# Patient Record
Sex: FEMALE | Race: WHITE | NOT HISPANIC OR LATINO | Employment: FULL TIME | URBAN - METROPOLITAN AREA
[De-identification: names, ages, dates, MRNs, and addresses within clinical notes are randomized per-mention and may not be internally consistent; named-entity substitution may affect disease eponyms.]

---

## 2018-08-07 DIAGNOSIS — M25.569 KNEE PAIN, UNSPECIFIED CHRONICITY, UNSPECIFIED LATERALITY: Primary | ICD-10-CM

## 2018-08-09 ENCOUNTER — OFFICE VISIT (OUTPATIENT)
Dept: ORTHOPEDICS | Facility: CLINIC | Age: 58
End: 2018-08-09
Payer: COMMERCIAL

## 2018-08-09 ENCOUNTER — HOSPITAL ENCOUNTER (OUTPATIENT)
Dept: RADIOLOGY | Facility: HOSPITAL | Age: 58
Discharge: HOME | End: 2018-08-09
Attending: ORTHOPAEDIC SURGERY
Payer: COMMERCIAL

## 2018-08-09 VITALS
BODY MASS INDEX: 29.19 KG/M2 | SYSTOLIC BLOOD PRESSURE: 128 MMHG | DIASTOLIC BLOOD PRESSURE: 69 MMHG | WEIGHT: 186 LBS | HEIGHT: 67 IN | HEART RATE: 89 BPM

## 2018-08-09 DIAGNOSIS — M17.12 ARTHRITIS OF KNEE, LEFT: ICD-10-CM

## 2018-08-09 DIAGNOSIS — M25.569 KNEE PAIN, UNSPECIFIED CHRONICITY, UNSPECIFIED LATERALITY: ICD-10-CM

## 2018-08-09 DIAGNOSIS — M25.561 RIGHT KNEE PAIN, UNSPECIFIED CHRONICITY: Primary | ICD-10-CM

## 2018-08-09 DIAGNOSIS — S83.241A ACUTE MEDIAL MENISCAL TEAR, RIGHT, INITIAL ENCOUNTER: Primary | ICD-10-CM

## 2018-08-09 PROCEDURE — 73564 X-RAY EXAM KNEE 4 OR MORE: CPT | Mod: 26,LT,, | Performed by: RADIOLOGY

## 2018-08-09 PROCEDURE — 99203 OFFICE O/P NEW LOW 30 MIN: CPT | Mod: S$GLB,,, | Performed by: ORTHOPAEDIC SURGERY

## 2018-08-09 PROCEDURE — 99999 PR PBB SHADOW E&M-EST. PATIENT-LVL III: CPT | Mod: PBBFAC,,, | Performed by: ORTHOPAEDIC SURGERY

## 2018-08-09 PROCEDURE — 73564 X-RAY EXAM KNEE 4 OR MORE: CPT | Mod: 26,RT,, | Performed by: RADIOLOGY

## 2018-08-09 PROCEDURE — 73564 X-RAY EXAM KNEE 4 OR MORE: CPT | Mod: TC,50,PN

## 2018-08-11 ENCOUNTER — HOSPITAL ENCOUNTER (OUTPATIENT)
Dept: RADIOLOGY | Facility: HOSPITAL | Age: 58
Discharge: HOME | End: 2018-08-11
Attending: ORTHOPAEDIC SURGERY
Payer: COMMERCIAL

## 2018-08-11 DIAGNOSIS — M25.561 RIGHT KNEE PAIN, UNSPECIFIED CHRONICITY: ICD-10-CM

## 2018-08-11 PROCEDURE — 73721 MRI JNT OF LWR EXTRE W/O DYE: CPT | Mod: TC,RT

## 2018-08-11 PROCEDURE — 73721 MRI JNT OF LWR EXTRE W/O DYE: CPT | Mod: 26,RT,, | Performed by: RADIOLOGY

## 2018-08-12 PROBLEM — M17.12 ARTHRITIS OF KNEE, LEFT: Status: ACTIVE | Noted: 2018-08-12

## 2018-08-12 NOTE — PROGRESS NOTES
History reviewed. No pertinent past medical history.    Past Surgical History:   Procedure Laterality Date    KNEE SURGERY Left 1980s       No current outpatient medications on file.     No current facility-administered medications for this visit.        Review of patient's allergies indicates:   Allergen Reactions    Ciprofloxacin     Pcn [penicillins]        History reviewed. No pertinent family history.    Social History     Socioeconomic History    Marital status: Single     Spouse name: Not on file    Number of children: Not on file    Years of education: Not on file    Highest education level: Not on file   Social Needs    Financial resource strain: Not on file    Food insecurity - worry: Not on file    Food insecurity - inability: Not on file    Transportation needs - medical: Not on file    Transportation needs - non-medical: Not on file   Occupational History    Not on file   Tobacco Use    Smoking status: Never Smoker    Smokeless tobacco: Never Used   Substance and Sexual Activity    Alcohol use: Not on file    Drug use: Not on file    Sexual activity: Not on file   Other Topics Concern    Not on file   Social History Narrative    Not on file       Chief Complaint:   Chief Complaint   Patient presents with    Right Knee - Pain    Left Knee - Pain       History of present illness:  This is a 58-year-old female seen for bilateral knee pain.  Patient just recently moved here.  She has had a history of problems with her knees for several years.  She had a torn meniscus in her right knee previously.  She last had a cortisone injection in June in the left knee. She was told that she would need left knee replacement previously.  She had an open ligament reconstruction by Dr. Covarrubias back in 1981.  Pain is a 3/10.      Review of Systems:    Constitution: Negative for chills, fever, and sweats.  Negative for unexplained weight loss.    HENT:  Negative for headaches and blurry  vision.    Cardiovascular:Negative for chest pain or irregular heart beat. Negative for hypertension.    Respiratory:  Negative for cough and shortness of breath.    Gastrointestinal: Negative for abdominal pain, heartburn, melena, nausea, and vomitting.    Genitourinary:  Negative bladder incontinence and dysuria.    Musculoskeletal:  See HPI    Neurological: Negative for numbness.    Psychiatric/Behavioral: Negative for depression.  The patient is not nervous/anxious.      Endocrine: Negative for polyuria    Hematologic/Lymphatic: Negative for bleeding problem.  Does not bruise/bleed easily.    Skin: Negative for poor would healing and rash      Physical Examination:    Vital Signs:    Vitals:    08/09/18 1326   BP: 128/69   Pulse: 89       Body mass index is 29.13 kg/m².    This a well-developed, well nourished patient in no acute distress.  They are alert and oriented and cooperative to examination.  Pt. walks without an antalgic gait.      Examination of the left knee shows no rashes or erythema. There are no masses ecchymosis or effusion.  Prior open surgical scars on both the medial and lateral knee. Patient has full range of motion from 0-130°. Patient is nontender to palpation over lateral joint line and nontender to palpation over the medial joint line. Patient has a - Lachman exam, - anterior drawer exam, and - posterior drawer exam. - Will's exam. Knee is stable to varus and valgus stress. 5 out of 5 motor strength. Palpable distal pulses. Intact light touch sensation. Negative Patellofemoral crepitus    Examination of the right knee shows no rashes or erythema. There are no masses ecchymosis or effusion. Patient has full range of motion from 0-130°. Patient is nontender to palpation over lateral joint line and nontender to palpation over the medial joint line. Patient has a - Lachman exam, - anterior drawer exam, and - posterior drawer exam. - Will's exam. Knee is stable to varus and valgus  stress. 5 out of 5 motor strength. Palpable distal pulses. Intact light touch sensation. Negative Patellofemoral crepitus        X-rays:  X-rays of both knees are ordered and reviewed which show severe lateral narrowing of the left knee.  Mild medial narrowing of the right knee.     Assessment::  Severe left knee arthritis  Possible right medial meniscal tear    Plan:  Reviewed the findings with her today. Patient would benefit from a left knee replacement at some point. Her main concern is the right knee today.  I recommended an MRI to evaluate her right knee further.  Follow-up after the right knees completed.    This note was created using [employer] voice recognition software that occasionally misinterpreted phrases or words.    Consult note is delivered via Epic messaging service.

## 2018-08-14 ENCOUNTER — TELEPHONE (OUTPATIENT)
Dept: ORTHOPEDICS | Facility: CLINIC | Age: 58
End: 2018-08-14

## 2018-08-14 NOTE — TELEPHONE ENCOUNTER
----- Message from Ata Paula MD sent at 8/12/2018  8:16 AM CDT -----  Results noted. Pt needs appt to discuss results and treatment options.

## 2018-09-10 ENCOUNTER — OFFICE VISIT (OUTPATIENT)
Dept: ORTHOPEDICS | Facility: CLINIC | Age: 58
End: 2018-09-10
Payer: COMMERCIAL

## 2018-09-10 VITALS
WEIGHT: 186.06 LBS | HEART RATE: 68 BPM | BODY MASS INDEX: 29.2 KG/M2 | SYSTOLIC BLOOD PRESSURE: 141 MMHG | HEIGHT: 67 IN | DIASTOLIC BLOOD PRESSURE: 72 MMHG

## 2018-09-10 DIAGNOSIS — R20.2 NUMBNESS AND TINGLING OF LEFT LEG: Primary | ICD-10-CM

## 2018-09-10 DIAGNOSIS — M17.12 ARTHRITIS OF KNEE, LEFT: Primary | ICD-10-CM

## 2018-09-10 DIAGNOSIS — M25.562 LEFT KNEE PAIN, UNSPECIFIED CHRONICITY: Primary | ICD-10-CM

## 2018-09-10 DIAGNOSIS — S83.241D ACUTE MEDIAL MENISCAL TEAR, RIGHT, SUBSEQUENT ENCOUNTER: ICD-10-CM

## 2018-09-10 DIAGNOSIS — R20.0 NUMBNESS AND TINGLING OF LEFT LEG: Primary | ICD-10-CM

## 2018-09-10 PROCEDURE — 99999 PR PBB SHADOW E&M-EST. PATIENT-LVL III: CPT | Mod: PBBFAC,,, | Performed by: ORTHOPAEDIC SURGERY

## 2018-09-10 PROCEDURE — 99213 OFFICE O/P EST LOW 20 MIN: CPT | Mod: S$GLB,,, | Performed by: ORTHOPAEDIC SURGERY

## 2018-09-10 NOTE — PROGRESS NOTES
History reviewed. No pertinent past medical history.    Past Surgical History:   Procedure Laterality Date    KNEE SURGERY Left 1980s       No current outpatient medications on file.     No current facility-administered medications for this visit.        Review of patient's allergies indicates:   Allergen Reactions    Ciprofloxacin     Pcn [penicillins]        History reviewed. No pertinent family history.    Social History     Socioeconomic History    Marital status: Single     Spouse name: Not on file    Number of children: Not on file    Years of education: Not on file    Highest education level: Not on file   Social Needs    Financial resource strain: Not on file    Food insecurity - worry: Not on file    Food insecurity - inability: Not on file    Transportation needs - medical: Not on file    Transportation needs - non-medical: Not on file   Occupational History    Not on file   Tobacco Use    Smoking status: Never Smoker    Smokeless tobacco: Never Used   Substance and Sexual Activity    Alcohol use: Not on file    Drug use: Not on file    Sexual activity: Not on file   Other Topics Concern    Not on file   Social History Narrative    Not on file       Chief Complaint:   Chief Complaint   Patient presents with    Right Knee - Pain       Interval history:  This is a 58-year-old female seen for bilateral knee pain.  Patient just recently moved here.  She has had a history of problems with her knees for several years.  She had a torn meniscus in her right knee previously.  She last had a cortisone injection in June in the left knee. She was told that she would need left knee replacement previously.  She had an open ligament reconstruction by Dr. Covarrubias back in 1981.  Pain is a 3/10.    History of present illness:  The right knee MRI did show a medial meniscal tear as we suspected.  Unfortunately, the left knee is bothering her even more.  Starting to have some more numbness and tingling in  the left leg.  Like feels cold times. Pain is up to 6/10.  She feels like she is much closer to doing a knee replacement.      Review of Systems:    Constitution: Negative for chills, fever, and sweats.  Negative for unexplained weight loss.    HENT:  Negative for headaches and blurry vision.    Cardiovascular:Negative for chest pain or irregular heart beat. Negative for hypertension.    Respiratory:  Negative for cough and shortness of breath.    Gastrointestinal: Negative for abdominal pain, heartburn, melena, nausea, and vomitting.    Genitourinary:  Negative bladder incontinence and dysuria.    Musculoskeletal:  See HPI    Neurological:  Positive for numbness.    Psychiatric/Behavioral: Negative for depression.  The patient is not nervous/anxious.      Endocrine: Negative for polyuria    Hematologic/Lymphatic: Negative for bleeding problem.  Does not bruise/bleed easily.    Skin: Negative for poor would healing and rash      Physical Examination:    Vital Signs:    Vitals:    09/10/18 0817   BP: (!) 141/72   Pulse: 68       Body mass index is 29.14 kg/m².    This a well-developed, well nourished patient in no acute distress.  They are alert and oriented and cooperative to examination.  Pt. walks without an antalgic gait.      Examination of the left knee shows no rashes or erythema. There are no masses ecchymosis or effusion.  Prior open surgical scars on both the medial and lateral knee. Patient has full range of motion from 0-130°. Patient is nontender to palpation over lateral joint line and nontender to palpation over the medial joint line. Patient has a - Lachman exam, - anterior drawer exam, and - posterior drawer exam. - Will's exam. Knee is stable to varus and valgus stress. 5 out of 5 motor strength. Palpable distal pulses. Intact light touch sensation. Negative Patellofemoral crepitus    Examination of the right knee shows no rashes or erythema. There are no masses ecchymosis or effusion. Patient  has full range of motion from 0-130°. Patient is nontender to palpation over lateral joint line and nontender to palpation over the medial joint line. Patient has a - Lachman exam, - anterior drawer exam, and - posterior drawer exam. - Will's exam. Knee is stable to varus and valgus stress. 5 out of 5 motor strength. Palpable distal pulses. Intact light touch sensation. Negative Patellofemoral crepitus        X-rays:  X-rays of both knees are reviewed which show severe lateral narrowing of the left knee.  Mild medial narrowing of the right knee.    MRI of the right knee: Tear of the posterior horn of the medial meniscus.  Intrasubstance degeneration of the anterior horn of the lateral meniscus without tear.  Large multilocular complex Baker cyst in the medial popliteal fossa.  Mild osteoarthritic changes with thinning of the articular cartilages.     Assessment::  Severe left knee arthritis  right medial meniscal tear    Plan:  Reviewed the findings with her today. Patient would benefit from a left knee replacement at some point. We will go ahead and get the [employer] MRI signature scan done for her left total knee replacement.  We also get a nerve conduction test of her left lower extremity.  Follow-up after the tests are complete.    This note was created using [employer] voice recognition software that occasionally misinterpreted phrases or words.    Consult note is delivered via Epic messaging service.

## 2018-09-11 ENCOUNTER — HOSPITAL ENCOUNTER (OUTPATIENT)
Dept: RADIOLOGY | Facility: HOSPITAL | Age: 58
Discharge: HOME | End: 2018-09-11
Attending: ORTHOPAEDIC SURGERY
Payer: COMMERCIAL

## 2018-09-11 ENCOUNTER — TELEPHONE (OUTPATIENT)
Dept: FAMILY MEDICINE | Facility: CLINIC | Age: 58
End: 2018-09-11

## 2018-09-11 DIAGNOSIS — M25.562 LEFT KNEE PAIN, UNSPECIFIED CHRONICITY: ICD-10-CM

## 2018-09-11 PROCEDURE — 73721 MRI JNT OF LWR EXTRE W/O DYE: CPT | Mod: TC,LT

## 2018-09-11 PROCEDURE — 73721 MRI JNT OF LWR EXTRE W/O DYE: CPT | Mod: 26,LT,, | Performed by: RADIOLOGY

## 2018-09-11 NOTE — TELEPHONE ENCOUNTER
----- Message from Mone Escalona LPN sent at 9/11/2018  9:31 AM CDT -----  Pt is wanting to schedule total knee replacement with Dr. Paula in October. She will require medical clearance prior to sx but is not established with a primary care physician. She is requesting a call to make soonest available appointment to obtain medical clearance. Thanks!

## 2018-09-12 NOTE — TELEPHONE ENCOUNTER
Pt has pre op scheduled.     ----- Message from Mone Escalona LPN sent at 9/11/2018  9:31 AM CDT -----  Pt is wanting to schedule total knee replacement with Dr. Paula in October. She will require medical clearance prior to sx but is not established with a primary care physician. She is requesting a call to make soonest available appointment to obtain medical clearance. Thanks!

## 2018-10-02 ENCOUNTER — TELEPHONE (OUTPATIENT)
Dept: ORTHOPEDICS | Facility: CLINIC | Age: 58
End: 2018-10-02

## 2018-10-02 NOTE — TELEPHONE ENCOUNTER
----- Message from Wesley Valdivia sent at 10/2/2018  1:32 PM CDT -----  Type: Needs Medical Advice    Who Called:  Self   Symptoms (please be specific): NA How long has patient had these symptoms:  ALEXY   Pharmacy name and phone #:  [facility] Call Back Number: [phone]  Additional Information: Patient called asking to speak with the nurse regarding surgery and plans.

## 2018-10-04 ENCOUNTER — HOSPITAL ENCOUNTER (OUTPATIENT)
Dept: RADIOLOGY | Facility: CLINIC | Age: 58
Discharge: HOME | End: 2018-10-04
Attending: FAMILY MEDICINE
Payer: COMMERCIAL

## 2018-10-04 ENCOUNTER — OFFICE VISIT (OUTPATIENT)
Dept: FAMILY MEDICINE | Facility: CLINIC | Age: 58
End: 2018-10-04
Payer: COMMERCIAL

## 2018-10-04 VITALS
WEIGHT: 195.56 LBS | TEMPERATURE: 98 F | BODY MASS INDEX: 30.69 KG/M2 | HEIGHT: 67 IN | SYSTOLIC BLOOD PRESSURE: 105 MMHG | DIASTOLIC BLOOD PRESSURE: 72 MMHG | HEART RATE: 73 BPM

## 2018-10-04 DIAGNOSIS — Z00.00 HEALTHCARE MAINTENANCE: Primary | ICD-10-CM

## 2018-10-04 DIAGNOSIS — B35.3 RECURRENT TINEA PEDIS: ICD-10-CM

## 2018-10-04 DIAGNOSIS — Z01.818 PRE-OP EVALUATION: ICD-10-CM

## 2018-10-04 PROCEDURE — 93005 ELECTROCARDIOGRAM TRACING: CPT | Mod: S$GLB,,, | Performed by: FAMILY MEDICINE

## 2018-10-04 PROCEDURE — 71046 X-RAY EXAM CHEST 2 VIEWS: CPT | Mod: TC,FY,PO

## 2018-10-04 PROCEDURE — 71046 X-RAY EXAM CHEST 2 VIEWS: CPT | Mod: 26,,, | Performed by: RADIOLOGY

## 2018-10-04 PROCEDURE — 93010 ELECTROCARDIOGRAM REPORT: CPT | Mod: S$GLB,,, | Performed by: INTERNAL MEDICINE

## 2018-10-04 PROCEDURE — 99204 OFFICE O/P NEW MOD 45 MIN: CPT | Mod: S$GLB,,, | Performed by: FAMILY MEDICINE

## 2018-10-04 PROCEDURE — 99999 PR PBB SHADOW E&M-EST. PATIENT-LVL III: CPT | Mod: PBBFAC,,, | Performed by: FAMILY MEDICINE

## 2018-10-04 RX ORDER — FLUCONAZOLE 150 MG/1
150 TABLET ORAL WEEKLY
Qty: 3 TABLET | Refills: 0 | Status: SHIPPED | OUTPATIENT
Start: 2018-10-04 | End: 2018-10-22

## 2018-10-04 NOTE — PROGRESS NOTES
Subjective:   Patient ID: Caryn Toledo is a 58 y.o. female     Chief Complaint:Pre-op Exam      Pt here to establish care. Has history of right knee pain. Has been going on for years. Intermittent. Pain moderate to severe. Pain does not radiate. Pain last throughout day. Made better with rest. Pt also with fungal infectoin of foot. Has been going on for months. Started after pedicure. Not improved with topical medication.      Review of Systems   Constitutional: Negative for chills and fever.   HENT: Negative for sore throat.    Eyes: Negative for visual disturbance.   Respiratory: Negative for shortness of breath.    Cardiovascular: Negative for chest pain.   Gastrointestinal: Negative for abdominal pain.   Endocrine: Negative for polyuria.   Genitourinary: Negative for dysuria.   Musculoskeletal: Positive for arthralgias. Negative for back pain.   Skin: Negative for color change.   Neurological: Negative for headaches.   Psychiatric/Behavioral: Negative for agitation and confusion.     History reviewed. No pertinent past medical history.  Objective:     Vitals:    10/04/18 1028   BP: 105/72   Pulse: 73   Temp: 98.1 °F (36.7 °C)     Body mass index is 30.63 kg/m².  Physical Exam   Constitutional: She is oriented to person, place, and time. She appears well-developed and well-nourished.   HENT:   Head: Normocephalic and atraumatic.   Eyes: EOM are normal. Pupils are equal, round, and reactive to light.   Neck: Normal range of motion. Neck supple.   Cardiovascular: Normal rate, regular rhythm, normal heart sounds and intact distal pulses.   Pulmonary/Chest: Effort normal and breath sounds normal.   Abdominal: Soft. She exhibits no distension.   Musculoskeletal: Normal range of motion.   Neurological: She is alert and oriented to person, place, and time.   Skin: Skin is warm and dry.   Psychiatric: She has a normal mood and affect. Her behavior is normal. Judgment and thought content normal.   Nursing note  and vitals reviewed.    Assessment:     1. Healthcare maintenance    2. BMI 30.0-30.9,adult    3. Pre-op evaluation    4. Recurrent tinea pedis      Plan:   Healthcare maintenance  -     Hepatitis C antibody; Future; Expected date: 10/04/2018  -     Mammo Digital Screening Bilateral With CAD; Future; Expected date: 10/04/2018    BMI 30.0-30.9,adult  -     Lipid panel; Future; Expected date: 10/04/2018  -     Hemoglobin A1c; Future; Expected date: 10/04/2018  The patient's BMI has been recorded in the chart. The patient has been provided educational materials regarding the benefits of attaining and maintaining a normal weight. We will continue to address and follow this issue during follow up visits.    Pre-op evaluation  -     CBC auto differential; Future; Expected date: 10/04/2018  -     Comprehensive metabolic panel; Future; Expected date: 10/04/2018  -     X-Ray Chest PA And Lateral; Future; Expected date: 10/04/2018  - will review labwork and imaging and if normal patient low risk for surgery  - EKG NSR ordered and performed here in clinic and interpreted by myself    Recurrent Tinea pedis of both feet  -     fluconazole (DIFLUCAN) 150 MG Tab; Take 1 tablet (150 mg total) by mouth once a week. for 21 days  Dispense: 3 tablet; Refill: 0  - recurrent        Discharge:   Time spent with patient: 45 minutes and over half of that time was spent on counseling an coordination of care.    Barriers to medications present (no )    Adverse reactions to current medications (no)    Over the counter medications reviewed (Yes)      Terrance Nolen MD  10/04/2018

## 2018-10-05 ENCOUNTER — DOCUMENTATION ONLY (OUTPATIENT)
Dept: FAMILY MEDICINE | Facility: CLINIC | Age: 58
End: 2018-10-05

## 2018-10-05 ENCOUNTER — LAB VISIT (OUTPATIENT)
Dept: LAB | Facility: HOSPITAL | Age: 58
End: 2018-10-05
Attending: FAMILY MEDICINE
Payer: COMMERCIAL

## 2018-10-05 DIAGNOSIS — Z00.00 HEALTHCARE MAINTENANCE: ICD-10-CM

## 2018-10-05 DIAGNOSIS — Z01.818 PRE-OP EVALUATION: ICD-10-CM

## 2018-10-05 LAB
ALBUMIN SERPL BCP-MCNC: 3.9 G/DL
ALP SERPL-CCNC: 70 U/L
ALT SERPL W/O P-5'-P-CCNC: 24 U/L
ANION GAP SERPL CALC-SCNC: 5 MMOL/L
AST SERPL-CCNC: 18 U/L
BASOPHILS # BLD AUTO: 0.01 K/UL
BASOPHILS NFR BLD: 0.3 %
BILIRUB SERPL-MCNC: 0.6 MG/DL
BUN SERPL-MCNC: 14 MG/DL
CALCIUM SERPL-MCNC: 9.7 MG/DL
CHLORIDE SERPL-SCNC: 110 MMOL/L
CHOLEST SERPL-MCNC: 302 MG/DL
CHOLEST/HDLC SERPL: 5.6 {RATIO}
CO2 SERPL-SCNC: 28 MMOL/L
CREAT SERPL-MCNC: 0.8 MG/DL
DIFFERENTIAL METHOD: ABNORMAL
EOSINOPHIL # BLD AUTO: 0 K/UL
EOSINOPHIL NFR BLD: 1.1 %
ERYTHROCYTE [DISTWIDTH] IN BLOOD BY AUTOMATED COUNT: 13.3 %
EST. GFR  (AFRICAN AMERICAN): >60 ML/MIN/1.73 M^2
EST. GFR  (NON AFRICAN AMERICAN): >60 ML/MIN/1.73 M^2
ESTIMATED AVG GLUCOSE: 105 MG/DL
GLUCOSE SERPL-MCNC: 92 MG/DL
HBA1C MFR BLD HPLC: 5.3 %
HCT VFR BLD AUTO: 42.4 %
HDLC SERPL-MCNC: 54 MG/DL
HDLC SERPL: 17.9 %
HGB BLD-MCNC: 13.6 G/DL
IMM GRANULOCYTES # BLD AUTO: 0.01 K/UL
IMM GRANULOCYTES NFR BLD AUTO: 0.3 %
LDLC SERPL CALC-MCNC: 216.6 MG/DL
LYMPHOCYTES # BLD AUTO: 1.3 K/UL
LYMPHOCYTES NFR BLD: 36.4 %
MCH RBC QN AUTO: 30.3 PG
MCHC RBC AUTO-ENTMCNC: 32.1 G/DL
MCV RBC AUTO: 94 FL
MONOCYTES # BLD AUTO: 0.3 K/UL
MONOCYTES NFR BLD: 7.9 %
NEUTROPHILS # BLD AUTO: 2 K/UL
NEUTROPHILS NFR BLD: 54 %
NONHDLC SERPL-MCNC: 248 MG/DL
NRBC BLD-RTO: 0 /100 WBC
PLATELET # BLD AUTO: 212 K/UL
PMV BLD AUTO: 10.8 FL
POTASSIUM SERPL-SCNC: 4.5 MMOL/L
PROT SERPL-MCNC: 6.9 G/DL
RBC # BLD AUTO: 4.49 M/UL
SODIUM SERPL-SCNC: 143 MMOL/L
TRIGL SERPL-MCNC: 157 MG/DL
WBC # BLD AUTO: 3.68 K/UL

## 2018-10-05 PROCEDURE — 36415 COLL VENOUS BLD VENIPUNCTURE: CPT | Mod: PO

## 2018-10-05 PROCEDURE — 83036 HEMOGLOBIN GLYCOSYLATED A1C: CPT

## 2018-10-05 PROCEDURE — 80053 COMPREHEN METABOLIC PANEL: CPT

## 2018-10-05 PROCEDURE — 86803 HEPATITIS C AB TEST: CPT

## 2018-10-05 PROCEDURE — 85025 COMPLETE CBC W/AUTO DIFF WBC: CPT

## 2018-10-05 PROCEDURE — 80061 LIPID PANEL: CPT

## 2018-10-08 LAB — HCV AB SERPL QL IA: NEGATIVE

## 2018-10-11 DIAGNOSIS — Z12.11 COLON CANCER SCREENING: ICD-10-CM

## 2018-10-18 ENCOUNTER — TELEPHONE (OUTPATIENT)
Dept: FAMILY MEDICINE | Facility: CLINIC | Age: 58
End: 2018-10-18

## 2018-10-18 NOTE — TELEPHONE ENCOUNTER
----- Message from Luis Alberto Cruz sent at 10/18/2018 12:22 PM CDT -----  Contact: self   Type:  Patient Returning Call    Who Called: patient   Who Left Message for Patient:  Nika   Does the patient know what this is regarding?:results   Best Call Back Number: [phone] (home)

## 2018-10-22 ENCOUNTER — OFFICE VISIT (OUTPATIENT)
Dept: ORTHOPEDICS | Facility: CLINIC | Age: 58
End: 2018-10-22
Payer: COMMERCIAL

## 2018-10-22 ENCOUNTER — HOSPITAL ENCOUNTER (OUTPATIENT)
Dept: PREADMISSION TESTING | Facility: HOSPITAL | Age: 58
Discharge: HOME | End: 2018-10-22
Attending: ORTHOPAEDIC SURGERY
Payer: COMMERCIAL

## 2018-10-22 VITALS
WEIGHT: 195 LBS | HEART RATE: 78 BPM | SYSTOLIC BLOOD PRESSURE: 129 MMHG | HEIGHT: 67 IN | DIASTOLIC BLOOD PRESSURE: 81 MMHG | BODY MASS INDEX: 30.61 KG/M2

## 2018-10-22 VITALS — BODY MASS INDEX: 30.61 KG/M2 | WEIGHT: 195 LBS | HEIGHT: 67 IN

## 2018-10-22 DIAGNOSIS — M17.12 ARTHRITIS OF KNEE, LEFT: Primary | ICD-10-CM

## 2018-10-22 DIAGNOSIS — S83.241D ACUTE MEDIAL MENISCAL TEAR, RIGHT, SUBSEQUENT ENCOUNTER: ICD-10-CM

## 2018-10-22 DIAGNOSIS — S83.241D ACUTE MEDIAL MENISCAL TEAR, RIGHT, SUBSEQUENT ENCOUNTER: Primary | ICD-10-CM

## 2018-10-22 DIAGNOSIS — M17.12 ARTHRITIS OF KNEE, LEFT: ICD-10-CM

## 2018-10-22 LAB
ABO + RH BLD: NORMAL
ANION GAP SERPL CALC-SCNC: 11 MMOL/L
BASOPHILS # BLD AUTO: 0 K/UL
BASOPHILS NFR BLD: 0.5 %
BILIRUB UR QL STRIP: NEGATIVE
BILIRUB UR QL STRIP: NEGATIVE
BLD GP AB SCN CELLS X3 SERPL QL: NORMAL
BUN SERPL-MCNC: 12 MG/DL
CALCIUM SERPL-MCNC: 9.7 MG/DL
CHLORIDE SERPL-SCNC: 107 MMOL/L
CLARITY UR: CLEAR
CLARITY UR: CLEAR
CO2 SERPL-SCNC: 25 MMOL/L
COLOR UR: YELLOW
COLOR UR: YELLOW
CREAT SERPL-MCNC: 0.9 MG/DL
DIFFERENTIAL METHOD: ABNORMAL
EOSINOPHIL # BLD AUTO: 0 K/UL
EOSINOPHIL NFR BLD: 0.4 %
ERYTHROCYTE [DISTWIDTH] IN BLOOD BY AUTOMATED COUNT: 14.2 %
EST. GFR  (AFRICAN AMERICAN): >60 ML/MIN/1.73 M^2
EST. GFR  (NON AFRICAN AMERICAN): >60 ML/MIN/1.73 M^2
GLUCOSE SERPL-MCNC: 82 MG/DL
GLUCOSE UR QL STRIP: NEGATIVE
GLUCOSE UR QL STRIP: NEGATIVE
HCT VFR BLD AUTO: 43.4 %
HGB BLD-MCNC: 14.6 G/DL
HGB UR QL STRIP: NEGATIVE
HGB UR QL STRIP: NEGATIVE
KETONES UR QL STRIP: NEGATIVE
KETONES UR QL STRIP: NEGATIVE
LEUKOCYTE ESTERASE UR QL STRIP: ABNORMAL
LEUKOCYTE ESTERASE UR QL STRIP: ABNORMAL
LYMPHOCYTES # BLD AUTO: 1.5 K/UL
LYMPHOCYTES NFR BLD: 32.9 %
MCH RBC QN AUTO: 30 PG
MCHC RBC AUTO-ENTMCNC: 33.6 G/DL
MCV RBC AUTO: 90 FL
MICROSCOPIC COMMENT: NORMAL
MONOCYTES # BLD AUTO: 0.2 K/UL
MONOCYTES NFR BLD: 5.3 %
NEUTROPHILS # BLD AUTO: 2.7 K/UL
NEUTROPHILS NFR BLD: 60.9 %
NITRITE UR QL STRIP: NEGATIVE
NITRITE UR QL STRIP: NEGATIVE
PH UR STRIP: 6 [PH] (ref 5–8)
PH UR STRIP: 6 [PH] (ref 5–8)
PLATELET # BLD AUTO: 219 K/UL
PMV BLD AUTO: 8.3 FL
POTASSIUM SERPL-SCNC: 4.1 MMOL/L
PROT UR QL STRIP: NEGATIVE
PROT UR QL STRIP: NEGATIVE
RBC # BLD AUTO: 4.85 M/UL
SODIUM SERPL-SCNC: 143 MMOL/L
SP GR UR STRIP: 1.02 (ref 1–1.03)
SP GR UR STRIP: 1.02 (ref 1–1.03)
URN SPEC COLLECT METH UR: ABNORMAL
URN SPEC COLLECT METH UR: ABNORMAL
UROBILINOGEN UR STRIP-ACNC: NEGATIVE EU/DL
UROBILINOGEN UR STRIP-ACNC: NEGATIVE EU/DL
WBC # BLD AUTO: 4.4 K/UL
WBC #/AREA URNS HPF: 1 /HPF (ref 0–5)

## 2018-10-22 PROCEDURE — 86901 BLOOD TYPING SEROLOGIC RH(D): CPT

## 2018-10-22 PROCEDURE — 99214 OFFICE O/P EST MOD 30 MIN: CPT | Mod: 57,S$GLB,, | Performed by: ORTHOPAEDIC SURGERY

## 2018-10-22 PROCEDURE — 99999 PR PBB SHADOW E&M-EST. PATIENT-LVL III: CPT | Mod: PBBFAC,,, | Performed by: ORTHOPAEDIC SURGERY

## 2018-10-22 PROCEDURE — 85025 COMPLETE CBC W/AUTO DIFF WBC: CPT

## 2018-10-22 PROCEDURE — 81000 URINALYSIS NONAUTO W/SCOPE: CPT

## 2018-10-22 PROCEDURE — 99900104 DSU ONLY-NO CHARGE-EA ADD'L HR (STAT)

## 2018-10-22 PROCEDURE — 80048 BASIC METABOLIC PNL TOTAL CA: CPT

## 2018-10-22 PROCEDURE — 99900103 DSU ONLY-NO CHARGE-INITIAL HR (STAT)

## 2018-10-22 PROCEDURE — 87081 CULTURE SCREEN ONLY: CPT

## 2018-10-22 PROCEDURE — 36415 COLL VENOUS BLD VENIPUNCTURE: CPT

## 2018-10-22 RX ORDER — CLINDAMYCIN PHOSPHATE 900 MG/50ML
900 INJECTION, SOLUTION INTRAVENOUS
Status: CANCELLED | OUTPATIENT
Start: 2018-10-22

## 2018-10-22 RX ORDER — MUPIROCIN 20 MG/G
OINTMENT TOPICAL
Status: CANCELLED | OUTPATIENT
Start: 2018-10-22

## 2018-10-22 NOTE — PRE ADMISSION SCREENING
Patient Name: Caryn Toledo  YOB: 1960   MRN: [id number]     [facility]-PAC   Basic Mobility Inpatient Short Form 6 Clicks         How much difficulty does the patient currently have  Unable  A Lot  A Little  None      1. Turning over in bed (including adjusting bedclothes, sheets and blankets)?     1 []    2 []    3 []    4 [x]        2. Sitting down on and standing up from a chair with arms (e.g., wheelchair, bedside commode, etc.)     1 []  2 []  3 []     4 [x]      3. Moving from lying on back to sitting on the side of the bed?     1 []  2 []  3 []    4 [x]    How much help from another person does the patient currently need  Total  A Lot  A Little  None      4. Moving to and from a bed to a chair (including a wheelchair)?    1 []  2 []  3 []    4 [x]      5. Need to walk in hospital room?    1 []  2 []  3 []    4 [x]      6. Climbing 3-5 steps with a railing?    1 []  2 []  3 []    4 [x]       Raw Score:      24            CMS 0-100% Score:     0       %   Standardized Score:    61.14           CMS Modifier:      [facility] AM-PAC   Basic Mobility Inpatient Short Form 6 Clicks Score Conversion Table*         *Use this form to convert -PAC Basic Mobility Inpatient Raw Scores.   [facility] Inpatient Basic Mobility Short Form Scoring Example   1. Add the number values associated with the response to each item. For example, items totals yield a Raw Score of 21.   2. Match the raw score to the t-Scale scores (t-Scale score = 50.25, SE = 4.69).   3. Find the associated CMS % (CMS % = 28.97%).   4. Locate the correct CMS Functional Modifier Code, or G Code (G code = CJ)     NOTE: Each -PAC Short Form has a separate conversion table. Make sure that you use the correct conversion table.       Instruction Manual - page 45 contains conversion table

## 2018-10-22 NOTE — OR NURSING
Patient stated she has a history of reactions to a lot of different medications.  [place] anti [place] drug that caused a reaction.  She doesn't know the name of the medication.  Called and Spoke to Dr. Le.  She said the anesthesiologist will speak to her the morning of her procedure. Elke Spence

## 2018-10-22 NOTE — PRE ADMISSION SCREENING
JOINT CAMP ASSESSMENT    Name Caryn Toledo   MRN [id number]    Age/Sex 58 y.o. female    Surgeon Dr. Ata Paula   Joint Camp Date 10/22/2018   Surgery Date 10/30/2018   Procedure Left Knee Arthroplasty   Insurance Payor: AETNA / Plan: AETNA GENERIC / Product Type: Commercial /    Care Team Patient Care Team:  Terrance Nolen MD as PCP - General (Family Medicine)  Marva Hernandez LPN as Care Coordinator (Family Medicine)  Ata Paula MD as Consulting Physician (Sports Medicine)    Pharmacy   [facility] - [place], [place] - [address]  [address] [place] [address]  Phone: [phone] Fax: [phone]     AM-PAC Score   24   Risk Assessment Score 2     No past medical history on file.    Past Surgical History:   Procedure Laterality Date    KNEE SURGERY Left 1980s         Home Enviroment     Living Arrangement: Lives with spouse  Home Environment: 1-story house/ trailer, number of outside stairs: 1, walk-in shower  Home Safety Concerns: Pets in the home: dogs (2).    DISCHARGE CAREGIVER/SUPPORT SYSTEM     Identified post-op caregiver: Patient has spouse / significant other and children / family / friends to help, mother.  Patient's caregiver(s) will be able to provide physical assistance. Patient will have someone to assist overnight.      Caregiver present at pre-op interview:  No      PRE-OPERATIVE FUNCTIONAL STATUS     Employment: Employed full time    Pre-op Functional Status: Patient is independent with mobility/ambulation, transfers, ADL's, IADL's.    Use of assistive device for ambulation: none  ADL: self care  ADL Limitations: difficulty with walking  Medical Restrictions: Decreased range of motions in extremities    POTENTIAL BARRIERS TO DISCHARGE/POTENTIAL POST-OP COMPLICATIONS     Patient with Hx of post op nausea and vomiting. Patient also states that she has unusual and severe reactions (anaphylaxis) to medications when taking them for the  1st time.      DISCHARGE PLAN     Expected LOS of 2 days or less for joint replacement discussed with patient. We also discussed a discharge path of HH for approximately one week with a transition to outpatient PT on the second week given no post-op complications.      Patient in agreement with discharge plan: Yes    Discharge to: Discharge home with home darnell (PT/OT) x1 weeks with transition to outpatient PT     HH:  [facility]     OP PT: [facility]     Home DME: none     Needed DME at D/C: rolling walker and bedside commode     Rx: Per Dr. Paula at discharge.     Meds to Beds: N/A  Patient expected to discharge on Aspirin 325mg by mouth twice daily for DVT prophylaxis.

## 2018-10-22 NOTE — PROGRESS NOTES
History reviewed. No pertinent past medical history.    Past Surgical History:   Procedure Laterality Date    KNEE SURGERY Left 1980s       No current outpatient medications on file.     No current facility-administered medications for this visit.        Review of patient's allergies indicates:   Allergen Reactions    Ciprofloxacin     Pcn [penicillins]        History reviewed. No pertinent family history.    Social History     Socioeconomic History    Marital status: Single     Spouse name: Not on file    Number of children: Not on file    Years of education: Not on file    Highest education level: Not on file   Social Needs    Financial resource strain: Not on file    Food insecurity - worry: Not on file    Food insecurity - inability: Not on file    Transportation needs - medical: Not on file    Transportation needs - non-medical: Not on file   Occupational History    Not on file   Tobacco Use    Smoking status: Never Smoker    Smokeless tobacco: Never Used   Substance and Sexual Activity    Alcohol use: Not on file    Drug use: Not on file    Sexual activity: Not on file   Other Topics Concern    Not on file   Social History Narrative    Not on file       Chief Complaint:   Chief Complaint   Patient presents with    Knee Pain     left knee-SX consents       Interval history:  This is a 58-year-old female seen for bilateral knee pain.  Patient just recently moved here.  She has had a history of problems with her knees for several years.  She had a torn meniscus in her right knee previously.  She last had a cortisone injection in June in the left knee. She was told that she would need left knee replacement previously.  She had an open ligament reconstruction by Dr. Covarrubias back in 1981.  Pain is a 3/10.    History of present illness:  The right knee MRI did show a medial meniscal tear as we suspected.  Unfortunately, the left knee is bothering her even more.  Starting to have some more  numbness and tingling in the left leg.  Like feels cold times. Pain is up to 6/10.  She feels like she is much closer to doing a knee replacement.      Review of Systems:    Constitution: Negative for chills, fever, and sweats.  Negative for unexplained weight loss.    HENT:  Negative for headaches and blurry vision.    Cardiovascular:Negative for chest pain or irregular heart beat. Negative for hypertension.    Respiratory:  Negative for cough and shortness of breath.    Gastrointestinal: Negative for abdominal pain, heartburn, melena, nausea, and vomitting.    Genitourinary:  Negative bladder incontinence and dysuria.    Musculoskeletal:  See HPI    Neurological:  Positive for numbness.    Psychiatric/Behavioral: Negative for depression.  The patient is not nervous/anxious.      Endocrine: Negative for polyuria    Hematologic/Lymphatic: Negative for bleeding problem.  Does not bruise/bleed easily.    Skin: Negative for poor would healing and rash      Physical Examination:    Vital Signs:    Vitals:    10/22/18 0902   BP: 129/81   Pulse: 78       Body mass index is 30.54 kg/m².    This a well-developed, well nourished patient in no acute distress.  They are alert and oriented and cooperative to examination.  Pt. walks without an antalgic gait.      Examination of the left knee shows no rashes or erythema. There are no masses ecchymosis or effusion.  Prior open surgical scars on both the medial and lateral knee. Patient has full range of motion from 0-130°. Patient is nontender to palpation over lateral joint line and nontender to palpation over the medial joint line. Patient has a - Lachman exam, - anterior drawer exam, and - posterior drawer exam. - Will's exam. Knee is stable to varus and valgus stress. 5 out of 5 motor strength. Palpable distal pulses. Intact light touch sensation. Negative Patellofemoral crepitus    Examination of the right knee shows no rashes or erythema. There are no masses ecchymosis  or effusion. Patient has full range of motion from 0-130°. Patient is nontender to palpation over lateral joint line and nontender to palpation over the medial joint line. Patient has a - Lachman exam, - anterior drawer exam, and - posterior drawer exam. - Will's exam. Knee is stable to varus and valgus stress. 5 out of 5 motor strength. Palpable distal pulses. Intact light touch sensation. Negative Patellofemoral crepitus    Heart is regular rate without obvious murmurs   Normal respiratory effort without audible wheezing  Abdomen is soft and nontender     X-rays:  X-rays of both knees are reviewed which show severe lateral narrowing of the left knee.  Mild medial narrowing of the right knee.    MRI of the right knee: Tear of the posterior horn of the medial meniscus.  Intrasubstance degeneration of the anterior horn of the lateral meniscus without tear.  Large multilocular complex Baker cyst in the medial popliteal fossa.  Mild osteoarthritic changes with thinning of the articular cartilages.     Assessment::  Severe left knee arthritis  right medial meniscal tear    Plan:  Reviewed the findings with her today. Patient would benefit from a left knee replacement and we will get that set up.  Plan is also for a right knee arthroscopy with partial meniscectomy.  Risks, benefits, and alternatives to the procedure were explained to the patient including but not limited to damage to nerves, arteries, blood vessels, bones, tendons, ligaments, stiffness, instability, infection, DVT, PE, as well as general anesthetic complications including seizure, stroke, heart attack and even death. The patient understood these risks and wished to proceed and signed the informed consent.       This note was created using [employer] voice recognition software that occasionally misinterpreted phrases or words.    Consult note is delivered via Epic messaging service.

## 2018-10-22 NOTE — PRE ADMISSION SCREENING
"               [facility] Risk Assessment Scale    Patient Name: Caryn Toledo  YOB: 1960  MRN: [id number]            RIsk Factor Measure Recommendation Patient Data Scale/Score   BMI >40 Reconsider surgery, weight loss   Estimated body mass index is 30.54 kg/m² as calculated from the following:    Height as of this encounter: 5' 7" (1.702 m).    Weight as of this encounter: 88.5 kg (195 lb).   [] 0 = 1 - 24.9  [] 1 = 25-29.9  [x] 2 = 30-34.9  [] 3 = 35-39.9  [] 4 = 40-44.9  [] 5 = 45-99.9   Hemoglobin AIC (if applicable) >9 Delay surgery until DM under control  Refer for:  · Nutrition Therapy  · Exercise   · Medication    Lab Results   Component Value Date    HGBA1C 5.3 10/05/2018       Lab Results   Component Value Date    GLU 82 10/22/2018      [x] 0 = 4.0-5.6  [] 1 = 5.7-6.4  [] 2 = 6.5-6.9  [] 3 = 7.0-7.9  [] 4 = 8.0-8.9  [] 5 = 9.0-12   Hemoglobin (Anemia) <9 Delay surgery   Correct anemia Lab Results   Component Value Date    HGB 14.6 10/22/2018    [] 20 - <9.0                    Albumin <3 Delay surgery &Workup Lab Results   Component Value Date    ALBUMIN 3.9 10/05/2018    [] 20 - <3.0   Smoking Cessation >4 Weeks Delay Surgery  Refer to OP Cessation Class    Never Smoker [] 20 - current smoker                                _____ PPD                    Hx of MI, PE, Arrhythmia, CVA, DVT <30 Days Delay Surgery    N/A [] 20      Infection Variable Delay surgery and re-evaluate   N/A [] 20 - recent/current infection     Depression (PHQ) >10 out of 27 Delay Surgery and re-evaluate  Medication  Counseling              [x] 0     []1     []2     []3      []4      [] 5                    (1-4)      (5-9)  (10-14)  (15-19)   (20-27)     Memory Impairment & Memory loss (Mini-Cog Screening Tool) Advanced dementia and/or Parkinson's Reconsider surgery     [x] 0     []1     []2     []3     []4     [] 5     Physical Conditioning (Modified AM-PAC Per Physical Therapy at [facility]) Unable to ambulate on day " of surgery Delay surgery and re-evaluate  Pre-Rehabilitation   (PT evaluation)       [x]  0   []4       []8     []12        []16     []20       (<20%)   (<40%)   (<60%)   (<80% )    (>80%)     Home Environment/Caregiver support  (Per /Navigator Interview)    Availability of basic services and/or approprate assistance during post-operative period Delay surgery and re-evaluate  Safe home environment  Health   1 week post-surgery  Transportation  availability  Ability to obtain DME/Medications post-op    [x] 0     []1     []2     []3     []4     [] 5  [x] 0     []1     []2     []3     []4     [] 5  [x] 0     []1     []2     []3     []4     [] 5  [x] 0     []1     []2     []3     []4     [] 5         MD Contact: Dr. Paula Comments:  Total Score:  2

## 2018-10-22 NOTE — H&P (VIEW-ONLY)
History reviewed. No pertinent past medical history.    Past Surgical History:   Procedure Laterality Date    KNEE SURGERY Left 1980s       No current outpatient medications on file.     No current facility-administered medications for this visit.        Review of patient's allergies indicates:   Allergen Reactions    Ciprofloxacin     Pcn [penicillins]        History reviewed. No pertinent family history.    Social History     Socioeconomic History    Marital status: Single     Spouse name: Not on file    Number of children: Not on file    Years of education: Not on file    Highest education level: Not on file   Social Needs    Financial resource strain: Not on file    Food insecurity - worry: Not on file    Food insecurity - inability: Not on file    Transportation needs - medical: Not on file    Transportation needs - non-medical: Not on file   Occupational History    Not on file   Tobacco Use    Smoking status: Never Smoker    Smokeless tobacco: Never Used   Substance and Sexual Activity    Alcohol use: Not on file    Drug use: Not on file    Sexual activity: Not on file   Other Topics Concern    Not on file   Social History Narrative    Not on file       Chief Complaint:   Chief Complaint   Patient presents with    Knee Pain     left knee-SX consents       Interval history:  This is a 58-year-old female seen for bilateral knee pain.  Patient just recently moved here.  She has had a history of problems with her knees for several years.  She had a torn meniscus in her right knee previously.  She last had a cortisone injection in June in the left knee. She was told that she would need left knee replacement previously.  She had an open ligament reconstruction by Dr. Covarrubias back in 1981.  Pain is a 3/10.    History of present illness:  The right knee MRI did show a medial meniscal tear as we suspected.  Unfortunately, the left knee is bothering her even more.  Starting to have some more  numbness and tingling in the left leg.  Like feels cold times. Pain is up to 6/10.  She feels like she is much closer to doing a knee replacement.      Review of Systems:    Constitution: Negative for chills, fever, and sweats.  Negative for unexplained weight loss.    HENT:  Negative for headaches and blurry vision.    Cardiovascular:Negative for chest pain or irregular heart beat. Negative for hypertension.    Respiratory:  Negative for cough and shortness of breath.    Gastrointestinal: Negative for abdominal pain, heartburn, melena, nausea, and vomitting.    Genitourinary:  Negative bladder incontinence and dysuria.    Musculoskeletal:  See HPI    Neurological:  Positive for numbness.    Psychiatric/Behavioral: Negative for depression.  The patient is not nervous/anxious.      Endocrine: Negative for polyuria    Hematologic/Lymphatic: Negative for bleeding problem.  Does not bruise/bleed easily.    Skin: Negative for poor would healing and rash      Physical Examination:    Vital Signs:    Vitals:    10/22/18 0902   BP: 129/81   Pulse: 78       Body mass index is 30.54 kg/m².    This a well-developed, well nourished patient in no acute distress.  They are alert and oriented and cooperative to examination.  Pt. walks without an antalgic gait.      Examination of the left knee shows no rashes or erythema. There are no masses ecchymosis or effusion.  Prior open surgical scars on both the medial and lateral knee. Patient has full range of motion from 0-130°. Patient is nontender to palpation over lateral joint line and nontender to palpation over the medial joint line. Patient has a - Lachman exam, - anterior drawer exam, and - posterior drawer exam. - Will's exam. Knee is stable to varus and valgus stress. 5 out of 5 motor strength. Palpable distal pulses. Intact light touch sensation. Negative Patellofemoral crepitus    Examination of the right knee shows no rashes or erythema. There are no masses ecchymosis  or effusion. Patient has full range of motion from 0-130°. Patient is nontender to palpation over lateral joint line and nontender to palpation over the medial joint line. Patient has a - Lachman exam, - anterior drawer exam, and - posterior drawer exam. - Will's exam. Knee is stable to varus and valgus stress. 5 out of 5 motor strength. Palpable distal pulses. Intact light touch sensation. Negative Patellofemoral crepitus    Heart is regular rate without obvious murmurs   Normal respiratory effort without audible wheezing  Abdomen is soft and nontender     X-rays:  X-rays of both knees are reviewed which show severe lateral narrowing of the left knee.  Mild medial narrowing of the right knee.    MRI of the right knee: Tear of the posterior horn of the medial meniscus.  Intrasubstance degeneration of the anterior horn of the lateral meniscus without tear.  Large multilocular complex Baker cyst in the medial popliteal fossa.  Mild osteoarthritic changes with thinning of the articular cartilages.     Assessment::  Severe left knee arthritis  right medial meniscal tear    Plan:  Reviewed the findings with her today. Patient would benefit from a left knee replacement and we will get that set up.  Plan is also for a right knee arthroscopy with partial meniscectomy.  Risks, benefits, and alternatives to the procedure were explained to the patient including but not limited to damage to nerves, arteries, blood vessels, bones, tendons, ligaments, stiffness, instability, infection, DVT, PE, as well as general anesthetic complications including seizure, stroke, heart attack and even death. The patient understood these risks and wished to proceed and signed the informed consent.       This note was created using [employer] voice recognition software that occasionally misinterpreted phrases or words.    Consult note is delivered via Epic messaging service.

## 2018-10-23 ENCOUNTER — OFFICE VISIT (OUTPATIENT)
Dept: PHYSICAL MEDICINE AND REHAB | Facility: CLINIC | Age: 58
End: 2018-10-23
Payer: COMMERCIAL

## 2018-10-23 DIAGNOSIS — R20.2 NUMBNESS AND TINGLING OF LEFT LEG: ICD-10-CM

## 2018-10-23 DIAGNOSIS — R20.0 NUMBNESS AND TINGLING OF LEFT LEG: ICD-10-CM

## 2018-10-23 PROCEDURE — 95908 NRV CNDJ TST 3-4 STUDIES: CPT | Mod: S$GLB,,, | Performed by: PHYSICAL MEDICINE & REHABILITATION

## 2018-10-23 PROCEDURE — 99499 UNLISTED E&M SERVICE: CPT | Mod: S$GLB,,, | Performed by: PHYSICAL MEDICINE & REHABILITATION

## 2018-10-23 PROCEDURE — 95886 MUSC TEST DONE W/N TEST COMP: CPT | Mod: S$GLB,,, | Performed by: PHYSICAL MEDICINE & REHABILITATION

## 2018-10-23 NOTE — PROGRESS NOTES
[facility]  Parisa Bolivar D.O.  Physical Medicine and Rehab  Phone: [phone]  Fax: [phone]        Patient: Nessa Toledo   Patient ID: [id number]   Sex:     Date of Birth:     Age:     Notes:     Last visit date: 10/23/2018             Visit date and time: 10/23/2018 07:14   Patient Age on Visit Date:     Referring Physician:     Diagnoses:        Leg numbness       Sensory NCS      Nerve / Sites Rec. Site Onset Lat Peak Lat Ref. NP Amp Ref. PP Amp Ref. Segments Distance Velocity     ms ms ms µV µV µV µV  cm m/s   L Sural - Ankle (Calf)      Calf Ankle 3.13 4.01 ?4.20 5.3 ?5.0 5.0 ?5.0 Calf - Ankle 14 45   L Superficial peroneal - Ankle      Lat leg Ankle 2.19 2.76 ?4.40 5.0 ?5.0 7.5 ?5.0 Lat leg - Ankle 14 64           Motor NCS      Nerve / Sites Muscle Latency Ref. Amplitude Ref. Amp % Duration Segments Distance Lat Diff Velocity Ref.     ms ms mV mV % ms  cm ms m/s m/s   L Peroneal - EDB      Ankle EDB 3.59 ?6.20 8.1 ?2.0 100 5.83 Ankle - EDB 8         Fib head EDB 10.42  6.7  82.9 6.67 Fib head - Ankle 32 6.82 47 ?39      Pop fossa EDB 11.61  6.8  84.5 6.77 Pop fossa - Fib head 10 1.20 83 ?39   L Tibial - AH      Ankle AH 3.80 ?6.00 15.3 ?3.0 100 5.36 Ankle - AH 8         Pop fossa AH 9.69  0.8  5.47 8.91 Pop fossa - Ankle 31 5.89 53 ?39           F  Wave      Nerve Fmin Ref.    ms ms   L Tibial - AH 52.92 ?58.00           EMG          EMG Summary Table     Spontaneous MUAP Recruitment   Muscle IA Fib PSW Fasc H.F. Amp Dur. PPP Pattern   L. Vastus lateralis N None None None None N N N N   L. Rectus femoris N None None None None N N N N   L. Biceps femoris (short head) N None None None None N N N N   L. Tibialis anterior N None None None None N N N N   L. Gastrocnemius (Medial head) N None None None None N N N N           Summary    The motor conduction test was normal in all 2 of the tested nerves: L Peroneal - EDB, L Tibial - AH.    The sensory conduction test was normal in all 2 of  the tested nerves: L Sural - Ankle (Calf), L Superficial peroneal - Ankle.    The F wave study was normal in all 1 of the tested nerves: L Tibial - AH.    The needle EMG study was normal in all 5 tested muscles: L. Vastus lateralis, L. Rectus femoris, L. Biceps femoris (short head), L. Tibialis anterior, L. Gastrocnemius (Medial head).              Conclusion:     Normal NCS/EMG          ____________________________  Parisa Bolivar D.O.

## 2018-10-23 NOTE — LETTER
October 23, 2018      Ata Paula MD  [address] [place] [address] [address] [place] [id number]           [facility]  [address]  [place] [address]  Phone: [phone]  Fax: [phone]          Patient: Caryn Toledo   MR Number: [id number]   YOB: 1960   Date of Visit: 10/23/2018       Dear Dr. Ata Paula:    Thank you for referring Caryn Toledo to me for evaluation. Attached you will find relevant portions of my assessment and plan of care.    If you have questions, please do not hesitate to call me. I look forward to following Caryn Toledo along with you.    Sincerely,    Parisa Bolivar,     Enclosure  CC:  No Recipients    If you would like to receive this communication electronically, please contact [email or web address] or [phone] to request more information on [employer] access.    For providers and/or their staff who would like to refer a patient to [facility], please contact us through our one-stop-shop provider referral line, [facility], at [phone].    If you feel you have received this communication in error or would no longer like to receive these types of communications, please e-mail [email or web address]

## 2018-10-24 LAB — MRSA SPEC QL CULT: NORMAL

## 2018-10-29 ENCOUNTER — ANESTHESIA EVENT (OUTPATIENT)
Dept: SURGERY | Facility: HOSPITAL | Age: 58
DRG: 470 | End: 2018-10-29
Payer: COMMERCIAL

## 2018-10-30 ENCOUNTER — ANESTHESIA (OUTPATIENT)
Dept: SURGERY | Facility: HOSPITAL | Age: 58
DRG: 470 | End: 2018-10-30
Payer: COMMERCIAL

## 2018-10-30 ENCOUNTER — HOSPITAL ENCOUNTER (INPATIENT)
Facility: HOSPITAL | Age: 58
LOS: 1 days | Discharge: HOME HEALTH | DRG: 470 | End: 2018-10-31
Attending: ORTHOPAEDIC SURGERY | Admitting: ORTHOPAEDIC SURGERY
Payer: COMMERCIAL

## 2018-10-30 DIAGNOSIS — E78.00 HIGH CHOLESTEROL: ICD-10-CM

## 2018-10-30 DIAGNOSIS — M17.12 ARTHRITIS OF KNEE, LEFT: ICD-10-CM

## 2018-10-30 DIAGNOSIS — S83.241D ACUTE MEDIAL MENISCAL TEAR, RIGHT, SUBSEQUENT ENCOUNTER: ICD-10-CM

## 2018-10-30 PROCEDURE — 25000003 PHARM REV CODE 250: Performed by: ANESTHESIOLOGY

## 2018-10-30 PROCEDURE — 36000711: Performed by: ORTHOPAEDIC SURGERY

## 2018-10-30 PROCEDURE — 29881 ARTHRS KNE SRG MNISECTMY M/L: CPT | Mod: 59,RT,, | Performed by: ORTHOPAEDIC SURGERY

## 2018-10-30 PROCEDURE — 63600175 PHARM REV CODE 636 W HCPCS: Performed by: NURSE ANESTHETIST, CERTIFIED REGISTERED

## 2018-10-30 PROCEDURE — 97530 THERAPEUTIC ACTIVITIES: CPT

## 2018-10-30 PROCEDURE — 64447 NJX AA&/STRD FEMORAL NRV IMG: CPT | Mod: 59,LT,, | Performed by: ANESTHESIOLOGY

## 2018-10-30 PROCEDURE — 76942 ECHO GUIDE FOR BIOPSY: CPT | Mod: 26,,, | Performed by: ANESTHESIOLOGY

## 2018-10-30 PROCEDURE — 94799 UNLISTED PULMONARY SVC/PX: CPT

## 2018-10-30 PROCEDURE — 27447 TOTAL KNEE ARTHROPLASTY: CPT | Mod: LT,,, | Performed by: ORTHOPAEDIC SURGERY

## 2018-10-30 PROCEDURE — 37000009 HC ANESTHESIA EA ADD 15 MINS: Performed by: ORTHOPAEDIC SURGERY

## 2018-10-30 PROCEDURE — 99900103 DSU ONLY-NO CHARGE-INITIAL HR (STAT): Performed by: ORTHOPAEDIC SURGERY

## 2018-10-30 PROCEDURE — 25000003 PHARM REV CODE 250: Performed by: ORTHOPAEDIC SURGERY

## 2018-10-30 PROCEDURE — D9220A PRA ANESTHESIA: Mod: ANES,,, | Performed by: ANESTHESIOLOGY

## 2018-10-30 PROCEDURE — C1776 JOINT DEVICE (IMPLANTABLE): HCPCS | Performed by: ORTHOPAEDIC SURGERY

## 2018-10-30 PROCEDURE — 36000710: Performed by: ORTHOPAEDIC SURGERY

## 2018-10-30 PROCEDURE — G8979 MOBILITY GOAL STATUS: HCPCS | Mod: CJ

## 2018-10-30 PROCEDURE — 63600175 PHARM REV CODE 636 W HCPCS: Performed by: ORTHOPAEDIC SURGERY

## 2018-10-30 PROCEDURE — 0SBC4ZZ EXCISION OF RIGHT KNEE JOINT, PERCUTANEOUS ENDOSCOPIC APPROACH: ICD-10-PCS | Performed by: ORTHOPAEDIC SURGERY

## 2018-10-30 PROCEDURE — 37000008 HC ANESTHESIA 1ST 15 MINUTES: Performed by: ORTHOPAEDIC SURGERY

## 2018-10-30 PROCEDURE — 99254 IP/OBS CNSLTJ NEW/EST MOD 60: CPT | Mod: ,,, | Performed by: INTERNAL MEDICINE

## 2018-10-30 PROCEDURE — 97162 PT EVAL MOD COMPLEX 30 MIN: CPT

## 2018-10-30 PROCEDURE — 99900104 DSU ONLY-NO CHARGE-EA ADD'L HR (STAT): Performed by: ORTHOPAEDIC SURGERY

## 2018-10-30 PROCEDURE — 97116 GAIT TRAINING THERAPY: CPT

## 2018-10-30 PROCEDURE — 71000033 HC RECOVERY, INTIAL HOUR: Performed by: ORTHOPAEDIC SURGERY

## 2018-10-30 PROCEDURE — S0077 INJECTION, CLINDAMYCIN PHOSP: HCPCS | Performed by: ORTHOPAEDIC SURGERY

## 2018-10-30 PROCEDURE — 27201423 OPTIME MED/SURG SUP & DEVICES STERILE SUPPLY: Performed by: ORTHOPAEDIC SURGERY

## 2018-10-30 PROCEDURE — 0SRD0J9 REPLACEMENT OF LEFT KNEE JOINT WITH SYNTHETIC SUBSTITUTE, CEMENTED, OPEN APPROACH: ICD-10-PCS | Performed by: ORTHOPAEDIC SURGERY

## 2018-10-30 PROCEDURE — 63600175 PHARM REV CODE 636 W HCPCS: Performed by: ANESTHESIOLOGY

## 2018-10-30 PROCEDURE — S0020 INJECTION, BUPIVICAINE HYDRO: HCPCS | Performed by: ANESTHESIOLOGY

## 2018-10-30 PROCEDURE — G8978 MOBILITY CURRENT STATUS: HCPCS | Mod: CK

## 2018-10-30 PROCEDURE — 27200750 HC INSULATED NEEDLE/ STIMUPLEX: Performed by: ANESTHESIOLOGY

## 2018-10-30 PROCEDURE — 64447 NJX AA&/STRD FEMORAL NRV IMG: CPT | Performed by: ANESTHESIOLOGY

## 2018-10-30 PROCEDURE — 12000002 HC ACUTE/MED SURGE SEMI-PRIVATE ROOM

## 2018-10-30 PROCEDURE — D9220A PRA ANESTHESIA: Mod: CRNA,,, | Performed by: NURSE ANESTHETIST, CERTIFIED REGISTERED

## 2018-10-30 PROCEDURE — C1713 ANCHOR/SCREW BN/BN,TIS/BN: HCPCS | Performed by: ORTHOPAEDIC SURGERY

## 2018-10-30 PROCEDURE — 71000039 HC RECOVERY, EACH ADD'L HOUR: Performed by: ORTHOPAEDIC SURGERY

## 2018-10-30 PROCEDURE — 25000003 PHARM REV CODE 250: Performed by: NURSE ANESTHETIST, CERTIFIED REGISTERED

## 2018-10-30 PROCEDURE — 63600175 PHARM REV CODE 636 W HCPCS

## 2018-10-30 DEVICE — CEMENT BONE RDPQ 40G PDR 20ML: Type: IMPLANTABLE DEVICE | Site: KNEE | Status: FUNCTIONAL

## 2018-10-30 DEVICE — TIBIAL TRAY CRUCIATE 75MM: Type: IMPLANTABLE DEVICE | Site: KNEE | Status: FUNCTIONAL

## 2018-10-30 DEVICE — INSERT TIB POST 10X71-75MM: Type: IMPLANTABLE DEVICE | Site: KNEE | Status: FUNCTIONAL

## 2018-10-30 DEVICE — PATELLA COMPONENT 3 PEG 31X8MM: Type: IMPLANTABLE DEVICE | Site: KNEE | Status: FUNCTIONAL

## 2018-10-30 DEVICE — COMP FEM POST STBL 65MM L: Type: IMPLANTABLE DEVICE | Site: KNEE | Status: FUNCTIONAL

## 2018-10-30 RX ORDER — LOPERAMIDE HYDROCHLORIDE 2 MG/1
2 CAPSULE ORAL CONTINUOUS PRN
Status: DISCONTINUED | OUTPATIENT
Start: 2018-10-30 | End: 2018-10-31 | Stop reason: HOSPADM

## 2018-10-30 RX ORDER — MUPIROCIN 20 MG/G
OINTMENT TOPICAL
Status: DISCONTINUED | OUTPATIENT
Start: 2018-10-30 | End: 2018-10-30 | Stop reason: HOSPADM

## 2018-10-30 RX ORDER — CELECOXIB 100 MG/1
400 CAPSULE ORAL ONCE
Status: DISCONTINUED | OUTPATIENT
Start: 2018-10-30 | End: 2018-10-31 | Stop reason: HOSPADM

## 2018-10-30 RX ORDER — BUPIVACAINE HYDROCHLORIDE 5 MG/ML
INJECTION, SOLUTION EPIDURAL; INTRACAUDAL
Status: COMPLETED | OUTPATIENT
Start: 2018-10-30 | End: 2018-10-30

## 2018-10-30 RX ORDER — PHENYLEPHRINE HYDROCHLORIDE 10 MG/ML
INJECTION INTRAVENOUS
Status: DISCONTINUED | OUTPATIENT
Start: 2018-10-30 | End: 2018-10-30

## 2018-10-30 RX ORDER — SODIUM CHLORIDE 0.9 % (FLUSH) 0.9 %
5 SYRINGE (ML) INJECTION
Status: DISCONTINUED | OUTPATIENT
Start: 2018-10-30 | End: 2018-10-31 | Stop reason: HOSPADM

## 2018-10-30 RX ORDER — CELECOXIB 100 MG/1
200 CAPSULE ORAL DAILY
Status: DISCONTINUED | OUTPATIENT
Start: 2018-10-31 | End: 2018-10-31 | Stop reason: HOSPADM

## 2018-10-30 RX ORDER — OXYCODONE HYDROCHLORIDE 10 MG/1
10 TABLET ORAL
Status: DISCONTINUED | OUTPATIENT
Start: 2018-10-30 | End: 2018-10-31 | Stop reason: HOSPADM

## 2018-10-30 RX ORDER — OXYCODONE HYDROCHLORIDE 5 MG/1
5 TABLET ORAL
Status: DISCONTINUED | OUTPATIENT
Start: 2018-10-30 | End: 2018-10-31 | Stop reason: HOSPADM

## 2018-10-30 RX ORDER — ONDANSETRON 2 MG/ML
4 INJECTION INTRAMUSCULAR; INTRAVENOUS DAILY PRN
Status: DISCONTINUED | OUTPATIENT
Start: 2018-10-30 | End: 2018-10-30 | Stop reason: HOSPADM

## 2018-10-30 RX ORDER — DEXTROSE MONOHYDRATE AND SODIUM CHLORIDE 5; .9 G/100ML; G/100ML
INJECTION, SOLUTION INTRAVENOUS CONTINUOUS
Status: DISCONTINUED | OUTPATIENT
Start: 2018-10-30 | End: 2018-10-31 | Stop reason: HOSPADM

## 2018-10-30 RX ORDER — ONDANSETRON 2 MG/ML
INJECTION INTRAMUSCULAR; INTRAVENOUS
Status: DISCONTINUED | OUTPATIENT
Start: 2018-10-30 | End: 2018-10-30

## 2018-10-30 RX ORDER — VANCOMYCIN HCL IN 5 % DEXTROSE 1G/250ML
1000 PLASTIC BAG, INJECTION (ML) INTRAVENOUS
Status: COMPLETED | OUTPATIENT
Start: 2018-10-30 | End: 2018-10-30

## 2018-10-30 RX ORDER — OXYCODONE HCL 10 MG/1
10 TABLET, FILM COATED, EXTENDED RELEASE ORAL EVERY 12 HOURS
Status: DISCONTINUED | OUTPATIENT
Start: 2018-10-30 | End: 2018-10-31 | Stop reason: HOSPADM

## 2018-10-30 RX ORDER — ENOXAPARIN SODIUM 100 MG/ML
40 INJECTION SUBCUTANEOUS EVERY 24 HOURS
Status: DISCONTINUED | OUTPATIENT
Start: 2018-10-30 | End: 2018-10-30

## 2018-10-30 RX ORDER — MORPHINE SULFATE 4 MG/ML
2 INJECTION, SOLUTION INTRAMUSCULAR; INTRAVENOUS
Status: DISCONTINUED | OUTPATIENT
Start: 2018-10-30 | End: 2018-10-31 | Stop reason: HOSPADM

## 2018-10-30 RX ORDER — TRANEXAMIC ACID 100 MG/ML
INJECTION, SOLUTION INTRAVENOUS
Status: DISCONTINUED | OUTPATIENT
Start: 2018-10-30 | End: 2018-10-30

## 2018-10-30 RX ORDER — PREGABALIN 75 MG/1
150 CAPSULE ORAL NIGHTLY
Status: DISCONTINUED | OUTPATIENT
Start: 2018-10-30 | End: 2018-10-31 | Stop reason: HOSPADM

## 2018-10-30 RX ORDER — SODIUM CHLORIDE, SODIUM LACTATE, POTASSIUM CHLORIDE, CALCIUM CHLORIDE 600; 310; 30; 20 MG/100ML; MG/100ML; MG/100ML; MG/100ML
INJECTION, SOLUTION INTRAVENOUS CONTINUOUS
Status: DISCONTINUED | OUTPATIENT
Start: 2018-10-30 | End: 2018-10-30

## 2018-10-30 RX ORDER — PROPOFOL 10 MG/ML
VIAL (ML) INTRAVENOUS CONTINUOUS PRN
Status: DISCONTINUED | OUTPATIENT
Start: 2018-10-30 | End: 2018-10-30

## 2018-10-30 RX ORDER — BUPIVACAINE HCL/EPINEPHRINE 0.25-.0005
VIAL (ML) INJECTION
Status: DISCONTINUED | OUTPATIENT
Start: 2018-10-30 | End: 2018-10-30 | Stop reason: HOSPADM

## 2018-10-30 RX ORDER — CLINDAMYCIN PHOSPHATE 900 MG/50ML
900 INJECTION, SOLUTION INTRAVENOUS
Status: COMPLETED | OUTPATIENT
Start: 2018-10-30 | End: 2018-10-30

## 2018-10-30 RX ORDER — PROPOFOL 10 MG/ML
VIAL (ML) INTRAVENOUS
Status: DISCONTINUED | OUTPATIENT
Start: 2018-10-30 | End: 2018-10-30

## 2018-10-30 RX ORDER — ONDANSETRON 2 MG/ML
4 INJECTION INTRAMUSCULAR; INTRAVENOUS EVERY 12 HOURS PRN
Status: DISCONTINUED | OUTPATIENT
Start: 2018-10-30 | End: 2018-10-31 | Stop reason: HOSPADM

## 2018-10-30 RX ORDER — DOCUSATE SODIUM 100 MG/1
100 CAPSULE, LIQUID FILLED ORAL EVERY 12 HOURS
Status: DISCONTINUED | OUTPATIENT
Start: 2018-10-30 | End: 2018-10-31 | Stop reason: HOSPADM

## 2018-10-30 RX ORDER — MUPIROCIN 20 MG/G
1 OINTMENT TOPICAL 2 TIMES DAILY
Status: DISCONTINUED | OUTPATIENT
Start: 2018-10-30 | End: 2018-10-31 | Stop reason: HOSPADM

## 2018-10-30 RX ORDER — SCOLOPAMINE TRANSDERMAL SYSTEM 1 MG/1
1 PATCH, EXTENDED RELEASE TRANSDERMAL
Status: DISCONTINUED | OUTPATIENT
Start: 2018-10-30 | End: 2018-10-30 | Stop reason: HOSPADM

## 2018-10-30 RX ORDER — HYDROMORPHONE HYDROCHLORIDE 2 MG/ML
0.2 INJECTION, SOLUTION INTRAMUSCULAR; INTRAVENOUS; SUBCUTANEOUS EVERY 5 MIN PRN
Status: DISCONTINUED | OUTPATIENT
Start: 2018-10-30 | End: 2018-10-30 | Stop reason: HOSPADM

## 2018-10-30 RX ORDER — MIDAZOLAM HYDROCHLORIDE 1 MG/ML
INJECTION, SOLUTION INTRAMUSCULAR; INTRAVENOUS
Status: DISCONTINUED | OUTPATIENT
Start: 2018-10-30 | End: 2018-10-30

## 2018-10-30 RX ORDER — EPINEPHRINE 1 MG/ML
INJECTION, SOLUTION INTRACARDIAC; INTRAMUSCULAR; INTRAVENOUS; SUBCUTANEOUS
Status: DISCONTINUED | OUTPATIENT
Start: 2018-10-30 | End: 2018-10-30 | Stop reason: HOSPADM

## 2018-10-30 RX ORDER — FENTANYL CITRATE 50 UG/ML
INJECTION, SOLUTION INTRAMUSCULAR; INTRAVENOUS
Status: DISCONTINUED | OUTPATIENT
Start: 2018-10-30 | End: 2018-10-30

## 2018-10-30 RX ORDER — FAMOTIDINE 20 MG/1
20 TABLET, FILM COATED ORAL 2 TIMES DAILY
Status: DISCONTINUED | OUTPATIENT
Start: 2018-10-30 | End: 2018-10-31 | Stop reason: HOSPADM

## 2018-10-30 RX ORDER — CELECOXIB 100 MG/1
200 CAPSULE ORAL ONCE
Status: COMPLETED | OUTPATIENT
Start: 2018-10-30 | End: 2018-10-30

## 2018-10-30 RX ORDER — ACETAMINOPHEN 325 MG/1
650 TABLET ORAL EVERY 4 HOURS PRN
Status: DISCONTINUED | OUTPATIENT
Start: 2018-10-30 | End: 2018-10-31 | Stop reason: HOSPADM

## 2018-10-30 RX ORDER — SODIUM CHLORIDE 0.9 % (FLUSH) 0.9 %
3 SYRINGE (ML) INJECTION
Status: DISCONTINUED | OUTPATIENT
Start: 2018-10-30 | End: 2018-10-31 | Stop reason: HOSPADM

## 2018-10-30 RX ORDER — PREGABALIN 75 MG/1
75 CAPSULE ORAL ONCE
Status: COMPLETED | OUTPATIENT
Start: 2018-10-30 | End: 2018-10-30

## 2018-10-30 RX ORDER — OXYCODONE HCL 10 MG/1
10 TABLET, FILM COATED, EXTENDED RELEASE ORAL ONCE
Status: COMPLETED | OUTPATIENT
Start: 2018-10-30 | End: 2018-10-30

## 2018-10-30 RX ORDER — ACETAMINOPHEN 10 MG/ML
1000 INJECTION, SOLUTION INTRAVENOUS EVERY 8 HOURS
Status: DISCONTINUED | OUTPATIENT
Start: 2018-10-30 | End: 2018-10-30 | Stop reason: HOSPADM

## 2018-10-30 RX ORDER — ENOXAPARIN SODIUM 100 MG/ML
40 INJECTION SUBCUTANEOUS EVERY 24 HOURS
Status: DISCONTINUED | OUTPATIENT
Start: 2018-10-30 | End: 2018-10-31 | Stop reason: HOSPADM

## 2018-10-30 RX ORDER — LIDOCAINE HYDROCHLORIDE 10 MG/ML
1 INJECTION, SOLUTION EPIDURAL; INFILTRATION; INTRACAUDAL; PERINEURAL ONCE
Status: DISCONTINUED | OUTPATIENT
Start: 2018-10-30 | End: 2018-10-30 | Stop reason: HOSPADM

## 2018-10-30 RX ADMIN — PREGABALIN 150 MG: 75 CAPSULE ORAL at 08:10

## 2018-10-30 RX ADMIN — MUPIROCIN 1 G: 20 OINTMENT TOPICAL at 03:10

## 2018-10-30 RX ADMIN — VANCOMYCIN HYDROCHLORIDE 1000 MG: 1 INJECTION, POWDER, LYOPHILIZED, FOR SOLUTION INTRAVENOUS at 03:10

## 2018-10-30 RX ADMIN — PHENYLEPHRINE HYDROCHLORIDE 40 MCG: 10 INJECTION INTRAVENOUS at 09:10

## 2018-10-30 RX ADMIN — PROPOFOL 30 MG: 10 INJECTION, EMULSION INTRAVENOUS at 08:10

## 2018-10-30 RX ADMIN — SCOPOLAMINE 1 PATCH: 1 PATCH, EXTENDED RELEASE TRANSDERMAL at 07:10

## 2018-10-30 RX ADMIN — BUPIVACAINE HYDROCHLORIDE 2.5 ML: 5 INJECTION, SOLUTION EPIDURAL; INTRACAUDAL; PERINEURAL at 08:10

## 2018-10-30 RX ADMIN — PREGABALIN 75 MG: 75 CAPSULE ORAL at 07:10

## 2018-10-30 RX ADMIN — PROPOFOL 75 MCG/KG/MIN: 10 INJECTION, EMULSION INTRAVENOUS at 08:10

## 2018-10-30 RX ADMIN — OXYCODONE HYDROCHLORIDE 10 MG: 10 TABLET, FILM COATED, EXTENDED RELEASE ORAL at 07:10

## 2018-10-30 RX ADMIN — DEXTROSE AND SODIUM CHLORIDE: 5; .9 INJECTION, SOLUTION INTRAVENOUS at 11:10

## 2018-10-30 RX ADMIN — MUPIROCIN: 20 OINTMENT TOPICAL at 07:10

## 2018-10-30 RX ADMIN — DOCUSATE SODIUM 100 MG: 100 CAPSULE, LIQUID FILLED ORAL at 08:10

## 2018-10-30 RX ADMIN — CLINDAMYCIN PHOSPHATE 900 MG: 18 INJECTION, SOLUTION INTRAVENOUS at 08:10

## 2018-10-30 RX ADMIN — MIDAZOLAM 2 MG: 1 INJECTION INTRAMUSCULAR; INTRAVENOUS at 07:10

## 2018-10-30 RX ADMIN — BUPIVACAINE HYDROCHLORIDE 20 ML: 5 INJECTION, SOLUTION EPIDURAL; INTRACAUDAL; PERINEURAL at 07:10

## 2018-10-30 RX ADMIN — FAMOTIDINE 20 MG: 20 TABLET ORAL at 08:10

## 2018-10-30 RX ADMIN — OXYCODONE HYDROCHLORIDE 5 MG: 5 TABLET ORAL at 06:10

## 2018-10-30 RX ADMIN — CELECOXIB 200 MG: 100 CAPSULE ORAL at 07:10

## 2018-10-30 RX ADMIN — FENTANYL CITRATE 50 MCG: 50 INJECTION, SOLUTION INTRAMUSCULAR; INTRAVENOUS at 07:10

## 2018-10-30 RX ADMIN — ONDANSETRON 4 MG: 2 INJECTION, SOLUTION INTRAMUSCULAR; INTRAVENOUS at 09:10

## 2018-10-30 RX ADMIN — FENTANYL CITRATE 50 MCG: 50 INJECTION, SOLUTION INTRAMUSCULAR; INTRAVENOUS at 08:10

## 2018-10-30 RX ADMIN — MUPIROCIN 1 G: 20 OINTMENT TOPICAL at 08:10

## 2018-10-30 RX ADMIN — TRANEXAMIC ACID 1000 MG: 100 INJECTION, SOLUTION INTRAVENOUS at 08:10

## 2018-10-30 RX ADMIN — PROMETHAZINE HYDROCHLORIDE 6.25 MG: 25 INJECTION INTRAMUSCULAR; INTRAVENOUS at 11:10

## 2018-10-30 RX ADMIN — SODIUM CHLORIDE, SODIUM LACTATE, POTASSIUM CHLORIDE, AND CALCIUM CHLORIDE: .6; .31; .03; .02 INJECTION, SOLUTION INTRAVENOUS at 07:10

## 2018-10-30 RX ADMIN — OXYCODONE HYDROCHLORIDE 10 MG: 10 TABLET, FILM COATED, EXTENDED RELEASE ORAL at 08:10

## 2018-10-30 RX ADMIN — ENOXAPARIN SODIUM 40 MG: 100 INJECTION SUBCUTANEOUS at 05:10

## 2018-10-30 NOTE — TRANSFER OF CARE
"Anesthesia Transfer of Care Note    Patient: Caryn Toledo    Procedure(s) Performed: Procedure(s) (LRB):  ARTHROPLASTY, KNEE (Left)  ARTHROSCOPY, KNEE, WITH MENISCECTOMY (Right)    Patient location: PACU    Anesthesia Type: spinal and MAC    Transport from OR: Transported from OR on 2-3 L/min O2 by NC with adequate spontaneous ventilation    Post pain: adequate analgesia    Post assessment: no apparent anesthetic complications    Post vital signs: stable    Level of consciousness: awake, alert and oriented    Nausea/Vomiting: no nausea/vomiting    Complications: none    Transfer of care protocol was followed      Last vitals:   Visit Vitals  BP (!) 108/51 (BP Location: Left arm, Patient Position: Lying)   Pulse 65   Temp 36.6 °C (97.9 °F) (Temporal)   Resp 18   Ht 5' 7" (1.702 m)   Wt 88.5 kg (195 lb)   SpO2 (!) 92%   Breastfeeding? No   BMI 30.54 kg/m²     "

## 2018-10-30 NOTE — PT/OT/SLP EVAL
Physical Therapy Evaluation    Patient Name:  Caryn Toledo   MRN:  [id number]    Recommendations:     Discharge Recommendations:  home health PT   Discharge Equipment Recommendations: walker, rolling   Barriers to discharge: None    Assessment:     Caryn Toledo is a 58 y.o. female admitted with a medical diagnosis of Acute medial meniscal tear, right, subsequent encounter.  She presents with the following impairments/functional limitations:  weakness, impaired endurance, impaired functional mobilty, decreased lower extremity function, gait instability, impaired self care skills, orthopedic precautions . Pt seen po day0, and did well with gait training at hallways. Pt should progress well with PT.    Rehab Prognosis:  good; patient would benefit from acute skilled PT services to address these deficits and reach maximum level of function.      Recent Surgery: Procedure(s) (LRB):  ARTHROPLASTY, KNEE (Left)  ARTHROSCOPY, KNEE, WITH MENISCECTOMY (Right) Day of Surgery    Plan:     During this hospitalization, patient to be seen BID to address the above listed problems via gait training, therapeutic activities, therapeutic exercises  · Plan of Care Expires:  11/09/18   Plan of Care Reviewed with: patient, mother    Subjective     Communicated with nurse Leung prior to session.  Patient found supine upon PT entry to room, agreeable to evaluation.    PT attempted 2x this pm- sleepy at first attempt  Second attempt, awake interactive and wanting to use bathroom  Mom from [place] at bedside and assisting with care    Chief Complaint: a little pain behind knee  Patient comments/goals: get home soon  Pain/Comfort:  · Pain Rating 1: 4/10  · Location - Side 1: Left  · Location 1: knee  · Pain Addressed 1: Reposition, Distraction    Patients cultural, spiritual, [employer]ology conflicts given the current situation:      Living Environment:  Home with SO  Prior to admission, patients level of function was independent.   Patient has the following equipment: none.  DME owned (not currently used): .  Upon discharge, patient will have assistance from SO.    Objective:     Patient found with: cryotherapy, SCD, peripheral IV     General Precautions: Standard, fall   Orthopedic Precautions:LLE weight bearing as tolerated, RLE weight bearing as tolerated   Braces: N/A     Exams:  · RLE ROM: WFL  · RLE Strength: Deficits: 90 degrees flexion- post scope  · LLE ROM: LK flexion 90 degrees  · LLE Strength: 3+/5    Functional Mobility:  · Bed Mobility:     · Rolling Right: minimum assistance  · Supine to Sit: minimum assistance  · Transfers:     · Sit to Stand:  minimum assistance with rolling walker  · Toilet Transfer: minimum assistance with  rolling walker  using  Stand Pivot  · Gait: 150ft with RW min assist    AM-PAC 6 CLICK MOBILITY  Total Score:16       Therapeutic Activities and Exercises:   bathroom use to void, to sink to wash hands. Pt ambulated at hallways with RW  OOB to chair with all needs within reach    Patient left up in chair with all lines intact, call button in reach and nurse Xochitl notified.    GOALS:   Multidisciplinary Problems     Physical Therapy Goals        Problem: Physical Therapy Goal    Goal Priority Disciplines Outcome Goal Variances Interventions   Physical Therapy Goal     PT, PT/OT Ongoing (interventions implemented as appropriate)     Description:  Goals to be met by: 2018     Patient will increase functional independence with mobility by performin. Supine to sit with Stand-by Assistance  2. Sit to stand transfer with Contact Guard Assistance  3. Bed to chair transfer with Contact Guard Assistance using Rolling Walker  4. Gait  x 250x2 feet with Contact Guard Assistance using Rolling Walker.   5. Lower extremity exercise program x20 reps per handout, with assistance as needed                      History:     Past Medical History:   Diagnosis Date    High cholesterol     PONV (postoperative  nausea and vomiting)        Past Surgical History:   Procedure Laterality Date    KNEE SURGERY Left 1980s       Clinical Decision Making:     History  Co-morbidities and personal factors that may impact the plan of care Examination  Body Structures and Functions, activity limitations and participation restrictions that may impact the plan of care Clinical Presentation   Decision Making/ Complexity Score   Co-morbidities:   [] Time since onset of injury / illness / exacerbation  [] Status of current condition  []Patient's cognitive status and safety concerns    [] Multiple Medical Problems (see med hx)  Personal Factors:   [] Patient's age  [] Prior Level of function   [] Patient's home situation (environment and family support)  [] Patient's level of motivation  [] Expected progression of patient      HISTORY:(criteria)    [] 36078 - no personal factors/history    [] 33347 - has 1-2 personal factor/comorbidity     [] 33335 - has >3 personal factor/comorbidity     Body Regions:  [] Objective examination findings  [] Head     []  Neck  [] Trunk   [] Upper Extremity  [] Lower Extremity    Body Systems:  [] For communication ability, affect, cognition, language, and learning style: the assessment of the ability to make needs known, consciousness, orientation (person, place, and time), expected emotional /behavioral responses, and learning preferences (eg, learning barriers, education  needs)  [] For the neuromuscular system: a general assessment of gross coordinated movement (eg, balance, gait, locomotion, transfers, and transitions) and motor function  (motor control and motor learning)  [] For the musculoskeletal system: the assessment of gross symmetry, gross range of motion, gross strength, height, and weight  [] For the integumentary system: the assessment of pliability(texture), presence of scar formation, skin color, and skin integrity  [] For cardiovascular/pulmonary system: the assessment of heart rate,  respiratory rate, blood pressure, and edema     Activity limitations:    [] Patient's cognitive status and saf ety concerns          [] Status of current condition      [] Weight bearing restriction  [] Cardiopulmunary Restriction    Participation Restrictions:   [] Goals and goal agreement with the patient     [] Rehab potential (prognosis) and probable outcome      Examination of Body System: (criteria)    [] 62112 - addressing 1-2 elements    [] 41191 - addressing a total of 3 or more elements     [] 19109 -  Addressing a total of 4 or more elements         Clinical Presentation: (criteria)  Choose one     On examination of body system using standardized tests and measures patient presents with (CHOOSE ONE) elements from any of the following: body structures and functions, activity limitations, and/or participation restrictions.  Leading to a clinical presentation that is considered (CHOOSE ONE)                              Clinical Decision Making  (Eval Complexity):  Choose One     Time Tracking:     PT Received On: 10/30/18  PT Start Time: 1523     PT Stop Time: 1547  PT Total Time (min): 24 min     Billable Minutes: Evaluation 8, Gait Training 8 and Therapeutic Activity 8      Candice Vila, PT  10/30/2018

## 2018-10-30 NOTE — ANESTHESIA PROCEDURE NOTES
Peripheral    Patient location during procedure: pre-op   Block not for primary anesthetic.  Reason for block: at surgeon's request and post-op pain management   Post-op Pain Location: knee left  Start time: 10/30/2018 7:26 AM  Timeout: 10/30/2018 7:25 AM   End time: 10/30/2018 7:30 AM  Surgery related to: TKA left  Staffing  Anesthesiologist: Frankie Zhao MD  Performed: anesthesiologist   Preanesthetic Checklist  Completed: patient identified, site marked, surgical consent, pre-op evaluation, timeout performed, IV checked, risks and benefits discussed and monitors and equipment checked  Peripheral Block  Patient position: supine  Prep: ChloraPrep  Patient monitoring: heart rate, cardiac monitor, continuous pulse ox, continuous capnometry and frequent blood pressure checks  Block type: adductor canal  Laterality: left  Injection technique: single shot  Needle  Needle type: Stimuplex   Needle gauge: 21 G  Needle length: 4 in  Needle localization: anatomical landmarks and ultrasound guidance   -ultrasound image captured on disc.  Assessment  Injection assessment: negative aspiration, negative parasthesia and local visualized surrounding nerve  Paresthesia pain: none  Heart rate change: no  Slow fractionated injection: yes  Additional Notes  VSS.  [facility] RN monitoring vitals throughout procedure.  Patient tolerated procedure well.     Exparel 10 cc in bup. 0.5% 10 cc mixture

## 2018-10-30 NOTE — PLAN OF CARE
`Pt. AAOx4, calm and cooperative.  VS wnl, pt. States her sinus bradycardia is normal for her.  Pt. Has hx. Of PONV and she discussed this with Dr. Canales and scopolamine patch ordered and placed behind left ear.  RN reviewed plan of care with pt. Who verbalized understanding.  Questions answered, comfort assured.  Prepared for surgery.

## 2018-10-30 NOTE — ANESTHESIA PROCEDURE NOTES
Spinal    Diagnosis: DJD knee left  Patient location during procedure: OR  Start time: 10/30/2018 8:34 AM  Timeout: 10/30/2018 8:34 AM  End time: 10/30/2018 8:38 AM  Staffing  Anesthesiologist: Frankie Zhao MD  Performed: anesthesiologist   Spinal Block  Patient position: sitting  Prep: ChloraPrep  Patient monitoring: cardiac monitor and continuous pulse ox  Approach: midline  Location: L3-4  Injection technique: single shot  CSF Fluid: clear free-flowing CSF  Needle  Needle type: pencil-tip   Needle gauge: 25 G  Needle length: 3.5 in  Additional Documentation: incremental injection, negative aspiration for heme and no paresthesia on injection  Needle localization: anatomical landmarks  Assessment  Sensory level: T7   Dermatomal levels determined by alcohol wipe  Ease of block: easy  Patient's tolerance of the procedure: comfortable throughout block and no complaints  Additional Notes  Isobaric bup used

## 2018-10-30 NOTE — OP NOTE
[facility]  Orthopedic Surgery  Operative Note    SUMMARY     Date of Procedure: 10/30/2018     Procedure: Procedure(s) (LRB):  ARTHROPLASTY, KNEE (Left)  ARTHROSCOPY, KNEE, WITH partial medial MENISCECTOMY (Right)       Surgeon(s) and Role:     * Ata Paula MD - Primary    Assistant: Lemuel Yanez    Pre-Operative Diagnosis: Acute medial meniscal tear, right, subsequent encounter [S83.241D]  Arthritis of knee, left [M17.12]    Post-Operative Diagnosis: Post-Op Diagnosis Codes:     * Acute medial meniscal tear, right, subsequent encounter [S83.241D]     * Arthritis of knee, left [M17.12]      Diagnostic findings of the right knee:  Complete arthroscopy was performed the patient's right knee.  Patient had a degenerative posterior horn medial meniscal tear.  There is a large horizontal component.  The undersurface leaflet was resected. Lateral meniscus was intact.  Patient's ACL PCL were intact.  Minimal arthritic changes were noted of the patellofemoral joint and medial femoral condyle.    Anesthesia: General    Complications: No    Estimated Blood Loss (EBL): 20Ml           Implants:   Implant Name Type Inv. Item Serial No.  Lot No. LRB No. Used   CEMENT BONE RDPQ 40G PDR 20ML - [id number]  CEMENT BONE RDPQ 40G PDR 20ML  [employer] [id number] Left 2   PATELLA COMPONENT 3 PEG 31X8MM - [id number]  PATELLA COMPONENT 3 PEG 31X8MM  [employer] [id number] Left 1   COMP FEM POST STBL 65MM L - [id number]  COMP FEM POST STBL 65MM L  [employer] [id number] Left 1   TIBIAL TRAY CRUCIATE 75MM - [id number]  TIBIAL TRAY CRUCIATE 75MM  [employer] [id number] Left 1   INSERT TIB POST 10X71-75MM - [id number]  INSERT TIB POST 10X71-75MM  [employer] [id number] Left 1       Tourniquet time: 45min at 300mmHg on left  8min on right    Specimens:   Specimen (12h ago, onward)    None                  Condition: Good    Disposition: PACU - hemodynamically stable.    Attestation: I was present and scrubbed for the  entire procedure.    INDICATIONS FOR THE PROCEDURE: A 58 female with a history of chronic   Left knee arthritis, had failed all conservative measures including cortisone   injections, PT, viscosupplementation and activity modification. After a long   discussion, the patient wished to proceed with the procedure above. She also had right knee pain and an MRI findings showing a medial meniscal tear. Patient wanted to recover from both at the same time.    PROCEDURE IN DETAIL: Risks, benefits and alternatives of the procedure were   explained to the patient including, but not limited to damage to nerves,   arteries or blood vessels. Also explained risk of infection, stiffness, DVT, PE,   polyethylene wear as well as anesthetic complications including seizure, stroke,   heart attack and death, understood this and signed informed consent. The   patient's Left knee was marked prior to coming to the Operating Room. The patient was brought to the operating room, placed on the operating table in a supine position.A  formal timeout was done in which correct patient, procedure and op site were all   correctly identified and confirmed by the entire operating team.  900 of clindamycin was given prior to surgical incision. Spinal anesthesia was induced. The patient'sBilateral  lower extremity was prepped and   draped in normal sterile fashion. The right  leg was exsanguinated with an   Esmarch. Tourniquet was inflated up 300 mmHg.  Standard anterior lateral portal was made with an 11 blade. Camera and arthroscopy was inserted into the knee.Standard inferior lateral portal was then made. A spinal needle was   used to localize an inferior medial portal and this was made under direct   arthroscopic visualization. Diagnostic arthroscopy was then performed with the   findings listed above. Shaver was used to remove redundant fat pad and   Synovium within the notch. A combination of arthroscopic biters and 3.5mm full radius shaver was  used to perform a partial menisectomy back to stable healthy appearing tissue. Camera was removed. Portals were closed using 3 O nylon in simple interrupted fashion.  Sterile dressing was applied. We then turned our attention to the left knee.      Left leg was exsanguinated with an Esmarch and tourniquet was inflated up to 300 millimeters of mercury.  Standard anterior approach to   the knee was made. Medial parapatellar arthrotomy was made, leaving about 1/8   inch of tissue for later repair. Proximal medial tibial release was performed,   making sure not to release any of the MCL. We then   everted the patella and reflexed the knee. Fat pad was excised as well as some   of the ACL and PCL. Soft tissue off the distal anterior femur was removed for   visualization, so as to help prevent notching.  The signature block was then locked into the femur and matched the mold.  We then pinned this into place.  Distal femoral cutting block was then placed and using the +3 slot our distal femoral cut was made. After   this was done, we turned our attention to the tibia.  We then used our signature block on the tibia and this locked into place very well as well. We then placed the tibial cutting block on. A tibial cut   was then made. We then checked our extension gap, a 10-spacer was able to be placed.  We then turned our attention back to the femur.  We then use the previous holes drilled from the femoral cutting block 1 and placed our 65 femoral cutting block into place.  Four in 1 block for the size 65 block was then placed and the 4 in 1 cuts were made. We then   checked posteriorly and removed posterior femoral osteophytes.  We then used the gap  to make sure that a 10 spacer good fit both in flexion and extension. Varus and valgus balancing was then checked as well.  We then placed our box cutting guide on and used the saw and chisel to make our box cut. We   decided to trial. Trial components were placed with a  10 meniscal bearing. The   patient had good balancing again in varus valgus in both flexion and extension.   We turned our attention to patella, caliper was used on the patella where it   measured 25. We set guide at 17 and made our 8-mm cut for polyethylene component, 31 was selected. Then drill holes were placed and the patellar button was placed.   This had good tracking. No need for a lateral release and with no hand tests,   it stayed centered the whole time. We then marked our tibial rotation.  We then   removed everything except the size 75 tibial tray. We then checked our tibial tray   with a drop kevin again and then marked our rotation and pinned it into place then   drilled, and then punched for our keel. We then started preparing final   components on the back table. Posterior capsule was injected with our local   Cocktail. Bony surfaces   copiously irrigated with Pulsavac and then thoroughly dried. Once the cement   was the appropriate consistency, first the tibia and then the femur were   cemented into place, removing excess cement. A 10 trial was placed. The knee   was held in compression and then the patella was placed. Cement was allowed to   cure. Once the cement was cured, we then removed excess cement. Again trialed   one final time, again seeing a 10. We then tapped into place the   final 10 E poly meniscal bearing into place. After this was done, we then did our   diluted iodine soak for 3 minutes and then proceeded with closing.  Arthrotomy was closed using our StrataFix.   Subcutaneous tissue was closed using 2-0 Vicryl and skin was using a running 3-0   StrataFix and Dermabond.Instrument, sponge, and needle counts were correct prior to wound closure and at the conclusion of the case.  Sterile dressing was applied including a Cryo/Cuff.   They were extubated, awakened and transferred from the Operating Room to the   Recovery Room in stable condition.

## 2018-10-30 NOTE — INTERVAL H&P NOTE
The patient has been examined and the H&P has been reviewed:    I concur with the findings and no changes have occurred since H&P was written.    Anesthesia/Surgery risks, benefits and alternative options discussed and understood by patient/family.          Active Hospital Problems    Diagnosis  POA    Acute medial meniscal tear, right, subsequent encounter [S83.356B]  Not Applicable      Resolved Hospital Problems   No resolved problems to display.

## 2018-10-30 NOTE — PLAN OF CARE
Problem: Physical Therapy Goal  Goal: Physical Therapy Goal  Goals to be met by: 2018     Patient will increase functional independence with mobility by performin. Supine to sit with Stand-by Assistance  2. Sit to stand transfer with Contact Guard Assistance  3. Bed to chair transfer with Contact Guard Assistance using Rolling Walker  4. Gait  x 250x2 feet with Contact Guard Assistance using Rolling Walker.   5. Lower extremity exercise program x20 reps per handout, with assistance as needed    Outcome: Ongoing (interventions implemented as appropriate)  PT eval and treat completed. Gait training with RW  150ft min assist, WBAT LLE. Bathroom use to void, to sink to wash hands and OOB to chair

## 2018-10-30 NOTE — PLAN OF CARE
10/30/18 1821   Incentive Spirometer   $ Incentive Spirometer Charges done with encouragement   Administration (Incentive Spirometer) done with encouragement   Number of Repetitions (Incentive Spirometer) 10   Level (mL) (Incentive Spirometer) 2000   Patient Tolerance good

## 2018-10-30 NOTE — ANESTHESIA POSTPROCEDURE EVALUATION
"Anesthesia Post Evaluation    Patient: Caryn Toledo    Procedure(s) Performed: Procedure(s) (LRB):  ARTHROPLASTY, KNEE (Left)  ARTHROSCOPY, KNEE, WITH MENISCECTOMY (Right)    Final Anesthesia Type: spinal  Patient location during evaluation: PACU  Patient participation: Yes- Able to Participate  Level of consciousness: awake and alert  Post-procedure vital signs: reviewed and stable  Pain management: adequate  Airway patency: patent  PONV status at discharge: No PONV  Anesthetic complications: no      Cardiovascular status: blood pressure returned to baseline  Respiratory status: unassisted  Hydration status: euvolemic  Follow-up not needed.        Visit Vitals  /61 (BP Location: Left arm, Patient Position: Lying)   Pulse (!) 49   Temp 36.1 °C (97 °F) (Oral)   Resp 16   Ht 5' 7" (1.702 m)   Wt 88.5 kg (195 lb)   SpO2 97%   Breastfeeding? No   BMI 30.54 kg/m²       Pain/Glenys Score: Pain Assessment Performed: Yes (10/30/2018 12:00 PM)  Presence of Pain: denies (10/30/2018 12:00 PM)  Pain Rating Prior to Med Admin: 5 (10/30/2018  7:17 AM)  Glenys Score: 10 (10/30/2018 11:30 AM)        "

## 2018-10-30 NOTE — CONSULTS
PCP: Terrance Nolen MD    Medical Consult    Reason for consult: Medical Management    History of Present Illness:  Patient is a 58 y.o. female s/p right total knee arthroplasty by Dr. Paula. Patient has PMH significant for hyperlipidemia and history of knee pain. Post-operatively, patient is doing well. Patient denied chest pain, shortness of breath, abdominal pain, nausea, vomiting, headache, vision changes, focal neuro-deficits, cough or fever.    Past Medical History:   Diagnosis Date    High cholesterol     PONV (postoperative nausea and vomiting)      Past Surgical History:   Procedure Laterality Date    KNEE SURGERY Left 1980s     History reviewed. No pertinent family history.  Social History     Tobacco Use    Smoking status: Never Smoker    Smokeless tobacco: Never Used   Substance Use Topics    Alcohol use: Yes     Comment: occasionally    Drug use: No      Review of patient's allergies indicates:   Allergen Reactions    Ciprofloxacin Anaphylaxis     paralysis    Pcn [penicillins] Anaphylaxis     paralysis    Eggplant Blisters    Eggs [egg derived]      Inside of mouth peel      Tomato (solanum lycopersicum) Blisters     No medications prior to admission.     Review of Systems:  Constitutional: no fever or chills  Eyes: no visual changes  Ears, nose, mouth, throat, and face: no nasal congestion or sore throat  Respiratory: no cough or shorness of breath  Cardiovascular: no chest pain or palpitations  Gastrointestinal: no nausea or vomiting, no abdominal pain or change in bowel habits  Genitourinary: no hematuria or dysuria  Integument/breast: no rash or pruritis  Hematologic/lymphatic: no easy bruising or lymphadenopathy  Musculoskeletal: see HPI  Neurological: no seizures or tremors.  Behavioral/Psych: no auditory or visual hallucinations  Endocrine: no heat or cold intolerance     OBJECTIVE:     Vital Signs (Most Recent)  Temp: 98.5 °F (36.9 °C) (10/30/18 1700)  Pulse: 60 (10/30/18  1700)  Resp: 16 (10/30/18 1700)  BP: 99/69 (10/30/18 1700)  SpO2: 97 % (10/30/18 1700)    Physical Exam:  General appearance: well developed, appears stated age  Head: normocephalic, atraumatic  Eyes:  conjunctivae/corneas clear. PERRL.  Nose: Nares normal. Septum midline.  Throat: lips, mucosa, and tongue normal; teeth and gums normal, no throat erythema.  Neck: supple, symmetrical, trachea midline, no JVD and thyroid not enlarged, symmetric, no tenderness/mass/nodules  Lungs:  clear to auscultation bilaterally and normal respiratory effort  Chest wall: no tenderness  Heart: regular rate and rhythm, S1, S2 normal, no murmur, click, rub or gallop  Abdomen: soft, non-tender non-distented; bowel sounds normal; no masses,  no organomegaly  Extremities: no cyanosis, clubbing or edema. Right knee dressing C/D/I.  Pulses: 2+ and symmetric  Skin: Skin color, texture, turgor normal. No rashes or lesions.  Lymph nodes: Cervical, supraclavicular, and axillary nodes normal.  Neurologic: Normal strength and tone. No focal numbness or weakness. CNII-XII intact.      Laboratory:   CBC: No results for input(s): WBC, RBC, HGB, HCT, PLT, MCV, MCH, MCHC in the last 168 hours.  CMP: No results for input(s): GLU, CALCIUM, ALBUMIN, PROT, NA, K, CO2, CL, BUN, CREATININE, ALKPHOS, ALT, AST, BILITOT in the last 168 hours.    Hemoglobin A1C   Date Value Ref Range Status   10/05/2018 5.3 4.0 - 5.6 % Final     Comment:     ADA Screening Guidelines:  5.7-6.4%  Consistent with prediabetes  >or=6.5%  Consistent with diabetes  High levels of fetal hemoglobin interfere with the HbA1C  assay. Heterozygous hemoglobin variants (HbS, HgC, etc)do  not significantly interfere with this assay.   However, presence of multiple variants may affect accuracy.       Diagnostic Results:  Left knee x-ray; Left knee total arthroplasty without immediate complication.    ASSESSMENT/PLAN:     Active Hospital Problems    Diagnosis  POA    * s/p right total knee  arthroplasty  Acute medial meniscal tear, right, subsequent encounter [S83.049D]  Arthritis of knee, left [M17.12]  Continue to follow Orthopedic recommendations.  Needs aggressive incentive spirometry.  Follow hemoglobin and hematocrit closely.  Pain control with IV narcotics and antiemetics as needed.  Physical therapy as per Orthopedics protocol with fall precautions.    Not Applicable    High cholesterol [E78.00]  Not on any lipid lowering agent.  Yes            DVT prophylaxis: Lovenox 40 mg Sq q day.    Thank you for allowing me to participate in the care of your patient. Will follow with you.

## 2018-10-31 VITALS
TEMPERATURE: 97 F | HEIGHT: 67 IN | OXYGEN SATURATION: 95 % | SYSTOLIC BLOOD PRESSURE: 109 MMHG | RESPIRATION RATE: 18 BRPM | DIASTOLIC BLOOD PRESSURE: 62 MMHG | HEART RATE: 72 BPM | BODY MASS INDEX: 30.64 KG/M2 | WEIGHT: 195.19 LBS

## 2018-10-31 LAB
ANION GAP SERPL CALC-SCNC: 8 MMOL/L
BASOPHILS # BLD AUTO: 0 K/UL
BASOPHILS NFR BLD: 0.3 %
BUN SERPL-MCNC: 9 MG/DL
CALCIUM SERPL-MCNC: 8.2 MG/DL
CHLORIDE SERPL-SCNC: 109 MMOL/L
CO2 SERPL-SCNC: 22 MMOL/L
CREAT SERPL-MCNC: 0.8 MG/DL
DIFFERENTIAL METHOD: ABNORMAL
EOSINOPHIL # BLD AUTO: 0 K/UL
EOSINOPHIL NFR BLD: 0.2 %
ERYTHROCYTE [DISTWIDTH] IN BLOOD BY AUTOMATED COUNT: 13.8 %
EST. GFR  (AFRICAN AMERICAN): >60 ML/MIN/1.73 M^2
EST. GFR  (NON AFRICAN AMERICAN): >60 ML/MIN/1.73 M^2
GLUCOSE SERPL-MCNC: 111 MG/DL
HCT VFR BLD AUTO: 30 %
HGB BLD-MCNC: 10.2 G/DL
LYMPHOCYTES # BLD AUTO: 1 K/UL
LYMPHOCYTES NFR BLD: 22.2 %
MCH RBC QN AUTO: 31 PG
MCHC RBC AUTO-ENTMCNC: 34.2 G/DL
MCV RBC AUTO: 91 FL
MONOCYTES # BLD AUTO: 0.4 K/UL
MONOCYTES NFR BLD: 8.4 %
NEUTROPHILS # BLD AUTO: 3.1 K/UL
NEUTROPHILS NFR BLD: 68.9 %
PLATELET # BLD AUTO: 170 K/UL
PMV BLD AUTO: 8.3 FL
POTASSIUM SERPL-SCNC: 3.9 MMOL/L
RBC # BLD AUTO: 3.31 M/UL
SODIUM SERPL-SCNC: 139 MMOL/L
WBC # BLD AUTO: 4.5 K/UL

## 2018-10-31 PROCEDURE — 36415 COLL VENOUS BLD VENIPUNCTURE: CPT

## 2018-10-31 PROCEDURE — G8987 SELF CARE CURRENT STATUS: HCPCS | Mod: CK

## 2018-10-31 PROCEDURE — 99232 SBSQ HOSP IP/OBS MODERATE 35: CPT | Mod: ,,, | Performed by: INTERNAL MEDICINE

## 2018-10-31 PROCEDURE — 97165 OT EVAL LOW COMPLEX 30 MIN: CPT

## 2018-10-31 PROCEDURE — 25000003 PHARM REV CODE 250: Performed by: ANESTHESIOLOGY

## 2018-10-31 PROCEDURE — 97116 GAIT TRAINING THERAPY: CPT

## 2018-10-31 PROCEDURE — G8988 SELF CARE GOAL STATUS: HCPCS | Mod: CK

## 2018-10-31 PROCEDURE — G8989 SELF CARE D/C STATUS: HCPCS | Mod: CK

## 2018-10-31 PROCEDURE — 80048 BASIC METABOLIC PNL TOTAL CA: CPT

## 2018-10-31 PROCEDURE — 25000003 PHARM REV CODE 250: Performed by: ORTHOPAEDIC SURGERY

## 2018-10-31 PROCEDURE — 97535 SELF CARE MNGMENT TRAINING: CPT

## 2018-10-31 PROCEDURE — 97110 THERAPEUTIC EXERCISES: CPT

## 2018-10-31 PROCEDURE — 97530 THERAPEUTIC ACTIVITIES: CPT

## 2018-10-31 PROCEDURE — 85025 COMPLETE CBC W/AUTO DIFF WBC: CPT

## 2018-10-31 RX ORDER — OXYCODONE AND ACETAMINOPHEN 5; 325 MG/1; MG/1
1 TABLET ORAL EVERY 4 HOURS PRN
Qty: 30 TABLET | Refills: 0 | Status: SHIPPED | OUTPATIENT
Start: 2018-10-31 | End: 2018-11-12 | Stop reason: ALTCHOICE

## 2018-10-31 RX ORDER — CELECOXIB 200 MG/1
200 CAPSULE ORAL DAILY
Qty: 30 CAPSULE | Refills: 0 | Status: SHIPPED | OUTPATIENT
Start: 2018-10-31 | End: 2019-02-06

## 2018-10-31 RX ORDER — SODIUM CHLORIDE 0.9 % (FLUSH) 0.9 %
5 SYRINGE (ML) INJECTION
Status: DISCONTINUED | OUTPATIENT
Start: 2018-10-31 | End: 2018-10-31 | Stop reason: HOSPADM

## 2018-10-31 RX ORDER — ASPIRIN 325 MG
325 TABLET, DELAYED RELEASE (ENTERIC COATED) ORAL 2 TIMES DAILY WITH MEALS
Refills: 0 | COMMUNITY
Start: 2018-10-31 | End: 2019-02-06

## 2018-10-31 RX ADMIN — OXYCODONE HYDROCHLORIDE 10 MG: 10 TABLET, FILM COATED, EXTENDED RELEASE ORAL at 09:10

## 2018-10-31 RX ADMIN — FAMOTIDINE 20 MG: 20 TABLET ORAL at 09:10

## 2018-10-31 RX ADMIN — DOCUSATE SODIUM 100 MG: 100 CAPSULE, LIQUID FILLED ORAL at 09:10

## 2018-10-31 RX ADMIN — OXYCODONE HYDROCHLORIDE 10 MG: 10 TABLET ORAL at 02:10

## 2018-10-31 RX ADMIN — OXYCODONE HYDROCHLORIDE 10 MG: 10 TABLET ORAL at 10:10

## 2018-10-31 RX ADMIN — DEXTROSE AND SODIUM CHLORIDE: 5; .9 INJECTION, SOLUTION INTRAVENOUS at 03:10

## 2018-10-31 RX ADMIN — OXYCODONE HYDROCHLORIDE 5 MG: 5 TABLET ORAL at 03:10

## 2018-10-31 RX ADMIN — CELECOXIB 200 MG: 100 CAPSULE ORAL at 09:10

## 2018-10-31 RX ADMIN — OXYCODONE HYDROCHLORIDE 5 MG: 5 TABLET ORAL at 07:10

## 2018-10-31 RX ADMIN — MUPIROCIN 1 G: 20 OINTMENT TOPICAL at 09:10

## 2018-10-31 NOTE — PT/OT/SLP PROGRESS
Physical Therapy Treatment    Patient Name:  Caryn Toledo   MRN:  [id number]    Recommendations:     Discharge Recommendations:  home health PT   Discharge Equipment Recommendations: walker, rolling   Barriers to discharge: None    Assessment:     Caryn Toledo is a 58 y.o. female admitted with a medical diagnosis of Acute medial meniscal tear, right, subsequent encounter.  She presents with the following impairments/functional limitations:  weakness, decreased ROM, pain, impaired functional mobilty . Pt seen BID today. Continued good progress with mobility and ROM.    Rehab Prognosis:  good; patient would benefit from acute skilled PT services to address these deficits and reach maximum level of function.      Recent Surgery: Procedure(s) (LRB):  ARTHROPLASTY, KNEE (Left)  ARTHROSCOPY, KNEE, WITH MENISCECTOMY (Right) 1 Day Post-Op    Plan:     During this hospitalization, patient to be seen BID to address the above listed problems via gait training, therapeutic activities, therapeutic exercises  · Plan of Care Expires:  11/09/18   Plan of Care Reviewed with: patient, mother    Subjective     Communicated with nurse Perry prior to session.  Patient found supine upon PT entry to room, agreeable to treatment.    Stated was up for a while this am  Pt stated of pain behind knee and feeling of stiffness    Chief Complaint: pain behind knee  Patient comments/goals: get home  Pain/Comfort:  · Pain Rating 1: 4/10  · Location - Side 1: Left  · Location - Orientation 1: posterior  · Location 1: knee  · Pain Addressed 1: Pre-medicate for activity    Patients cultural, spiritual, Sabianism conflicts given the current situation:      Objective:     Patient found with: cryotherapy, SCD, peripheral IV     General Precautions: Standard, fall   Orthopedic Precautions:LLE weight bearing as tolerated, RLE weight bearing as tolerated   Braces: N/A     Functional Mobility:  · Bed Mobility:     · Supine to Sit: stand by  assistance  · Sit to Supine: stand by assistance  · Transfers:     · Sit to Stand:  stand by assistance with rolling walker  · Toilet Transfer: stand by assistance with  rolling walker  using  Stand Pivot  · Gait: 350ft with RW      AM-PAC 6 CLICK MOBILITY  Turning over in bed (including adjusting bedclothes, sheets and blankets)?: 4  Sitting down on and standing up from a chair with arms (e.g., wheelchair, bedside commode, etc.): 3  Moving from lying on back to sitting on the side of the bed?: 4  Moving to and from a bed to a chair (including a wheelchair)?: 3  Need to walk in hospital room?: 3  Climbing 3-5 steps with a railing?: 1  Basic Mobility Total Score: 18       Therapeutic Activities and Exercises:   completed AP,QS,SLR,abd/add, HS x 10  Bathroom use to void with SBA    Patient left HOB elevated with all lines intact, call button in reach and mom present..    GOALS:   Multidisciplinary Problems     Physical Therapy Goals        Problem: Physical Therapy Goal    Goal Priority Disciplines Outcome Goal Variances Interventions   Physical Therapy Goal     PT, PT/OT Ongoing (interventions implemented as appropriate)     Description:  Goals to be met by: 2018     Patient will increase functional independence with mobility by performin. Supine to sit with Stand-by Assistance  2. Sit to stand transfer with Contact Guard Assistance  3. Bed to chair transfer with Contact Guard Assistance using Rolling Walker  4. Gait  x 250x2 feet with Contact Guard Assistance using Rolling Walker.   5. Lower extremity exercise program x20 reps per handout, with assistance as needed                      Time Tracking:     PT Received On: 10/31/18  PT Start Time: 1349     PT Stop Time: 1419  PT Total Time (min): 30 min     Billable Minutes: Gait Training 10, Therapeutic Activity 10 and Therapeutic Exercise 10    Treatment Type: Treatment  PT/PTA: PT           Candice Vila, PT  10/31/2018

## 2018-10-31 NOTE — PLAN OF CARE
Pts face sheet, H&P, HH orders, AVS and discharge summary sent to [facility] [phone], fax [phone] via [facility]. Brenda Knox LMSW     2:27 pm- I confirmed with [facility] that they have received the referral and will meet with the pt tomorrow. [facility] Brenda Knox LMSW        10/31/18 1202   Post-Acute Status   Post-Acute Authorization E   [facility] Status Referrals Sent

## 2018-10-31 NOTE — NURSING
Peripheral IV removed and discharge instructions given to patient by primary nurse, Vicky GARRISON. Verbalized understanding. Patient left floor via wheelchair with family at side. Remained free from fall and injury.

## 2018-10-31 NOTE — ADDENDUM NOTE
Addendum  created 10/31/18 1047 by Mynor Ryan MD    Intraprocedure Event edited, Sign clinical note

## 2018-10-31 NOTE — PLAN OF CARE
Problem: Patient Care Overview  Goal: Plan of Care Review  Outcome: Ongoing (interventions implemented as appropriate)  Plan of care reviewed with patient. Patient verbalized understanding. AAO x 4. VSS/NADN. IV fluids infusing per MD orders. PRN and scheduled pain meds given with relief noted. Patient ambulatory to bathroom with walker and standby assist only. Voiding without difficulty. Dsg CDI. Cryotherapy in use. SR up x 2, bed in lowest position, call light in reach. Will continue to monitor.

## 2018-10-31 NOTE — PLAN OF CARE
Problem: Physical Therapy Goal  Goal: Physical Therapy Goal  Goals to be met by: 2018     Patient will increase functional independence with mobility by performin. Supine to sit with Stand-by Assistance  2. Sit to stand transfer with Contact Guard Assistance  3. Bed to chair transfer with Contact Guard Assistance using Rolling Walker  4. Gait  x 250x2 feet with Contact Guard Assistance using Rolling Walker.   5. Lower extremity exercise program x20 reps per handout, with assistance as needed     Outcome: Ongoing (interventions implemented as appropriate)  Gait 250ft with RW. TKA exercises with AP,SLR,QS,abd/add, HS with bilateral knee flexion ~100 degrees

## 2018-10-31 NOTE — PLAN OF CARE
10/31/18 1110   Discharge Assessment   Assessment Type Discharge Planning Assessment   Confirmed/corrected address and phone number on facesheet? Yes   Assessment information obtained from? Patient   Expected Length of Stay (days) 2   Communicated expected length of stay with patient/caregiver yes   Prior to hospitilization cognitive status: Alert/Oriented   Prior to hospitalization functional status: Independent   Current cognitive status: Alert/Oriented   Current Functional Status: Assistive Equipment;Needs Assistance   Lives With significant other  (Rigo Luna )   Able to Return to Prior Arrangements yes   Is patient able to care for self after discharge? Yes  (with assistance )   Patient currently being followed by outpatient case management? No   Patient currently receives any other outside agency services? No   Equipment Currently Used at Home walker, rolling;bedside commode   Do you have any problems affording any of your prescribed medications? No  (pharm: [facility] )   Is the patient taking medications as prescribed? yes   Does the patient have transportation home? Yes   Transportation Available family or friend will provide   Does the patient receive services at the [facility]? No   Discharge Plan A Home Health   Patient/Family In Agreement With Plan yes     Patient signed pt choice/disclosure for [facility]; I placed it in the pt's blue folder....MELI Rivera CM

## 2018-10-31 NOTE — PROGRESS NOTES
Progress Note  [facility] Medicine  Patient Name:Caryn Toledo  MRN:  [id number]  Patient Class: IP- Inpatient  Admit Date: 10/30/2018  Length of Stay: 1 days  Expected Discharge Date:   Attending Physician: Ata Paula MD  Primary Care Provider:  Terrance Nolen MD    SUBJECTIVE:     Principal Problem: Acute medial meniscal tear, right, subsequent encounter  Initial history of present illness: Patient is a 58 y.o. female s/p right total knee arthroplasty by Dr. Paula. Patient has PMH significant for hyperlipidemia and history of knee pain. Post-operatively, patient is doing well. Patient denied chest pain, shortness of breath, abdominal pain, nausea, vomiting, headache, vision changes, focal neuro-deficits, cough or fever.    PMH/PSH/SH/FH/Meds: reviewed.    Symptoms/Review of Systems: Doing well. Pain controlled. No shortness of breath, cough, chest pain or headache, fever or abdominal pain.     Diet:  Adequate intake.    Activity level: Normal.    Pain:  Patient reports no pain.       OBJECTIVE:   Vital Signs (Most Recent):      Temp: (P) 97.4 °F (36.3 °C) (10/31/18 0726)  Pulse: (P) 72 (10/31/18 0726)  Resp: (P) 18 (10/31/18 0726)  BP: (P) 109/62 (10/31/18 0726)  SpO2: (P) 95 % (10/31/18 0726)       Vital Signs Range (Last 24H):  Temp:  [97 °F (36.1 °C)-99.3 °F (37.4 °C)]   Pulse:  [46-83]   Resp:  [11-21]   BP: ()/(50-69)   SpO2:  [92 %-97 %]     Weight: 88.5 kg (195 lb)  Body mass index is 30.54 kg/m².    Intake/Output Summary (Last 24 hours) at 10/31/2018 0923  Last data filed at 10/31/2018 0600  Gross per 24 hour   Intake 3905 ml   Output --   Net 3905 ml     Physical Examination:  General appearance: well developed, appears stated age  Head: normocephalic, atraumatic  Eyes:  conjunctivae/corneas clear. PERRL.  Nose: Nares normal. Septum midline.  Throat: lips, mucosa, and tongue normal; teeth and gums normal, no throat erythema.  Neck: supple, symmetrical, trachea midline, no JVD and  thyroid not enlarged, symmetric, no tenderness/mass/nodules  Lungs:  clear to auscultation bilaterally and normal respiratory effort  Chest wall: no tenderness  Heart: regular rate and rhythm, S1, S2 normal, no murmur, click, rub or gallop  Abdomen: soft, non-tender non-distented; bowel sounds normal; no masses,  no organomegaly  Extremities: no cyanosis, clubbing or edema. Right knee dressing C/D/I.  Pulses: 2+ and symmetric  Skin: Skin color, texture, turgor normal. No rashes or lesions.  Lymph nodes: Cervical, supraclavicular, and axillary nodes normal.  Neurologic: Normal strength and tone. No focal numbness or weakness. CNII-XII intact.      CBC:  Recent Labs   Lab 10/31/18  0441   WBC 4.50   RBC 3.31*   HGB 10.2*   HCT 30.0*      MCV 91   MCH 31.0   MCHC 34.2   BMP  Recent Labs   Lab 10/31/18  0441   *      K 3.9      CO2 22*   BUN 9   CREATININE 0.8   CALCIUM 8.2*      Diagnostic Results:  Microbiology Results (last 7 days)     ** No results found for the last 168 hours. **         Left knee x-ray; Left knee total arthroplasty without immediate complication.    Assessment/Plan:      * s/p right total knee arthroplasty  Acute medial meniscal tear, right, subsequent encounter [S83.241D]  Arthritis of knee, left [M17.12]  Continue to follow Orthopedic recommendations.  Needs aggressive incentive spirometry.  Follow hemoglobin and hematocrit closely.  Pain control with PO narcotics and antiemetics as needed.  Physical therapy as per Orthopedics protocol with fall precautions.      Not Applicable    High cholesterol [E78.00]  Not on any lipid lowering agent.   Yes                 DVT prophylaxis: Lovenox 40 mg Sq q day.    VTE Risk Mitigation (From admission, onward)        Ordered     enoxaparin injection 40 mg  Daily      10/30/18 1250     IP VTE HIGH RISK PATIENT  Once      10/30/18 1204     Place sequential compression device  Until discontinued      10/30/18 1204     Place ANANDA hose   Until discontinued      10/30/18 1204        Missy Watts MD  Department of Hospital Medicine   [facility]

## 2018-10-31 NOTE — PLAN OF CARE
Problem: Patient Care Overview  Goal: Plan of Care Review  Outcome: Ongoing (interventions implemented as appropriate)  Patient aao x 4. Complains of L knee pain, controlled with oral medication. Plan of care reviewed with patient, verbalized understanding. IV fluids maintained. IV antibiotic given. Ambulated with PT, up in chair. Voiding without difficulty. Remained free from fall and injury. Bed in lowest position, call light within reach and family to remain at bedside.

## 2018-10-31 NOTE — PT/OT/SLP PROGRESS
Physical Therapy Treatment    Patient Name:  Caryn Toledo   MRN:  [id number]    Recommendations:     Discharge Recommendations:  home health PT   Discharge Equipment Recommendations: walker, rolling   Barriers to discharge: None    Assessment:     Caryn Toledo is a 58 y.o. female admitted with a medical diagnosis of Acute medial meniscal tear, right, subsequent encounter.  She presents with the following impairments/functional limitations:  weakness, decreased ROM, pain, impaired functional mobilty . Pt mobilizing well at hallways except for L posterior knee pain and tightness. Pt for possible discharge home today.    Rehab Prognosis:  good; patient would benefit from acute skilled PT services to address these deficits and reach maximum level of function.      Recent Surgery: Procedure(s) (LRB):  ARTHROPLASTY, KNEE (Left)  ARTHROSCOPY, KNEE, WITH MENISCECTOMY (Right) 1 Day Post-Op    Plan:     During this hospitalization, patient to be seen BID to address the above listed problems via gait training, therapeutic activities, therapeutic exercises  · Plan of Care Expires:  11/09/18   Plan of Care Reviewed with: patient, mother    Subjective     Communicated with nurse Perry prior to session.  Patient found supine upon PT entry to room, agreeable to treatment.    Pt stated rested well last night and will be going home after 4 pm    Chief Complaint: tightness and pain posterior knee  Patient comments/goals: get home  Pain/Comfort:  · Pain Rating 1: 4/10  · Location - Side 1: Left  · Location - Orientation 1: posterior  · Location 1: knee  · Pain Addressed 1: Pre-medicate for activity    Patients cultural, spiritual, Presybeterian conflicts given the current situation:      Objective:     Patient found with: cryotherapy, SCD, peripheral IV     General Precautions: Standard, fall   Orthopedic Precautions:LLE weight bearing as tolerated, RLE weight bearing as tolerated   Braces: N/A     Functional  Mobility:  · Bed Mobility:     · Rolling Right: independence  · Scooting: modified independence  · Supine to Sit: modified independence  · Transfers:     · Sit to Stand:  contact guard assistance with rolling walker  · Toilet Transfer: contact guard assistance with  rolling walker  using  Stand Pivot  · Gait: 250ft with RW       AM-PAC 6 CLICK MOBILITY  Turning over in bed (including adjusting bedclothes, sheets and blankets)?: 4  Sitting down on and standing up from a chair with arms (e.g., wheelchair, bedside commode, etc.): 3  Moving from lying on back to sitting on the side of the bed?: 4  Moving to and from a bed to a chair (including a wheelchair)?: 3  Need to walk in hospital room?: 3  Climbing 3-5 steps with a railing?: 1  Basic Mobility Total Score: 18       Therapeutic Activities and Exercises:   AP,QS,SLR,abd/add,HS x 10 reps bilaterally  Pt used bathroom to void with CGA  To sink to wash hands CGA  OOB to chair, donned underwear and dress with min assist  Cryo reapplied    Patient left up in chair with all lines intact, call button in reach and mom present..    GOALS:   Multidisciplinary Problems     Physical Therapy Goals        Problem: Physical Therapy Goal    Goal Priority Disciplines Outcome Goal Variances Interventions   Physical Therapy Goal     PT, PT/OT Ongoing (interventions implemented as appropriate)     Description:  Goals to be met by: 2018     Patient will increase functional independence with mobility by performin. Supine to sit with Stand-by Assistance  2. Sit to stand transfer with Contact Guard Assistance  3. Bed to chair transfer with Contact Guard Assistance using Rolling Walker  4. Gait  x 250x2 feet with Contact Guard Assistance using Rolling Walker.   5. Lower extremity exercise program x20 reps per handout, with assistance as needed                      Time Tracking:     PT Received On: 10/31/18  PT Start Time: 929     PT Stop Time: 1003  PT Total Time (min): 34  min     Billable Minutes: Gait Training 14, Therapeutic Activity 10 and Therapeutic Exercise 10    Treatment Type: Treatment  PT/PTA: PT           Candice Vila, PT  10/31/2018

## 2018-10-31 NOTE — DISCHARGE SUMMARY
[facility]  Orthopedics  Discharge Summary      Patient Name: Caryn Toledo  MRN: [id number]  Admission Date: 10/30/2018  Hospital Length of Stay: 1 days  Discharge Date and Time:  10/31/2018 11:32 AM  Attending Physician: Ata Morris MD   Discharging Provider: Ata Morris MD  Primary Care Provider: Terrance Nolen MD    HPI:   Patient is a 58 y.o. female s/p right total knee arthroplasty. Patient has PMH significant for hyperlipidemia and history of knee pain. Post-operatively, patient is doing well. Patient denied chest pain, shortness of breath, abdominal pain, nausea, vomiting, headache, vision changes, focal neuro-deficits, cough or fever.        Procedure(s) (LRB):  ARTHROPLASTY, KNEE (Left)  ARTHROSCOPY, KNEE, WITH MENISCECTOMY (Right)      Hospital Course:  S/p R TKA on 10/30/18    Consults (From admission, onward)        Status Ordering Provider     Inpatient consult to Hospitalist  Once     Provider:  Missy Watts MD    Completed ATA MORRIS          Significant Diagnostic Studies: No pertinent studies.    Pending Diagnostic Studies:     None        Final Active Diagnoses:    Diagnosis Date Noted POA    PRINCIPAL PROBLEM:  Acute medial meniscal tear, right, subsequent encounter [S83.241D] 09/10/2018 Not Applicable    High cholesterol [E78.00] 10/30/2018 Yes    Arthritis of knee, left [M17.12] 08/12/2018 Yes      Problems Resolved During this Admission:      Discharged Condition: good    Disposition: Home or Self Care    Follow Up:  Follow-up Information     Ata Morris MD On 11/12/2018.    Specialties:  Sports Medicine, Orthopedic Surgery  Why:  @10:00am   Contact information:  [address] [place] [address] DR Tayo CHRISTINA [id number]  [phone]                 Patient Instructions:      Ambulatory referral to Home Health   Referral Priority: Routine Referral Type: Home Health   Referral Reason: Specialty Services Required   Requested Specialty: Home  Health Services   Number of Visits Requested: 1     Referral to Home health   Referral Priority: Routine Referral Type: Home Health   Referral Reason: Specialty Services Required   Requested Specialty: Home Health Services   Number of Visits Requested: 1     Diet general     Ice to affected area     No driving, operating heavy equipment or signing legal documents while taking pain medication     Call MD for:  temperature >100.4     Call MD for:  persistent nausea and vomiting     Call MD for:  severe uncontrolled pain     Call MD for:  difficulty breathing, headache or visual disturbances     Call MD for:  redness, tenderness, or signs of infection (pain, swelling, redness, odor or green/yellow discharge around incision site)     Call MD for:  hives     Call MD for:  persistent dizziness or light-headedness     Call MD for:  extreme fatigue     Leave dressing on - Keep it clean, dry, and intact until clinic visit     Change dressing (specify)   Order Comments: Dressing change: If dressing loses its seal, change daily.     Medications:  Reconciled Home Medications:      Medication List      START taking these medications    aspirin 325 MG EC tablet  Commonly known as:  ECOTRIN  Take 1 tablet (325 mg total) by mouth 2 (two) times daily with meals.     celecoxib 200 MG capsule  Commonly known as:  CeleBREX  Take 1 capsule (200 mg total) by mouth once daily.     oxyCODONE-acetaminophen 5-325 mg per tablet  Commonly known as:  PERCOCET  Take 1 tablet by mouth every 4 (four) hours as needed for Pain.            Ata Paula MD  Orthopedics  [facility]

## 2018-10-31 NOTE — PT/OT/SLP EVAL
Occupational Therapy   Evaluation and Discharge Note    Name: Caryn Toledo  MRN: [id number]  Admitting Diagnosis:  Acute medial meniscal tear, right, subsequent encounter 1 Day Post-Op    Recommendations:     Discharge Recommendations: home health PT  Discharge Equipment Recommendations:  walker, rolling, bedside commode  Barriers to discharge:       History:     Occupational Profile:  Living Environment: Pt lives with her  in a [facility] with 0 [address] and has a walk-in shower with a built-in bench.  Previous level of function: Patient was Independent with all I/ADL's at home and in the community and driving and working full time.  Roles and Routines: Pt travels internationally and has an active job that includes lots of walking around [place].  Equipment Owned:  none  Assistance upon Discharge: Her  and mother will help her at home.    Past Medical History:   Diagnosis Date    High cholesterol     PONV (postoperative nausea and vomiting)        Past Surgical History:   Procedure Laterality Date    KNEE SURGERY Left 1980s       Subjective     Chief Complaint: L knee pain  Patient/Family stated goals: I plan to take off work till February.  Communicated with: nurse Vicky prior to session.  Pain/Comfort:  · Pain Rating 1: 7/10  · Location - Side 1: Left  · Location - Orientation 1: posterior  · Location 1: knee  · Pain Addressed 1: Pre-medicate for activity, Nurse notified  · Pain Rating Post-Intervention 1: (no c/o pain)    Patients cultural, spiritual, Mandaeism conflicts given the current situation:      Objective:     Patient found with:seated in bedside chair with her mother present and cryotherapy    General Precautions: Standard, fall   Orthopedic Precautions:LLE weight bearing as tolerated, RLE weight bearing as tolerated   Braces: N/A     Occupational Performance:    Functional Mobility/Transfers:  · Patient completed Sit <> Stand Transfer with contact guard assistance   with  rolling walker and cues for hand placement   · Patient completed Toilet Transfer Stand Pivot technique with contact guard assistance and cues for safety and use of grabbars and turning at a slower pace with smaller steps, coordinating her walker with her body  with  rolling walker and grab bars  · Patient completed  Shower Transfer Step Transfer technique with minimum assistance and cues for technique with rolling walker  · Functional Mobility: Pt walked from bedside to bathroom, then to sink & back to bedside using walker with SBA & no LOB noted but cues for walker management with turning around.      Activities of Daily Living:  · Grooming: stand by assistance standing at sink for all tasks  · Lower Body Dressing: supervision to don/doff socks and underwear seated in chair  · Toileting: stand by assistance cues for use of grab bars    Cognitive/Visual Perceptual:  Cognitive/Psychosocial Skills:     -       Safety awareness/insight to disability: cues for walker safety     Physical Exam:  Balance:    -       SBA static and CGA dynamic standing  Postural examination/scapula alignment:    -       Rounded shoulders  Skin integrity: Visible skin intact  Dominant hand:    -       Left  Upper Extremity Range of Motion:     -       Right Upper Extremity: WNL    -       Left Upper Extremity: WNL      Upper Extremity Strength:    -       Right Upper Extremity: WNL  -       Left Upper Extremity: WNL     Strength:    -       Right Upper Extremity: WNL    -       Left Upper Extremity: WNL    Fine Motor Coordination:    Gross motor coordination:   WFL    Patient left up in chair with all lines intact, call button in reach, Vicky, nurse notified and pt's mother present    [facility] 6 Click:  [facility] Total Score: 19    Treatment & Education:  OT ed pt on OT role & discharge recommendations.  OT ed patient on safety with walker use for functional mobility with cues for hand placement.   OT educated patient on shower & toilet  "transfer techniques using rolling walker.  OT ed pt on fall risk and advised pt to call for help for all OOB mobility.    Education:    Assessment:     Caryn Toledo is a 58 y.o. female with a medical diagnosis of Acute medial meniscal tear, right, subsequent encounter. OT education completed on bathroom transfers, UB & LB ADLs with use of adaptive equipment and on safety with all ADL tasks using assistive devices with pt able to provide return demonstration. No further OT needs at this time.        Clinical Decision Makin.  OT Low:  "Pt evaluation falls under low complexity for evaluation coding due to performance deficits noted in 1-3 areas as stated above and 0 co-morbities affecting current functional status. Data obtained from problem focused assessments. No modifications or assistance was required for completion of evaluation. Only brief occupational profile and history review completed."     Plan:     During this hospitalization, patient does not require further acute OT services.  Please re-consult if situation changes.    · Plan of Care Reviewed with: mother, patient    This Plan of care has been discussed with the patient who was involved in its development and understands and is in agreement with the identified goals and treatment plan      Time Tracking:     OT Date of Treatment: 10/31/18  OT Start Time: 1019  OT Stop Time: 1045  OT Total Time (min): 26 min    Billable Minutes:Evaluation 8  Self Care/Home Management 18    CHRISTIANNE Rapp  10/31/2018    "

## 2018-10-31 NOTE — ANESTHESIA POST-OP PAIN MANAGEMENT
Acute Pain Service Progress Note    Caryn Toledo is a 58 y.o., female, [id number].    Surgery:  L TKR    Post Op Day #: 2    Problem List:    Active Hospital Problems    Diagnosis  POA    *Acute medial meniscal tear, right, subsequent encounter [S83.241D]  Not Applicable    High cholesterol [E78.00]  Yes    Arthritis of knee, left [M17.12]  Yes      Resolved Hospital Problems   No resolved problems to display.       Subjective/Objective:   Pt doing great. Pain 0/10 and JACINDA Haile. Pt c/o posterior calf pain today.    Assessment:     Pain control adequate    Plan:     Patient doing well, continue present treatment.    Evaluator Mynor Ryan

## 2018-10-31 NOTE — HPI
Patient is a 58 y.o. female s/p right total knee arthroplasty. Patient has PMH significant for hyperlipidemia and history of knee pain. Post-operatively, patient is doing well. Patient denied chest pain, shortness of breath, abdominal pain, nausea, vomiting, headache, vision changes, focal neuro-deficits, cough or fever.

## 2018-11-07 DIAGNOSIS — M25.562 LEFT KNEE PAIN, UNSPECIFIED CHRONICITY: Primary | ICD-10-CM

## 2018-11-12 ENCOUNTER — HOSPITAL ENCOUNTER (OUTPATIENT)
Dept: RADIOLOGY | Facility: HOSPITAL | Age: 58
Discharge: HOME | End: 2018-11-12
Attending: ORTHOPAEDIC SURGERY
Payer: COMMERCIAL

## 2018-11-12 ENCOUNTER — OFFICE VISIT (OUTPATIENT)
Dept: ORTHOPEDICS | Facility: CLINIC | Age: 58
End: 2018-11-12
Payer: COMMERCIAL

## 2018-11-12 VITALS
HEART RATE: 86 BPM | BODY MASS INDEX: 30.61 KG/M2 | WEIGHT: 195 LBS | SYSTOLIC BLOOD PRESSURE: 138 MMHG | HEIGHT: 67 IN | DIASTOLIC BLOOD PRESSURE: 68 MMHG

## 2018-11-12 DIAGNOSIS — S83.241D ACUTE MEDIAL MENISCAL TEAR, RIGHT, SUBSEQUENT ENCOUNTER: Primary | ICD-10-CM

## 2018-11-12 DIAGNOSIS — M25.562 LEFT KNEE PAIN, UNSPECIFIED CHRONICITY: ICD-10-CM

## 2018-11-12 PROCEDURE — 99024 POSTOP FOLLOW-UP VISIT: CPT | Mod: S$GLB,,, | Performed by: ORTHOPAEDIC SURGERY

## 2018-11-12 PROCEDURE — 99999 PR PBB SHADOW E&M-EST. PATIENT-LVL III: CPT | Mod: PBBFAC,,, | Performed by: ORTHOPAEDIC SURGERY

## 2018-11-12 PROCEDURE — 73564 X-RAY EXAM KNEE 4 OR MORE: CPT | Mod: 26,50,, | Performed by: RADIOLOGY

## 2018-11-12 PROCEDURE — 73564 X-RAY EXAM KNEE 4 OR MORE: CPT | Mod: TC,50,PN

## 2018-11-12 RX ORDER — HYDROCODONE BITARTRATE AND ACETAMINOPHEN 5; 325 MG/1; MG/1
1 TABLET ORAL EVERY 6 HOURS PRN
Qty: 40 TABLET | Refills: 0 | Status: SHIPPED | OUTPATIENT
Start: 2018-11-12 | End: 2018-11-22

## 2018-11-12 NOTE — PROGRESS NOTES
Past Medical History:   Diagnosis Date    High cholesterol     PONV (postoperative nausea and vomiting)        Past Surgical History:   Procedure Laterality Date    ARTHROPLASTY, KNEE Left 10/30/2018    Performed by Ata Paula MD at [facility] OR    ARTHROSCOPY, KNEE, WITH MENISCECTOMY Right 10/30/2018    Performed by [facility] Paula MD at [facility] OR    KNEE ARTHROPLASTY Left 10/30/2018    Procedure: ARTHROPLASTY, KNEE;  Surgeon: Ata Paula MD;  Location: [facility] OR;  Service: Orthopedics;  Laterality: Left;    KNEE ARTHROSCOPY W/ MENISCECTOMY Right 10/30/2018    Procedure: ARTHROSCOPY, KNEE, WITH MENISCECTOMY;  Surgeon: Ata Paula MD;  Location: [facility] OR;  Service: Orthopedics;  Laterality: Right;    KNEE SURGERY Left 1980s       Current Outpatient Medications   Medication Sig    aspirin (ECOTRIN) 325 MG EC tablet Take 1 tablet (325 mg total) by mouth 2 (two) times daily with meals.    celecoxib (CELEBREX) 200 MG capsule Take 1 capsule (200 mg total) by mouth once daily.    oxyCODONE-acetaminophen (PERCOCET) 5-325 mg per tablet Take 1 tablet by mouth every 4 (four) hours as needed for Pain.     No current facility-administered medications for this visit.        Review of patient's allergies indicates:   Allergen Reactions    Ciprofloxacin Anaphylaxis     paralysis    Pcn [penicillins] Anaphylaxis     paralysis    Eggplant Blisters    Eggs [egg derived]      Inside of mouth peel      Tomato (solanum lycopersicum) Blisters       History reviewed. No pertinent family history.    Social History     Socioeconomic History    Marital status: Single     Spouse name: Not on file    Number of children: Not on file    Years of education: Not on file    Highest education level: Not on file   Social Needs    Financial resource strain: Not on file    Food insecurity - worry: Not on file    Food insecurity - inability: Not on file    Transportation needs - medical: Not on  file    Transportation needs - non-medical: Not on file   Occupational History    Not on file   Tobacco Use    Smoking status: Never Smoker    Smokeless tobacco: Never Used   Substance and Sexual Activity    Alcohol use: Yes     Comment: occasionally    Drug use: No    Sexual activity: Not on file   Other Topics Concern    Not on file   Social History Narrative    Not on file       Chief Complaint:   Chief Complaint   Patient presents with    Knee Pain     B knee s/p sx 10/30/18       Date of surgery:  October 30, 2018    History of present illness:  58-year-old female seen for left total knee arthroplasty and right knee arthroscopy.  Patient is doing well.  Pain is a 6/10.  The Percocet was giving her migraines.  She is only taking ibuprofen at this point. No wound issues.      Review of Systems:    Musculoskeletal:  See HPI        Physical Examination:    Vital Signs:    Vitals:    11/12/18 0942   BP: 138/68   Pulse: 86       Body mass index is 30.54 kg/m².    This a well-developed, well nourished patient in no acute distress.  They are alert and oriented and cooperative to examination.  Pt. walks without an antalgic gait.      It has examination of both knees show well-healing surgical incisions.  No erythema or drainage.  Patient has good range of motion of her left knee from 0-95 degrees. No calf pain. Negative Homans sign.    X-rays:  Four views of both knees are ordered and reviewed which show well-aligned left total knee arthroplasty.     Assessment::  Status post left total knee arthroplasty  Status post right partial medial meniscectomy    Plan:  Reviewed the x-rays with her today.  Patient is doing very well.  We will transition her to outpatient physical therapy.  I gave her a prescription for Norco to hopefully help with her pain.  Follow-up in 4 weeks.    This note was created using [employer] voice recognition software that occasionally misinterpreted phrases or words.

## 2018-11-15 RX ORDER — TRAMADOL HYDROCHLORIDE 50 MG/1
50 TABLET ORAL EVERY 6 HOURS
Qty: 40 TABLET | Refills: 0 | Status: SHIPPED | OUTPATIENT
Start: 2018-11-15 | End: 2019-02-06

## 2018-12-13 ENCOUNTER — OFFICE VISIT (OUTPATIENT)
Dept: ORTHOPEDICS | Facility: CLINIC | Age: 58
End: 2018-12-13
Payer: COMMERCIAL

## 2018-12-13 VITALS
SYSTOLIC BLOOD PRESSURE: 138 MMHG | WEIGHT: 195 LBS | BODY MASS INDEX: 30.61 KG/M2 | DIASTOLIC BLOOD PRESSURE: 80 MMHG | HEIGHT: 67 IN | HEART RATE: 90 BPM

## 2018-12-13 DIAGNOSIS — M54.16 LEFT LUMBAR RADICULOPATHY: ICD-10-CM

## 2018-12-13 DIAGNOSIS — M54.5 BILATERAL LOW BACK PAIN, UNSPECIFIED CHRONICITY, WITH SCIATICA PRESENCE UNSPECIFIED: Primary | ICD-10-CM

## 2018-12-13 DIAGNOSIS — Z96.652 STATUS POST TOTAL KNEE REPLACEMENT USING CEMENT, LEFT: ICD-10-CM

## 2018-12-13 DIAGNOSIS — S83.241D ACUTE MEDIAL MENISCAL TEAR, RIGHT, SUBSEQUENT ENCOUNTER: Primary | ICD-10-CM

## 2018-12-13 PROBLEM — M17.12 ARTHRITIS OF KNEE, LEFT: Status: RESOLVED | Noted: 2018-08-12 | Resolved: 2018-12-13

## 2018-12-13 PROCEDURE — 99213 OFFICE O/P EST LOW 20 MIN: CPT | Mod: 24,S$PBB,, | Performed by: ORTHOPAEDIC SURGERY

## 2018-12-13 PROCEDURE — 99999 PR PBB SHADOW E&M-EST. PATIENT-LVL III: CPT | Mod: PBBFAC,,, | Performed by: ORTHOPAEDIC SURGERY

## 2018-12-13 RX ORDER — METHYLPREDNISOLONE 4 MG/1
TABLET ORAL
Qty: 21 TABLET | Refills: 0 | Status: SHIPPED | OUTPATIENT
Start: 2018-12-13 | End: 2018-12-20 | Stop reason: SDUPTHER

## 2018-12-13 NOTE — PROGRESS NOTES
Past Medical History:   Diagnosis Date    High cholesterol     PONV (postoperative nausea and vomiting)        Past Surgical History:   Procedure Laterality Date    ARTHROPLASTY, KNEE Left 10/30/2018    Performed by [facility] at [facility] OR    ARTHROSCOPY, KNEE, WITH MENISCECTOMY Right 10/30/2018    Performed by [facility] MD at [facility] OR    KNEE ARTHROPLASTY Left 10/30/2018    Procedure: ARTHROPLASTY, KNEE;  Surgeon: Ata Paula MD;  Location: [facility] OR;  Service: Orthopedics;  Laterality: Left;    KNEE ARTHROSCOPY W/ MENISCECTOMY Right 10/30/2018    Procedure: ARTHROSCOPY, KNEE, WITH MENISCECTOMY;  Surgeon: Ata Paula MD;  Location: [facility] OR;  Service: Orthopedics;  Laterality: Right;    KNEE SURGERY Left 1980s       Current Outpatient Medications   Medication Sig    aspirin (ECOTRIN) 325 MG EC tablet Take 1 tablet (325 mg total) by mouth 2 (two) times daily with meals.    celecoxib (CELEBREX) 200 MG capsule Take 1 capsule (200 mg total) by mouth once daily.    traMADol (ULTRAM) 50 mg tablet Take 1 tablet (50 mg total) by mouth every 6 (six) hours.     No current facility-administered medications for this visit.        Review of patient's allergies indicates:   Allergen Reactions    Ciprofloxacin Anaphylaxis     paralysis    Pcn [penicillins] Anaphylaxis     paralysis    Eggplant Blisters    Eggs [egg derived]      Inside of mouth peel      Tomato (solanum lycopersicum) Blisters       History reviewed. No pertinent family history.    Social History     Socioeconomic History    Marital status: Single     Spouse name: Not on file    Number of children: Not on file    Years of education: Not on file    Highest education level: Not on file   Social Needs    Financial resource strain: Not on file    Food insecurity - worry: Not on file    Food insecurity - inability: Not on file    Transportation needs - medical: Not on file    Transportation needs -  non-medical: Not on file   Occupational History    Not on file   Tobacco Use    Smoking status: Never Smoker    Smokeless tobacco: Never Used   Substance and Sexual Activity    Alcohol use: Yes     Comment: occasionally    Drug use: No    Sexual activity: Not on file   Other Topics Concern    Not on file   Social History Narrative    Not on file       Chief Complaint:   Chief Complaint   Patient presents with    Knee Pain     B knee s/p sx 10/30/18       Date of surgery:  October 30, 2018    History of present illness:  58-year-old female seen for left total knee arthroplasty and right knee arthroscopy.  Patient is doing well.  Pain is a 6/10.  Right her knees are doing great.  Her main complaint is sciatica film improved.  The therapist has been trying to work on her back and hip area but it does not seem to be making progress.  Denies bowel or bladder symptoms.  Denies any weakness.    Review of Systems:    Musculoskeletal:  See HPI        Physical Examination:    Vital Signs:    Vitals:    12/13/18 0941   BP: 138/80   Pulse: 90       Body mass index is 30.54 kg/m².    This a well-developed, well nourished patient in no acute distress.  They are alert and oriented and cooperative to examination.  Pt. walks without an antalgic gait.       examination of both knees show healed surgical incisions.  No erythema or drainage.  Patient has good range of motion of her left knee from 0-125 degrees. No calf pain. Negative Homans sign.    X-rays:  Four views of both knees are  reviewed which show well-aligned left total knee arthroplasty.     Assessment::  Status post left total knee arthroplasty  Status post right partial medial meniscectomy  Left lumbar radiculopathy    Plan:    Patient is doing very well in regards to her knee.  Continue with the outpatient physical therapy.  I gave her a Medrol Dosepak to help with the pain.  We will also get an MRI of her lumbar spine to look for lumbar radiculopathy.    This  note was created using [employer] voice recognition software that occasionally misinterpreted phrases or words.

## 2018-12-17 ENCOUNTER — HOSPITAL ENCOUNTER (OUTPATIENT)
Dept: RADIOLOGY | Facility: HOSPITAL | Age: 58
Discharge: HOME | End: 2018-12-17
Attending: ORTHOPAEDIC SURGERY
Payer: COMMERCIAL

## 2018-12-17 DIAGNOSIS — M54.5 BILATERAL LOW BACK PAIN, UNSPECIFIED CHRONICITY, WITH SCIATICA PRESENCE UNSPECIFIED: ICD-10-CM

## 2018-12-17 PROCEDURE — 72148 MRI LUMBAR SPINE W/O DYE: CPT | Mod: TC

## 2018-12-17 PROCEDURE — 72148 MRI LUMBAR SPINE W/O DYE: CPT | Mod: 26,,, | Performed by: RADIOLOGY

## 2018-12-20 ENCOUNTER — OFFICE VISIT (OUTPATIENT)
Dept: ORTHOPEDICS | Facility: CLINIC | Age: 58
End: 2018-12-20
Payer: COMMERCIAL

## 2018-12-20 ENCOUNTER — HOSPITAL ENCOUNTER (OUTPATIENT)
Dept: RADIOLOGY | Facility: CLINIC | Age: 58
Discharge: HOME | End: 2018-12-20
Attending: FAMILY MEDICINE
Payer: COMMERCIAL

## 2018-12-20 VITALS
BODY MASS INDEX: 30.61 KG/M2 | SYSTOLIC BLOOD PRESSURE: 137 MMHG | DIASTOLIC BLOOD PRESSURE: 81 MMHG | HEART RATE: 66 BPM | WEIGHT: 195 LBS | HEIGHT: 67 IN

## 2018-12-20 DIAGNOSIS — Z00.00 HEALTHCARE MAINTENANCE: ICD-10-CM

## 2018-12-20 DIAGNOSIS — M54.16 LUMBAR BACK PAIN WITH RADICULOPATHY AFFECTING LEFT LOWER EXTREMITY: ICD-10-CM

## 2018-12-20 DIAGNOSIS — Z96.652 STATUS POST TOTAL KNEE REPLACEMENT USING CEMENT, LEFT: ICD-10-CM

## 2018-12-20 DIAGNOSIS — S83.241D ACUTE MEDIAL MENISCAL TEAR, RIGHT, SUBSEQUENT ENCOUNTER: Primary | ICD-10-CM

## 2018-12-20 PROCEDURE — 77063 BREAST TOMOSYNTHESIS BI: CPT | Mod: TC,PO

## 2018-12-20 PROCEDURE — 99024 POSTOP FOLLOW-UP VISIT: CPT | Mod: S$GLB,,, | Performed by: ORTHOPAEDIC SURGERY

## 2018-12-20 PROCEDURE — 99213 OFFICE O/P EST LOW 20 MIN: CPT | Mod: 24,S$GLB,, | Performed by: ORTHOPAEDIC SURGERY

## 2018-12-20 PROCEDURE — 77063 BREAST TOMOSYNTHESIS BI: CPT | Mod: 26,,, | Performed by: RADIOLOGY

## 2018-12-20 PROCEDURE — 77067 SCR MAMMO BI INCL CAD: CPT | Mod: TC,PO

## 2018-12-20 PROCEDURE — 77067 SCR MAMMO BI INCL CAD: CPT | Mod: 26,,, | Performed by: RADIOLOGY

## 2018-12-20 PROCEDURE — 99999 PR PBB SHADOW E&M-EST. PATIENT-LVL III: CPT | Mod: PBBFAC,,, | Performed by: ORTHOPAEDIC SURGERY

## 2018-12-20 RX ORDER — METHYLPREDNISOLONE 4 MG/1
TABLET ORAL
Qty: 21 TABLET | Refills: 0 | Status: SHIPPED | OUTPATIENT
Start: 2018-12-20 | End: 2019-02-06

## 2018-12-20 NOTE — PROGRESS NOTES
Past Medical History:   Diagnosis Date    High cholesterol     PONV (postoperative nausea and vomiting)        Past Surgical History:   Procedure Laterality Date    ARTHROPLASTY, KNEE Left 10/30/2018    Performed by [facility] at [facility] OR    ARTHROSCOPY, KNEE, WITH MENISCECTOMY Right 10/30/2018    Performed by [facility] at [facility] OR    KNEE ARTHROPLASTY Left 10/30/2018    Procedure: ARTHROPLASTY, KNEE;  Surgeon: Aat Paula MD;  Location: [facility] OR;  Service: Orthopedics;  Laterality: Left;    KNEE ARTHROSCOPY W/ MENISCECTOMY Right 10/30/2018    Procedure: ARTHROSCOPY, KNEE, WITH MENISCECTOMY;  Surgeon: Ata Paula MD;  Location: [facility] OR;  Service: Orthopedics;  Laterality: Right;    KNEE SURGERY Left 1980s       Current Outpatient Medications   Medication Sig    aspirin (ECOTRIN) 325 MG EC tablet Take 1 tablet (325 mg total) by mouth 2 (two) times daily with meals.    celecoxib (CELEBREX) 200 MG capsule Take 1 capsule (200 mg total) by mouth once daily.    methylPREDNISolone (MEDROL DOSEPACK) 4 mg tablet use as directed    traMADol (ULTRAM) 50 mg tablet Take 1 tablet (50 mg total) by mouth every 6 (six) hours.     No current facility-administered medications for this visit.        Review of patient's allergies indicates:   Allergen Reactions    Ciprofloxacin Anaphylaxis     paralysis    Pcn [penicillins] Anaphylaxis     paralysis    Eggplant Blisters    Eggs [egg derived]      Inside of mouth peel      Tomato (solanum lycopersicum) Blisters       History reviewed. No pertinent family history.    Social History     Socioeconomic History    Marital status: Single     Spouse name: Not on file    Number of children: Not on file    Years of education: Not on file    Highest education level: Not on file   Social Needs    Financial resource strain: Not on file    Food insecurity - worry: Not on file    Food insecurity - inability: Not on file     Transportation needs - medical: Not on file    Transportation needs - non-medical: Not on file   Occupational History    Not on file   Tobacco Use    Smoking status: Never Smoker    Smokeless tobacco: Never Used   Substance and Sexual Activity    Alcohol use: Yes     Comment: occasionally    Drug use: No    Sexual activity: Not on file   Other Topics Concern    Not on file   Social History Narrative    Not on file       Chief Complaint:   Chief Complaint   Patient presents with    Back Pain     lumbar spine mri results        Date of surgery:  October 30, 2018    History of present illness:  58-year-old female seen for left total knee arthroplasty and right knee arthroscopy.  Patient is doing well.  Pain is a 3/10.  Right her knees are doing great.  Her main complaint is sciatica film improved.  The therapist has been trying to work on her back and hip area but it does not seem to be making progress.  Denies bowel or bladder symptoms.  Denies any weakness. MRI showed no significant disc bulges or neuroforaminal stenosis.  Medrol Dosepak helped a lot.    Review of Systems:    Musculoskeletal:  See HPI        Physical Examination:    Vital Signs:    Vitals:    12/20/18 1008   BP: 137/81   Pulse: 66       Body mass index is 30.54 kg/m².    This a well-developed, well nourished patient in no acute distress.  They are alert and oriented and cooperative to examination.  Pt. walks without an antalgic gait.       examination of both knees show healed surgical incisions.  No erythema or drainage.  Patient has good range of motion of her left knee from 0-125 degrees. No calf pain. Negative Homans sign.    X-rays:  Four views of both knees are  reviewed which show well-aligned left total knee arthroplasty.    MRI of the lumbar spine:Mild multilevel degenerative lumbar spondylosis without spinal canal or neuroforaminal narrowing.     Assessment::  Status post left total knee arthroplasty  Status post right partial  medial meniscectomy  Left lumbar radiculopathy    Plan:    Patient is doing very well in regards to her knee.  Continue with the outpatient physical therapy.  The Medrol Dosepak was helping.  I will give her another 1.  We will also add her low back to her physical therapy exercises.  I will see her back in 6 weeks.    This note was created using [employer] voice recognition software that occasionally misinterpreted phrases or words.

## 2019-01-02 ENCOUNTER — TELEPHONE (OUTPATIENT)
Dept: RADIOLOGY | Facility: HOSPITAL | Age: 59
End: 2019-01-02

## 2019-01-03 DIAGNOSIS — N63.20 LEFT BREAST MASS: Primary | ICD-10-CM

## 2019-01-14 ENCOUNTER — HOSPITAL ENCOUNTER (OUTPATIENT)
Dept: RADIOLOGY | Facility: HOSPITAL | Age: 59
Discharge: HOME | End: 2019-01-14
Attending: FAMILY MEDICINE
Payer: COMMERCIAL

## 2019-01-14 DIAGNOSIS — R92.8 ABNORMAL MAMMOGRAM OF LEFT BREAST: ICD-10-CM

## 2019-01-14 PROCEDURE — 77061 MAMMO DIGITAL DIAGNOSTIC LEFT WITH TOMOSYNTHESIS_CAD: ICD-10-PCS | Mod: 26,LT,, | Performed by: RADIOLOGY

## 2019-01-14 PROCEDURE — 77065 DX MAMMO INCL CAD UNI: CPT | Mod: 26,LT,, | Performed by: RADIOLOGY

## 2019-01-14 PROCEDURE — 76642 US BREAST LEFT LIMITED: ICD-10-PCS | Mod: 26,LT,, | Performed by: RADIOLOGY

## 2019-01-14 PROCEDURE — 76642 ULTRASOUND BREAST LIMITED: CPT | Mod: TC,LT

## 2019-01-14 PROCEDURE — 77061 BREAST TOMOSYNTHESIS UNI: CPT | Mod: 26,LT,, | Performed by: RADIOLOGY

## 2019-01-14 PROCEDURE — 77065 DX MAMMO INCL CAD UNI: CPT | Mod: TC,LT

## 2019-01-14 PROCEDURE — 77065 MAMMO DIGITAL DIAGNOSTIC LEFT WITH TOMOSYNTHESIS_CAD: ICD-10-PCS | Mod: 26,LT,, | Performed by: RADIOLOGY

## 2019-01-14 PROCEDURE — 77061 BREAST TOMOSYNTHESIS UNI: CPT | Mod: TC,LT

## 2019-01-14 PROCEDURE — 76642 ULTRASOUND BREAST LIMITED: CPT | Mod: 26,LT,, | Performed by: RADIOLOGY

## 2019-01-15 ENCOUNTER — TELEPHONE (OUTPATIENT)
Dept: FAMILY MEDICINE | Facility: CLINIC | Age: 59
End: 2019-01-15

## 2019-01-15 DIAGNOSIS — N63.20 LEFT BREAST MASS: Primary | ICD-10-CM

## 2019-01-15 NOTE — TELEPHONE ENCOUNTER
Spoke to Olesya Wakefield [phone] (Breast Care Coordinator) who stated this matter is being handled by her and she will call pt to get her scheduled with Dr Garcia.

## 2019-01-15 NOTE — PROGRESS NOTES
Please call and schedule patient for ultrasound-guided left breast biopsy.  Order has been placed.

## 2019-01-15 NOTE — TELEPHONE ENCOUNTER
----- Message from Mary Haro sent at 1/15/2019 12:52 PM CST -----  Type:  Patient Returning Call    Who Called:  Patient  Who Left Message for Patient:  n/a  Does the patient know what this is regarding?:  Biopsy  Best Call Back Number:  [phone]

## 2019-01-16 ENCOUNTER — TELEPHONE (OUTPATIENT)
Dept: RADIOLOGY | Facility: HOSPITAL | Age: 59
End: 2019-01-16

## 2019-01-17 ENCOUNTER — OFFICE VISIT (OUTPATIENT)
Dept: ORTHOPEDICS | Facility: CLINIC | Age: 59
End: 2019-01-17
Payer: COMMERCIAL

## 2019-01-17 VITALS
BODY MASS INDEX: 30.61 KG/M2 | HEIGHT: 67 IN | WEIGHT: 195 LBS | SYSTOLIC BLOOD PRESSURE: 142 MMHG | HEART RATE: 77 BPM | DIASTOLIC BLOOD PRESSURE: 82 MMHG

## 2019-01-17 DIAGNOSIS — Z96.652 STATUS POST TOTAL KNEE REPLACEMENT USING CEMENT, LEFT: Primary | ICD-10-CM

## 2019-01-17 PROCEDURE — 99999 PR PBB SHADOW E&M-EST. PATIENT-LVL III: ICD-10-PCS | Mod: PBBFAC,,, | Performed by: ORTHOPAEDIC SURGERY

## 2019-01-17 PROCEDURE — 99024 POSTOP FOLLOW-UP VISIT: CPT | Mod: S$GLB,,, | Performed by: ORTHOPAEDIC SURGERY

## 2019-01-17 PROCEDURE — 99024 PR POST-OP FOLLOW-UP VISIT: ICD-10-PCS | Mod: S$GLB,,, | Performed by: ORTHOPAEDIC SURGERY

## 2019-01-17 PROCEDURE — 99999 PR PBB SHADOW E&M-EST. PATIENT-LVL III: CPT | Mod: PBBFAC,,, | Performed by: ORTHOPAEDIC SURGERY

## 2019-01-17 NOTE — PROGRESS NOTES
Past Medical History:   Diagnosis Date    High cholesterol     PONV (postoperative nausea and vomiting)        Past Surgical History:   Procedure Laterality Date    ARTHROPLASTY, KNEE Left 10/30/2018    Performed by [facility] Paula [facility] at [facility] OR    ARTHROSCOPY, KNEE, WITH MENISCECTOMY Right 10/30/2018    Performed by [facility] at [facility] OR    KNEE SURGERY Left 1980s       Current Outpatient Medications   Medication Sig    aspirin (ECOTRIN) 325 MG EC tablet Take 1 tablet (325 mg total) by mouth 2 (two) times daily with meals.    celecoxib (CELEBREX) 200 MG capsule Take 1 capsule (200 mg total) by mouth once daily.    methylPREDNISolone (MEDROL DOSEPACK) 4 mg tablet use as directed    traMADol (ULTRAM) 50 mg tablet Take 1 tablet (50 mg total) by mouth every 6 (six) hours.     No current facility-administered medications for this visit.        Review of patient's allergies indicates:   Allergen Reactions    Ciprofloxacin Anaphylaxis     paralysis    Pcn [penicillins] Anaphylaxis     paralysis    Eggplant Blisters    Eggs [egg derived]      Inside of mouth peel      Tomato (solanum lycopersicum) Blisters       Family History   Problem Relation Age of Onset    Breast cancer Mother        Social History     Socioeconomic History    Marital status: Single     Spouse name: Not on file    Number of children: Not on file    Years of education: Not on file    Highest education level: Not on file   Social Needs    Financial resource strain: Not on file    Food insecurity - worry: Not on file    Food insecurity - inability: Not on file    Transportation needs - medical: Not on file    Transportation needs - non-medical: Not on file   Occupational History    Not on file   Tobacco Use    Smoking status: Never Smoker    Smokeless tobacco: Never Used   Substance and Sexual Activity    Alcohol use: Yes     Comment: occasionally    Drug use: No    Sexual activity: Not on file    Other Topics Concern    Not on file   Social History Narrative    Not on file       Chief Complaint:   Chief Complaint   Patient presents with    Post-op Evaluation     s/p right knee TKA 10/30/18       Date of surgery:  October 30, 2018    History of present illness:  58-year-old female seen for left total knee arthroplasty and right knee arthroscopy.  Patient is doing well.  Still making good improvements with physical therapy.  No pain at this time.  Sciatica as finally resolved to some degree.    Review of Systems:    Musculoskeletal:  See HPI        Physical Examination:    Vital Signs:    Vitals:    01/17/19 0759   BP: (!) 142/82   Pulse: 77       Body mass index is 30.54 kg/m².    This a well-developed, well nourished patient in no acute distress.  They are alert and oriented and cooperative to examination.  Pt. walks without an antalgic gait.       examination of both knees show healed surgical incisions.  No erythema or drainage.  Patient has good range of motion of her left knee from 0-125 degrees. No calf pain. Negative Homans sign.    X-rays:  Four views of both knees are  reviewed which show well-aligned left total knee arthroplasty.    MRI of the lumbar spine:Mild multilevel degenerative lumbar spondylosis without spinal canal or neuroforaminal narrowing.     Assessment::  Status post left total knee arthroplasty  Status post right partial medial meniscectomy      Plan:    Patient is doing very well in regards to her knee.  Continue with the outpatient physical therapy.  I will see her back in October next year.    This note was created using M Modal voice recognition software that occasionally misinterpreted phrases or words.

## 2019-01-23 ENCOUNTER — HOSPITAL ENCOUNTER (OUTPATIENT)
Dept: RADIOLOGY | Facility: HOSPITAL | Age: 59
Discharge: HOME | End: 2019-01-23
Attending: FAMILY MEDICINE
Payer: COMMERCIAL

## 2019-01-23 DIAGNOSIS — R92.8 ABNORMAL MAMMOGRAM OF LEFT BREAST: ICD-10-CM

## 2019-01-23 PROCEDURE — 88305 TISSUE EXAM BY PATHOLOGIST: CPT | Mod: 26,,, | Performed by: PATHOLOGY

## 2019-01-23 PROCEDURE — 19083 US BREAST BIOPSY WITH IMAGING 1ST SITE LEFT: ICD-10-PCS | Mod: 50,,, | Performed by: FAMILY MEDICINE

## 2019-01-23 PROCEDURE — 19083 BX BREAST 1ST LESION US IMAG: CPT | Mod: 50,,, | Performed by: FAMILY MEDICINE

## 2019-01-23 PROCEDURE — 88305 TISSUE EXAM BY PATHOLOGIST: CPT | Performed by: PATHOLOGY

## 2019-01-23 PROCEDURE — 88305 TISSUE SPECIMEN TO PATHOLOGY, RADIOLOGY: ICD-10-PCS | Mod: 26,,, | Performed by: PATHOLOGY

## 2019-01-23 PROCEDURE — 27201044 US BREAST BIOPSY WITH IMAGING 1ST SITE LEFT

## 2019-01-23 PROCEDURE — 88360 TISSUE SPECIMEN TO PATHOLOGY, RADIOLOGY: ICD-10-PCS | Mod: 26,,, | Performed by: PATHOLOGY

## 2019-01-23 PROCEDURE — 88360 TUMOR IMMUNOHISTOCHEM/MANUAL: CPT | Performed by: PATHOLOGY

## 2019-01-23 PROCEDURE — 88360 TUMOR IMMUNOHISTOCHEM/MANUAL: CPT | Mod: 26,,, | Performed by: PATHOLOGY

## 2019-01-23 PROCEDURE — 19083 BX BREAST 1ST LESION US IMAG: CPT | Mod: TC,LT

## 2019-01-28 ENCOUNTER — TELEPHONE (OUTPATIENT)
Dept: RADIOLOGY | Facility: HOSPITAL | Age: 59
End: 2019-01-28

## 2019-01-28 NOTE — TELEPHONE ENCOUNTER
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  -Immunohistochemical stains performed with adequate controls show the following results:  Estrogen receptor: Positive, strong staining in more than 90% of neoplastic nuclei.  Progesterone receptor: Positive, strong staining in more than 90% of neoplastic nuclei.  Her2: Negative, score 0  Ki67: Approximately 7%    Appointment scheduled on 1/30/2019 with Dr Garcia

## 2019-01-30 ENCOUNTER — OFFICE VISIT (OUTPATIENT)
Dept: SURGERY | Facility: CLINIC | Age: 59
End: 2019-01-30
Payer: COMMERCIAL

## 2019-01-30 VITALS — BODY MASS INDEX: 30.73 KG/M2 | WEIGHT: 196.19 LBS

## 2019-01-30 DIAGNOSIS — D05.12 DUCTAL CARCINOMA IN SITU (DCIS) OF LEFT BREAST: Primary | ICD-10-CM

## 2019-01-30 PROCEDURE — 99999 PR PBB SHADOW E&M-EST. PATIENT-LVL III: ICD-10-PCS | Mod: PBBFAC,,, | Performed by: SURGERY

## 2019-01-30 PROCEDURE — 99999 PR PBB SHADOW E&M-EST. PATIENT-LVL III: CPT | Mod: PBBFAC,,, | Performed by: SURGERY

## 2019-01-30 PROCEDURE — 99244 OFF/OP CNSLTJ NEW/EST MOD 40: CPT | Mod: S$GLB,,, | Performed by: SURGERY

## 2019-01-30 PROCEDURE — 99244 PR OFFICE CONSULTATION,LEVEL IV: ICD-10-PCS | Mod: S$GLB,,, | Performed by: SURGERY

## 2019-01-30 NOTE — H&P (VIEW-ONLY)
Subjective:       Patient ID: Caryn Toledo is a 59 y.o. female.    Chief Complaint: Consult (dcis)      HPI 59-year-old female follows up after stereotactic breast biopsy of the left breast with a diagnosis of ductal carcinoma in situ.  She has had no previous breast problems.  She does have significant family history however.  Her mother developed breast cancer in her 30s and she has 2 maternal aunts who developed breast cancer at young ages.  Father had multiple myeloma.  Age of menarche was 13.  She had menopause at age 35.  She has never taken hormone replacement therapy or oral contraceptives.  She has had 3 pregnancies the 1st child born when she was 21 years old.  She denies any symptoms.  This was found on a screening test.    Past Medical History:   Diagnosis Date    High cholesterol     PONV (postoperative nausea and vomiting)      Past Surgical History:   Procedure Laterality Date    ARTHROPLASTY, KNEE Left 10/30/2018    Performed by [facility] at [facility] OR    ARTHROSCOPY, KNEE, WITH MENISCECTOMY Right 10/30/2018    Performed by [facility] at [facility] OR    KNEE SURGERY Left 1980s         Current Outpatient Medications:     aspirin (ECOTRIN) 325 MG EC tablet, Take 1 tablet (325 mg total) by mouth 2 (two) times daily with meals., Disp: , Rfl: 0    celecoxib (CELEBREX) 200 MG capsule, Take 1 capsule (200 mg total) by mouth once daily., Disp: 30 capsule, Rfl: 0    methylPREDNISolone (MEDROL DOSEPACK) 4 mg tablet, use as directed, Disp: 21 tablet, Rfl: 0    traMADol (ULTRAM) 50 mg tablet, Take 1 tablet (50 mg total) by mouth every 6 (six) hours., Disp: 40 tablet, Rfl: 0    Review of patient's allergies indicates:   Allergen Reactions    Ciprofloxacin Anaphylaxis     paralysis    Pcn [penicillins] Anaphylaxis     paralysis    Eggplant Blisters    Eggs [egg derived]      Inside of mouth peel      Tomato (solanum lycopersicum) Blisters       Family History   Problem  Relation Age of Onset    Breast cancer Mother      Social History     Socioeconomic History    Marital status: Single     Spouse name: Not on file    Number of children: Not on file    Years of education: Not on file    Highest education level: Not on file   Social Needs    Financial resource strain: Not on file    Food insecurity - worry: Not on file    Food insecurity - inability: Not on file    Transportation needs - medical: Not on file    Transportation needs - non-medical: Not on file   Occupational History    Not on file   Tobacco Use    Smoking status: Never Smoker    Smokeless tobacco: Never Used   Substance and Sexual Activity    Alcohol use: Yes     Comment: occasionally    Drug use: No    Sexual activity: Not on file   Other Topics Concern    Not on file   Social History Narrative    Not on file       Review of Systems   Constitutional: Negative for activity change, chills, fever and unexpected weight change.   HENT: Negative for congestion, sore throat, trouble swallowing and voice change.    Eyes: Negative for redness and visual disturbance.   Respiratory: Negative for cough, shortness of breath and wheezing.    Cardiovascular: Negative for chest pain and palpitations.   Gastrointestinal: Negative for abdominal pain, blood in stool, nausea and vomiting.   Endocrine: Negative.    Genitourinary: Negative for dysuria, frequency and hematuria.   Musculoskeletal: Negative for arthralgias, back pain and neck pain.   Skin: Negative for rash and wound.   Allergic/Immunologic: Negative.    Neurological: Negative for dizziness, weakness and headaches.   Hematological: Negative for adenopathy.   Psychiatric/Behavioral: Negative for agitation and dysphoric mood. The patient is not nervous/anxious.      Objective:     Physical Exam   Constitutional: She is oriented to person, place, and time. She appears well-developed and well-nourished. No distress.   HENT:   Head: Normocephalic and atraumatic.    Mouth/Throat: Oropharynx is clear and moist. No oropharyngeal exudate.   Eyes: Conjunctivae and EOM are normal. Pupils are equal, round, and reactive to light. No scleral icterus.   Neck: Normal range of motion. No thyromegaly present.   Cardiovascular: Normal rate and regular rhythm.   No murmur heard.  Pulmonary/Chest: Effort normal and breath sounds normal. She has no wheezes. She has no rales.   Left Breast Exam:  There are no palpable dominant masses.  There are no overlying skin changes.  There is no contour change of the breast.  There is no nipple discharge.  There is no significant breast tenderness.  There is no palpable axillary or supraclavicular adenopathy.  There is a small amount of ecchymosis at the site of the biopsy.    There is no abnormality detected on physical exam of the contralateral breast.   Abdominal: Soft. Bowel sounds are normal. She exhibits no distension and no mass. There is no tenderness. No hernia.   Musculoskeletal: Normal range of motion. She exhibits no edema.   Lymphadenopathy:     She has no cervical adenopathy.   Neurological: She is alert and oriented to person, place, and time. No cranial nerve deficit.   Skin: Skin is warm and dry. No rash noted. No erythema.   Psychiatric: She has a normal mood and affect. Her behavior is normal.          FINAL PATHOLOGIC DIAGNOSIS  LEFT BREAST MASS, 6:00, 5 CM FTN, BIOPSY:  -Minute focus of at least ductal carcinoma in situ, intermediate nuclear grade, see note.  -Immunohistochemical stains performed with adequate controls show the following results:  Estrogen receptor: Positive, strong staining in more than 90% of neoplastic nuclei.  Progesterone receptor: Positive, strong staining in more than 90% of neoplastic nuclei.  Her2: Negative, score 0  Ki67: Approximately 7%  Note: There are small detached fragments of neoplastic epithelium. While no definite invasion is identified,  evaluation of invasion is limited by the detached nature of  the neoplastic fragments. Mucinous features are present.  Definite classification is deferred to resection if applicable.      Assessment:     Encounter Diagnosis   Name Primary?    Ductal carcinoma in situ (DCIS) of left breast Yes       Plan:      1.  Long discussion with the patient and her  about the surgical options.  They would like to proceed with a partial mastectomy followed by radiation treatment.  The patient works in [employer] and intends to have her surgery here.  She would like to return to [place] where her follow-up will be done including medical Oncology and Radiation Oncology visits.  2. Risks and benefits of the planned procedure were discussed at length with the patient.  Risks and benefits of not proceeding with the procedure were discussed as well. All questions were answered. The patient expressed clear understanding and would like to proceed with the procedure as discussed.

## 2019-01-30 NOTE — PROGRESS NOTES
Subjective:       Patient ID: Caryn Toledo is a 59 y.o. female.    Chief Complaint: Consult (dcis)      HPI 59-year-old female follows up after stereotactic breast biopsy of the left breast with a diagnosis of ductal carcinoma in situ.  She has had no previous breast problems.  She does have significant family history however.  Her mother developed breast cancer in her 30s and she has 2 maternal aunts who developed breast cancer at young ages.  Father had multiple myeloma.  Age of menarche was 13.  She had menopause at age 35.  She has never taken hormone replacement therapy or oral contraceptives.  She has had 3 pregnancies the 1st child born when she was 21 years old.  She denies any symptoms.  This was found on a screening test.    Past Medical History:   Diagnosis Date    High cholesterol     PONV (postoperative nausea and vomiting)      Past Surgical History:   Procedure Laterality Date    ARTHROPLASTY, KNEE Left 10/30/2018    Performed by [facility]ta Paula MD at [facility] OR    ARTHROSCOPY, KNEE, WITH MENISCECTOMY Right 10/30/2018    Performed by [facility] at [facility] OR    KNEE SURGERY Left 1980s         Current Outpatient Medications:     aspirin (ECOTRIN) 325 MG EC tablet, Take 1 tablet (325 mg total) by mouth 2 (two) times daily with meals., Disp: , Rfl: 0    celecoxib (CELEBREX) 200 MG capsule, Take 1 capsule (200 mg total) by mouth once daily., Disp: 30 capsule, Rfl: 0    methylPREDNISolone (MEDROL DOSEPACK) 4 mg tablet, use as directed, Disp: 21 tablet, Rfl: 0    traMADol (ULTRAM) 50 mg tablet, Take 1 tablet (50 mg total) by mouth every 6 (six) hours., Disp: 40 tablet, Rfl: 0    Review of patient's allergies indicates:   Allergen Reactions    Ciprofloxacin Anaphylaxis     paralysis    Pcn [penicillins] Anaphylaxis     paralysis    Eggplant Blisters    Eggs [egg derived]      Inside of mouth peel      Tomato (solanum lycopersicum) Blisters       Family History   Problem  Relation Age of Onset    Breast cancer Mother      Social History     Socioeconomic History    Marital status: Single     Spouse name: Not on file    Number of children: Not on file    Years of education: Not on file    Highest education level: Not on file   Social Needs    Financial resource strain: Not on file    Food insecurity - worry: Not on file    Food insecurity - inability: Not on file    Transportation needs - medical: Not on file    Transportation needs - non-medical: Not on file   Occupational History    Not on file   Tobacco Use    Smoking status: Never Smoker    Smokeless tobacco: Never Used   Substance and Sexual Activity    Alcohol use: Yes     Comment: occasionally    Drug use: No    Sexual activity: Not on file   Other Topics Concern    Not on file   Social History Narrative    Not on file       Review of Systems   Constitutional: Negative for activity change, chills, fever and unexpected weight change.   HENT: Negative for congestion, sore throat, trouble swallowing and voice change.    Eyes: Negative for redness and visual disturbance.   Respiratory: Negative for cough, shortness of breath and wheezing.    Cardiovascular: Negative for chest pain and palpitations.   Gastrointestinal: Negative for abdominal pain, blood in stool, nausea and vomiting.   Endocrine: Negative.    Genitourinary: Negative for dysuria, frequency and hematuria.   Musculoskeletal: Negative for arthralgias, back pain and neck pain.   Skin: Negative for rash and wound.   Allergic/Immunologic: Negative.    Neurological: Negative for dizziness, weakness and headaches.   Hematological: Negative for adenopathy.   Psychiatric/Behavioral: Negative for agitation and dysphoric mood. The patient is not nervous/anxious.      Objective:     Physical Exam   Constitutional: She is oriented to person, place, and time. She appears well-developed and well-nourished. No distress.   HENT:   Head: Normocephalic and atraumatic.    Mouth/Throat: Oropharynx is clear and moist. No oropharyngeal exudate.   Eyes: Conjunctivae and EOM are normal. Pupils are equal, round, and reactive to light. No scleral icterus.   Neck: Normal range of motion. No thyromegaly present.   Cardiovascular: Normal rate and regular rhythm.   No murmur heard.  Pulmonary/Chest: Effort normal and breath sounds normal. She has no wheezes. She has no rales.   Left Breast Exam:  There are no palpable dominant masses.  There are no overlying skin changes.  There is no contour change of the breast.  There is no nipple discharge.  There is no significant breast tenderness.  There is no palpable axillary or supraclavicular adenopathy.  There is a small amount of ecchymosis at the site of the biopsy.    There is no abnormality detected on physical exam of the contralateral breast.   Abdominal: Soft. Bowel sounds are normal. She exhibits no distension and no mass. There is no tenderness. No hernia.   Musculoskeletal: Normal range of motion. She exhibits no edema.   Lymphadenopathy:     She has no cervical adenopathy.   Neurological: She is alert and oriented to person, place, and time. No cranial nerve deficit.   Skin: Skin is warm and dry. No rash noted. No erythema.   Psychiatric: She has a normal mood and affect. Her behavior is normal.          FINAL PATHOLOGIC DIAGNOSIS  LEFT BREAST MASS, 6:00, 5 CM FTN, BIOPSY:  -Minute focus of at least ductal carcinoma in situ, intermediate nuclear grade, see note.  -Immunohistochemical stains performed with adequate controls show the following results:  Estrogen receptor: Positive, strong staining in more than 90% of neoplastic nuclei.  Progesterone receptor: Positive, strong staining in more than 90% of neoplastic nuclei.  Her2: Negative, score 0  Ki67: Approximately 7%  Note: There are small detached fragments of neoplastic epithelium. While no definite invasion is identified,  evaluation of invasion is limited by the detached nature of  the neoplastic fragments. Mucinous features are present.  Definite classification is deferred to resection if applicable.      Assessment:     Encounter Diagnosis   Name Primary?    Ductal carcinoma in situ (DCIS) of left breast Yes       Plan:      1.  Long discussion with the patient and her  about the surgical options.  They would like to proceed with a partial mastectomy followed by radiation treatment.  The patient works in [employer] and intends to have her surgery here.  She would like to return to [place] where her follow-up will be done including medical Oncology and Radiation Oncology visits.  2. Risks and benefits of the planned procedure were discussed at length with the patient.  Risks and benefits of not proceeding with the procedure were discussed as well. All questions were answered. The patient expressed clear understanding and would like to proceed with the procedure as discussed.

## 2019-01-30 NOTE — LETTER
January 30, 2019      Terrance Nolen MD  [address]  [facility] [address]           [facility]  [address]  [facility] [address]  Phone: [phone]          Patient: Caryn Toledo   MR Number: [id number]   YOB: 1960   Date of Visit: 1/30/2019       Dear Dr. Terrance Nolen:    Thank you for referring Caryn Toledo to me for evaluation. Attached you will find relevant portions of my assessment and plan of care.    If you have questions, please do not hesitate to call me. I look forward to following Caryn Toledo along with you.    Sincerely,    Didier Garcia MD    Enclosure  CC:  No Recipients    If you would like to receive this communication electronically, please contact [email or web address] or [phone] to request more information on [employer] Link access.    For providers and/or their staff who would like to refer a patient to [facility], please contact us through our one-stop-shop provider referral line, [facility], at [phone].    If you feel you have received this communication in error or would no longer like to receive these types of communications, please e-mail [email or web address]

## 2019-01-31 ENCOUNTER — LAB VISIT (OUTPATIENT)
Dept: LAB | Facility: HOSPITAL | Age: 59
End: 2019-01-31
Attending: SURGERY
Payer: COMMERCIAL

## 2019-01-31 DIAGNOSIS — D05.12 DUCTAL CARCINOMA IN SITU (DCIS) OF LEFT BREAST: ICD-10-CM

## 2019-01-31 LAB
ALBUMIN SERPL BCP-MCNC: 4.2 G/DL
ALP SERPL-CCNC: 84 U/L
ALT SERPL W/O P-5'-P-CCNC: 18 U/L
ANION GAP SERPL CALC-SCNC: 9 MMOL/L
AST SERPL-CCNC: 16 U/L
BASOPHILS # BLD AUTO: 0 K/UL
BASOPHILS NFR BLD: 0.3 %
BILIRUB SERPL-MCNC: 0.4 MG/DL
BUN SERPL-MCNC: 11 MG/DL
CALCIUM SERPL-MCNC: 9.9 MG/DL
CHLORIDE SERPL-SCNC: 107 MMOL/L
CO2 SERPL-SCNC: 25 MMOL/L
CREAT SERPL-MCNC: 0.9 MG/DL
DIFFERENTIAL METHOD: ABNORMAL
EOSINOPHIL # BLD AUTO: 0 K/UL
EOSINOPHIL NFR BLD: 0.5 %
ERYTHROCYTE [DISTWIDTH] IN BLOOD BY AUTOMATED COUNT: 14.5 %
EST. GFR  (AFRICAN AMERICAN): >60 ML/MIN/1.73 M^2
EST. GFR  (NON AFRICAN AMERICAN): >60 ML/MIN/1.73 M^2
GLUCOSE SERPL-MCNC: 82 MG/DL
HCT VFR BLD AUTO: 41.7 %
HGB BLD-MCNC: 13.8 G/DL
LYMPHOCYTES # BLD AUTO: 1.7 K/UL
LYMPHOCYTES NFR BLD: 25.3 %
MCH RBC QN AUTO: 30 PG
MCHC RBC AUTO-ENTMCNC: 33.1 G/DL
MCV RBC AUTO: 91 FL
MONOCYTES # BLD AUTO: 0.4 K/UL
MONOCYTES NFR BLD: 5.4 %
NEUTROPHILS # BLD AUTO: 4.5 K/UL
NEUTROPHILS NFR BLD: 68.5 %
PLATELET # BLD AUTO: 248 K/UL
PMV BLD AUTO: 8.2 FL
POTASSIUM SERPL-SCNC: 4.2 MMOL/L
PROT SERPL-MCNC: 7.5 G/DL
RBC # BLD AUTO: 4.59 M/UL
SODIUM SERPL-SCNC: 141 MMOL/L
WBC # BLD AUTO: 6.6 K/UL

## 2019-01-31 PROCEDURE — 80053 COMPREHEN METABOLIC PANEL: CPT

## 2019-01-31 PROCEDURE — 85025 COMPLETE CBC W/AUTO DIFF WBC: CPT

## 2019-01-31 PROCEDURE — 36415 COLL VENOUS BLD VENIPUNCTURE: CPT

## 2019-02-01 RX ORDER — LIDOCAINE HYDROCHLORIDE 10 MG/ML
1 INJECTION, SOLUTION EPIDURAL; INFILTRATION; INTRACAUDAL; PERINEURAL ONCE
Status: CANCELLED | OUTPATIENT
Start: 2019-02-11

## 2019-02-01 RX ORDER — SODIUM CHLORIDE 9 MG/ML
INJECTION, SOLUTION INTRAVENOUS CONTINUOUS
Status: CANCELLED | OUTPATIENT
Start: 2019-02-11

## 2019-02-06 RX ORDER — GUAIFENESIN 600 MG/1
1200 TABLET, EXTENDED RELEASE ORAL 2 TIMES DAILY
COMMUNITY
End: 2019-02-27 | Stop reason: ALTCHOICE

## 2019-02-08 ENCOUNTER — ANESTHESIA EVENT (OUTPATIENT)
Dept: SURGERY | Facility: HOSPITAL | Age: 59
End: 2019-02-08
Payer: COMMERCIAL

## 2019-02-11 ENCOUNTER — ANESTHESIA (OUTPATIENT)
Dept: SURGERY | Facility: HOSPITAL | Age: 59
End: 2019-02-11
Payer: COMMERCIAL

## 2019-02-11 ENCOUNTER — HOSPITAL ENCOUNTER (OUTPATIENT)
Dept: RADIOLOGY | Facility: HOSPITAL | Age: 59
Discharge: HOME | End: 2019-02-11
Attending: SURGERY | Admitting: SURGERY
Payer: COMMERCIAL

## 2019-02-11 ENCOUNTER — HOSPITAL ENCOUNTER (OUTPATIENT)
Facility: HOSPITAL | Age: 59
Discharge: HOME | End: 2019-02-11
Attending: SURGERY | Admitting: SURGERY
Payer: COMMERCIAL

## 2019-02-11 ENCOUNTER — HOSPITAL ENCOUNTER (OUTPATIENT)
Dept: RADIOLOGY | Facility: HOSPITAL | Age: 59
Discharge: HOME | End: 2019-02-11
Attending: SURGERY
Payer: COMMERCIAL

## 2019-02-11 DIAGNOSIS — R92.8 ABNORMAL MAMMOGRAM OF LEFT BREAST: ICD-10-CM

## 2019-02-11 DIAGNOSIS — D05.12 DUCTAL CARCINOMA IN SITU (DCIS) OF LEFT BREAST: Primary | ICD-10-CM

## 2019-02-11 PROCEDURE — 88305 TISSUE EXAM BY PATHOLOGIST: CPT | Mod: 26,,, | Performed by: PATHOLOGY

## 2019-02-11 PROCEDURE — 88305 TISSUE EXAM BY PATHOLOGIST: CPT | Performed by: PATHOLOGY

## 2019-02-11 PROCEDURE — 63600175 PHARM REV CODE 636 W HCPCS: Mod: PO | Performed by: ANESTHESIOLOGY

## 2019-02-11 PROCEDURE — 76098 X-RAY EXAM SURGICAL SPECIMEN: CPT | Mod: 26,,, | Performed by: RADIOLOGY

## 2019-02-11 PROCEDURE — 37000008 HC ANESTHESIA 1ST 15 MINUTES: Mod: PO | Performed by: SURGERY

## 2019-02-11 PROCEDURE — 88342 IMHCHEM/IMCYTCHM 1ST ANTB: CPT | Mod: 26,,, | Performed by: PATHOLOGY

## 2019-02-11 PROCEDURE — 37000009 HC ANESTHESIA EA ADD 15 MINS: Mod: PO | Performed by: SURGERY

## 2019-02-11 PROCEDURE — 88341 IMHCHEM/IMCYTCHM EA ADD ANTB: CPT | Mod: 26,,, | Performed by: PATHOLOGY

## 2019-02-11 PROCEDURE — 25000003 PHARM REV CODE 250: Mod: PO | Performed by: SURGERY

## 2019-02-11 PROCEDURE — 88342 CHG IMMUNOCYTOCHEMISTRY: ICD-10-PCS | Mod: 26,,, | Performed by: PATHOLOGY

## 2019-02-11 PROCEDURE — 88305 TISSUE SPECIMEN TO PATHOLOGY: ICD-10-PCS | Mod: 26,,, | Performed by: PATHOLOGY

## 2019-02-11 PROCEDURE — 36000707: Mod: PO | Performed by: SURGERY

## 2019-02-11 PROCEDURE — 71000015 HC POSTOP RECOV 1ST HR: Mod: PO | Performed by: SURGERY

## 2019-02-11 PROCEDURE — 76098 MAMMO BREAST SPECIMEN: ICD-10-PCS | Mod: 26,,, | Performed by: RADIOLOGY

## 2019-02-11 PROCEDURE — S0077 INJECTION, CLINDAMYCIN PHOSP: HCPCS | Mod: PO | Performed by: SURGERY

## 2019-02-11 PROCEDURE — 19285 PERQ DEV BREAST 1ST US IMAG: CPT | Mod: LT,,, | Performed by: RADIOLOGY

## 2019-02-11 PROCEDURE — 19285 US NEEDLE LOC WITH ULTRASOUND 1ST SITE: ICD-10-PCS | Mod: LT,,, | Performed by: RADIOLOGY

## 2019-02-11 PROCEDURE — 19301 PARTIAL MASTECTOMY: CPT | Mod: LT,,, | Performed by: SURGERY

## 2019-02-11 PROCEDURE — 71000033 HC RECOVERY, INTIAL HOUR: Mod: PO | Performed by: SURGERY

## 2019-02-11 PROCEDURE — D9220A PRA ANESTHESIA: ICD-10-PCS | Mod: CRNA,,, | Performed by: NURSE ANESTHETIST, CERTIFIED REGISTERED

## 2019-02-11 PROCEDURE — 88307 TISSUE EXAM BY PATHOLOGIST: CPT | Performed by: PATHOLOGY

## 2019-02-11 PROCEDURE — 25000003 PHARM REV CODE 250: Mod: PO | Performed by: ANESTHESIOLOGY

## 2019-02-11 PROCEDURE — D9220A PRA ANESTHESIA: Mod: CRNA,,, | Performed by: NURSE ANESTHETIST, CERTIFIED REGISTERED

## 2019-02-11 PROCEDURE — D9220A PRA ANESTHESIA: ICD-10-PCS | Mod: ANES,,, | Performed by: ANESTHESIOLOGY

## 2019-02-11 PROCEDURE — 88341 PR IHC OR ICC EACH ADD'L SINGLE ANTIBODY  STAINPR: ICD-10-PCS | Mod: 26,,, | Performed by: PATHOLOGY

## 2019-02-11 PROCEDURE — 76098 X-RAY EXAM SURGICAL SPECIMEN: CPT | Mod: TC,PO

## 2019-02-11 PROCEDURE — 36000706: Mod: PO | Performed by: SURGERY

## 2019-02-11 PROCEDURE — 19285 PERQ DEV BREAST 1ST US IMAG: CPT | Mod: TC,PO

## 2019-02-11 PROCEDURE — 88307 TISSUE SPECIMEN TO PATHOLOGY: ICD-10-PCS | Mod: 26,,, | Performed by: PATHOLOGY

## 2019-02-11 PROCEDURE — 63600175 PHARM REV CODE 636 W HCPCS: Mod: PO | Performed by: NURSE ANESTHETIST, CERTIFIED REGISTERED

## 2019-02-11 PROCEDURE — 19301 PR MASTECTOMY, PARTIAL: ICD-10-PCS | Mod: LT,,, | Performed by: SURGERY

## 2019-02-11 PROCEDURE — D9220A PRA ANESTHESIA: Mod: ANES,,, | Performed by: ANESTHESIOLOGY

## 2019-02-11 PROCEDURE — 25000003 PHARM REV CODE 250: Mod: PO | Performed by: NURSE ANESTHETIST, CERTIFIED REGISTERED

## 2019-02-11 PROCEDURE — 88307 TISSUE EXAM BY PATHOLOGIST: CPT | Mod: 26,,, | Performed by: PATHOLOGY

## 2019-02-11 RX ORDER — SODIUM CHLORIDE 9 MG/ML
INJECTION, SOLUTION INTRAVENOUS CONTINUOUS
Status: DISCONTINUED | OUTPATIENT
Start: 2019-02-11 | End: 2019-02-11 | Stop reason: HOSPADM

## 2019-02-11 RX ORDER — DEXAMETHASONE SODIUM PHOSPHATE 4 MG/ML
INJECTION, SOLUTION INTRA-ARTICULAR; INTRALESIONAL; INTRAMUSCULAR; INTRAVENOUS; SOFT TISSUE
Status: DISCONTINUED | OUTPATIENT
Start: 2019-02-11 | End: 2019-02-11

## 2019-02-11 RX ORDER — BUPIVACAINE HYDROCHLORIDE AND EPINEPHRINE 2.5; 5 MG/ML; UG/ML
INJECTION, SOLUTION EPIDURAL; INFILTRATION; INTRACAUDAL; PERINEURAL
Status: DISCONTINUED | OUTPATIENT
Start: 2019-02-11 | End: 2019-02-11 | Stop reason: HOSPADM

## 2019-02-11 RX ORDER — SODIUM CHLORIDE 0.9 % (FLUSH) 0.9 %
3 SYRINGE (ML) INJECTION
Status: DISCONTINUED | OUTPATIENT
Start: 2019-02-11 | End: 2019-02-11 | Stop reason: HOSPADM

## 2019-02-11 RX ORDER — SODIUM CHLORIDE, SODIUM LACTATE, POTASSIUM CHLORIDE, CALCIUM CHLORIDE 600; 310; 30; 20 MG/100ML; MG/100ML; MG/100ML; MG/100ML
INJECTION, SOLUTION INTRAVENOUS CONTINUOUS
Status: DISCONTINUED | OUTPATIENT
Start: 2019-02-11 | End: 2019-02-11 | Stop reason: HOSPADM

## 2019-02-11 RX ORDER — ONDANSETRON 2 MG/ML
INJECTION INTRAMUSCULAR; INTRAVENOUS
Status: DISCONTINUED | OUTPATIENT
Start: 2019-02-11 | End: 2019-02-11

## 2019-02-11 RX ORDER — SODIUM CHLORIDE 9 MG/ML
INJECTION, SOLUTION INTRAVENOUS CONTINUOUS
Status: DISCONTINUED | OUTPATIENT
Start: 2019-02-11 | End: 2019-02-11

## 2019-02-11 RX ORDER — TRAMADOL HYDROCHLORIDE 50 MG/1
50 TABLET ORAL EVERY 6 HOURS
Qty: 15 TABLET | Refills: 0 | Status: SHIPPED | OUTPATIENT
Start: 2019-02-11 | End: 2019-02-27

## 2019-02-11 RX ORDER — CLINDAMYCIN PHOSPHATE 900 MG/50ML
900 INJECTION, SOLUTION INTRAVENOUS
Status: COMPLETED | OUTPATIENT
Start: 2019-02-11 | End: 2019-02-11

## 2019-02-11 RX ORDER — FENTANYL CITRATE 50 UG/ML
INJECTION, SOLUTION INTRAMUSCULAR; INTRAVENOUS
Status: DISCONTINUED | OUTPATIENT
Start: 2019-02-11 | End: 2019-02-11

## 2019-02-11 RX ORDER — KETOROLAC TROMETHAMINE 30 MG/ML
30 INJECTION, SOLUTION INTRAMUSCULAR; INTRAVENOUS ONCE
Status: COMPLETED | OUTPATIENT
Start: 2019-02-11 | End: 2019-02-11

## 2019-02-11 RX ORDER — METOCLOPRAMIDE HYDROCHLORIDE 5 MG/ML
10 INJECTION INTRAMUSCULAR; INTRAVENOUS EVERY 10 MIN PRN
Status: DISCONTINUED | OUTPATIENT
Start: 2019-02-11 | End: 2019-02-11 | Stop reason: HOSPADM

## 2019-02-11 RX ORDER — PROPOFOL 10 MG/ML
VIAL (ML) INTRAVENOUS
Status: DISCONTINUED | OUTPATIENT
Start: 2019-02-11 | End: 2019-02-11

## 2019-02-11 RX ORDER — EPHEDRINE SULFATE 50 MG/ML
INJECTION, SOLUTION INTRAVENOUS
Status: DISCONTINUED | OUTPATIENT
Start: 2019-02-11 | End: 2019-02-11

## 2019-02-11 RX ORDER — LIDOCAINE HYDROCHLORIDE 10 MG/ML
1 INJECTION, SOLUTION EPIDURAL; INFILTRATION; INTRACAUDAL; PERINEURAL ONCE
Status: DISCONTINUED | OUTPATIENT
Start: 2019-02-11 | End: 2019-02-11 | Stop reason: HOSPADM

## 2019-02-11 RX ORDER — LIDOCAINE HYDROCHLORIDE 10 MG/ML
INJECTION, SOLUTION INTRAVENOUS
Status: DISCONTINUED | OUTPATIENT
Start: 2019-02-11 | End: 2019-02-11

## 2019-02-11 RX ORDER — MIDAZOLAM HYDROCHLORIDE 1 MG/ML
INJECTION, SOLUTION INTRAMUSCULAR; INTRAVENOUS
Status: DISCONTINUED | OUTPATIENT
Start: 2019-02-11 | End: 2019-02-11

## 2019-02-11 RX ORDER — FENTANYL CITRATE 50 UG/ML
25 INJECTION, SOLUTION INTRAMUSCULAR; INTRAVENOUS EVERY 5 MIN PRN
Status: COMPLETED | OUTPATIENT
Start: 2019-02-11 | End: 2019-02-11

## 2019-02-11 RX ORDER — LIDOCAINE HYDROCHLORIDE 10 MG/ML
1 INJECTION, SOLUTION EPIDURAL; INFILTRATION; INTRACAUDAL; PERINEURAL ONCE
Status: DISCONTINUED | OUTPATIENT
Start: 2019-02-11 | End: 2019-02-11

## 2019-02-11 RX ADMIN — FENTANYL CITRATE 50 MCG: 50 INJECTION, SOLUTION INTRAMUSCULAR; INTRAVENOUS at 12:02

## 2019-02-11 RX ADMIN — ONDANSETRON 4 MG: 2 INJECTION, SOLUTION INTRAMUSCULAR; INTRAVENOUS at 12:02

## 2019-02-11 RX ADMIN — PROPOFOL 200 MG: 10 INJECTION, EMULSION INTRAVENOUS at 11:02

## 2019-02-11 RX ADMIN — SODIUM CHLORIDE, SODIUM LACTATE, POTASSIUM CHLORIDE, AND CALCIUM CHLORIDE: .6; .31; .03; .02 INJECTION, SOLUTION INTRAVENOUS at 11:02

## 2019-02-11 RX ADMIN — FENTANYL CITRATE 25 MCG: 50 INJECTION INTRAMUSCULAR; INTRAVENOUS at 01:02

## 2019-02-11 RX ADMIN — KETOROLAC TROMETHAMINE 30 MG: 30 INJECTION, SOLUTION INTRAMUSCULAR at 01:02

## 2019-02-11 RX ADMIN — EPHEDRINE SULFATE 5 MG: 50 INJECTION, SOLUTION INTRAMUSCULAR; INTRAVENOUS; SUBCUTANEOUS at 12:02

## 2019-02-11 RX ADMIN — CLINDAMYCIN IN 5 PERCENT DEXTROSE 900 MG: 18 INJECTION, SOLUTION INTRAVENOUS at 11:02

## 2019-02-11 RX ADMIN — EPHEDRINE SULFATE 10 MG: 50 INJECTION, SOLUTION INTRAMUSCULAR; INTRAVENOUS; SUBCUTANEOUS at 12:02

## 2019-02-11 RX ADMIN — FENTANYL CITRATE 25 MCG: 50 INJECTION INTRAMUSCULAR; INTRAVENOUS at 10:02

## 2019-02-11 RX ADMIN — MIDAZOLAM HYDROCHLORIDE 2 MG: 1 INJECTION, SOLUTION INTRAMUSCULAR; INTRAVENOUS at 11:02

## 2019-02-11 RX ADMIN — LIDOCAINE HYDROCHLORIDE 50 MG: 10 INJECTION, SOLUTION INTRAVENOUS at 11:02

## 2019-02-11 RX ADMIN — SODIUM CHLORIDE, SODIUM LACTATE, POTASSIUM CHLORIDE, AND CALCIUM CHLORIDE: .6; .31; .03; .02 INJECTION, SOLUTION INTRAVENOUS at 12:02

## 2019-02-11 RX ADMIN — DEXAMETHASONE SODIUM PHOSPHATE 8 MG: 4 INJECTION, SOLUTION INTRAMUSCULAR; INTRAVENOUS at 12:02

## 2019-02-11 NOTE — INTERVAL H&P NOTE
The patient has been examined and the H&P has been reviewed:    I concur with the findings and no changes have occurred since H&P was written.    Anesthesia/Surgery risks, benefits and alternative options discussed and understood by patient/family.          Active Hospital Problems    Diagnosis  POA    Ductal carcinoma in situ (DCIS) of left breast [D05.12]  Yes      Resolved Hospital Problems   No resolved problems to display.

## 2019-02-11 NOTE — ANESTHESIA POSTPROCEDURE EVALUATION
"Anesthesia Post Evaluation    Patient: Caryn Toledo    Procedure(s) Performed: Procedure(s) (LRB):  LUMPECTOMY, BREAST (Left)    Final Anesthesia Type: general  Patient location during evaluation: PACU  Patient participation: Yes- Able to Participate  Level of consciousness: awake and alert  Post-procedure vital signs: reviewed and stable  Pain management: adequate  Airway patency: patent  PONV status at discharge: No PONV  Anesthetic complications: no      Cardiovascular status: blood pressure returned to baseline  Respiratory status: unassisted  Hydration status: euvolemic  Follow-up not needed.        Visit Vitals  BP (!) 140/83 (BP Location: Right arm, Patient Position: Lying)   Pulse 65   Temp 36.7 °C (98 °F) (Skin)   Resp 16   Ht 5' 7" (1.702 m)   Wt 86.2 kg (190 lb)   SpO2 99%   Breastfeeding? No   BMI 29.76 kg/m²       Pain/Glenys Score: Pain Rating Prior to Med Admin: 3 (2/11/2019  1:40 PM)  Pain Rating Post Med Admin: 3 (2/11/2019  1:40 PM)  Glenys Score: 10 (2/11/2019  1:40 PM)        "

## 2019-02-11 NOTE — OP NOTE
Patient: Caryn Toledo     Date of Procedure: 2/11/2019    Procedure: 1. Right partial mastectomy with wire localization.      Surgeon: Didier Roberson MD    Assistant: None    Pre-op Diagnosis: Ductal carcinoma in situ (DCIS) of left breast [D05.12]     Post-op Diagnosis: Ductal carcinoma in situ (DCIS) of left breast [D05.12]    Findings: Breast Cancer.      Specimen: Partial mastectomy.      EBL: 30 cc    Complications: None      Procedure in detail:    After appropriate consent was obtained, consent forms signed and questions answered, the breast was marked and the patient was taken to the operating room.  She was positioned and general anesthesia was induced. The x-ray images were reviewed.  The chest was then prepped and draped in the usual sterile fashion.  Prior to the procedure the patient had had wire localization performed by radiology.      The x-ray images were again reviewed identifying the wire and the lesion.  Skin incision was made and dissection was performed into the breast tissue with electrocautery.  A partial mastectomy specimen was then removed which encased the wire.  The specimen was then oriented for the pathologist with a long stitch on the lateral margin, a short stitch on the superior margin, and a double stitch on the deep margin.  The cavity was examined and adequate hemostasis was achieved.  Additional margins were excised and marked. Anterior, superior and deep. Quarter percent Marcaine with epinephrine was injected for local anesthesia.  The deep tissue was reapproximated with 3-0 Vicryl suture and the skin was then closed with a 4-0 Monocryl subcuticular stitch.   Specimen radiograph confirmed the lesion to be within the partial mastectomy specimen.  Steri-Strips and dressings were then applied.  The patient was then awakened, extubated and transferred to the recovery room in stable condition.  She tolerated the procedure without complication.

## 2019-02-11 NOTE — ANESTHESIA PREPROCEDURE EVALUATION
02/11/2019  Caryn Toledo is a 59 y.o., female.    Pre-op Assessment    I have reviewed the Patient Summary Reports.     I have reviewed the Nursing Notes.   I have reviewed the Medications.     Review of Systems  Anesthesia Hx:  Hx of Anesthetic complications  Denies Family Hx of Anesthesia complications.  Personal Hx of Anesthesia complications, Post-Operative Nausea/Vomiting, in the past, but not with recent anesthetics / prophylaxis   Social:  Non-Smoker    Hematology/Oncology:        Current/Recent Cancer. Breast right       Physical Exam  General:  Well nourished    Airway/Jaw/Neck:  Airway Findings: Mouth Opening: Normal Tongue: Normal  General Airway Assessment: Adult  Mallampati: II  TM Distance: Normal, at least 6 cm         Dental:  DENTAL FINDINGS: Normal   Chest/Lungs:  Chest/Lungs Clear    Heart/Vascular:  Heart Findings: Normal            Anesthesia Plan  Type of Anesthesia, risks & benefits discussed:  Anesthesia Type:  general  Patient's Preference:   Intra-op Monitoring Plan:   Intra-op Monitoring Plan Comments:   Post Op Pain Control Plan:   Post Op Pain Control Plan Comments:   Induction:    Beta Blocker:  Patient is not currently on a Beta-Blocker (No further documentation required).       Informed Consent: Patient understands risks and agrees with Anesthesia plan.  Questions answered. Anesthesia consent signed with patient.  ASA Score: 1     Day of Surgery Review of History & Physical:    H&P update referred to the surgeon.         Ready For Surgery From Anesthesia Perspective.

## 2019-02-11 NOTE — TRANSFER OF CARE
"Anesthesia Transfer of Care Note    Patient: Caryn Toledo    Procedure(s) Performed: Procedure(s) (LRB):  LUMPECTOMY, BREAST (Left)    Patient location: PACU    Anesthesia Type: general    Transport from OR: Transported from OR on room air with adequate spontaneous ventilation    Post pain: adequate analgesia    Post assessment: no apparent anesthetic complications    Post vital signs: stable    Level of consciousness: awake, alert and oriented    Nausea/Vomiting: no nausea/vomiting    Complications: none    Transfer of care protocol was followed      Last vitals:   Visit Vitals  BP (!) 155/69 (BP Location: Right arm, Patient Position: Lying)   Pulse 80   Temp 36.1 °C (97 °F) (Skin)   Resp 16   Ht 5' 7" (1.702 m)   Wt 86.2 kg (190 lb)   SpO2 96%   Breastfeeding? No   BMI 29.76 kg/m²     "

## 2019-02-11 NOTE — DISCHARGE SUMMARY
Discharge Summary  General Surgery      Admit Date: 2/11/2019    Discharge Date :2/11/2019    Attending Physician: Didier Garcia     Discharge Physician: Didier Garcia    Discharged Condition: good    Discharge Diagnosis: Ductal carcinoma in situ (DCIS) of left breast [D05.12]    Treatments/Procedures: Procedure(s) (LRB):  LUMPECTOMY, BREAST (Left)    Hospital Course: Uneventful; Discharged home from Recovery    Significant Diagnostic Studies: none    Disposition: Home or Self Care    Diet: Regular    Follow up: Office 10-14 days    Activity: No heavy lifting till seen in office.    Patient Instructions:   Current Discharge Medication List      START taking these medications    Details   traMADol (ULTRAM) 50 mg tablet Take 1 tablet (50 mg total) by mouth every 6 (six) hours.  Qty: 15 tablet, Refills: 0         CONTINUE these medications which have NOT CHANGED    Details   DM/p-ephed/acetaminoph/doxylam (NYQUIL ORAL) Take by mouth.      guaiFENesin (MUCINEX) 600 mg 12 hr tablet Take 1,200 mg by mouth 2 (two) times daily.             Discharge Procedure Orders   Diet general     Remove dressing in 48 hours

## 2019-02-11 NOTE — DISCHARGE INSTRUCTIONS
Department of General Surgery  [facility]    BREAST BIOPSY    DOS:   Wear bra day and night for one week.   May shower in 24 hours   May remove outer dressing in 48 hours. If steri-strips present leave in place. Pat dry if they get  wet. Do not worry if they fall off.   Minimal activity for 48 hours.   Advance diet as tolerated   Apply ice pack for 24 hours for comfort.   Resume home medications as prescribed    DONT:   Do not soak in the tub   No driving for 24 hours or while taking narcotic pain medication   DO NOT TAKE ADDITIONAL TYLENOL/ACETAMINOPHEN WHILE TAKING NARCOTIC PAIN MEDICATION THAT CONTAINS TYLENOL/ACETAMINOPHEN.    CALL PHYSICIAN FOR:   Redness or bleeding.   Fever greater then 101.   Drainage from the incision site that appears infected.   Persistent pain not relieved by pain medication.    RETURN APPOINTMENT: Call to schedule appointment in 2 weeks    FOR EMERGENCIES: Call your physician at [phone]      Discharge Instructions: After Your Surgery  Youve just had surgery. During surgery, you were given medicine called anesthesia to keep you relaxed and free of pain. After surgery, you may have some pain or nausea. This is common. Here are some tips for feeling better and getting well after surgery.     Stay on schedule with your medicine.   Going home  Your healthcare provider will show you how to take care of yourself when you go home. He or she will also answer your questions. Have an adult family member or friend drive you home. For the first 24 hours after your surgery:  · Do not drive or use heavy equipment.  · Do not make important decisions or sign legal papers.  · Do not drink alcohol.  · Have someone stay with you, if needed. He or she can watch for problems and help keep you safe.  Be sure to go to all follow-up visits with your healthcare provider. And rest after your surgery for as long as your healthcare provider tells you to.  Coping with pain  If  you have pain after surgery, pain medicine will help you feel better. Take it as told, before pain becomes severe. Also, ask your healthcare provider or pharmacist about other ways to control pain. This might be with heat, ice, or relaxation. And follow any other instructions your surgeon or nurse gives you.  Tips for taking pain medicine  To get the best relief possible, remember these points:  · Pain medicines can upset your stomach. Taking them with a little food may help.  · Most pain relievers taken by mouth need at least 20 to 30 minutes to start to work.  · Taking medicine on a schedule can help you remember to take it. Try to time your medicine so that you can take it before starting an activity. This might be before you get dressed, go for a walk, or sit down for dinner.  · Constipation is a common side effect of pain medicines. Call your healthcare provider before taking any medicines such as laxatives or stool softeners to help ease constipation. Also ask if you should skip any foods. Drinking lots of fluids and eating foods such as fruits and vegetables that are high in fiber can also help. Remember, do not take laxatives unless your surgeon has prescribed them.  · Drinking alcohol and taking pain medicine can cause dizziness and slow your breathing. It can even be deadly. Do not drink alcohol while taking pain medicine.  · Pain medicine can make you react more slowly to things. Do not drive or run machinery while taking pain medicine.  Your healthcare provider may tell you to take acetaminophen to help ease your pain. Ask him or her how much you are supposed to take each day. Acetaminophen or other pain relievers may interact with your prescription medicines or other over-the-counter (OTC) medicines. Some prescription medicines have acetaminophen and other ingredients. Using both prescription and OTC acetaminophen for pain can cause you to overdose. Read the labels on your OTC medicines with care. This  will help you to clearly know the list of ingredients, how much to take, and any warnings. It may also help you not take too much acetaminophen. If you have questions or do not understand the information, ask your pharmacist or healthcare provider to explain it to you before you take the OTC medicine.  Managing nausea  Some people have an upset stomach after surgery. This is often because of anesthesia, pain, or pain medicine, or the stress of surgery. These tips will help you handle nausea and eat healthy foods as you get better. If you were on a special food plan before surgery, ask your healthcare provider if you should follow it while you get better. These tips may help:  · Do not push yourself to eat. Your body will tell you when to eat and how much.  · Start off with clear liquids and soup. They are easier to digest.  · Next try semi-solid foods, such as mashed potatoes, applesauce, and gelatin, as you feel ready.  · Slowly move to solid foods. Dont eat fatty, rich, or spicy foods at first.  · Do not force yourself to have 3 large meals a day. Instead eat smaller amounts more often.  · Take pain medicines with a small amount of solid food, such as crackers or toast, to avoid nausea.     Call your surgeon if  · You still have pain an hour after taking medicine. The medicine may not be strong enough.  · You feel too sleepy, dizzy, or groggy. The medicine may be too strong.  · You have side effects like nausea, vomiting, or skin changes, such as rash, itching, or hives.       If you have obstructive sleep apnea  You were given anesthesia medicine during surgery to keep you comfortable and free of pain. After surgery, you may have more apnea spells because of this medicine and other medicines you were given. The spells may last longer than usual.   At home:  · Keep using the continuous positive airway pressure (CPAP) device when you sleep. Unless your healthcare provider tells you not to, use it when you sleep,  day or night. CPAP is a common device used to treat obstructive sleep apnea.  · Talk with your provider before taking any pain medicine, muscle relaxants, or sedatives. Your provider will tell you about the possible dangers of taking these medicines.  Date Last Reviewed: 12/1/2016  © 6907-8448 [employer]. [address] [place] [address], [place] [address]. All rights reserved. This information is not intended as a substitute for professional medical care. Always follow your healthcare professional's instructions.

## 2019-02-12 VITALS
DIASTOLIC BLOOD PRESSURE: 83 MMHG | HEIGHT: 67 IN | SYSTOLIC BLOOD PRESSURE: 140 MMHG | WEIGHT: 190 LBS | OXYGEN SATURATION: 99 % | RESPIRATION RATE: 16 BRPM | HEART RATE: 65 BPM | TEMPERATURE: 98 F | BODY MASS INDEX: 29.82 KG/M2

## 2019-02-14 ENCOUNTER — OFFICE VISIT (OUTPATIENT)
Dept: ORTHOPEDICS | Facility: CLINIC | Age: 59
End: 2019-02-14
Payer: COMMERCIAL

## 2019-02-14 VITALS
WEIGHT: 190 LBS | HEIGHT: 67 IN | SYSTOLIC BLOOD PRESSURE: 148 MMHG | BODY MASS INDEX: 29.82 KG/M2 | DIASTOLIC BLOOD PRESSURE: 90 MMHG | HEART RATE: 101 BPM

## 2019-02-14 DIAGNOSIS — R22.42 MASS OF LEFT THIGH: Primary | ICD-10-CM

## 2019-02-14 DIAGNOSIS — M79.652 LEFT THIGH PAIN: Primary | ICD-10-CM

## 2019-02-14 PROCEDURE — 99213 OFFICE O/P EST LOW 20 MIN: CPT | Mod: S$GLB,,, | Performed by: ORTHOPAEDIC SURGERY

## 2019-02-14 PROCEDURE — 99999 PR PBB SHADOW E&M-EST. PATIENT-LVL III: CPT | Mod: PBBFAC,,, | Performed by: ORTHOPAEDIC SURGERY

## 2019-02-14 PROCEDURE — 99999 PR PBB SHADOW E&M-EST. PATIENT-LVL III: ICD-10-PCS | Mod: PBBFAC,,, | Performed by: ORTHOPAEDIC SURGERY

## 2019-02-14 PROCEDURE — 99213 PR OFFICE/OUTPT VISIT, EST, LEVL III, 20-29 MIN: ICD-10-PCS | Mod: S$GLB,,, | Performed by: ORTHOPAEDIC SURGERY

## 2019-02-15 ENCOUNTER — HOSPITAL ENCOUNTER (OUTPATIENT)
Dept: RADIOLOGY | Facility: HOSPITAL | Age: 59
Discharge: HOME | End: 2019-02-15
Attending: ORTHOPAEDIC SURGERY
Payer: COMMERCIAL

## 2019-02-15 DIAGNOSIS — M79.652 LEFT THIGH PAIN: ICD-10-CM

## 2019-02-15 PROCEDURE — 73720 MRI LWR EXTREMITY W/O&W/DYE: CPT | Mod: 26,LT,, | Performed by: RADIOLOGY

## 2019-02-15 PROCEDURE — 25500020 PHARM REV CODE 255

## 2019-02-15 PROCEDURE — A9585 GADOBUTROL INJECTION: HCPCS

## 2019-02-15 PROCEDURE — 73720 MRI LWR EXTREMITY W/O&W/DYE: CPT | Mod: TC,LT

## 2019-02-15 PROCEDURE — 73720 MRI FEMUR W WO CONTRAST LEFT: ICD-10-PCS | Mod: 26,LT,, | Performed by: RADIOLOGY

## 2019-02-15 RX ORDER — GADOBUTROL 604.72 MG/ML
INJECTION INTRAVENOUS
Status: COMPLETED
Start: 2019-02-15 | End: 2019-02-15

## 2019-02-15 RX ADMIN — GADOBUTROL 8 ML: 604.72 INJECTION INTRAVENOUS at 07:02

## 2019-02-15 NOTE — PROGRESS NOTES
Past Medical History:   Diagnosis Date    Cancer     breast; skin    High cholesterol     PONV (postoperative nausea and vomiting)        Past Surgical History:   Procedure Laterality Date    APPENDECTOMY      ARTHROPLASTY, KNEE Left 10/30/2018    [facility]    ARTHROSCOPY, KNEE, WITH MENISCECTOMY Right 10/30/2018    [facility]    KNEE SURGERY Left 1980s    LUMPECTOMY, BREAST Left 2/11/2019    [facility] OR       Current Outpatient Medications   Medication Sig    DM/p-ephed/acetaminoph/doxylam (NYQUIL ORAL) Take by mouth.    guaiFENesin (MUCINEX) 600 mg 12 hr tablet Take 1,200 mg by mouth 2 (two) times daily.    traMADol (ULTRAM) 50 mg tablet Take 1 tablet (50 mg total) by mouth every 6 (six) hours.     No current facility-administered medications for this visit.        Review of patient's allergies indicates:   Allergen Reactions    Ciprofloxacin Anaphylaxis     paralysis    Pcn [penicillins] Anaphylaxis     paralysis    Eggplant Blisters    Eggs [egg derived]      Inside of mouth peel      Tomato (solanum lycopersicum) Blisters       Family History   Problem Relation Age of Onset    Breast cancer Mother        Social History     Socioeconomic History    Marital status: Single     Spouse name: Not on file    Number of children: Not on file    Years of education: Not on file    Highest education level: Not on file   Social Needs    Financial resource strain: Not on file    Food insecurity - worry: Not on file    Food insecurity - inability: Not on file    Transportation needs - medical: Not on file    Transportation needs - non-medical: Not on file   Occupational History    Not on file   Tobacco Use    Smoking status: Never Smoker    Smokeless tobacco: Never Used   Substance and Sexual Activity    Alcohol use: Yes     Comment: occasionally    Drug use: No    Sexual  activity: Not on file   Other Topics Concern    Not on file   Social History Narrative    Not on file       Chief Complaint:   Chief Complaint   Patient presents with    Leg Pain     L thigh pain       Date of surgery:  October 30, 2018    History of present illness:  59-year-old female s/p left total knee arthroplasty and right knee arthroscopy.  Patient is doing well. She comes in after noting maybe a little lump in her left thigh just below the groin crease.  It started out small but now has even bigger.  Seems to be getting larger.  Causes popping and possibly some nerve irritation.  She has a history of breast cancer and is concerned.  Pain is a 2/10.  Worse after therapy.    Review of Systems:    Musculoskeletal:  See HPI        Physical Examination:    Vital Signs:    Vitals:    02/14/19 1514   BP: (!) 148/90   Pulse: 101       Body mass index is 29.76 kg/m².    This a well-developed, well nourished patient in no acute distress.  They are alert and oriented and cooperative to examination.  Pt. walks without an antalgic gait.       Examination of both knees show healed surgical incisions.  No erythema or drainage.  Patient has good range of motion of her left knee from 0-125 degrees. No calf pain. Negative Homans sign.    Examination of the left thigh feels a small palpable mass and I am able to cause somepopping sensation possibly from the tendon or the lump.    X-rays:  Four views of both knees are  reviewed which show well-aligned left total knee arthroplasty.    MRI of the lumbar spine:Mild multilevel degenerative lumbar spondylosis without spinal canal or neuroforaminal narrowing.     Assessment::  Status post left total knee arthroplasty  Status post right partial medial meniscectomy  Left thigh mass      Plan:    Given her history of breast cancer, I think it is prudent to evaluate this further.  We will get an MRI of her left thigh with and without contrast.  Follow up after the MRI is  completed.    This note was created using [employer] voice recognition software that occasionally misinterpreted phrases or words.

## 2019-02-20 ENCOUNTER — OFFICE VISIT (OUTPATIENT)
Dept: ORTHOPEDICS | Facility: CLINIC | Age: 59
End: 2019-02-20
Payer: COMMERCIAL

## 2019-02-20 VITALS
HEIGHT: 67 IN | BODY MASS INDEX: 29.82 KG/M2 | DIASTOLIC BLOOD PRESSURE: 76 MMHG | WEIGHT: 190 LBS | SYSTOLIC BLOOD PRESSURE: 121 MMHG | HEART RATE: 66 BPM

## 2019-02-20 DIAGNOSIS — R22.42 MASS OF LEFT THIGH: Primary | ICD-10-CM

## 2019-02-20 PROCEDURE — 99999 PR PBB SHADOW E&M-EST. PATIENT-LVL III: CPT | Mod: PBBFAC,,, | Performed by: ORTHOPAEDIC SURGERY

## 2019-02-20 PROCEDURE — 99999 PR PBB SHADOW E&M-EST. PATIENT-LVL III: ICD-10-PCS | Mod: PBBFAC,,, | Performed by: ORTHOPAEDIC SURGERY

## 2019-02-20 PROCEDURE — 99213 OFFICE O/P EST LOW 20 MIN: CPT | Mod: S$GLB,,, | Performed by: ORTHOPAEDIC SURGERY

## 2019-02-20 PROCEDURE — 99213 PR OFFICE/OUTPT VISIT, EST, LEVL III, 20-29 MIN: ICD-10-PCS | Mod: S$GLB,,, | Performed by: ORTHOPAEDIC SURGERY

## 2019-02-20 NOTE — PROGRESS NOTES
Past Medical History:   Diagnosis Date    Cancer     breast; skin    High cholesterol     PONV (postoperative nausea and vomiting)        Past Surgical History:   Procedure Laterality Date    APPENDECTOMY      ARTHROPLASTY, KNEE Left 10/30/2018    [facility]    ARTHROSCOPY, KNEE, WITH MENISCECTOMY Right 10/30/2018    [facility]    KNEE SURGERY Left 1980s    LUMPECTOMY, BREAST Left 2/11/2019    [facility] OR       Current Outpatient Medications   Medication Sig    DM/p-ephed/acetaminoph/doxylam (NYQUIL ORAL) Take by mouth.    guaiFENesin (MUCINEX) 600 mg 12 hr tablet Take 1,200 mg by mouth 2 (two) times daily.    traMADol (ULTRAM) 50 mg tablet Take 1 tablet (50 mg total) by mouth every 6 (six) hours.     No current facility-administered medications for this visit.        Review of patient's allergies indicates:   Allergen Reactions    Ciprofloxacin Anaphylaxis     paralysis    Pcn [penicillins] Anaphylaxis     paralysis    Eggplant Blisters    Eggs [egg derived]      Inside of mouth peel      Tomato (solanum lycopersicum) Blisters       Family History   Problem Relation Age of Onset    Breast cancer Mother        Social History     Socioeconomic History    Marital status: Single     Spouse name: Not on file    Number of children: Not on file    Years of education: Not on file    Highest education level: Not on file   Social Needs    Financial resource strain: Not on file    Food insecurity - worry: Not on file    Food insecurity - inability: Not on file    Transportation needs - medical: Not on file    Transportation needs - non-medical: Not on file   Occupational History    Not on file   Tobacco Use    Smoking status: Never Smoker    Smokeless tobacco: Never Used   Substance and Sexual Activity    Alcohol use: Yes     Comment: occasionally    Drug use: No    Sexual  activity: Not on file   Other Topics Concern    Not on file   Social History Narrative    Not on file       Chief Complaint:   Chief Complaint   Patient presents with    Leg Pain     L femur mri results        Date of surgery:  October 30, 2018    History of present illness:  59-year-old female s/p left total knee arthroplasty and right knee arthroscopy.  Patient is doing well. She comes in after noting maybe a little lump in her left thigh just below the groin crease.  It started out small but now has even bigger.  Seems to be getting larger.  Causes popping and possibly some nerve irritation.  She has a history of breast cancer and is concerned.  Pain is a 2/10.  Worse after therapy.  MRI did show an intramuscular mass.    Review of Systems:    Musculoskeletal:  See HPI        Physical Examination:    Vital Signs:    Vitals:    02/20/19 0922   BP: 121/76   Pulse: 66       Body mass index is 29.76 kg/m².    This a well-developed, well nourished patient in no acute distress.  They are alert and oriented and cooperative to examination.  Pt. walks without an antalgic gait.       Examination of both knees show healed surgical incisions.  No erythema or drainage.  Patient has good range of motion of her left knee from 0-125 degrees. No calf pain. Negative Homans sign.    Examination of the left thigh feels a small palpable mass and I am able to cause somepopping sensation possibly from the tendon or the lump.    X-rays:  Four views of both knees are  reviewed which show well-aligned left total knee arthroplasty.    MRI of the lumbar spine:Mild multilevel degenerative lumbar spondylosis without spinal canal or neuroforaminal narrowing.    MRI of the left femur:Solid enhancing intramuscular mass within the left anterior thigh compartment with anatomic considerations as described.  Differential considerations include both benign (myxoma) and malignant (myxoid liposarcoma and mix of fibrosarcoma) entities and pathologic  sampling is recommended for more definitive characterization.     Assessment::  Status post left total knee arthroplasty  Status post right partial medial meniscectomy  Left thigh mass      Plan:  I reviewed the MRI findings with her today.  I will trying get recommendations from Dr. Gibson (the orthopedic oncologist at \[facility]") for further treatment including possible biopsy or excisional biopsy.    This note was created using [employer]oc[employer] voice recognition software that occasionally misinterpreted phrases or words.

## 2019-02-26 DIAGNOSIS — R22.42 MASS OF LEG, LEFT: Primary | ICD-10-CM

## 2019-02-27 ENCOUNTER — TELEPHONE (OUTPATIENT)
Dept: ORTHOPEDICS | Facility: CLINIC | Age: 59
End: 2019-02-27

## 2019-02-27 ENCOUNTER — OFFICE VISIT (OUTPATIENT)
Dept: SURGERY | Facility: CLINIC | Age: 59
End: 2019-02-27
Payer: COMMERCIAL

## 2019-02-27 VITALS
DIASTOLIC BLOOD PRESSURE: 76 MMHG | TEMPERATURE: 98 F | HEART RATE: 80 BPM | HEIGHT: 67 IN | SYSTOLIC BLOOD PRESSURE: 126 MMHG | WEIGHT: 193.13 LBS | BODY MASS INDEX: 30.31 KG/M2

## 2019-02-27 DIAGNOSIS — C50.112 MALIGNANT NEOPLASM OF CENTRAL PORTION OF LEFT BREAST IN FEMALE, ESTROGEN RECEPTOR POSITIVE: Primary | ICD-10-CM

## 2019-02-27 DIAGNOSIS — Z17.0 MALIGNANT NEOPLASM OF CENTRAL PORTION OF LEFT BREAST IN FEMALE, ESTROGEN RECEPTOR POSITIVE: Primary | ICD-10-CM

## 2019-02-27 PROCEDURE — 99999 PR PBB SHADOW E&M-EST. PATIENT-LVL III: CPT | Mod: PBBFAC,,, | Performed by: SURGERY

## 2019-02-27 PROCEDURE — 99024 PR POST-OP FOLLOW-UP VISIT: ICD-10-PCS | Mod: S$GLB,,, | Performed by: SURGERY

## 2019-02-27 PROCEDURE — 99999 PR PBB SHADOW E&M-EST. PATIENT-LVL III: ICD-10-PCS | Mod: PBBFAC,,, | Performed by: SURGERY

## 2019-02-27 PROCEDURE — 99024 POSTOP FOLLOW-UP VISIT: CPT | Mod: S$GLB,,, | Performed by: SURGERY

## 2019-02-27 RX ORDER — SODIUM CHLORIDE 9 MG/ML
INJECTION, SOLUTION INTRAVENOUS CONTINUOUS
Status: CANCELLED | OUTPATIENT
Start: 2019-03-14

## 2019-02-27 RX ORDER — LIDOCAINE HYDROCHLORIDE 10 MG/ML
1 INJECTION, SOLUTION EPIDURAL; INFILTRATION; INTRACAUDAL; PERINEURAL ONCE
Status: CANCELLED | OUTPATIENT
Start: 2019-03-14

## 2019-02-27 NOTE — H&P (VIEW-ONLY)
HPI 59-year-old female who had stereotactic breast biopsy of the left breast with a diagnosis of ductal carcinoma in situ.  She has had no previous breast problems.  She does have significant family history however.  Her mother developed breast cancer in her 30s and she has 2 maternal aunts who developed breast cancer at young ages.  Father had multiple myeloma.  Age of menarche was 13.  She had menopause at age 35.  She has never taken hormone replacement therapy or oral contraceptives.  She has had 3 pregnancies the 1st child born when she was 21 years old.  This was found on a screening test. She underwent partial mastectomy and biopsy revealed invasive carcinoma and DCIS with clear margins. She will need a sentinel node biopsy.          Past Medical History:   Diagnosis Date    High cholesterol      PONV (postoperative nausea and vomiting)              Past Surgical History:   Procedure Laterality Date    ARTHROPLASTY, KNEE Left 10/30/2018     [facility]formed by [facility] at [facility]    ARTHROSCOPY, KNEE, WITH MENISCECTOMY Right 10/30/2018     Performed by [facility] at [facility] OR    KNEE SURGERY Left 1980s            Current Outpatient Medications:     aspirin (ECOTRIN) 325 MG EC tablet, Take 1 tablet (325 mg total) by mouth 2 (two) times daily with meals., Disp: , Rfl: 0    celecoxib (CELEBREX) 200 MG capsule, Take 1 capsule (200 mg total) by mouth once daily., Disp: 30 capsule, Rfl: 0    methylPREDNISolone (MEDROL DOSEPACK) 4 mg tablet, use as directed, Disp: 21 tablet, Rfl: 0    traMADol (ULTRAM) 50 mg tablet, Take 1 tablet (50 mg total) by mouth every 6 (six) hours., Disp: 40 tablet, Rfl: 0           Review of patient's allergies indicates:   Allergen Reactions    Ciprofloxacin Anaphylaxis       paralysis    Pcn [penicillins] Anaphylaxis       paralysis    Eggplant Blisters    Eggs [egg derived]         Inside of mouth peel       Tomato (solanum lycopersicum)  Blisters               Family History   Problem Relation Age of Onset    Breast cancer Mother        Social History               Socioeconomic History    Marital status: Single       Spouse name: Not on file    Number of children: Not on file    Years of education: Not on file    Highest education level: Not on file   Social Needs    Financial resource strain: Not on file    Food insecurity - worry: Not on file    Food insecurity - inability: Not on file    Transportation needs - medical: Not on file    Transportation needs - non-medical: Not on file   Occupational History    Not on file   Tobacco Use    Smoking status: Never Smoker    Smokeless tobacco: Never Used   Substance and Sexual Activity    Alcohol use: Yes       Comment: occasionally    Drug use: No    Sexual activity: Not on file   Other Topics Concern    Not on file   Social History Narrative    Not on file            Review of Systems   Constitutional: Negative for activity change, chills, fever and unexpected weight change.   HENT: Negative for congestion, sore throat, trouble swallowing and voice change.    Eyes: Negative for redness and visual disturbance.   Respiratory: Negative for cough, shortness of breath and wheezing.    Cardiovascular: Negative for chest pain and palpitations.   Gastrointestinal: Negative for abdominal pain, blood in stool, nausea and vomiting.   Endocrine: Negative.    Genitourinary: Negative for dysuria, frequency and hematuria.   Musculoskeletal: Negative for arthralgias, back pain and neck pain.   Skin: Negative for rash and wound.   Allergic/Immunologic: Negative.    Neurological: Negative for dizziness, weakness and headaches.   Hematological: Negative for adenopathy.   Psychiatric/Behavioral: Negative for agitation and dysphoric mood. The patient is not nervous/anxious.       Objective:      Physical Exam   Constitutional: She is oriented to person, place, and time. She appears well-developed and  well-nourished. No distress.   HENT:   Head: Normocephalic and atraumatic.   Mouth/Throat: Oropharynx is clear and moist. No oropharyngeal exudate.   Eyes: Conjunctivae and EOM are normal. Pupils are equal, round, and reactive to light. No scleral icterus.   Neck: Normal range of motion. No thyromegaly present.   Cardiovascular: Normal rate and regular rhythm.   No murmur heard.  Pulmonary/Chest: Effort normal and breath sounds normal. She has no wheezes. She has no rales.   Left Breast Exam:  Mallorie areolar incision is healing well without infection.    There is no abnormality detected on physical exam of the contralateral breast.   Abdominal: Soft. Bowel sounds are normal. She exhibits no distension and no mass. There is no tenderness. No hernia.   Musculoskeletal: Normal range of motion. She exhibits no edema.   Lymphadenopathy:     She has no cervical adenopathy.   Neurological: She is alert and oriented to person, place, and time. No cranial nerve deficit.   Skin: Skin is warm and dry. No rash noted. No erythema.   Psychiatric: She has a normal mood and affect. Her behavior is normal.          FINAL PATHOLOGIC DIAGNOSIS  1. Left breast, partial mastectomy:  - Invasive mucinous carcinoma, [place] grade 1, (T=2, N=1, M=1), 3mm in longest linear dimension  - Ductal carcinoma in-situ (DCIS), solid and mucinous types, low grade, 9mm in longest linear dimension  - Margins negative for carcinoma and negative for DCIS  - No lymphovascular invasion identified  - Fibrocystic changes present  2. Left breast additional superior margin, excision:  - Benign breast tissue with fibrocystic changes  - Negative for atypia or malignancy  3. Left breast additional anterior margin, excision:  - Benign breast tissue with fibrocystic changes  - Negative for atypia or malignancy  4. Left breast additional deep margin, excision:  - Benign breast tissue  - Negative for atypia or malignancy        Assessment:           Encounter  Diagnosis   Name Primary?    Ductal carcinoma in situ (DCIS) of left breast Yes    Invasive breast cancer.     Plan:      1. [place] node biopsy and possible axillary dissection on the left.  2. Risks and benefits of the planned procedure were discussed at length with the patient.  Risks and benefits of not proceeding with the procedure were discussed as well. All questions were answered. The patient expressed clear understanding and would like to proceed with the procedure as discussed.

## 2019-02-27 NOTE — PATIENT INSTRUCTIONS
PRE-OP INSTRUCTIONS    Your procedure has been scheduled at:    [facility]pre-admit nurse  [phone]      DAY  Thursday  DATE  March 14, 2019    1:  Pre-admit nurse will go over your medicines and let you know which ones not to take the morning of surgery                                             2:  You will need to stop any blood thinners 1 week prior to surgery.  This includes Aspirin, fish oil,Ibuprofen, BC Powder,aleve, Pradaxa, Coumadin, Plavix, Pletal.  Please contact the prescribing doctor to be sure it is ok to stop these medicines.    3:  DO NOT EAT OR DRINK ANYTHING AFTER MIDNIGHT THE NIGHT BEFORE    4:  Plan to have someone drive you home after you are released from the hospital.  You WILL NOT be able to drive yourself.    5:  If you have any questions or need to change your surgery date, please call Cecilia at [phone].    You will be called the evening before between 3-6 PM with your arrival time    AFTER SURGERY:    You can shower 48 hours after surgery, REMOVE WET BANDAGES AND BANDAIDS, leave the steri- strips on.  If you have not had a bowel movement within 3 days after surgery you may take a laxative of your choice.  Do not lift anything over 5-10 pounds.    You need to have a follow up appointment 7-14 days after surgery, call the office to schedule or if you have questions or concerns.

## 2019-02-27 NOTE — TELEPHONE ENCOUNTER
----- Message from Shira Krishna MA sent at 2/26/2019  4:04 PM CST -----  Contact: pt   Wants update on her a referral   Call back

## 2019-02-27 NOTE — PROGRESS NOTES
HPI 59-year-old female who had stereotactic breast biopsy of the left breast with a diagnosis of ductal carcinoma in situ.  She has had no previous breast problems.  She does have significant family history however.  Her mother developed breast cancer in her 30s and she has 2 maternal aunts who developed breast cancer at young ages.  Father had multiple myeloma.  Age of menarche was 13.  She had menopause at age 35.  She has never taken hormone replacement therapy or oral contraceptives.  She has had 3 pregnancies the 1st child born when she was 21 years old.  This was found on a screening test. She underwent partial mastectomy and biopsy revealed invasive carcinoma and DCIS with clear margins. She will need a sentinel node biopsy.          Past Medical History:   Diagnosis Date    High cholesterol      PONV (postoperative nausea and vomiting)              Past Surgical History:   Procedure Laterality Date    ARTHROPLASTY, KNEE Left 10/30/2018     [facility]    ARTHROSCOPY, KNEE, WITH MENISCECTOMY Right 10/30/2018     [facility] at [facility] OR    KNEE SURGERY Left 1980s            Current Outpatient Medications:     aspirin (ECOTRIN) 325 MG EC tablet, Take 1 tablet (325 mg total) by mouth 2 (two) times daily with meals., Disp: , Rfl: 0    celecoxib (CELEBREX) 200 MG capsule, Take 1 capsule (200 mg total) by mouth once daily., Disp: 30 capsule, Rfl: 0    methylPREDNISolone (MEDROL DOSEPACK) 4 mg tablet, use as directed, Disp: 21 tablet, Rfl: 0    traMADol (ULTRAM) 50 mg tablet, Take 1 tablet (50 mg total) by mouth every 6 (six) hours., Disp: 40 tablet, Rfl: 0           Review of patient's allergies indicates:   Allergen Reactions    Ciprofloxacin Anaphylaxis       paralysis    Pcn [penicillins] Anaphylaxis       paralysis    Eggplant Blisters    Eggs [egg derived]         Inside of mouth peel       Tomato (solanum lycopersicum)  Blisters               Family History   Problem Relation Age of Onset    Breast cancer Mother        Social History               Socioeconomic History    Marital status: Single       Spouse name: Not on file    Number of children: Not on file    Years of education: Not on file    Highest education level: Not on file   Social Needs    Financial resource strain: Not on file    Food insecurity - worry: Not on file    Food insecurity - inability: Not on file    Transportation needs - medical: Not on file    Transportation needs - non-medical: Not on file   Occupational History    Not on file   Tobacco Use    Smoking status: Never Smoker    Smokeless tobacco: Never Used   Substance and Sexual Activity    Alcohol use: Yes       Comment: occasionally    Drug use: No    Sexual activity: Not on file   Other Topics Concern    Not on file   Social History Narrative    Not on file            Review of Systems   Constitutional: Negative for activity change, chills, fever and unexpected weight change.   HENT: Negative for congestion, sore throat, trouble swallowing and voice change.    Eyes: Negative for redness and visual disturbance.   Respiratory: Negative for cough, shortness of breath and wheezing.    Cardiovascular: Negative for chest pain and palpitations.   Gastrointestinal: Negative for abdominal pain, blood in stool, nausea and vomiting.   Endocrine: Negative.    Genitourinary: Negative for dysuria, frequency and hematuria.   Musculoskeletal: Negative for arthralgias, back pain and neck pain.   Skin: Negative for rash and wound.   Allergic/Immunologic: Negative.    Neurological: Negative for dizziness, weakness and headaches.   Hematological: Negative for adenopathy.   Psychiatric/Behavioral: Negative for agitation and dysphoric mood. The patient is not nervous/anxious.       Objective:      Physical Exam   Constitutional: She is oriented to person, place, and time. She appears well-developed and  well-nourished. No distress.   HENT:   Head: Normocephalic and atraumatic.   Mouth/Throat: Oropharynx is clear and moist. No oropharyngeal exudate.   Eyes: Conjunctivae and EOM are normal. Pupils are equal, round, and reactive to light. No scleral icterus.   Neck: Normal range of motion. No thyromegaly present.   Cardiovascular: Normal rate and regular rhythm.   No murmur heard.  Pulmonary/Chest: Effort normal and breath sounds normal. She has no wheezes. She has no rales.   Left Breast Exam:  Mallorie areolar incision is healing well without infection.    There is no abnormality detected on physical exam of the contralateral breast.   Abdominal: Soft. Bowel sounds are normal. She exhibits no distension and no mass. There is no tenderness. No hernia.   Musculoskeletal: Normal range of motion. She exhibits no edema.   Lymphadenopathy:     She has no cervical adenopathy.   Neurological: She is alert and oriented to person, place, and time. No cranial nerve deficit.   Skin: Skin is warm and dry. No rash noted. No erythema.   Psychiatric: She has a normal mood and affect. Her behavior is normal.          FINAL PATHOLOGIC DIAGNOSIS  1. Left breast, partial mastectomy:  - Invasive mucinous carcinoma, [place] grade 1, (T=2, N=1, M=1), 3mm in longest linear dimension  - Ductal carcinoma in-situ (DCIS), solid and mucinous types, low grade, 9mm in longest linear dimension  - Margins negative for carcinoma and negative for DCIS  - No lymphovascular invasion identified  - Fibrocystic changes present  2. Left breast additional superior margin, excision:  - Benign breast tissue with fibrocystic changes  - Negative for atypia or malignancy  3. Left breast additional anterior margin, excision:  - Benign breast tissue with fibrocystic changes  - Negative for atypia or malignancy  4. Left breast additional deep margin, excision:  - Benign breast tissue  - Negative for atypia or malignancy        Assessment:           Encounter  Diagnosis   Name Primary?    Ductal carcinoma in situ (DCIS) of left breast Yes    Invasive breast cancer.     Plan:      1. [place] node biopsy and possible axillary dissection on the left.  2. Risks and benefits of the planned procedure were discussed at length with the patient.  Risks and benefits of not proceeding with the procedure were discussed as well. All questions were answered. The patient expressed clear understanding and would like to proceed with the procedure as discussed.

## 2019-02-28 NOTE — PROGRESS NOTES
Results noted. Pt needs appt to discuss results and treatment options.   There are no preventive care reminders to display for this patient.    Patient is up to date, no discussion needed.      Unaddressed Risk Adjusted HCC Categories and Diagnoses

## 2019-03-01 ENCOUNTER — PATIENT MESSAGE (OUTPATIENT)
Dept: ORTHOPEDICS | Facility: CLINIC | Age: 59
End: 2019-03-01

## 2019-03-01 ENCOUNTER — TELEPHONE (OUTPATIENT)
Dept: ORTHOPEDICS | Facility: CLINIC | Age: 59
End: 2019-03-01

## 2019-03-01 NOTE — TELEPHONE ENCOUNTER
----- Message from Edwige Sher sent at 3/1/2019  3:41 PM CST -----  Contact: self  Type:  Patient Returning Call    Who Called:  self  Who Left Message for Patient:  Shelly  Does the patient know what this is regarding?:  yes  Best Call Back Number:  [phone]  Additional Information:  Patient states it is regarding the referral to [facility] in [facility]. She has heard back from them and they will confirm the appointment date and time on Monday. She wanted to thank you for doing such a good job. Please call patient if any questions. Thanks!

## 2019-03-04 ENCOUNTER — TELEPHONE (OUTPATIENT)
Dept: ORTHOPEDICS | Facility: CLINIC | Age: 59
End: 2019-03-04

## 2019-03-04 NOTE — TELEPHONE ENCOUNTER
----- Message from Shira Krishna MA sent at 3/4/2019 10:04 AM CST -----  Contact: Maura Baron   Localize, metastatic, is pt ambulatory at this time   Does pt know of referral      [phone]  Option 1

## 2019-03-04 NOTE — TELEPHONE ENCOUNTER
Pt requires antibiotics for teeth cleaning but is allergic to amoxicillin. Please advise alternative antibiotic. Thanks!

## 2019-03-11 RX ORDER — CLINDAMYCIN HYDROCHLORIDE 300 MG/1
300 CAPSULE ORAL EVERY 6 HOURS
Qty: 3 CAPSULE | Refills: 0 | Status: ON HOLD | OUTPATIENT
Start: 2019-03-11 | End: 2019-12-03 | Stop reason: CLARIF

## 2019-03-13 ENCOUNTER — ANESTHESIA EVENT (OUTPATIENT)
Dept: SURGERY | Facility: HOSPITAL | Age: 59
End: 2019-03-13
Payer: COMMERCIAL

## 2019-03-14 ENCOUNTER — HOSPITAL ENCOUNTER (OUTPATIENT)
Facility: HOSPITAL | Age: 59
Discharge: HOME | End: 2019-03-14
Attending: SURGERY | Admitting: SURGERY
Payer: COMMERCIAL

## 2019-03-14 ENCOUNTER — HOSPITAL ENCOUNTER (OUTPATIENT)
Dept: RADIOLOGY | Facility: HOSPITAL | Age: 59
Discharge: HOME | End: 2019-03-14
Attending: SURGERY | Admitting: SURGERY
Payer: COMMERCIAL

## 2019-03-14 ENCOUNTER — ANESTHESIA (OUTPATIENT)
Dept: SURGERY | Facility: HOSPITAL | Age: 59
End: 2019-03-14
Payer: COMMERCIAL

## 2019-03-14 DIAGNOSIS — C50.112 MALIGNANT NEOPLASM OF CENTRAL PORTION OF LEFT BREAST IN FEMALE, ESTROGEN RECEPTOR POSITIVE: Primary | ICD-10-CM

## 2019-03-14 DIAGNOSIS — C50.112 MALIGNANT NEOPLASM OF CENTRAL PORTION OF LEFT BREAST IN FEMALE, ESTROGEN RECEPTOR POSITIVE: ICD-10-CM

## 2019-03-14 DIAGNOSIS — Z17.0 MALIGNANT NEOPLASM OF CENTRAL PORTION OF LEFT BREAST IN FEMALE, ESTROGEN RECEPTOR POSITIVE: ICD-10-CM

## 2019-03-14 DIAGNOSIS — Z17.0 MALIGNANT NEOPLASM OF CENTRAL PORTION OF LEFT BREAST IN FEMALE, ESTROGEN RECEPTOR POSITIVE: Primary | ICD-10-CM

## 2019-03-14 PROCEDURE — 27200651 HC AIRWAY, LMA: Performed by: NURSE ANESTHETIST, CERTIFIED REGISTERED

## 2019-03-14 PROCEDURE — 25000003 PHARM REV CODE 250: Performed by: SURGERY

## 2019-03-14 PROCEDURE — 88307 TISSUE SPECIMEN TO PATHOLOGY - SURGERY: ICD-10-PCS | Mod: 26,,, | Performed by: PATHOLOGY

## 2019-03-14 PROCEDURE — 88342 IMHCHEM/IMCYTCHM 1ST ANTB: CPT | Mod: 26,,, | Performed by: PATHOLOGY

## 2019-03-14 PROCEDURE — 71000039 HC RECOVERY, EACH ADD'L HOUR: Performed by: SURGERY

## 2019-03-14 PROCEDURE — 36000707: Performed by: SURGERY

## 2019-03-14 PROCEDURE — 88341 PR IHC OR ICC EACH ADD'L SINGLE ANTIBODY  STAINPR: ICD-10-PCS | Mod: 26,,, | Performed by: PATHOLOGY

## 2019-03-14 PROCEDURE — 38900 PR INTRAOPERATIVE SENTINEL LYMPH NODE ID W DYE INJECTION: ICD-10-PCS | Mod: LT,,, | Performed by: SURGERY

## 2019-03-14 PROCEDURE — S0077 INJECTION, CLINDAMYCIN PHOSP: HCPCS | Performed by: SURGERY

## 2019-03-14 PROCEDURE — 37000009 HC ANESTHESIA EA ADD 15 MINS: Performed by: SURGERY

## 2019-03-14 PROCEDURE — 38792 RA TRACER ID OF SENTINL NODE: CPT | Mod: LT,,, | Performed by: RADIOLOGY

## 2019-03-14 PROCEDURE — 38792 RA TRACER ID OF SENTINL NODE: CPT | Mod: TC

## 2019-03-14 PROCEDURE — 71000033 HC RECOVERY, INTIAL HOUR: Performed by: SURGERY

## 2019-03-14 PROCEDURE — 25000003 PHARM REV CODE 250: Performed by: ANESTHESIOLOGY

## 2019-03-14 PROCEDURE — 38525 BIOPSY/REMOVAL LYMPH NODES: CPT | Mod: 58,LT,, | Performed by: SURGERY

## 2019-03-14 PROCEDURE — 63600175 PHARM REV CODE 636 W HCPCS: Performed by: NURSE ANESTHETIST, CERTIFIED REGISTERED

## 2019-03-14 PROCEDURE — 88307 TISSUE EXAM BY PATHOLOGIST: CPT | Performed by: PATHOLOGY

## 2019-03-14 PROCEDURE — D9220A PRA ANESTHESIA: ICD-10-PCS | Mod: CRNA,,, | Performed by: NURSE ANESTHETIST, CERTIFIED REGISTERED

## 2019-03-14 PROCEDURE — 38525 PR BIOPSY/REM LYMPH NODES, AXILLARY: ICD-10-PCS | Mod: 58,LT,, | Performed by: SURGERY

## 2019-03-14 PROCEDURE — 71000015 HC POSTOP RECOV 1ST HR: Performed by: SURGERY

## 2019-03-14 PROCEDURE — 36000706: Performed by: SURGERY

## 2019-03-14 PROCEDURE — 99900103 DSU ONLY-NO CHARGE-INITIAL HR (STAT): Performed by: SURGERY

## 2019-03-14 PROCEDURE — 88342 TISSUE SPECIMEN TO PATHOLOGY - SURGERY: ICD-10-PCS | Mod: 26,,, | Performed by: PATHOLOGY

## 2019-03-14 PROCEDURE — 63600175 PHARM REV CODE 636 W HCPCS: Performed by: SURGERY

## 2019-03-14 PROCEDURE — A9520 TC99 TILMANOCEPT DIAG 0.5MCI: HCPCS

## 2019-03-14 PROCEDURE — D9220A PRA ANESTHESIA: Mod: CRNA,,, | Performed by: NURSE ANESTHETIST, CERTIFIED REGISTERED

## 2019-03-14 PROCEDURE — 37000008 HC ANESTHESIA 1ST 15 MINUTES: Performed by: SURGERY

## 2019-03-14 PROCEDURE — D9220A PRA ANESTHESIA: Mod: ANES,,, | Performed by: ANESTHESIOLOGY

## 2019-03-14 PROCEDURE — D9220A PRA ANESTHESIA: ICD-10-PCS | Mod: ANES,,, | Performed by: ANESTHESIOLOGY

## 2019-03-14 PROCEDURE — 63600175 PHARM REV CODE 636 W HCPCS: Performed by: ANESTHESIOLOGY

## 2019-03-14 PROCEDURE — 99900104 DSU ONLY-NO CHARGE-EA ADD'L HR (STAT): Performed by: SURGERY

## 2019-03-14 PROCEDURE — 88341 IMHCHEM/IMCYTCHM EA ADD ANTB: CPT | Mod: 26,,, | Performed by: PATHOLOGY

## 2019-03-14 PROCEDURE — 38792 NM SENTINEL LYMPH NODE INJECTION ONLY_RAD PERFORMED: ICD-10-PCS | Mod: LT,,, | Performed by: RADIOLOGY

## 2019-03-14 PROCEDURE — 38900 IO MAP OF SENT LYMPH NODE: CPT | Mod: LT,,, | Performed by: SURGERY

## 2019-03-14 RX ORDER — ACETAMINOPHEN 10 MG/ML
1000 INJECTION, SOLUTION INTRAVENOUS ONCE
Status: COMPLETED | OUTPATIENT
Start: 2019-03-14 | End: 2019-03-14

## 2019-03-14 RX ORDER — FENTANYL CITRATE 50 UG/ML
INJECTION, SOLUTION INTRAMUSCULAR; INTRAVENOUS
Status: DISCONTINUED | OUTPATIENT
Start: 2019-03-14 | End: 2019-03-14

## 2019-03-14 RX ORDER — DEXAMETHASONE SODIUM PHOSPHATE 4 MG/ML
INJECTION, SOLUTION INTRA-ARTICULAR; INTRALESIONAL; INTRAMUSCULAR; INTRAVENOUS; SOFT TISSUE
Status: DISCONTINUED | OUTPATIENT
Start: 2019-03-14 | End: 2019-03-14

## 2019-03-14 RX ORDER — LIDOCAINE HCL/PF 100 MG/5ML
SYRINGE (ML) INTRAVENOUS
Status: DISCONTINUED | OUTPATIENT
Start: 2019-03-14 | End: 2019-03-14

## 2019-03-14 RX ORDER — MEPERIDINE HYDROCHLORIDE 50 MG/ML
12.5 INJECTION INTRAMUSCULAR; INTRAVENOUS; SUBCUTANEOUS ONCE AS NEEDED
Status: DISCONTINUED | OUTPATIENT
Start: 2019-03-14 | End: 2019-03-14 | Stop reason: HOSPADM

## 2019-03-14 RX ORDER — FENTANYL CITRATE 50 UG/ML
25 INJECTION, SOLUTION INTRAMUSCULAR; INTRAVENOUS EVERY 5 MIN PRN
Status: DISCONTINUED | OUTPATIENT
Start: 2019-03-14 | End: 2019-03-14 | Stop reason: HOSPADM

## 2019-03-14 RX ORDER — CLINDAMYCIN PHOSPHATE 900 MG/50ML
900 INJECTION, SOLUTION INTRAVENOUS
Status: COMPLETED | OUTPATIENT
Start: 2019-03-14 | End: 2019-03-14

## 2019-03-14 RX ORDER — DIPHENHYDRAMINE HYDROCHLORIDE 50 MG/ML
25 INJECTION INTRAMUSCULAR; INTRAVENOUS EVERY 6 HOURS PRN
Status: DISCONTINUED | OUTPATIENT
Start: 2019-03-14 | End: 2019-03-14 | Stop reason: HOSPADM

## 2019-03-14 RX ORDER — SODIUM CHLORIDE, SODIUM LACTATE, POTASSIUM CHLORIDE, CALCIUM CHLORIDE 600; 310; 30; 20 MG/100ML; MG/100ML; MG/100ML; MG/100ML
INJECTION, SOLUTION INTRAVENOUS CONTINUOUS
Status: DISCONTINUED | OUTPATIENT
Start: 2019-03-14 | End: 2019-03-14 | Stop reason: HOSPADM

## 2019-03-14 RX ORDER — LIDOCAINE HYDROCHLORIDE 10 MG/ML
1 INJECTION, SOLUTION EPIDURAL; INFILTRATION; INTRACAUDAL; PERINEURAL ONCE
Status: DISCONTINUED | OUTPATIENT
Start: 2019-03-14 | End: 2019-03-14 | Stop reason: HOSPADM

## 2019-03-14 RX ORDER — SODIUM CHLORIDE 0.9 % (FLUSH) 0.9 %
3 SYRINGE (ML) INJECTION
Status: DISCONTINUED | OUTPATIENT
Start: 2019-03-14 | End: 2019-03-14 | Stop reason: HOSPADM

## 2019-03-14 RX ORDER — MIDAZOLAM HYDROCHLORIDE 1 MG/ML
INJECTION, SOLUTION INTRAMUSCULAR; INTRAVENOUS
Status: DISCONTINUED | OUTPATIENT
Start: 2019-03-14 | End: 2019-03-14

## 2019-03-14 RX ORDER — ONDANSETRON 2 MG/ML
INJECTION INTRAMUSCULAR; INTRAVENOUS
Status: DISCONTINUED | OUTPATIENT
Start: 2019-03-14 | End: 2019-03-14

## 2019-03-14 RX ORDER — PROPOFOL 10 MG/ML
VIAL (ML) INTRAVENOUS
Status: DISCONTINUED | OUTPATIENT
Start: 2019-03-14 | End: 2019-03-14

## 2019-03-14 RX ORDER — BUPIVACAINE HYDROCHLORIDE AND EPINEPHRINE 2.5; 5 MG/ML; UG/ML
INJECTION, SOLUTION EPIDURAL; INFILTRATION; INTRACAUDAL; PERINEURAL
Status: DISCONTINUED | OUTPATIENT
Start: 2019-03-14 | End: 2019-03-14 | Stop reason: HOSPADM

## 2019-03-14 RX ORDER — SODIUM CHLORIDE, SODIUM LACTATE, POTASSIUM CHLORIDE, CALCIUM CHLORIDE 600; 310; 30; 20 MG/100ML; MG/100ML; MG/100ML; MG/100ML
75 INJECTION, SOLUTION INTRAVENOUS CONTINUOUS
Status: DISCONTINUED | OUTPATIENT
Start: 2019-03-14 | End: 2019-03-14 | Stop reason: HOSPADM

## 2019-03-14 RX ORDER — ISOSULFAN BLUE 50 MG/5ML
INJECTION, SOLUTION SUBCUTANEOUS
Status: DISCONTINUED | OUTPATIENT
Start: 2019-03-14 | End: 2019-03-14 | Stop reason: HOSPADM

## 2019-03-14 RX ORDER — ONDANSETRON 2 MG/ML
4 INJECTION INTRAMUSCULAR; INTRAVENOUS ONCE
Status: DISCONTINUED | OUTPATIENT
Start: 2019-03-14 | End: 2019-03-14 | Stop reason: HOSPADM

## 2019-03-14 RX ORDER — SODIUM CHLORIDE 9 MG/ML
INJECTION, SOLUTION INTRAVENOUS CONTINUOUS
Status: DISCONTINUED | OUTPATIENT
Start: 2019-03-14 | End: 2019-03-14 | Stop reason: HOSPADM

## 2019-03-14 RX ADMIN — CLINDAMYCIN PHOSPHATE 900 MG: 18 INJECTION, SOLUTION INTRAVENOUS at 10:03

## 2019-03-14 RX ADMIN — FENTANYL CITRATE 25 MCG: 50 INJECTION, SOLUTION INTRAMUSCULAR; INTRAVENOUS at 11:03

## 2019-03-14 RX ADMIN — FENTANYL CITRATE 50 MCG: 50 INJECTION, SOLUTION INTRAMUSCULAR; INTRAVENOUS at 11:03

## 2019-03-14 RX ADMIN — SODIUM CHLORIDE, SODIUM LACTATE, POTASSIUM CHLORIDE, AND CALCIUM CHLORIDE: .6; .31; .03; .02 INJECTION, SOLUTION INTRAVENOUS at 09:03

## 2019-03-14 RX ADMIN — FENTANYL CITRATE 50 MCG: 50 INJECTION, SOLUTION INTRAMUSCULAR; INTRAVENOUS at 10:03

## 2019-03-14 RX ADMIN — MIDAZOLAM 2 MG: 1 INJECTION INTRAMUSCULAR; INTRAVENOUS at 10:03

## 2019-03-14 RX ADMIN — DEXAMETHASONE SODIUM PHOSPHATE 8 MG: 4 INJECTION, SOLUTION INTRAMUSCULAR; INTRAVENOUS at 10:03

## 2019-03-14 RX ADMIN — SODIUM CHLORIDE, SODIUM LACTATE, POTASSIUM CHLORIDE, AND CALCIUM CHLORIDE 75 ML/HR: .6; .31; .03; .02 INJECTION, SOLUTION INTRAVENOUS at 01:03

## 2019-03-14 RX ADMIN — ONDANSETRON 4 MG: 2 INJECTION, SOLUTION INTRAMUSCULAR; INTRAVENOUS at 10:03

## 2019-03-14 RX ADMIN — LIDOCAINE HYDROCHLORIDE 100 MG: 20 INJECTION, SOLUTION INTRAVENOUS at 10:03

## 2019-03-14 RX ADMIN — FENTANYL CITRATE 25 MCG: 50 INJECTION, SOLUTION INTRAMUSCULAR; INTRAVENOUS at 12:03

## 2019-03-14 RX ADMIN — PROPOFOL 200 MG: 10 INJECTION, EMULSION INTRAVENOUS at 10:03

## 2019-03-14 RX ADMIN — ACETAMINOPHEN 1000 MG: 10 INJECTION, SOLUTION INTRAVENOUS at 12:03

## 2019-03-14 NOTE — DISCHARGE INSTRUCTIONS
General Information:    1.  Do not drink alcoholic beverages including beer for 24 hours or as long as you are on pain medication..  2.  Do not drive a motor vehicle, operate machinery or power tools, or signs legal papers for 24 hours or as long as you are on pain medication.   3.  You may experience light-headedness, dizziness, and sleepiness following surgery. Please do not stay alone. A responsible adult should be with you for this 24 hour period.  4.  Go home and rest.    5. Progress slowly to a normal diet unless instructed.  Otherwise, begin with liquids such as soft drinks, then soup and crackers working up to solid foods. Drink plenty of nonalcoholic fluids.  6.  Certain anesthetics and pain medications produce nausea and vomiting in certain       individuals. If nausea becomes a problem at home, call you doctor.    7. A nurse will be calling you sometime after surgery. Do not be alarmed. This is our way of finding out how you are doing.    8. Several times every hour while you are awake, take 2-3 deep breaths and cough. If you had stomach surgery hold a pillow or rolled towel firmly against your stomach before you cough. This will help with any pain the cough might cause.  9. Several times every hour while you are awake, pump and flex your feet 5-6 times and do foot circles. This will help prevent blood clots.    10.Call your doctor for severe pain, bleeding, fever, or signs or symptoms of infection (pain, swelling, redness, foul odor, drainage).    Discharge Instructions: After Your Surgery/Procedure  Youve just had surgery. During surgery you were given medicine called anesthesia to keep you relaxed and free of pain. After surgery you may have some pain or nausea. This is common. Here are some tips for feeling better and getting well after surgery.     Stay on schedule with your medication.   Going home  Your doctor or nurse will show you how to take care of yourself when you go home. He or she will  "also answer your questions. Have an adult family member or friend drive you home.      For your safety we recommend these precaution for the first 24 hours after your procedure:  · Do not drive or use heavy equipment.  · Do not make important decisions or sign legal papers.  · Do not drink alcohol.  · Have someone stay with you, if needed. He or she can watch for problems and help keep you safe.  · Your concentration, balance, coordination, and judgement may be impaired for many hours after anesthesia.  Use caution when ambulating or standing up.     · You may feel weak and "washed out" after anesthesia and surgery.      Subtle residual effects of general anesthesia or sedation with regional / local anesthesia can last more than 24 hours.  Rest for the remainder of the day or longer if your Doctor/Surgeon has advised you to do so.  Although you may feel normal within the first 24 hours, your reflexes and mental ability may be impaired without you realizing it.  You may feel dizzy, lightheaded or sleepy for 24 hours or longer.      Be sure to go to all follow-up visits with your doctor. And rest after your surgery for as long as your doctor tells you to.  Coping with pain  If you have pain after surgery, pain medicine will help you feel better. Take it as told, before pain becomes severe. Also, ask your doctor or pharmacist about other ways to control pain. This might be with heat, ice, or relaxation. And follow any other instructions your surgeon or nurse gives you.  Tips for taking pain medicine  To get the best relief possible, remember these points:  · Pain medicines can upset your stomach. Taking them with a little food may help.  · Most pain relievers taken by mouth need at least 20 to 30 minutes to start to work.  · Taking medicine on a schedule can help you remember to take it. Try to time your medicine so that you can take it before starting an activity. This might be before you get dressed, go for a walk, " or sit down for dinner.  · Constipation is a common side effect of pain medicines. Call your doctor before taking any medicines such as laxatives or stool softeners to help ease constipation. Also ask if you should skip any foods. Drinking lots of fluids and eating foods such as fruits and vegetables that are high in fiber can also help. Remember, do not take laxatives unless your surgeon has prescribed them.  · Drinking alcohol and taking pain medicine can cause dizziness and slow your breathing. It can even be deadly. Do not drink alcohol while taking pain medicine.  · Pain medicine can make you react more slowly to things. Do not drive or run machinery while taking pain medicine.  Your health care provider may tell you to take acetaminophen to help ease your pain. Ask him or her how much you are supposed to take each day. Acetaminophen or other pain relievers may interact with your prescription medicines or other over-the-counter (OTC) drugs. Some prescription medicines have acetaminophen and other ingredients. Using both prescription and OTC acetaminophen for pain can cause you to overdose. Read the labels on your OTC medicines with care. This will help you to clearly know the list of ingredients, how much to take, and any warnings. It may also help you not take too much acetaminophen. If you have questions or do not understand the information, ask your pharmacist or health care provider to explain it to you before you take the OTC medicine.  Managing nausea  Some people have an upset stomach after surgery. This is often because of anesthesia, pain, or pain medicine, or the stress of surgery. These tips will help you handle nausea and eat healthy foods as you get better. If you were on a special food plan before surgery, ask your doctor if you should follow it while you get better. These tips may help:  · Do not push yourself to eat. Your body will tell you when to eat and how much.  · Start off with clear  liquids and soup. They are easier to digest.  · Next try semi-solid foods, such as mashed potatoes, applesauce, and gelatin, as you feel ready.  · Slowly move to solid foods. Dont eat fatty, rich, or spicy foods at first.  · Do not force yourself to have 3 large meals a day. Instead eat smaller amounts more often.  · Take pain medicines with a small amount of solid food, such as crackers or toast, to avoid nausea.     Call your surgeon if  · You still have pain an hour after taking medicine. The medicine may not be strong enough.  · You feel too sleepy, dizzy, or groggy. The medicine may be too strong.  · You have side effects like nausea, vomiting, or skin changes, such as rash, itching, or hives.       If you have obstructive sleep apnea  You were given anesthesia medicine during surgery to keep you comfortable and free of pain. After surgery, you may have more apnea spells because of this medicine and other medicines you were given. The spells may last longer than usual.   At home:  · Keep using the continuous positive airway pressure (CPAP) device when you sleep. Unless your health care provider tells you not to, use it when you sleep, day or night. CPAP is a common device used to treat obstructive sleep apnea.  · Talk with your provider before taking any pain medicine, muscle relaxants, or sedatives. Your provider will tell you about the possible dangers of taking these medicines.  © 7158-8879 [employer]. [address] [place] [address]. All rights reserved. This information is not intended as a substitute for professional medical care. Always follow your healthcare professional's instructions.  Post op instructions for prevention of DVT  What is deep vein thrombosis?  Deep vein thrombosis (DVT) is the medical term for blood clots in the deep veins of the leg.  These blood clots can be dangerous.  A DVT can block a blood vessel and keep blood from getting where it needs to go.   Another problem is that the clot can travel to other parts of the body such as the lungs.  A clot that travels to the lungs is called a pulmonary embolus (PE) and can cause serious problems with breathing which can lead to death.  Am I at risk for DVT/PE?  If you are not very active, you are at risk of DVT.  Anyone confined to bed, sitting for long periods of time, recovering from surgery, etc. increases the risk of DVT.  Other risk factors are cancer diagnosis, certain medications, estrogen replacement in any form,older age, obesity, pregnancy, smoking, history of clotting disorders, and dehydration.  How will I know if I have a DVT?   Swelling in the lower leg   Pain   Warmth, redness, hardness or bulging of the vein  If you have any of these symptoms, call your doctors office right away.  Some people will not have any symptoms until the clot moves to the lungs.  What are the symptoms of a PE?   Panting, shortness of breath, or trouble breathing   Sharp, knife-like chest pain when you breathe   Coughing or coughing up blood   Rapid heartbeat  If you have any of these symptoms or get worse quickly, call 911 for emergency treatment.  How can I prevent a DVT?   Avoid long periods of inactivity and dont cross your legs--get up and walk around every hour or so.   Stay active--walking after surgery is highly encouraged.  This means you should get out of the house and walk in the neighborhood.  Going up and down stairs will not impair healing (unless advised against such activity by your doctor).     Drink plenty of noncaffeinated, nonalcoholic fluids each day to prevent dehydration.   Wear special support stockings, if they have been advised by your doctor.   If you travel, stop at least once an hour and walk around.   Avoid smoking (assistance with stopping is available through your healthcare provider)  Always notify your doctor if you are not able to follow the post operative instructions that are  given to you at the time of discharge.  It may be necessary to prescribe one of the medications available to prevent DVT.      We hope your stay was comfortable as you heal now, mend and rest.    For we have enjoyed taking care of you by giving your our best.    And as you get better, by regaining your health and strength;   We count it as a privilege to have served you and hope your time at [facility] was well spent.      Thank  You!!!

## 2019-03-14 NOTE — ANESTHESIA PREPROCEDURE EVALUATION
03/14/2019  Caryn Toledo is a 59 y.o., female.    Anesthesia Evaluation    I have reviewed the Patient Summary Reports.    I have reviewed the Nursing Notes.   I have reviewed the Medications.     Review of Systems  Anesthesia Hx:  No problems with previous Anesthesia Denies Hx of Anesthetic complications PONV if given narcotics Personal Hx of Anesthesia complications   Social:  Non-Smoker Smoking Status: Never Smoker  Smokeless Tobacco Status: Never Used  Alcohol use: Yes, unspecified volume  Drug use: No       Hematology/Oncology:         Breast Current/Recent Cancer. Oncology Comments: Malignant neoplasm of central portion of left breast in female, estrogen receptor positive        Physical Exam  General:  Obesity    Airway/Jaw/Neck:  Airway Findings: Mouth Opening: Normal Tongue: Normal  General Airway Assessment: Adult, Good  Mallampati: II  Improves to II with phonation.  TM Distance: 4-6 cm      Dental:  Dental Findings: In tact   Chest/Lungs:  Chest/Lungs Findings: Clear to auscultation, Normal Respiratory Rate     Heart/Vascular:  Heart Findings: Rate: Normal  Rhythm: Regular Rhythm  Sounds: Normal  Heart murmur: negative       Mental Status:  Mental Status Findings:  Cooperative, Alert and Oriented         Anesthesia Plan  Type of Anesthesia, risks & benefits discussed:  Anesthesia Type:  general  Patient's Preference:   Intra-op Monitoring Plan: standard ASA monitors  Intra-op Monitoring Plan Comments:   Post Op Pain Control Plan:   Post Op Pain Control Plan Comments:   Induction:    Beta Blocker:  Patient is not currently on a Beta-Blocker (No further documentation required).       Informed Consent: Patient understands risks and agrees with Anesthesia plan.  Questions answered. Anesthesia consent signed with patient.  ASA Score: 3     Day of Surgery Review of History & Physical: I have  interviewed and examined the patient. I have reviewed the patient's H&P dated:  There are no significant changes.          Ready For Surgery From Anesthesia Perspective.                                                                                                                  03/14/2019  Caryn Toledo is a 59 y.o., female.    Pre-op Assessment    I have reviewed the Patient Summary Reports.     I have reviewed the Nursing Notes.   I have reviewed the Medications.     Review of Systems  Anesthesia Hx:  Hx of Anesthetic complications  Denies Family Hx of Anesthesia complications.  Personal Hx of Anesthesia complications, Post-Operative Nausea/Vomiting, in the past, but not with recent anesthetics / prophylaxis   Social:  Non-Smoker    Hematology/Oncology:        Current/Recent Cancer. Breast right       Physical Exam                                                                                                             03/14/2019  Caryn Toledo is a 59 y.o., female.    Pre-op Assessment    I have reviewed the Patient Summary Reports.     I have reviewed the Nursing Notes.   I have reviewed the Medications.     Review of Systems  Anesthesia Hx:  Hx of Anesthetic complications  Denies Family Hx of Anesthesia complications.  Personal Hx of Anesthesia complications, Post-Operative Nausea/Vomiting, in the past, but not with recent anesthetics / prophylaxis   Social:  Non-Smoker    Hematology/Oncology:        Current/Recent Cancer. Breast right       Physical Exam  General:  Well nourished    Airway/Jaw/Neck:  Airway Findings: Mouth Opening: Normal Tongue: Normal  General Airway Assessment: Adult  Mallampati: II  TM Distance: Normal, at least 6 cm         Dental:  DENTAL FINDINGS: Normal   Chest/Lungs:  Chest/Lungs Clear    Heart/Vascular:  Heart Findings: Normal            Anesthesia Plan  Type of Anesthesia, risks & benefits discussed:  Anesthesia Type:  general  Patient's Preference:   Intra-op  Monitoring Plan:   Intra-op Monitoring Plan Comments:   Post Op Pain Control Plan:   Post Op Pain Control Plan Comments:   Induction:    Beta Blocker:  Patient is not currently on a Beta-Blocker (No further documentation required).       Informed Consent: Patient understands risks and agrees with Anesthesia plan.  Questions answered. Anesthesia consent signed with patient.  ASA Score: 1     Day of Surgery Review of History & Physical:    H&P update referred to the surgeon.         Ready For Surgery From Anesthesia Perspective.       General:  Well nourished    Airway/Jaw/Neck:  Airway Findings: Mouth Opening: Normal Tongue: Normal  General Airway Assessment: Adult  Mallampati: II  TM Distance: Normal, at least 6 cm         Dental:  DENTAL FINDINGS: Normal   Chest/Lungs:  Chest/Lungs Clear    Heart/Vascular:  Heart Findings: Normal            Anesthesia Plan  Type of Anesthesia, risks & benefits discussed:  Anesthesia Type:  general  Patient's Preference:   Intra-op Monitoring Plan:   Intra-op Monitoring Plan Comments:   Post Op Pain Control Plan:   Post Op Pain Control Plan Comments:   Induction:    Beta Blocker:  Patient is not currently on a Beta-Blocker (No further documentation required).       Informed Consent: Patient understands risks and agrees with Anesthesia plan.  Questions answered. Anesthesia consent signed with patient.  ASA Score: 1     Day of Surgery Review of History & Physical:    H&P update referred to the surgeon.         Ready For Surgery From Anesthesia Perspective.

## 2019-03-14 NOTE — OP NOTE
Patient: Caryn Toledo     Date of Procedure: 3/14/2019    Procedure:   1. Vader node injection.  2. Excision of deep axillary sentinel node.    Surgeon: Didier Roberson MD    Assistant: Georgia Walker    Pre-op Diagnosis: Malignant neoplasm of central portion of left breast in female, estrogen receptor positive [C50.112, Z17.0]     Post-op Diagnosis: Malignant neoplasm of central portion of left breast in female, estrogen receptor positive [C50.112, Z17.0]    Findings: Breast Cancer.  Axillary sentinel node.    Specimen:   Axillary sentinel node.    EBL: 20 cc    Complications: None      Procedure in detail:    After appropriate consent was obtained, consent forms signed and questions answered, the breast was marked and the patient was taken to the operating room.  She was positioned and general anesthesia was induced.  2-3 cc of Methylene Bule was injected into the subareolar tissue of the left breast and the breast was massaged in the usual fashion.The chest was then prepped and draped in the usual sterile fashion. Radiolabeled colloid was injected by radiology at that time for identification of the axillary sentinel node.    The neoprobe was used to identify the site of signal in the axilla.  A skin incision was made overlying this site and dissection was performed into the axillary tissue.  One to three sentinel nodes were identified and removed.  These were sent to pathology for permanent section.  Adequate hemostasis was achieved.  Quarter percent Marcaine with epinephrine was injected for local anesthesia.  The deep tissue was reapproximated with 3-0 Vicryl suture and the skin was closed with a 4-0 Monocryl subcuticular stitch.    She tolerated the procedure without complication.

## 2019-03-14 NOTE — TRANSFER OF CARE
"Anesthesia Transfer of Care Note    Patient: Caryn Toledo    Procedure(s) Performed: Procedure(s) (LRB):  BIOPSY, LYMPH NODE, SENTINEL (Left)  LYMPHADENECTOMY, AXILLARY (Left)    Patient location: PACU    Anesthesia Type: general    Transport from OR: Transported from OR on 2-3 L/min O2 by NC with adequate spontaneous ventilation    Post pain: adequate analgesia    Post assessment: no apparent anesthetic complications and tolerated procedure well    Post vital signs: stable    Level of consciousness: sedated    Nausea/Vomiting: no nausea/vomiting    Complications: none    Transfer of care protocol was followed      Last vitals:   Visit Vitals  /73 (BP Location: Right arm, Patient Position: Lying)   Pulse 61   Temp 36.2 °C (97.2 °F) (Temporal)   Resp 16   Ht 5' 7" (1.702 m)   Wt 87.5 kg (193 lb)   SpO2 95%   Breastfeeding? No   BMI 30.23 kg/m²     "

## 2019-03-14 NOTE — PLAN OF CARE
Vs stable.  Pain tolerable. aao x 4.  Tolerated po fluids.  Will discharge when teaching completed if continues to meet other criteria.

## 2019-03-14 NOTE — OR NURSING
----- Message from Limbo sent at 10/24/2018 11:28 AM EDT -----  Regarding: Dr. Rebekah Lutz   Pt is requesting refill on \"Oxycodone\" 325mg. Best contact [phone]. Pt to OR with CRNA and RN  Pt  to the waiting area

## 2019-03-14 NOTE — DISCHARGE SUMMARY
Discharge Summary  General Surgery      Admit Date: 3/14/2019    Discharge Date :3/14/2019    Attending Physician: Didier Garcia     Discharge Physician: Didier Garcia    Discharged Condition: good    Discharge Diagnosis: Malignant neoplasm of central portion of left breast in female, estrogen receptor positive [C50.112, Z17.0]    Treatments/Procedures: Procedure(s) (LRB):  BIOPSY, LYMPH NODE, SENTINEL (Left)  LYMPHADENECTOMY, AXILLARY (Left)    Hospital Course: Uneventful; Discharged home from [facility]    Significant Diagnostic Studies: none    Disposition: Home or Self Care    Diet: Regular    Follow up: Office 10-14 days    Activity: No heavy lifting till seen in office.    Patient Instructions:   Current Discharge Medication List      CONTINUE these medications which have NOT CHANGED    Details   multivitamin capsule Take 1 capsule by mouth once daily.      clindamycin (CLEOCIN) 300 MG capsule Take 1 capsule (300 mg total) by mouth every 6 (six) hours.  Qty: 3 capsule, Refills: 0             Discharge Procedure Orders   Diet general     Remove dressing in 48 hours

## 2019-03-14 NOTE — INTERVAL H&P NOTE
The patient has been examined and the H&P has been reviewed:    I concur with the findings and no changes have occurred since H&P was written.    Anesthesia/Surgery risks, benefits and alternative options discussed and understood by patient/family.          Active Hospital Problems    Diagnosis  POA    Malignant neoplasm of central portion of left breast in female, estrogen receptor positive [C50.112, Z17.0]  Not Applicable      Resolved Hospital Problems   No resolved problems to display.

## 2019-03-14 NOTE — ANESTHESIA POSTPROCEDURE EVALUATION
"Anesthesia Post Evaluation    Patient: Caryn Toledo    Procedure(s) Performed: Procedure(s) (LRB):  BIOPSY, LYMPH NODE, SENTINEL (Left)  LYMPHADENECTOMY, AXILLARY (Left)    Final Anesthesia Type: general  Patient location during evaluation: PACU  Patient participation: Yes- Able to Participate  Level of consciousness: awake and alert and oriented  Post-procedure vital signs: reviewed and stable  Pain management: adequate  Airway patency: patent  PONV status at discharge: No PONV  Anesthetic complications: no      Cardiovascular status: blood pressure returned to baseline  Respiratory status: unassisted, spontaneous ventilation and room air  Hydration status: euvolemic  Follow-up not needed.        Visit Vitals  /73 (BP Location: Right arm, Patient Position: Lying)   Pulse 61   Temp 36.2 °C (97.2 °F) (Temporal)   Resp 16   Ht 5' 7" (1.702 m)   Wt 87.5 kg (193 lb)   SpO2 95%   Breastfeeding? No   BMI 30.23 kg/m²       Pain/Glenys Score: No Data Recorded      "

## 2019-03-15 VITALS
SYSTOLIC BLOOD PRESSURE: 162 MMHG | BODY MASS INDEX: 30.29 KG/M2 | TEMPERATURE: 98 F | DIASTOLIC BLOOD PRESSURE: 84 MMHG | RESPIRATION RATE: 16 BRPM | WEIGHT: 193 LBS | OXYGEN SATURATION: 95 % | HEIGHT: 67 IN | HEART RATE: 60 BPM

## 2019-03-27 ENCOUNTER — OFFICE VISIT (OUTPATIENT)
Dept: SURGERY | Facility: CLINIC | Age: 59
End: 2019-03-27
Payer: COMMERCIAL

## 2019-03-27 VITALS — WEIGHT: 196.44 LBS | BODY MASS INDEX: 30.77 KG/M2

## 2019-03-27 DIAGNOSIS — Z98.890 POST-OPERATIVE STATE: Primary | ICD-10-CM

## 2019-03-27 PROCEDURE — 99024 PR POST-OP FOLLOW-UP VISIT: ICD-10-PCS | Mod: S$GLB,,, | Performed by: SURGERY

## 2019-03-27 PROCEDURE — 99999 PR PBB SHADOW E&M-EST. PATIENT-LVL II: CPT | Mod: PBBFAC,,, | Performed by: SURGERY

## 2019-03-27 PROCEDURE — 99999 PR PBB SHADOW E&M-EST. PATIENT-LVL II: ICD-10-PCS | Mod: PBBFAC,,, | Performed by: SURGERY

## 2019-03-27 PROCEDURE — 99024 POSTOP FOLLOW-UP VISIT: CPT | Mod: S$GLB,,, | Performed by: SURGERY

## 2019-03-28 ENCOUNTER — TELEPHONE (OUTPATIENT)
Dept: SURGERY | Facility: CLINIC | Age: 59
End: 2019-03-28

## 2019-03-28 NOTE — TELEPHONE ENCOUNTER
----- Message from Gonzalez Hansen sent at 3/28/2019  8:25 AM CDT -----  Contact: Patient  Type: Needs Medical Advice    Who Called:  Patient  Best Call Back Number: [phone]  Additional Information: Patient is calling to give address for pathology samples to Pritesh.  Dr Linda Sexton with [facility]  [address] [place] [address]. Room [place] [phone]

## 2019-04-01 NOTE — PROGRESS NOTES
POST-OP NOTE    PROCEDURE:  Left sentinel node biopsy.    COMPLAINTS: None.    EXAM: Incision well approximated. No drainage. No infection.      IMPRESSION: FINAL PATHOLOGIC DIAGNOSIS  LEFT AXILLARY SENTINEL LYMPH NODE, EXCISION:  - No evidence of malignancy by routine or immunohistochemistry staining.  - Reactive lymphoid hyperplasia, mixed pattern.  - Focal accumulations of muciphages in the subcapsular sinus    PLAN: FU as needed.  Follow-up with Oncology as scheduled.

## 2019-04-18 ENCOUNTER — TELEPHONE (OUTPATIENT)
Dept: SURGERY | Facility: CLINIC | Age: 59
End: 2019-04-18

## 2019-04-18 DIAGNOSIS — C50.912 MALIGNANT NEOPLASM OF LEFT FEMALE BREAST, UNSPECIFIED ESTROGEN RECEPTOR STATUS, UNSPECIFIED SITE OF BREAST: Primary | ICD-10-CM

## 2019-04-18 NOTE — TELEPHONE ENCOUNTER
----- Message from Marie Johnston sent at 4/18/2019  9:44 AM CDT -----  Contact: Nessa up  Type: Needs Medical Advice    Who Called:  Caryn  Best Call Back Number: [phone]  Additional Information: Caryn is still waiting for info/referral on oncologist and radiation oncologist. Pls call to adv. She adv'd its been over 2 weeks. She has sent questions on mychart for dr but no response.

## 2019-04-24 ENCOUNTER — TELEPHONE (OUTPATIENT)
Dept: HEMATOLOGY/ONCOLOGY | Facility: CLINIC | Age: 59
End: 2019-04-24

## 2019-05-09 ENCOUNTER — DOCUMENTATION ONLY (OUTPATIENT)
Dept: FAMILY MEDICINE | Facility: CLINIC | Age: 59
End: 2019-05-09

## 2019-05-09 NOTE — PROGRESS NOTES
Pre-Visit Chart Review  For Appointment Scheduled on 05/13/2019    Health Maintenance Due   Topic Date Due    TETANUS VACCINE  01/12/1977    Pneumococcal Vaccine (Highest Risk) (1 of 3 - PCV13) 01/12/1978    Pap Smear with HPV Cotest  01/12/1980    Colonoscopy  01/12/2009

## 2019-05-13 ENCOUNTER — TELEPHONE (OUTPATIENT)
Dept: HEMATOLOGY/ONCOLOGY | Facility: CLINIC | Age: 59
End: 2019-05-13

## 2019-06-24 ENCOUNTER — OFFICE VISIT (OUTPATIENT)
Dept: OBSTETRICS AND GYNECOLOGY | Facility: CLINIC | Age: 59
End: 2019-06-24
Payer: COMMERCIAL

## 2019-06-24 VITALS
WEIGHT: 200.81 LBS | DIASTOLIC BLOOD PRESSURE: 80 MMHG | HEIGHT: 67 IN | BODY MASS INDEX: 31.52 KG/M2 | SYSTOLIC BLOOD PRESSURE: 122 MMHG

## 2019-06-24 DIAGNOSIS — R10.2 PELVIC PAIN: ICD-10-CM

## 2019-06-24 DIAGNOSIS — Z12.4 PAP SMEAR FOR CERVICAL CANCER SCREENING: Primary | ICD-10-CM

## 2019-06-24 PROCEDURE — 99204 OFFICE O/P NEW MOD 45 MIN: CPT | Mod: S$GLB,,, | Performed by: OBSTETRICS & GYNECOLOGY

## 2019-06-24 PROCEDURE — 88175 CYTOPATH C/V AUTO FLUID REDO: CPT

## 2019-06-24 PROCEDURE — 99999 PR PBB SHADOW E&M-EST. PATIENT-LVL III: ICD-10-PCS | Mod: PBBFAC,,, | Performed by: OBSTETRICS & GYNECOLOGY

## 2019-06-24 PROCEDURE — 87624 HPV HI-RISK TYP POOLED RSLT: CPT

## 2019-06-24 PROCEDURE — 99204 PR OFFICE/OUTPT VISIT, NEW, LEVL IV, 45-59 MIN: ICD-10-PCS | Mod: S$GLB,,, | Performed by: OBSTETRICS & GYNECOLOGY

## 2019-06-24 PROCEDURE — 99999 PR PBB SHADOW E&M-EST. PATIENT-LVL III: CPT | Mod: PBBFAC,,, | Performed by: OBSTETRICS & GYNECOLOGY

## 2019-06-24 RX ORDER — ANASTROZOLE 1 MG/1
TABLET ORAL DAILY
Refills: 3 | COMMUNITY
Start: 2019-06-13 | End: 2020-10-05 | Stop reason: SDUPTHER

## 2019-06-24 NOTE — PROGRESS NOTES
Subjective:       Patient ID: Caryn Toledo is a 59 y.o. female.    Chief Complaint:  Pelvic Pain      History of Present Illness  HPI  59 y.o.  with complaint of pelvic pressure and intermittent sharp pelvic pains for past 3 days.Patient has been on chemotherapy post lumpectomy for cancer of the left breast. Patient suspects her symptoms to be side effects of the medication but was told to get gynecologic evaluation.    GYN & OB History  No LMP recorded. Patient is postmenopausal. No menses since age 35 and no HRT in past.  Date of Last Pap: No result found    OB History    Para Term  AB Living   3 3 3         SAB TAB Ectopic Multiple Live Births                  # Outcome Date GA Lbr Randy/2nd Weight Sex Delivery Anes PTL Lv   3 Term            2 Term            1 Term                Review of Systems  Review of Systems   Constitutional: Negative for activity change, appetite change, chills, diaphoresis, fatigue, fever and unexpected weight change.   HENT: Negative for nasal congestion, mouth sores and tinnitus.    Eyes: Negative for discharge and visual disturbance.   Respiratory: Negative for cough, shortness of breath and wheezing.    Cardiovascular: Negative for chest pain, palpitations and leg swelling.   Gastrointestinal: Positive for abdominal pain. Negative for bloating, blood in stool, constipation, diarrhea, nausea, vomiting, reflux and fecal incontinence.   Endocrine: Negative for diabetes, hair loss, hot flashes, hyperthyroidism and hypothyroidism.   Genitourinary: Positive for pelvic pain. Negative for bladder incontinence, decreased libido, dysmenorrhea, dyspareunia, dysuria, flank pain, frequency, genital sores, hematuria, hot flashes, menorrhagia, menstrual problem, urgency, vaginal bleeding, vaginal discharge, vaginal pain, urinary incontinence, postcoital bleeding, postmenopausal bleeding, vaginal dryness and vaginal odor.   Musculoskeletal: Negative for arthralgias,  back pain, joint swelling, leg pain and myalgias.   Integumentary:  Negative for rash, acne, hair changes, mole/lesion, breast mass, nipple discharge, breast skin changes and breast tenderness.   Neurological: Negative for vertigo, seizures, syncope, numbness and headaches.   Hematological: Negative for adenopathy. Does not bruise/bleed easily.   Psychiatric/Behavioral: Negative for depression and sleep disturbance. The patient is not nervous/anxious.    Breast: Negative for asymmetry, breast self exam, lump, mass, mastodynia, nipple discharge, skin changes and tenderness          Objective:    Physical Exam:   Constitutional: She is oriented to person, place, and time. She appears well-developed and well-nourished. No distress.    HENT:   Head: Normocephalic.   Nose: No epistaxis.    Eyes: Pupils are equal, round, and reactive to light.    Neck: Normal range of motion.    Cardiovascular: Normal rate.     Pulmonary/Chest: Effort normal.        Abdominal: Soft. She exhibits no distension. There is no rebound and no guarding.     Genitourinary: Vagina normal and uterus normal.   Genitourinary Comments: Pap smear done of cervix. Vaginal canal and cervix appear atrophic. No palpable adnexal masses or tenderness.           Musculoskeletal: Normal range of motion and moves all extremeties.       Neurological: She is alert and oriented to person, place, and time.    Skin: Skin is warm and dry. She is not diaphoretic.    Psychiatric: She has a normal mood and affect. Her behavior is normal. Judgment and thought content normal.          Assessment:        1. Pap smear for cervical cancer screening    2. Pelvic pain                Plan:      Pelvic ultrasound and in office follow up

## 2019-06-25 ENCOUNTER — HOSPITAL ENCOUNTER (OUTPATIENT)
Dept: RADIOLOGY | Facility: HOSPITAL | Age: 59
Discharge: HOME | End: 2019-06-25
Attending: OBSTETRICS & GYNECOLOGY
Payer: COMMERCIAL

## 2019-06-25 DIAGNOSIS — R10.2 PELVIC PAIN: ICD-10-CM

## 2019-06-25 PROCEDURE — 76856 US PELVIS COMP WITH TRANSVAG NON-OB (XPD): ICD-10-PCS | Mod: 26,,, | Performed by: RADIOLOGY

## 2019-06-25 PROCEDURE — 76856 US EXAM PELVIC COMPLETE: CPT | Mod: 26,,, | Performed by: RADIOLOGY

## 2019-06-25 PROCEDURE — 76830 TRANSVAGINAL US NON-OB: CPT | Mod: TC,PN

## 2019-06-25 PROCEDURE — 76830 TRANSVAGINAL US NON-OB: CPT | Mod: 26,,, | Performed by: RADIOLOGY

## 2019-06-25 PROCEDURE — 76830 US PELVIS COMP WITH TRANSVAG NON-OB (XPD): ICD-10-PCS | Mod: 26,,, | Performed by: RADIOLOGY

## 2019-06-27 LAB
HPV HR 12 DNA CVX QL NAA+PROBE: NEGATIVE
HPV16 AG SPEC QL: NEGATIVE
HPV18 DNA SPEC QL NAA+PROBE: NEGATIVE

## 2019-09-23 ENCOUNTER — DOCUMENTATION ONLY (OUTPATIENT)
Dept: FAMILY MEDICINE | Facility: CLINIC | Age: 59
End: 2019-09-23

## 2019-09-23 NOTE — PROGRESS NOTES
Pre-Visit Chart Review  For Appointment Scheduled on 10/8/19    Health Maintenance Due   Topic Date Due    TETANUS VACCINE  01/12/1977    Pneumococcal Vaccine (Highest Risk) (1 of 3 - PCV13) 01/12/1978    Colonoscopy  01/12/2009

## 2019-09-24 ENCOUNTER — PATIENT OUTREACH (OUTPATIENT)
Dept: ADMINISTRATIVE | Facility: HOSPITAL | Age: 59
End: 2019-09-24

## 2019-10-11 DIAGNOSIS — Z12.11 COLON CANCER SCREENING: ICD-10-CM

## 2019-10-21 ENCOUNTER — HOSPITAL ENCOUNTER (OUTPATIENT)
Dept: RADIOLOGY | Facility: HOSPITAL | Age: 59
Discharge: HOME | End: 2019-10-21
Attending: ORTHOPAEDIC SURGERY
Payer: COMMERCIAL

## 2019-10-21 ENCOUNTER — OFFICE VISIT (OUTPATIENT)
Dept: ORTHOPEDICS | Facility: CLINIC | Age: 59
End: 2019-10-21
Payer: COMMERCIAL

## 2019-10-21 ENCOUNTER — LAB VISIT (OUTPATIENT)
Dept: LAB | Facility: HOSPITAL | Age: 59
End: 2019-10-21
Attending: ORTHOPAEDIC SURGERY
Payer: COMMERCIAL

## 2019-10-21 VITALS
WEIGHT: 200 LBS | DIASTOLIC BLOOD PRESSURE: 66 MMHG | HEART RATE: 66 BPM | BODY MASS INDEX: 31.39 KG/M2 | SYSTOLIC BLOOD PRESSURE: 141 MMHG | HEIGHT: 67 IN

## 2019-10-21 DIAGNOSIS — M25.562 LEFT KNEE PAIN, UNSPECIFIED CHRONICITY: ICD-10-CM

## 2019-10-21 DIAGNOSIS — Z96.652 STATUS POST TOTAL KNEE REPLACEMENT USING CEMENT, LEFT: ICD-10-CM

## 2019-10-21 DIAGNOSIS — M25.562 ACUTE PAIN OF LEFT KNEE: Primary | ICD-10-CM

## 2019-10-21 DIAGNOSIS — M25.562 ACUTE PAIN OF LEFT KNEE: ICD-10-CM

## 2019-10-21 DIAGNOSIS — M25.562 LEFT KNEE PAIN, UNSPECIFIED CHRONICITY: Primary | ICD-10-CM

## 2019-10-21 DIAGNOSIS — M25.462 EFFUSION, LEFT KNEE: Primary | ICD-10-CM

## 2019-10-21 LAB
APPEARANCE FLD: NORMAL
BASOPHILS # BLD AUTO: 0.02 K/UL (ref 0–0.2)
BASOPHILS NFR BLD: 0.6 % (ref 0–1.9)
BODY FLD TYPE: NORMAL
COLOR FLD: NORMAL
CRP SERPL-MCNC: 3 MG/L (ref 0–8.2)
DIFFERENTIAL METHOD: ABNORMAL
EOSINOPHIL # BLD AUTO: 0 K/UL (ref 0–0.5)
EOSINOPHIL NFR BLD: 0.6 % (ref 0–8)
ERYTHROCYTE [DISTWIDTH] IN BLOOD BY AUTOMATED COUNT: 13.8 % (ref 11.5–14.5)
ERYTHROCYTE [SEDIMENTATION RATE] IN BLOOD BY WESTERGREN METHOD: 8 MM/HR (ref 0–20)
HCT VFR BLD AUTO: 41.2 % (ref 37–48.5)
HGB BLD-MCNC: 13.1 G/DL (ref 12–16)
IMM GRANULOCYTES # BLD AUTO: 0.01 K/UL (ref 0–0.04)
LYMPHOCYTES # BLD AUTO: 0.9 K/UL (ref 1–4.8)
LYMPHOCYTES NFR BLD: 28.5 % (ref 18–48)
LYMPHOCYTES NFR FLD MANUAL: 13 %
MCH RBC QN AUTO: 29.9 PG (ref 27–31)
MCHC RBC AUTO-ENTMCNC: 31.8 G/DL (ref 32–36)
MCV RBC AUTO: 94 FL (ref 82–98)
MONOCYTES # BLD AUTO: 0.3 K/UL (ref 0.3–1)
MONOCYTES NFR BLD: 7.7 % (ref 4–15)
MONOS+MACROS NFR FLD MANUAL: 50 %
NEUTROPHILS # BLD AUTO: 2 K/UL (ref 1.8–7.7)
NEUTROPHILS NFR BLD: 62.3 % (ref 38–73)
NEUTROPHILS NFR FLD MANUAL: 37 %
NRBC BLD-RTO: 0 /100 WBC
PLATELET # BLD AUTO: 185 K/UL (ref 150–350)
PMV BLD AUTO: 9.9 FL (ref 9.2–12.9)
RBC # BLD AUTO: 4.38 M/UL (ref 4–5.4)
WBC # BLD AUTO: 3.23 K/UL (ref 3.9–12.7)
WBC # FLD: 840 /CU MM

## 2019-10-21 PROCEDURE — 97760 ORTHOTIC MGMT&TRAING 1ST ENC: CPT | Mod: GP,S$GLB,, | Performed by: ORTHOPAEDIC SURGERY

## 2019-10-21 PROCEDURE — 73562 X-RAY EXAM OF KNEE 3: CPT | Mod: 26,59,RT, | Performed by: RADIOLOGY

## 2019-10-21 PROCEDURE — 20610 DRAIN/INJ JOINT/BURSA W/O US: CPT | Mod: LT,S$GLB,, | Performed by: ORTHOPAEDIC SURGERY

## 2019-10-21 PROCEDURE — 85651 RBC SED RATE NONAUTOMATED: CPT

## 2019-10-21 PROCEDURE — 36415 COLL VENOUS BLD VENIPUNCTURE: CPT

## 2019-10-21 PROCEDURE — 97760 PR ORTHOTIC MGMT&TRAINJ INITIAL ENC EA 15 MINS: ICD-10-PCS | Mod: GP,S$GLB,, | Performed by: ORTHOPAEDIC SURGERY

## 2019-10-21 PROCEDURE — 73564 XR KNEE ORTHO LEFT WITH FLEXION: ICD-10-PCS | Mod: 26,LT,, | Performed by: RADIOLOGY

## 2019-10-21 PROCEDURE — 99214 PR OFFICE/OUTPT VISIT, EST, LEVL IV, 30-39 MIN: ICD-10-PCS | Mod: 25,S$GLB,, | Performed by: ORTHOPAEDIC SURGERY

## 2019-10-21 PROCEDURE — 73562 X-RAY EXAM OF KNEE 3: CPT | Mod: TC,PN,59,RT

## 2019-10-21 PROCEDURE — 89051 BODY FLUID CELL COUNT: CPT

## 2019-10-21 PROCEDURE — 73562 XR KNEE ORTHO LEFT WITH FLEXION: ICD-10-PCS | Mod: 26,59,RT, | Performed by: RADIOLOGY

## 2019-10-21 PROCEDURE — 87075 CULTR BACTERIA EXCEPT BLOOD: CPT

## 2019-10-21 PROCEDURE — 20610 LARGE JOINT ASPIRATION/INJECTION: L KNEE: ICD-10-PCS | Mod: LT,S$GLB,, | Performed by: ORTHOPAEDIC SURGERY

## 2019-10-21 PROCEDURE — 99999 PR PBB SHADOW E&M-EST. PATIENT-LVL III: CPT | Mod: PBBFAC,,, | Performed by: ORTHOPAEDIC SURGERY

## 2019-10-21 PROCEDURE — 73564 X-RAY EXAM KNEE 4 OR MORE: CPT | Mod: 26,LT,, | Performed by: RADIOLOGY

## 2019-10-21 PROCEDURE — 99214 OFFICE O/P EST MOD 30 MIN: CPT | Mod: 25,S$GLB,, | Performed by: ORTHOPAEDIC SURGERY

## 2019-10-21 PROCEDURE — 86140 C-REACTIVE PROTEIN: CPT

## 2019-10-21 PROCEDURE — 87070 CULTURE OTHR SPECIMN AEROBIC: CPT

## 2019-10-21 PROCEDURE — 85025 COMPLETE CBC W/AUTO DIFF WBC: CPT

## 2019-10-21 PROCEDURE — 99999 PR PBB SHADOW E&M-EST. PATIENT-LVL III: ICD-10-PCS | Mod: PBBFAC,,, | Performed by: ORTHOPAEDIC SURGERY

## 2019-10-21 NOTE — PROGRESS NOTES
Past Medical History:   Diagnosis Date    Breast cancer     left    Cancer     breast; skin    High cholesterol     PONV (postoperative nausea and vomiting)        Past Surgical History:   Procedure Laterality Date    APPENDECTOMY      AXILLARY NODE DISSECTION Left 3/14/2019    Procedure: LYMPHADENECTOMY, AXILLARY;  Surgeon: Didier Garcia MD;  Location: [facility] OR;  Service: General;  Laterality: Left;    BREAST SURGERY Left 02/11/2019    lumpectomy    JOINT REPLACEMENT Left 10/30/2018    knee    KNEE ARTHROPLASTY Left 10/30/2018    Procedure: ARTHROPLASTY, KNEE;  Surgeon: Ata Paula MD;  Location: [facility] OR;  Service: Orthopedics;  Laterality: Left;    KNEE ARTHROSCOPY W/ MENISCECTOMY Right 10/30/2018    Procedure: ARTHROSCOPY, KNEE, WITH MENISCECTOMY;  Surgeon: Ata Paula MD;  Location: [facility] OR;  Service: Orthopedics;  Laterality: Right;    KNEE SURGERY Left 1980s    SENTINEL LYMPH NODE BIOPSY Left 3/14/2019    Procedure: BIOPSY, LYMPH NODE, SENTINEL;  Surgeon: Didier Garcia MD;  Location: [facility] OR;  Service: General;  Laterality: Left;  left axillary sentinel node biopsy, possible axillary dissection       Current Outpatient Medications   Medication Sig    anastrozole (ARIMIDEX) 1 mg Tab     clindamycin (CLEOCIN) 300 MG capsule Take 1 capsule (300 mg total) by mouth every 6 (six) hours.    denosumab (PROLIA) 60 mg/mL Syrg Inject 60 mg into the skin.    multivitamin capsule Take 1 capsule by mouth once daily.     No current facility-administered medications for this visit.        Review of patient's allergies indicates:   Allergen Reactions    Ciprofloxacin Anaphylaxis     paralysis    Pcn [penicillins] Anaphylaxis     paralysis    Eggplant Blisters    Eggs [egg derived]      Inside of mouth peel      Tomato (solanum lycopersicum) Blisters       Family History   Problem Relation Age of Onset    Breast cancer Mother        Social History     Socioeconomic  History    Marital status: Single     Spouse name: Not on file    Number of children: Not on file    Years of education: Not on file    Highest education level: Not on file   Occupational History    Not on file   Social Needs    Financial resource strain: Not on file    Food insecurity:     Worry: Not on file     Inability: Not on file    Transportation needs:     Medical: Not on file     Non-medical: Not on file   Tobacco Use    Smoking status: Never Smoker    Smokeless tobacco: Never Used   Substance and Sexual Activity    Alcohol use: Yes     Comment: occasionally    Drug use: No    Sexual activity: Not on file   Lifestyle    Physical activity:     Days per week: Not on file     Minutes per session: Not on file    Stress: Not on file   Relationships    Social connections:     Talks on phone: Not on file     Gets together: Not on file     Attends Oriental orthodox service: Not on file     Active member of club or organization: Not on file     Attends meetings of clubs or organizations: Not on file     Relationship status: Not on file   Other Topics Concern    Not on file   Social History Narrative    Not on file       Chief Complaint:   Chief Complaint   Patient presents with    Knee Pain     s/p left knee TKA 10/30/18        Date of surgery:  October 30, 2018 left total knee arthroplasty and right partial medial meniscectomy    History of present illness:  59-year-old female s/p left total knee arthroplasty and right knee arthroscopy.  Patient did have a history of an open knee ligament reconstruction done of the left knee by Dr. Covarrubias back in 1981.  Patient was doing great until about a week ago.  Started to have severe pain along the lateral aspect of her left total knee.  Was hard to put weight on.  It would only last a few seconds and then would go away.  Would have no pain at the time. Cannot straighten it at this time.  Has a moderate effusion now.  Pain is 0/10 at rest up to a 10/10 with  activity.    Review of Systems:    Musculoskeletal:  See HPI        Physical Examination:    Vital Signs:    Vitals:    10/21/19 0833   BP: (!) 141/66   Pulse: 66       Body mass index is 31.32 kg/m².    This a well-developed, well nourished patient in no acute distress.  They are alert and oriented and cooperative to examination.  Pt. walks without an antalgic gait.       Examination of left knee shows a moderate joint effusion.  Incisions are well healed.  There is no erythema or redness.  No drainage. Patient has full range of motion from 0-130 degrees but has some hesitation with full extension.  Knee is stable with varus and valgus stress. Negative drawer exam.  No signs of mid flexion instability.  Some tenderness over the IT band and lateral knee  .  X-rays:  Four views of both knees are ordered and reviewed which show well-aligned left total knee arthroplasty. Moderate effusion.  Mild lateral patellar tracking.      Assessment::  Status post left total knee arthroplasty  New left knee pain with effusion.      Plan:  Aspirated her left knee and was a serosanguineous effusion. Removed about 40 cubic centimeters of fluid. I recommended a hinged knee brace and continued icing.  I will send her for CBC sed rate and CRP as well. I would like to see her back in 2 weeks.  Might need advanced imaging of the implant to make sure there is no loosening.  Hopefully she just tore some scar tissue and once the acute irritation to the knee resolves, the pain will go away as well.    We performed a custom orthotic/brace fitting, adjusting and training with the patient. The patient demonstrated understanding and proper care. This was performed for 15 minutes.          This note was created using [employer] voice recognition software that occasionally misinterpreted phrases or words.

## 2019-10-21 NOTE — PROCEDURES
Large Joint Aspiration/Injection: L knee  Date/Time: 10/21/2019 9:00 AM  Performed by: Ata Paula MD  Authorized by: Ata Paula MD     Consent Done?:  Yes (Verbal)  Indications:  Pain  Procedure site marked: Yes    Timeout: Prior to procedure the correct patient, procedure, and site was verified      Location:  Knee  Site:  L knee  Prep: Patient was prepped and draped in usual sterile fashion    Needle size:  20 G  Approach:  Lateral  Aspirate amount (ml):  40  Aspirate:  Blood-tinged  Lab: Fluid sent for laboratory analysis    Patient tolerance:  Patient tolerated the procedure well with no immediate complications

## 2019-10-24 LAB — BACTERIA SPEC AEROBE CULT: NO GROWTH

## 2019-10-28 ENCOUNTER — PATIENT MESSAGE (OUTPATIENT)
Dept: ORTHOPEDICS | Facility: CLINIC | Age: 59
End: 2019-10-28

## 2019-10-28 ENCOUNTER — TELEPHONE (OUTPATIENT)
Dept: ORTHOPEDICS | Facility: CLINIC | Age: 59
End: 2019-10-28

## 2019-10-28 ENCOUNTER — OFFICE VISIT (OUTPATIENT)
Dept: FAMILY MEDICINE | Facility: CLINIC | Age: 59
End: 2019-10-28
Payer: COMMERCIAL

## 2019-10-28 VITALS
SYSTOLIC BLOOD PRESSURE: 130 MMHG | HEIGHT: 67 IN | BODY MASS INDEX: 31.8 KG/M2 | HEART RATE: 89 BPM | OXYGEN SATURATION: 97 % | WEIGHT: 202.63 LBS | DIASTOLIC BLOOD PRESSURE: 80 MMHG | TEMPERATURE: 98 F

## 2019-10-28 DIAGNOSIS — C50.112 MALIGNANT NEOPLASM OF CENTRAL PORTION OF LEFT BREAST IN FEMALE, ESTROGEN RECEPTOR POSITIVE: ICD-10-CM

## 2019-10-28 DIAGNOSIS — F43.0 STRESS REACTION: ICD-10-CM

## 2019-10-28 DIAGNOSIS — Z17.0 MALIGNANT NEOPLASM OF CENTRAL PORTION OF LEFT BREAST IN FEMALE, ESTROGEN RECEPTOR POSITIVE: ICD-10-CM

## 2019-10-28 DIAGNOSIS — M25.562 LEFT KNEE PAIN, UNSPECIFIED CHRONICITY: Primary | ICD-10-CM

## 2019-10-28 DIAGNOSIS — G47.9 SLEEP DISTURBANCE: ICD-10-CM

## 2019-10-28 DIAGNOSIS — D05.12 DUCTAL CARCINOMA IN SITU (DCIS) OF LEFT BREAST: ICD-10-CM

## 2019-10-28 DIAGNOSIS — I89.0 LYMPH EDEMA: ICD-10-CM

## 2019-10-28 DIAGNOSIS — F32.9 REACTIVE DEPRESSION: Primary | ICD-10-CM

## 2019-10-28 LAB — BACTERIA SPEC ANAEROBE CULT: NORMAL

## 2019-10-28 PROCEDURE — 99999 PR PBB SHADOW E&M-EST. PATIENT-LVL IV: ICD-10-PCS | Mod: PBBFAC,,, | Performed by: PHYSICIAN ASSISTANT

## 2019-10-28 PROCEDURE — 99214 PR OFFICE/OUTPT VISIT, EST, LEVL IV, 30-39 MIN: ICD-10-PCS | Mod: S$GLB,,, | Performed by: PHYSICIAN ASSISTANT

## 2019-10-28 PROCEDURE — 99214 OFFICE O/P EST MOD 30 MIN: CPT | Mod: S$GLB,,, | Performed by: PHYSICIAN ASSISTANT

## 2019-10-28 PROCEDURE — 99999 PR PBB SHADOW E&M-EST. PATIENT-LVL IV: CPT | Mod: PBBFAC,,, | Performed by: PHYSICIAN ASSISTANT

## 2019-10-28 RX ORDER — SERTRALINE HYDROCHLORIDE 25 MG/1
25 TABLET, FILM COATED ORAL DAILY
Qty: 30 TABLET | Refills: 11 | Status: SHIPPED | OUTPATIENT
Start: 2019-10-28 | End: 2020-09-10 | Stop reason: SDUPTHER

## 2019-10-28 NOTE — TELEPHONE ENCOUNTER
----- Message from Shira Krishna MA sent at 10/28/2019 10:15 AM CDT -----  Contact: pt   Wants test ordered, would not tell me which test   Wants to speak to DR about her pain   Call back  [phone]

## 2019-10-28 NOTE — TELEPHONE ENCOUNTER
Pt states still having sharp pain in knee even with brace. Having pain with any weightbearing. Asking to schedule mri or CT to see why knee is hurting so bad. Please advise.

## 2019-10-28 NOTE — PROGRESS NOTES
Subjective:       Patient ID: Caryn Toledo is a 59 y.o. female.    Chief Complaint: Follow-up    HPI   Several recent cancers  Has depression  Sleep disturbance  Lymph edema L arm after breast cancer surgery  Surgery on L thigh  Has nutritional concerns  Review of Systems   Constitutional: Positive for activity change and fatigue. Negative for appetite change, chills, diaphoresis, fever and unexpected weight change.   HENT: Negative.    Eyes: Negative.    Respiratory: Negative.  Negative for cough and shortness of breath.    Cardiovascular: Negative.  Negative for chest pain and leg swelling.   Gastrointestinal: Negative.    Endocrine: Negative.    Genitourinary: Negative.    Skin: Negative.  Negative for rash.   Neurological: Negative.    Psychiatric/Behavioral: Positive for confusion, decreased concentration, dysphoric mood and sleep disturbance. Negative for agitation, behavioral problems, hallucinations, self-injury and suicidal ideas. The patient is nervous/anxious. The patient is not hyperactive.        Objective:      Physical Exam   Constitutional: She is oriented to person, place, and time. She appears well-developed and well-nourished. No distress.   HENT:   Head: Normocephalic and atraumatic.   Eyes: Conjunctivae are normal. No scleral icterus.   Neck: Normal range of motion. Neck supple. No tracheal deviation present. No thyromegaly present.   Musculoskeletal: She exhibits edema.   L arm edema   Lymphadenopathy:     She has no cervical adenopathy.   Neurological: She is alert and oriented to person, place, and time.   Skin: Skin is warm and dry. No rash noted.   Psychiatric: Her behavior is normal. Judgment and thought content normal.   Depressed mood   Tearful when talking about pt problems   Vitals reviewed.      Assessment:       1. Reactive depression    2. Stress reaction    3. Sleep disturbance    4. Lymph edema    5. Ductal carcinoma in situ (DCIS) of left breast    6. Malignant neoplasm  of central portion of left breast in female, estrogen receptor positive        Plan:       Caryn was seen today for follow-up.    Diagnoses and all orders for this visit:    Reactive depression  -     Ambulatory consult to Psychiatry  -     sertraline (ZOLOFT) 25 MG tablet; Take 1 tablet (25 mg total) by mouth once daily.    Stress reaction  -     Ambulatory consult to Psychiatry  -     sertraline (ZOLOFT) 25 MG tablet; Take 1 tablet (25 mg total) by mouth once daily.    Sleep disturbance  -     Ambulatory consult to Psychiatry  -     sertraline (ZOLOFT) 25 MG tablet; Take 1 tablet (25 mg total) by mouth once daily.    Lymph edema  -     Ambulatory referral to Hematology / Oncology    Ductal carcinoma in situ (DCIS) of left breast  -     Ambulatory consult to Psychiatry  -     Ambulatory referral to Hematology / Oncology    Malignant neoplasm of central portion of left breast in female, estrogen receptor positive  -     Ambulatory referral to Hematology / Oncology    50 mins spent with Pt

## 2019-10-29 ENCOUNTER — TELEPHONE (OUTPATIENT)
Dept: HEMATOLOGY/ONCOLOGY | Facility: CLINIC | Age: 59
End: 2019-10-29

## 2019-10-29 ENCOUNTER — HOSPITAL ENCOUNTER (OUTPATIENT)
Dept: RADIOLOGY | Facility: HOSPITAL | Age: 59
Discharge: HOME | End: 2019-10-29
Attending: ORTHOPAEDIC SURGERY
Payer: COMMERCIAL

## 2019-10-29 DIAGNOSIS — M25.562 LEFT KNEE PAIN, UNSPECIFIED CHRONICITY: ICD-10-CM

## 2019-10-29 PROCEDURE — 73700 CT LOWER EXTREMITY W/O DYE: CPT | Mod: TC,LT

## 2019-10-29 PROCEDURE — 73700 CT LOWER EXTREMITY W/O DYE: CPT | Mod: 26,LT,, | Performed by: RADIOLOGY

## 2019-10-29 PROCEDURE — 73700 CT KNEE WITHOUT CONTRAST LEFT: ICD-10-PCS | Mod: 26,LT,, | Performed by: RADIOLOGY

## 2019-10-29 NOTE — TELEPHONE ENCOUNTER
----- Message from Keli Vallejo sent at 10/29/2019  3:21 PM CDT -----  Contact: pt  ..Type:  Patient Returning Call    Who Called:  pt  Who Left Message for Patient:  Margaret  Does the patient know what this is regarding?:  schedule appt  Best Call Back Number:    Additional Information:  Pt returned a missed call  Please advise  Thanks

## 2019-10-31 ENCOUNTER — OFFICE VISIT (OUTPATIENT)
Dept: ORTHOPEDICS | Facility: CLINIC | Age: 59
End: 2019-10-31
Payer: COMMERCIAL

## 2019-10-31 VITALS — WEIGHT: 202 LBS | RESPIRATION RATE: 18 BRPM | BODY MASS INDEX: 31.71 KG/M2 | HEIGHT: 67 IN

## 2019-10-31 DIAGNOSIS — M25.562 LEFT KNEE PAIN, UNSPECIFIED CHRONICITY: Primary | ICD-10-CM

## 2019-10-31 DIAGNOSIS — M25.462 EFFUSION OF LEFT KNEE JOINT: Primary | ICD-10-CM

## 2019-10-31 PROCEDURE — 99999 PR PBB SHADOW E&M-EST. PATIENT-LVL III: ICD-10-PCS | Mod: PBBFAC,,, | Performed by: ORTHOPAEDIC SURGERY

## 2019-10-31 PROCEDURE — 99213 PR OFFICE/OUTPT VISIT, EST, LEVL III, 20-29 MIN: ICD-10-PCS | Mod: S$GLB,,, | Performed by: ORTHOPAEDIC SURGERY

## 2019-10-31 PROCEDURE — 99999 PR PBB SHADOW E&M-EST. PATIENT-LVL III: CPT | Mod: PBBFAC,,, | Performed by: ORTHOPAEDIC SURGERY

## 2019-10-31 PROCEDURE — 99213 OFFICE O/P EST LOW 20 MIN: CPT | Mod: S$GLB,,, | Performed by: ORTHOPAEDIC SURGERY

## 2019-10-31 NOTE — PROGRESS NOTES
Past Medical History:   Diagnosis Date    Breast cancer     left    Cancer     breast; skin    High cholesterol     PONV (postoperative nausea and vomiting)        Past Surgical History:   Procedure Laterality Date    APPENDECTOMY      AXILLARY NODE DISSECTION Left 3/14/2019    Procedure: LYMPHADENECTOMY, AXILLARY;  Surgeon: Didier Garcia MD;  Location: [facility] OR;  Service: General;  Laterality: Left;    BREAST SURGERY Left 02/11/2019    lumpectomy    JOINT REPLACEMENT Left 10/30/2018    knee    KNEE ARTHROPLASTY Left 10/30/2018    Procedure: ARTHROPLASTY, KNEE;  Surgeon: Ata Paula MD;  Location: [facility] OR;  Service: Orthopedics;  Laterality: Left;    KNEE ARTHROSCOPY W/ MENISCECTOMY Right 10/30/2018    Procedure: ARTHROSCOPY, KNEE, WITH MENISCECTOMY;  Surgeon: Ata Paula MD;  Location: [facility] OR;  Service: Orthopedics;  Laterality: Right;    KNEE SURGERY Left 1980s    SENTINEL LYMPH NODE BIOPSY Left 3/14/2019    Procedure: BIOPSY, LYMPH NODE, SENTINEL;  Surgeon: Didier Garcia MD;  Location: [facility] OR;  Service: General;  Laterality: Left;  left axillary sentinel node biopsy, possible axillary dissection       Current Outpatient Medications   Medication Sig    anastrozole (ARIMIDEX) 1 mg Tab     clindamycin (CLEOCIN) 300 MG capsule Take 1 capsule (300 mg total) by mouth every 6 (six) hours.    denosumab (PROLIA) 60 mg/mL Syrg Inject 60 mg into the skin.    multivitamin capsule Take 1 capsule by mouth once daily.    sertraline (ZOLOFT) 25 MG tablet Take 1 tablet (25 mg total) by mouth once daily.     No current facility-administered medications for this visit.        Review of patient's allergies indicates:   Allergen Reactions    Ciprofloxacin Anaphylaxis     paralysis    Pcn [penicillins] Anaphylaxis     paralysis    Eggplant Blisters    Eggs [egg derived]      Inside of mouth peel      Tomato (solanum lycopersicum) Blisters       Family History   Problem  Relation Age of Onset    Breast cancer Mother        Social History     Socioeconomic History    Marital status: Single     Spouse name: Not on file    Number of children: Not on file    Years of education: Not on file    Highest education level: Not on file   Occupational History    Not on file   Social Needs    Financial resource strain: Not on file    Food insecurity:     Worry: Not on file     Inability: Not on file    Transportation needs:     Medical: Not on file     Non-medical: Not on file   Tobacco Use    Smoking status: Never Smoker    Smokeless tobacco: Never Used   Substance and Sexual Activity    Alcohol use: Yes     Comment: occasionally    Drug use: No    Sexual activity: Not on file   Lifestyle    Physical activity:     Days per week: Not on file     Minutes per session: Not on file    Stress: Not on file   Relationships    Social connections:     Talks on phone: Not on file     Gets together: Not on file     Attends Judaism service: Not on file     Active member of club or organization: Not on file     Attends meetings of clubs or organizations: Not on file     Relationship status: Not on file   Other Topics Concern    Not on file   Social History Narrative    Not on file       Chief Complaint:   Chief Complaint   Patient presents with    Knee Pain     L knee CT results        Date of surgery:  October 30, 2018 left total knee arthroplasty and right partial medial meniscectomy    History of present illness:  59-year-old female s/p left total knee arthroplasty and right knee arthroscopy.  Patient did have a history of an open knee ligament reconstruction done of the left knee by Dr. Covarrubias back in 1981.  Patient was doing great until about a week ago.  Started to have severe pain along the lateral aspect of her left total knee.  Was hard to put weight on.  It would only last a few seconds and then would go away.  Would have no pain at the time. Cannot straighten it at this  time.  Has a small effusion now.  Pain is 0/10 at rest up to a 10/10 with activity.  Knee aspirate an infection labs were all normal. CT scan showed a small effusion but no obvious loosening of the components.    Answers for HPI/ROS submitted by the patient on 10/29/2019   Leg pain  unexpected weight change: No  appetite change : No  sleep disturbance: No  IMMUNOCOMPROMISED: No  nervous/ anxious: No  dysphoric mood: No  rash: No  visual disturbance: No  eye redness: No  eye pain: No  ear pain: No  tinnitus: No  hearing loss: No  sinus pressure : No  nosebleeds: No  enviro allergies: No  food allergies: No  cough: No  shortness of breath: No  sweating: No  dysuria: No  frequency: No  difficulty urinating: No  hematuria: No  painful intercourse: No  chest pain: No  palpitations: No  nausea: No  vomiting: No  diarrhea: No  blood in stool: No  constipation: No  headaches: No  dizziness: No  numbness: No  seizures: No  joint swelling: No  myalgia: No  weakness: No  back pain: No  Pain Chronicity: recurrent  History of trauma: Yes  Onset: 1 to 4 weeks ago  Frequency: intermittently  Progression since onset: gradually worsening  injury location: at home  pain- numeric: 10/10  pain location: left knee  pain quality: sharp  Radiating Pain: No  Aggravating factors: walking  fever: No  inability to bear weight: Yes  itching: No  joint locking: No  limited range of motion: No  stiffness: No  tingling: No  Treatments tried: brace/corset, cold, OTC pain meds, rest  physical therapy: not tried  Improvement on treatment: no relief      Physical Examination:    Vital Signs:    Vitals:    10/31/19 1358   Resp: 18       Body mass index is 31.64 kg/m².    This a well-developed, well nourished patient in no acute distress.  They are alert and oriented and cooperative to examination.  Pt. walks without an antalgic gait.       Examination of left knee shows a moderate joint effusion.  Incisions are well healed.  There is no erythema or  redness.  No drainage. Patient has full range of motion from 0-130 degrees but has some hesitation with full extension.  Knee is stable with varus and valgus stress.  Some laxity with anterior drawer exam.  No signs of mid flexion instability.  Some tenderness over the IT band and lateral knee  .  X-rays:  Four views of both knees are reviewed which show well-aligned left total knee arthroplasty. Moderate effusion.  Mild lateral patellar tracking.    CT scan of the left knee:Status post left knee joint replacement with metallic femoral and tibial components in good position.  Lucency around the prosthesis consistent with loosening or osteomyelitis is not seen.  A fracture is not noted.  There is a moderate size joint effusion demonstrated.    Assessment::  Status post left total knee arthroplasty  New left knee pain with effusion.  Concern for possible aseptic loosening or loose body within the knee    Plan:  Reviewed the findings with her today.  The concern is for possible aseptic loosening particularly the tibia. The only other issue might be if she has a loose body such as broken off cement or some scar tissue that is now causing mechanical symptoms. Will call and notify her with the results of the bone scan that we will get to assess for loosening.        This note was created using [employer] voice recognition software that occasionally misinterpreted phrases or words.

## 2019-11-04 ENCOUNTER — INITIAL CONSULT (OUTPATIENT)
Dept: HEMATOLOGY/ONCOLOGY | Facility: CLINIC | Age: 59
End: 2019-11-04
Payer: COMMERCIAL

## 2019-11-04 ENCOUNTER — HOSPITAL ENCOUNTER (OUTPATIENT)
Dept: RADIOLOGY | Facility: HOSPITAL | Age: 59
Discharge: HOME | End: 2019-11-04
Attending: ORTHOPAEDIC SURGERY
Payer: COMMERCIAL

## 2019-11-04 ENCOUNTER — DOCUMENTATION ONLY (OUTPATIENT)
Dept: HEMATOLOGY/ONCOLOGY | Facility: CLINIC | Age: 59
End: 2019-11-04

## 2019-11-04 VITALS
SYSTOLIC BLOOD PRESSURE: 126 MMHG | TEMPERATURE: 99 F | DIASTOLIC BLOOD PRESSURE: 75 MMHG | HEIGHT: 67 IN | RESPIRATION RATE: 20 BRPM | OXYGEN SATURATION: 96 % | WEIGHT: 202.38 LBS | BODY MASS INDEX: 31.76 KG/M2 | HEART RATE: 70 BPM

## 2019-11-04 DIAGNOSIS — Z85.831 HISTORY OF SARCOMA OF SOFT TISSUE: ICD-10-CM

## 2019-11-04 DIAGNOSIS — M25.562 LEFT KNEE PAIN, UNSPECIFIED CHRONICITY: ICD-10-CM

## 2019-11-04 DIAGNOSIS — Z17.0 MALIGNANT NEOPLASM OF CENTRAL PORTION OF LEFT BREAST IN FEMALE, ESTROGEN RECEPTOR POSITIVE: Primary | ICD-10-CM

## 2019-11-04 DIAGNOSIS — C50.112 MALIGNANT NEOPLASM OF CENTRAL PORTION OF LEFT BREAST IN FEMALE, ESTROGEN RECEPTOR POSITIVE: Primary | ICD-10-CM

## 2019-11-04 DIAGNOSIS — I89.0 LYMPHEDEMA: ICD-10-CM

## 2019-11-04 DIAGNOSIS — Z96.652 STATUS POST TOTAL KNEE REPLACEMENT USING CEMENT, LEFT: ICD-10-CM

## 2019-11-04 PROCEDURE — A9503 TC99M MEDRONATE: HCPCS

## 2019-11-04 PROCEDURE — 99204 OFFICE O/P NEW MOD 45 MIN: CPT | Mod: S$GLB,,, | Performed by: INTERNAL MEDICINE

## 2019-11-04 PROCEDURE — 99999 PR PBB SHADOW E&M-EST. PATIENT-LVL III: ICD-10-PCS | Mod: PBBFAC,,, | Performed by: INTERNAL MEDICINE

## 2019-11-04 PROCEDURE — 99204 PR OFFICE/OUTPT VISIT, NEW, LEVL IV, 45-59 MIN: ICD-10-PCS | Mod: S$GLB,,, | Performed by: INTERNAL MEDICINE

## 2019-11-04 PROCEDURE — 99999 PR PBB SHADOW E&M-EST. PATIENT-LVL III: CPT | Mod: PBBFAC,,, | Performed by: INTERNAL MEDICINE

## 2019-11-04 PROCEDURE — 78306 BONE IMAGING WHOLE BODY: CPT | Mod: 26,,, | Performed by: RADIOLOGY

## 2019-11-04 PROCEDURE — 78306 NM BONE SCAN WHOLE BODY: ICD-10-PCS | Mod: 26,,, | Performed by: RADIOLOGY

## 2019-11-04 NOTE — LETTER
November 4, 2019      Damon Shelley PA-C  [address]  [place] [id number]           [facility]  [address] [address]  Phone: [phone]          Patient: Caryn Toledo   MR Number: [id number]   YOB: 1960   Date of Visit: 11/4/2019       Dear Damon Shelley:    Thank you for referring Caryn Toledo to me for evaluation. Attached you will find relevant portions of my assessment and plan of care.    If you have questions, please do not hesitate to call me. I look forward to following Caryn Toledo along with you.    Sincerely,    Anca Hansen MD    Enclosure  CC:  No Recipients    If you would like to receive this communication electronically, please contact [email or web address] or [phone] to request more information on [employer] access.    For providers and/or their staff who would like to refer a patient to [facility], please contact us through our one-stop-shop provider referral line, [facility], at [phone].    If you feel you have received this communication in error or would no longer like to receive these types of communications, please e-mail [email or web address]

## 2019-11-04 NOTE — PROGRESS NOTES
Subjective:       Patient ID: Caryn Toledo is a 59 y.o. female.    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  She does have significant family history however.  Her mother developed breast cancer in her 30s and she has 2 maternal aunts who developed breast cancer at young ages.  Father had multiple myeloma.  Age of menarche was 13.  She had menopause at age 35.  She has never taken hormone replacement therapy or oral contraceptives.  She has had 3 pregnancies the 1st child born when she was 21 years old.  This was found on a screening test. She underwent partial mastectomy and biopsy revealed invasive carcinoma and DCIS sentinel lymph node was negative for malignancy with clear margins.  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  She completed radiation to the breast started  Arimidex.  Had resection and radiation to the soft tissue sarcoma and has ongoing follow-up with Prolia. In [place]. Patient is from [place] has had issues with lymphedema ever since and would like a lymphedema clinic appointment. Her arthroplasty has been recent issue in the last several weeks she had a bone scan done today shows loosening.  Patient is concerned the Arimidex may be causing it as she has ongoing issues with the use of Arimidex with mood swings. And bone pain. Recently given Zoloft but has not started this       Oncologic History     Oncologic Treatment     Pathology1. Left breast, partial mastectomy:  - Invasive mucinous carcinoma, Cecilia grade 1, (T=2, N=1, M=1), 3mm in longest linear dimension  - Ductal carcinoma in-situ (DCIS), solid and  mucinous types, low grade, 9mm in longest linear dimension  - Margins negative for carcinoma and negative for DCIS  - No lymphovascular invasion identified  - Fibrocystic changes present  2. Left breast additional superior margin, excision:  - Benign breast tissue with fibrocystic changes  - Negative for atypia or malignancy  3. Left breast additional anterior margin, excision:  - Benign breast tissue with fibrocystic changes  - Negative for atypia or malignancy  4. Left breast additional deep margin, excision:  - BenignTumor Size: Greatest dimension of largest invasive focus: 3mm  Additional dimensions: 2 x 2 mm  Histologic Type: Mucinous carcinoma  Histologic Grade: (Kinston histologic score):  Glandular (Acinar)/Tubular differentiation: Score 2 (10% to 75%) of tumor area forming glandular/tubular  structures)  Nuclear Pleomorphism: Score 1 (nuclei small with little increase in size in comparison with normal breast  epithelial cells, regular outlines, uniform nuclear chromatin, little variation in size  Mitotic Rate: Score 1 (< or = 3 mitoses per mm^2)  Overall Grade: Grade 1 (scores of 3, 4, or 5)  Tumor Focality: Unifocal  Ductal carcinoma in-situ (DCIS): Present      HPI:     Social History     Socioeconomic History    Marital status: Single     Spouse name: Not on file    Number of children: Not on file    Years of education: Not on file    Highest education level: Not on file   Occupational History    Not on file   Social Needs    Financial resource strain: Not on file    Food insecurity:     Worry: Not on file     Inability: Not on file    Transportation needs:     Medical: Not on file     Non-medical: Not on file   Tobacco Use    Smoking status: Never Smoker    Smokeless tobacco: Never Used   Substance and Sexual Activity    Alcohol use: Yes     Comment: occasionally    Drug use: No    Sexual activity: Not on file   Lifestyle    Physical activity:     Days per week: Not on file     Minutes  per session: Not on file    Stress: Not on file   Relationships    Social connections:     Talks on phone: Not on file     Gets together: Not on file     Attends Synagogue service: Not on file     Active member of club or organization: Not on file     Attends meetings of clubs or organizations: Not on file     Relationship status: Not on file   Other Topics Concern    Not on file   Social History Narrative    Not on file     Family History   Problem Relation Age of Onset    Breast cancer Mother      Past Surgical History:   Procedure Laterality Date    APPENDECTOMY      AXILLARY NODE DISSECTION Left 3/14/2019    Procedure: LYMPHADENECTOMY, AXILLARY;  Surgeon: Didier Garcia MD;  Location: [facility] OR;  Service: General;  Laterality: Left;    BREAST SURGERY Left 02/11/2019    lumpectomy    JOINT REPLACEMENT Left 10/30/2018    knee    KNEE ARTHROPLASTY Left 10/30/2018    Procedure: ARTHROPLASTY, KNEE;  Surgeon: Ata Paula MD;  Location: [facility] OR;  Service: Orthopedics;  Laterality: Left;    KNEE ARTHROSCOPY W/ MENISCECTOMY Right 10/30/2018    Procedure: ARTHROSCOPY, KNEE, WITH MENISCECTOMY;  Surgeon: Ata Paula MD;  Location: [facility] OR;  Service: Orthopedics;  Laterality: Right;    KNEE SURGERY Left 1980s    SENTINEL LYMPH NODE BIOPSY Left 3/14/2019    Procedure: BIOPSY, LYMPH NODE, SENTINEL;  Surgeon: Didier Garcia MD;  Location: [facility] OR;  Service: General;  Laterality: Left;  left axillary sentinel node biopsy, possible axillary dissection     Past Medical History:   Diagnosis Date    Breast cancer     left    Cancer     breast; skin    High cholesterol     PONV (postoperative nausea and vomiting)        Current Outpatient Medications:     anastrozole (ARIMIDEX) 1 mg Tab, , Disp: , Rfl: 3    denosumab (PROLIA) 60 mg/mL Syrg, Inject 60 mg into the skin., Disp: , Rfl:     multivitamin capsule, Take 1 capsule by mouth once daily., Disp: , Rfl:     clindamycin (CLEOCIN)  "300 MG capsule, Take 1 capsule (300 mg total) by mouth every 6 (six) hours., Disp: 3 capsule, Rfl: 0    sertraline (ZOLOFT) 25 MG tablet, Take 1 tablet (25 mg total) by mouth once daily. (Patient not taking: Reported on 11/4/2019), Disp: 30 tablet, Rfl: 11  Review of patient's allergies indicates:   Allergen Reactions    Ciprofloxacin Anaphylaxis     paralysis    Pcn [penicillins] Anaphylaxis     paralysis    Eggplant Blisters    Eggs [egg derived]      Inside of mouth peel      Hydrocodone Nausea Only     "causes a migraine"    Morphine Nausea Only     "causes a migraine"    Oxycodone Nausea Only     "causes a migraine"    Tomato (solanum lycopersicum) Blisters         REVIEW OF SYSTEMS:     CONSTITUTIONAL: The patient denies any weight change. There is no apparent    change in appetite, fever, night sweats, headaches,  asfatigue, dizziness, or    weakness.      SKIN: Denies rash, issues with nails, non-healing sores, bleeding, blotching    skin or abnormal bruising. Denies new moles or changes to existing moles.      BREASTS:  Complaints of boggy and lumpy areas to left breast complains of swelling and lymphedema to left which occasionally gets pain full    EYES: Denies eye pain, blurred vision, swelling, redness or discharge.      ENT AND MOUTH: Denies runny nose, stuffiness, sinus trouble or sores. Denies    nosebleeds. Denies, hoarseness, change in voice or swelling in front of the    neck.      CARDIOVASCULAR: Denies chest pain, discomfort or palpitations. Denies neck    swelling or episodes of passing out.      RESPIRATORY: Denies cough, sputum production, blood in sputum, and denies    shortness of breath.      GI: Denies trouble swallowing, indigestion, heartburn, abdominal pain, nausea,    vomiting, diarrhea, altered bowel habits, blood in stool, discoloration of    stools, change in nature of stool, bloating, increased abdominal girth.      GENITOURINARY: No discharge. No pelvic pain or lumps. No " rash around groin or  lesions. No urinary frequency, hesitation, painful urination or blood in    urine. Denies incontinence. No problems with intercourse.      MUSCULOSKELETAL:   Complains of left arm swelling bogginess to and fullness to left breast.Muscle aches and pains pain from arthroplasty site in the recent.  Mood swings    NEUROLOGICAL: Denies tingling, numbness, altered mentation changes to nerve    function in the face, weakness to one or both of the body. Denies changes to    gait and denies multiple falls or accidents.      PSYCHIATRIC:   Insomnia.  Mood swings     The patient denies recent foreign travel or recent exposure to chemicals or    products of concern or infectious diseases.     PHYSICAL EXAM:     Wt Readings from Last 3 Encounters:   11/04/19 91.8 kg (202 lb 6.1 oz)   10/31/19 91.6 kg (202 lb)   10/28/19 91.9 kg (202 lb 9.6 oz)     Temp Readings from Last 3 Encounters:   11/04/19 98.7 °F (37.1 °C)   10/28/19 98.1 °F (36.7 °C) (Oral)   03/14/19 97.5 °F (36.4 °C)     BP Readings from Last 3 Encounters:   11/04/19 126/75   10/28/19 130/80   10/21/19 (!) 141/66     Pulse Readings from Last 3 Encounters:   11/04/19 70   10/28/19 89   10/21/19 66     GENERAL: Comfortable looking patient. Patient is in no distress.  Awake, alert and oriented to time, person and place.  No anxiety, or agitation.      HEENT: Normal conjunctivae and eyelids. WNL.  PERRLA 3 to 4 mm. No icterus, no pallor, no congestion, and no discharge noted.     NECK:  Supple. Trachea is central.  No crepitus.  No JVD or masses.    RESPIRATORY:  No intercostal retractions.  No dullness to percussion.  Chest is clear to auscultation.  No rales, rhonchi or wheezes.  No crepitus.  Good air entry bilaterally.    CARDIOVASCULAR:  S1 and S2 are normally heard without murmurs or gallops.  All peripheral pulses are present.    ABDOMEN:  Normal abdomen.  No hepatosplenomegaly.  No free fluid.  Bowel sounds are present.  No hernia noted. No  masses.  No rebound or tenderness.  No guarding or rigidity.  Umbilicus is midline.    LYMPHATICS:  No axillary, cervical, supraclavicular, submental, or inguinal lymphadenopathy. Left arm slightly swollen left breast is boggy but no discrete masses are felthere  Are lumpy  Area around left breast bilateral nipples are retracted. Lumpectomy area is hardly visit some lymphedema around left arm and axilla is obvious.    SKIN/MUSCULOSKELETAL:  There is no evidence of excoriation marks or ecchmosis.  No rashes.  No cyanosis.  No clubbing.  No joint or skeletal deformities noted.  Normal range of motion.    NEUROLOGIC:  Higher functions are appropriate.  No cranial nerve deficits.  Normal alis.  Normal strength.  Motor and sensory functions are normal.  Deep tendon reflexes are normal.    GENITAL/RECTAL:  Exams are deferred.      Laboratory:     CBC:  Lab Results   Component Value Date    WBC 3.23 (L) 10/21/2019    RBC 4.38 10/21/2019    HGB 13.1 10/21/2019    HCT 41.2 10/21/2019    MCV 94 10/21/2019    MCH 29.9 10/21/2019    MCHC 31.8 (L) 10/21/2019    RDW 13.8 10/21/2019     10/21/2019    MPV 9.9 10/21/2019    GRAN 2.0 10/21/2019    GRAN 62.3 10/21/2019    LYMPH 0.9 (L) 10/21/2019    LYMPH 28.5 10/21/2019    MONO 0.3 10/21/2019    MONO 7.7 10/21/2019    EOS 0.0 10/21/2019    BASO 0.02 10/21/2019    EOSINOPHIL 0.6 10/21/2019    BASOPHIL 0.6 10/21/2019       BMP: BMP  Lab Results   Component Value Date     01/31/2019    K 4.2 01/31/2019     01/31/2019    CO2 25 01/31/2019    BUN 11 01/31/2019    CREATININE 0.9 01/31/2019    CALCIUM 9.9 01/31/2019    ANIONGAP 9 01/31/2019    ESTGFRAFRICA >60 01/31/2019    EGFRNONAA >60 01/31/2019       LFT:   Lab Results   Component Value Date    ALT 18 01/31/2019    AST 16 01/31/2019    ALKPHOS 84 01/31/2019    BILITOT 0.4 01/31/2019       Assessment/Plan:        invasive breast cancer.  Patient status post lumpectomy to left breast stage I.  Radiation. Currently on  Arimidex.  Patient concerned that the loosening of her arthroplasty may be related to Arimidex use. She will have to decide if she can continue using the Arimidex since she also has mood swings but Zoloft has not been started yet.  She has an appointment with [facility] for Prolia. Her appointment is in November.  I am not sure that they would give her Prolia if and revision is performed on  Her leg. I would recommend delay in for at least 3 months.   patient has a soft tissue sarcoma follow-up at [facility] as well.   patient could be switched off of Arimidex to Aromasin he for side effects are not alleviated by use of Zoloft   and if she feels that the arthroplasty issue is coming from Arimidex. This would be her decision   refer to lymphedema Clinic per patient's request   she can return p.r.n.

## 2019-11-04 NOTE — PROGRESS NOTES
Pt was seen in clinic today and discharged per dr lanier. Informed pt that lymphedema clinic will be calling to schedule, I will f/u with the scheduling also.

## 2019-11-05 ENCOUNTER — DOCUMENTATION ONLY (OUTPATIENT)
Dept: HEMATOLOGY/ONCOLOGY | Facility: CLINIC | Age: 59
End: 2019-11-05

## 2019-11-05 NOTE — PROGRESS NOTES
Medical Nutrition Therapy Oncology Progress Note      Patient's PCP:Terrance Nolen MD  Referring Provider: No ref. provider found  Subjective:        Patient ID: Caryn Toledo is a 59 y.o. female.    Chief Complaint: RD consult for nutrition education    Past Medical History:   Diagnosis Date    Breast cancer     left    Cancer     breast; skin    High cholesterol     PONV (postoperative nausea and vomiting)        Past Surgical History:   Procedure Laterality Date    APPENDECTOMY      AXILLARY NODE DISSECTION Left 3/14/2019    Procedure: LYMPHADENECTOMY, AXILLARY;  Surgeon: Didier Garcia MD;  Location: [facility] OR;  Service: General;  Laterality: Left;    BREAST SURGERY Left 02/11/2019    lumpectomy    JOINT REPLACEMENT Left 10/30/2018    knee    KNEE ARTHROPLASTY Left 10/30/2018    Procedure: ARTHROPLASTY, KNEE;  Surgeon: Ata Paula MD;  Location: [facility] OR;  Service: Orthopedics;  Laterality: Left;    KNEE ARTHROSCOPY W/ MENISCECTOMY Right 10/30/2018    Procedure: ARTHROSCOPY, KNEE, WITH MENISCECTOMY;  Surgeon: Ata Paula MD;  Location: [facility] OR;  Service: Orthopedics;  Laterality: Right;    KNEE SURGERY Left 1980s    SENTINEL LYMPH NODE BIOPSY Left 3/14/2019    Procedure: BIOPSY, LYMPH NODE, SENTINEL;  Surgeon: Didier Garcia MD;  Location: [facility] OR;  Service: General;  Laterality: Left;  left axillary sentinel node biopsy, possible axillary dissection       Social History     Socioeconomic History    Marital status: Single     Spouse name: Not on file    Number of children: Not on file    Years of education: Not on file    Highest education level: Not on file   Occupational History    Not on file   Social Needs    Financial resource strain: Not on file    Food insecurity:     Worry: Not on file     Inability: Not on file    Transportation needs:     Medical: Not on file     Non-medical: Not on file   Tobacco Use    Smoking  "status: Never Smoker    Smokeless tobacco: Never Used   Substance and Sexual Activity    Alcohol use: Yes     Comment: occasionally    Drug use: No    Sexual activity: Not on file   Lifestyle    Physical activity:     Days per week: Not on file     Minutes per session: Not on file    Stress: Not on file   Relationships    Social connections:     Talks on phone: Not on file     Gets together: Not on file     Attends Taoist service: Not on file     Active member of club or organization: Not on file     Attends meetings of clubs or organizations: Not on file     Relationship status: Not on file   Other Topics Concern    Not on file   Social History Narrative    Not on file       Family History   Problem Relation Age of Onset    Breast cancer Mother        Review of patient's allergies indicates:   Allergen Reactions    Ciprofloxacin Anaphylaxis     paralysis    Pcn [penicillins] Anaphylaxis     paralysis    Eggplant Blisters    Eggs [egg derived]      Inside of mouth peel      Hydrocodone Nausea Only     "causes a migraine"    Morphine Nausea Only     "causes a migraine"    Oxycodone Nausea Only     "causes a migraine"    Tomato (solanum lycopersicum) Blisters       Current Outpatient Medications:     anastrozole (ARIMIDEX) 1 mg Tab, , Disp: , Rfl: 3    clindamycin (CLEOCIN) 300 MG capsule, Take 1 capsule (300 mg total) by mouth every 6 (six) hours., Disp: 3 capsule, Rfl: 0    denosumab (PROLIA) 60 mg/mL Syrg, Inject 60 mg into the skin., Disp: , Rfl:     multivitamin capsule, Take 1 capsule by mouth once daily., Disp: , Rfl:     sertraline (ZOLOFT) 25 MG tablet, Take 1 tablet (25 mg total) by mouth once daily. (Patient not taking: Reported on 11/4/2019), Disp: 30 tablet, Rfl: 11    All medications and past history have been reviewed.    [No treatment plan]    Objective:        Wt Readings from Last 1 Encounters:   11/04/19 1434 91.8 kg (202 lb 6.1 oz)     CW: 202#  UBW: 160#  IBW: 140#  % " IBW: 144%  BMI: 31.6    Last Labs:  Glucose   Date Value Ref Range Status   01/31/2019 82 70 - 110 mg/dL Final   10/31/2018 111 (H) 70 - 110 mg/dL Final     BUN, Bld   Date Value Ref Range Status   01/31/2019 11 6 - 20 mg/dL Final   10/31/2018 9 6 - 20 mg/dL Final     Creatinine   Date Value Ref Range Status   01/31/2019 0.9 0.5 - 1.4 mg/dL Final   10/31/2018 0.8 0.5 - 1.4 mg/dL Final     Sodium   Date Value Ref Range Status   01/31/2019 141 136 - 145 mmol/L Final   10/31/2018 139 136 - 145 mmol/L Final     Potassium   Date Value Ref Range Status   01/31/2019 4.2 3.5 - 5.1 mmol/L Final   10/31/2018 3.9 3.5 - 5.1 mmol/L Final     No results found for: PHOS  Calcium   Date Value Ref Range Status   01/31/2019 9.9 8.7 - 10.5 mg/dL Final   10/31/2018 8.2 (L) 8.7 - 10.5 mg/dL Final     No results found for: PREALBUMIN  Total Protein   Date Value Ref Range Status   01/31/2019 7.5 6.0 - 8.4 g/dL Final   10/05/2018 6.9 6.0 - 8.4 g/dL Final     Cholesterol   Date Value Ref Range Status   10/05/2018 302 (H) 120 - 199 mg/dL Final     Comment:     The National Cholesterol Education Program (NCEP) has set the  following guidelines (reference ranges) for Cholesterol:  Optimal.....................<200 mg/dL  Borderline High.............200-239 mg/dL  High........................> or = 240 mg/dL       Hemoglobin A1C   Date Value Ref Range Status   10/05/2018 5.3 4.0 - 5.6 % Final     Comment:     ADA Screening Guidelines:  5.7-6.4%  Consistent with prediabetes  >or=6.5%  Consistent with diabetes  High levels of fetal hemoglobin interfere with the HbA1C  assay. Heterozygous hemoglobin variants (HbS, HgC, etc)do  not significantly interfere with this assay.   However, presence of multiple variants may affect accuracy.       Hemoglobin   Date Value Ref Range Status   10/21/2019 13.1 12.0 - 16.0 g/dL Final   01/31/2019 13.8 12.0 - 16.0 g/dL Final     Hematocrit   Date Value Ref Range Status   10/21/2019 41.2 37.0 - 48.5 % Final    01/31/2019 41.7 37.0 - 48.5 % Final     No results found for: IRON  No components found for: FROLATE  No results found for: [id number]  WBC   Date Value Ref Range Status   10/21/2019 3.23 (L) 3.90 - 12.70 K/uL Final   01/31/2019 6.60 3.90 - 12.70 K/uL Final       Assessment:     Nutrition/Diet History     Patient Reported Diet/Restrictions/Preferences: regular diet  Food Allergies: eggplant, egg, tomato  Factors Affecting Nutritional Intake: none    Estimated/Assessed Needs     Weight Used For Calorie Calculations: 91.8 kg (202 lb)  Energy Calorie Requirements (kcal): 4595-1150 kcal/day   Energy Need Method: 20-25 Kcal/kg  Protein Requirements: 75 g/day   Protein Need Method: 0.8 g/kg  Fluid Requirements: 2000 ml/day  Estimated Fluid Requirement Method: 1ml/kcal      Nutrition Support  N/A    Evaluation of Received Nutrient/Fluid Intake     Calorie Intake:  meeting needs  Protein Intake: meeting needs  Fluid Intake: meeting needs  Tolerance: tolerating  % Intake of Estimated Energy Needs: 100 %      Nutrition Diagnosis Related to (Etiology) As Evidenced By (Signs/Symptoms)   Obese, Class I Excessive caloric intake BMI of 31.6       RD Notes  Ms. Toledo requested to meet with an RD for nutrition education & counseling re: unwanted wt gain over the past 18 months. She is a pt of Dr. Hansen's with a hx of breast cancer- currently taking oral chemotherapy Arimidex. Pt has been referred to lymphedema specialist for affected area in left arm. She is also anticipating total knee replacement in the near future- physical activity is currently limited.   Pt provided 24-hr food recall, which illustrated a high-fat diet & unhealthy eating patterns in regards to timing of meals- some days only eating 1 meal/day or eating very late at night. Pt inquired about appropriate diet to promote wt loss. Pt also asked about anti-inflammatory diet for lymphedema.     Nutrition Intervention:      Nutrition Intervention Nutrition  Education  Nutirtion relationship to health/disease   Goals/Expected Outcomes Pt to consume plant-based, anti-inflammatory diet as recommended to promote gradual wt loss.    Progress Initial       Plan  1. Educated on plant-based & anti-inflammatory diet. Increase consumption of whole, unprocessed foods.   2. Recommend consuming more small, frequent meals 5-7x/day to prevent binging at meal time.  3. Encouraged pt to keep 3-day food journal to allow for RD to evaluate current eating patterns.   4. Provided RD contact info. Encouraged pt to call with any questions/concerns.     Monitoring/Evaluation:     Monitor: weight, food journal    Next Visit: Will f/u in 2 weeks.       I have explained and the patient understands all of  the current recommendation(s). I have answered all of their questions to the best of my ability and to their complete satisfaction.   The patient is to continue with the current management plan.    Electronically signed by: Prema Zaldivar MS, RDN, LDN

## 2019-11-07 ENCOUNTER — OFFICE VISIT (OUTPATIENT)
Dept: ORTHOPEDICS | Facility: CLINIC | Age: 59
End: 2019-11-07
Payer: COMMERCIAL

## 2019-11-07 VITALS
BODY MASS INDEX: 31.71 KG/M2 | HEART RATE: 72 BPM | WEIGHT: 202 LBS | SYSTOLIC BLOOD PRESSURE: 135 MMHG | DIASTOLIC BLOOD PRESSURE: 74 MMHG | HEIGHT: 67 IN

## 2019-11-07 VITALS
SYSTOLIC BLOOD PRESSURE: 135 MMHG | HEART RATE: 72 BPM | HEIGHT: 67 IN | DIASTOLIC BLOOD PRESSURE: 74 MMHG | BODY MASS INDEX: 31.71 KG/M2 | WEIGHT: 202 LBS

## 2019-11-07 DIAGNOSIS — Z96.659 ASEPTIC LOOSENING OF PROSTHETIC KNEE, SUBSEQUENT ENCOUNTER: Primary | ICD-10-CM

## 2019-11-07 DIAGNOSIS — T84.038D ASEPTIC LOOSENING OF PROSTHETIC KNEE, SUBSEQUENT ENCOUNTER: Primary | ICD-10-CM

## 2019-11-07 DIAGNOSIS — T84.033A MECHANICAL LOOSENING OF INTERNAL LEFT KNEE PROSTHETIC JOINT, INITIAL ENCOUNTER: Primary | ICD-10-CM

## 2019-11-07 PROCEDURE — 99213 OFFICE O/P EST LOW 20 MIN: CPT | Mod: S$GLB,,, | Performed by: ORTHOPAEDIC SURGERY

## 2019-11-07 PROCEDURE — 99999 PR PBB SHADOW E&M-EST. PATIENT-LVL III: ICD-10-PCS | Mod: PBBFAC,,, | Performed by: ORTHOPAEDIC SURGERY

## 2019-11-07 PROCEDURE — 99999 PR PBB SHADOW E&M-EST. PATIENT-LVL III: CPT | Mod: PBBFAC,,, | Performed by: ORTHOPAEDIC SURGERY

## 2019-11-07 PROCEDURE — 99213 PR OFFICE/OUTPT VISIT, EST, LEVL III, 20-29 MIN: ICD-10-PCS | Mod: S$GLB,,, | Performed by: ORTHOPAEDIC SURGERY

## 2019-11-07 PROCEDURE — 99214 OFFICE O/P EST MOD 30 MIN: CPT | Mod: S$GLB,,, | Performed by: ORTHOPAEDIC SURGERY

## 2019-11-07 PROCEDURE — 99214 PR OFFICE/OUTPT VISIT, EST, LEVL IV, 30-39 MIN: ICD-10-PCS | Mod: S$GLB,,, | Performed by: ORTHOPAEDIC SURGERY

## 2019-11-07 NOTE — PROGRESS NOTES
Past Medical History:   Diagnosis Date    Breast cancer     left    Cancer     breast; skin    High cholesterol     PONV (postoperative nausea and vomiting)        Past Surgical History:   Procedure Laterality Date    APPENDECTOMY      AXILLARY NODE DISSECTION Left 3/14/2019    Procedure: LYMPHADENECTOMY, AXILLARY;  Surgeon: Didier Garcia MD;  Location: [facility] OR;  Service: General;  Laterality: Left;    BREAST SURGERY Left 02/11/2019    lumpectomy    JOINT REPLACEMENT Left 10/30/2018    knee    KNEE ARTHROPLASTY Left 10/30/2018    Procedure: ARTHROPLASTY, KNEE;  Surgeon: Ata Paula MD;  Location: [facility] OR;  Service: Orthopedics;  Laterality: Left;    KNEE ARTHROSCOPY W/ MENISCECTOMY Right 10/30/2018    Procedure: ARTHROSCOPY, KNEE, WITH MENISCECTOMY;  Surgeon: Ata Paula MD;  Location: [facility] OR;  Service: Orthopedics;  Laterality: Right;    KNEE SURGERY Left 1980s    SENTINEL LYMPH NODE BIOPSY Left 3/14/2019    Procedure: BIOPSY, LYMPH NODE, SENTINEL;  Surgeon: Didier Garcia MD;  Location: [facility] OR;  Service: General;  Laterality: Left;  left axillary sentinel node biopsy, possible axillary dissection       Current Outpatient Medications   Medication Sig    anastrozole (ARIMIDEX) 1 mg Tab     clindamycin (CLEOCIN) 300 MG capsule Take 1 capsule (300 mg total) by mouth every 6 (six) hours.    denosumab (PROLIA) 60 mg/mL Syrg Inject 60 mg into the skin.    multivitamin capsule Take 1 capsule by mouth once daily.    sertraline (ZOLOFT) 25 MG tablet Take 1 tablet (25 mg total) by mouth once daily.     No current facility-administered medications for this visit.        Review of patient's allergies indicates:   Allergen Reactions    Ciprofloxacin Anaphylaxis     paralysis    Pcn [penicillins] Anaphylaxis     paralysis    Eggplant Blisters    Eggs [egg derived]      Inside of mouth peel      Tomato (solanum lycopersicum) Blisters       Family History   Problem  Relation Age of Onset    Breast cancer Mother        Social History     Socioeconomic History    Marital status: Single     Spouse name: Not on file    Number of children: Not on file    Years of education: Not on file    Highest education level: Not on file   Occupational History    Not on file   Social Needs    Financial resource strain: Not on file    Food insecurity:     Worry: Not on file     Inability: Not on file    Transportation needs:     Medical: Not on file     Non-medical: Not on file   Tobacco Use    Smoking status: Never Smoker    Smokeless tobacco: Never Used   Substance and Sexual Activity    Alcohol use: Yes     Comment: occasionally    Drug use: No    Sexual activity: Not on file   Lifestyle    Physical activity:     Days per week: Not on file     Minutes per session: Not on file    Stress: Not on file   Relationships    Social connections:     Talks on phone: Not on file     Gets together: Not on file     Attends Sikhism service: Not on file     Active member of club or organization: Not on file     Attends meetings of clubs or organizations: Not on file     Relationship status: Not on file   Other Topics Concern    Not on file   Social History Narrative    Not on file       Chief Complaint:   Chief Complaint   Patient presents with    Knee Pain     left knee-bone scan results       Date of surgery:  October 30, 2018 left total knee arthroplasty and right partial medial meniscectomy    History of present illness:  59-year-old female s/p left total knee arthroplasty and right knee arthroscopy.  Patient did have a history of an open knee ligament reconstruction done of the left knee by Dr. Covarrubias back in 1981.  Patient was doing great until about a week ago.  Started to have severe pain along the lateral aspect of her left total knee.  Was hard to put weight on.  It would only last a few seconds and then would go away.  Would have no pain at the time. Cannot straighten it at  this time.  Has a small effusion now.  Pain is 0/10 at rest up to a 10/10 with activity.  Knee aspirate and infection labs were all normal. CT scan showed a small effusion but no obvious loosening of the components.  Bone scan was suspicious for possible loosening unfortunately    Answers for HPI/ROS submitted by the patient on 10/29/2019   Leg pain  unexpected weight change: No  appetite change : No  sleep disturbance: No  IMMUNOCOMPROMISED: No  nervous/ anxious: No  dysphoric mood: No  rash: No  visual disturbance: No  eye redness: No  eye pain: No  ear pain: No  tinnitus: No  hearing loss: No  sinus pressure : No  nosebleeds: No  enviro allergies: No  food allergies: No  cough: No  shortness of breath: No  sweating: No  dysuria: No  frequency: No  difficulty urinating: No  hematuria: No  painful intercourse: No  chest pain: No  palpitations: No  nausea: No  vomiting: No  diarrhea: No  blood in stool: No  constipation: No  headaches: No  dizziness: No  numbness: No  seizures: No  joint swelling: No  myalgia: No  weakness: No  back pain: No  Pain Chronicity: recurrent  History of trauma: Yes  Onset: 1 to 4 weeks ago  Frequency: intermittently  Progression since onset: gradually worsening  injury location: at home  pain- numeric: 10/10  pain location: left knee  pain quality: sharp  Radiating Pain: No  Aggravating factors: walking  fever: No  inability to bear weight: Yes  itching: No  joint locking: No  limited range of motion: No  stiffness: No  tingling: No  Treatments tried: brace/corset, cold, OTC pain meds, rest  physical therapy: not tried  Improvement on treatment: no relief      Physical Examination:    Vital Signs:    Vitals:    11/07/19 0841   BP: 135/74   Pulse: 72       Body mass index is 31.64 kg/m².    This a well-developed, well nourished patient in no acute distress.  They are alert and oriented and cooperative to examination.  Pt. walks without an antalgic gait.       Examination of left knee shows  a moderate joint effusion.  Incisions are well healed.  There is no erythema or redness.  No drainage. Patient has full range of motion from 0-130 degrees but has some hesitation with full extension.  Knee is stable with varus and valgus stress.  Some laxity with anterior drawer exam.  No signs of mid flexion instability.  Some tenderness over the IT band and lateral knee  .  X-rays:  Four views of both knees are reviewed which show well-aligned left total knee arthroplasty. Moderate effusion.  Mild lateral patellar tracking.    CT scan of the left knee:Status post left knee joint replacement with metallic femoral and tibial components in good position.  Lucency around the prosthesis consistent with loosening or osteomyelitis is not seen.  A fracture is not noted.  There is a moderate size joint effusion demonstrated.    Bone scan:Findings consistent with left TKA and with increased tracer localization in the distal femur proximal tibia suggesting loosening.    Assessment::  Status post left total knee arthroplasty  New left knee pain with effusion.  Concern for possible aseptic loosening     Plan:  Reviewed the findings with her today.  Had a long discussion with her today about aseptic loosening.  We talked about the only real treatment is for revision total knee arthroplasty. I want her to be evaluated by Dr. Burton so that he can see her and see if we both agree on the diagnosis and treatment plan.    This note was created using [employer] voice recognition software that occasionally misinterpreted phrases or words.

## 2019-11-07 NOTE — PROGRESS NOTES
CC:  59-year-old female presents for evaluation of left knee pain.  She is referred to me by 1 of our partners to evaluate her left knee.  About 1 year ago she had a left total knee replacement done.  Since that time she has had several other health issues that were discovered including 2 different cancers.  She has undergone treatment for that.  She is currently on Arimidex for suppression.  She is on Prolia because of concerns of bony resorption with Arimidex.  Recently she began having pain in the left knee after almost 1 year being pain free since her knee replacement. She was worked up for infection and x-rays and CT scans of the knee appear normal.  She had a bone scan done recently which showed some increased uptake which is concerning.    ROS:    Constitution: Denies chills, fever, and sweats.  HENT: Denies headaches or blurry vision.  Cardiovascular: Denies chest pain or irregular heart beat.  Respiratory: Denies cough or shortness of breath.  Gastrointestinal: Denies abdominal pain, nausea, or vomiting.  Genitourinary:  Denies urinary incontinence, bladder and kidney issues  Musculoskeletal:  Denies muscle cramps.  Positive for left knee pain.  Positive for history of sarcoma  Neurological: Denies dizziness or focal weakness.  Psychiatric/Behavioral: Normal mental status.  Hematologic/Lymphatic: Denies bleeding problem or easy bruising/bleeding.  Skin: Denies rash or suspicious lesions.    Physical examination     Gen - No acute distress   Eyes - Extraoccular motions intact, pupils equally round and reactive to light and accommodation   ENT - normocephalic, atruamtic, oropharynx clear   Neck - Supple, no abnormal masses   Cardiovascular - regular rate and rhythm   Pulmonary - clear to auscultation bilaterally   Abdomen - soft, non-tender, non-distended, positive bowel sounds   Psych - The patient is alert and oriented x3 with normal mood and affect    Examination of the Left Lower Extremity:     Motor  function is intact distally EHL/FHL/TA/josie   +2 dorsalis pedis and posterior tibial pulses   Sensation to light touch intact distally dorsal, plantar, and first web space     Examination of the Left knee:    ROM 0 - 150   Effusion positive  Tenderness to palpation at the joint line positive  Pain during range of motion negative  Crepitation during range of motion negative     negative increased pain noted with flexion past 90   negative antalgic gait noted   negative Varus/Valgus instability    X-rays were examined and personally reviewed by me.  Three views of the left knee dated 10/21/2019 are available for review.  They show a left total knee arthroplasty that on x-ray appears well fixed with no evidence of loosening    CT scan images were examined and personally reviewed by me.  CT of the left knee dated 10/29/2019 is available for review.  It shows an effusion around the components of the left knee but no obvious loosening on CT scan.    Bone scan images were examined and personally reviewed by me.  Bone scan dated 11/04/2019 is available for review.  There is uptake around both the femoral and tibial components of the left total knee arthroplasty.    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    Plan:  We discussed treatment options. I agree with her primary orthopedic surgeon that a revision knee replacement is what is needed. Her original surgery was done with high viscosity bone cement we discussed the slightly increased risk of aseptic loosening with high viscosity bone cement. We discussed the risk of possible loosening or bone problems with Arimidex.  She will follow up with her original orthopedic surgeon to schedule revision knee replacement surgery. She can follow up with us as needed.

## 2019-11-11 ENCOUNTER — PATIENT MESSAGE (OUTPATIENT)
Dept: HEMATOLOGY/ONCOLOGY | Facility: CLINIC | Age: 59
End: 2019-11-11

## 2019-11-12 ENCOUNTER — PATIENT MESSAGE (OUTPATIENT)
Dept: ORTHOPEDICS | Facility: CLINIC | Age: 59
End: 2019-11-12

## 2019-11-18 ENCOUNTER — OFFICE VISIT (OUTPATIENT)
Dept: ORTHOPEDICS | Facility: CLINIC | Age: 59
End: 2019-11-18
Payer: COMMERCIAL

## 2019-11-18 ENCOUNTER — TELEPHONE (OUTPATIENT)
Dept: FAMILY MEDICINE | Facility: CLINIC | Age: 59
End: 2019-11-18

## 2019-11-18 VITALS — BODY MASS INDEX: 31.71 KG/M2 | WEIGHT: 202 LBS | HEIGHT: 67 IN

## 2019-11-18 DIAGNOSIS — M25.562 ACUTE PAIN OF LEFT KNEE: Primary | ICD-10-CM

## 2019-11-18 DIAGNOSIS — T84.038D ASEPTIC LOOSENING OF PROSTHETIC KNEE, SUBSEQUENT ENCOUNTER: Primary | ICD-10-CM

## 2019-11-18 DIAGNOSIS — Z96.659 ASEPTIC LOOSENING OF PROSTHETIC KNEE, SUBSEQUENT ENCOUNTER: Primary | ICD-10-CM

## 2019-11-18 PROCEDURE — 99214 PR OFFICE/OUTPT VISIT, EST, LEVL IV, 30-39 MIN: ICD-10-PCS | Mod: 57,S$GLB,, | Performed by: ORTHOPAEDIC SURGERY

## 2019-11-18 PROCEDURE — 99999 PR PBB SHADOW E&M-EST. PATIENT-LVL II: CPT | Mod: PBBFAC,,, | Performed by: ORTHOPAEDIC SURGERY

## 2019-11-18 PROCEDURE — 99214 OFFICE O/P EST MOD 30 MIN: CPT | Mod: 57,S$GLB,, | Performed by: ORTHOPAEDIC SURGERY

## 2019-11-18 PROCEDURE — 99999 PR PBB SHADOW E&M-EST. PATIENT-LVL II: ICD-10-PCS | Mod: PBBFAC,,, | Performed by: ORTHOPAEDIC SURGERY

## 2019-11-18 NOTE — H&P (VIEW-ONLY)
Past Medical History:   Diagnosis Date    Breast cancer     left    Cancer     breast; skin    High cholesterol     PONV (postoperative nausea and vomiting)        Past Surgical History:   Procedure Laterality Date    APPENDECTOMY      AXILLARY NODE DISSECTION Left 3/14/2019    Procedure: LYMPHADENECTOMY, AXILLARY;  Surgeon: Didier Garcia MD;  Location: [facility] OR;  Service: General;  Laterality: Left;    BREAST SURGERY Left 02/11/2019    lumpectomy    JOINT REPLACEMENT Left 10/30/2018    knee    KNEE ARTHROPLASTY Left 10/30/2018    Procedure: ARTHROPLASTY, KNEE;  Surgeon: Ata Paula MD;  Location: [facility] OR;  Service: Orthopedics;  Laterality: Left;    KNEE ARTHROSCOPY W/ MENISCECTOMY Right 10/30/2018    Procedure: ARTHROSCOPY, KNEE, WITH MENISCECTOMY;  Surgeon: Ata Paula MD;  Location: [facility] OR;  Service: Orthopedics;  Laterality: Right;    KNEE SURGERY Left 1980s    SENTINEL LYMPH NODE BIOPSY Left 3/14/2019    Procedure: BIOPSY, LYMPH NODE, SENTINEL;  Surgeon: Didier Garcia MD;  Location: [facility] OR;  Service: General;  Laterality: Left;  left axillary sentinel node biopsy, possible axillary dissection       Current Outpatient Medications   Medication Sig    anastrozole (ARIMIDEX) 1 mg Tab     clindamycin (CLEOCIN) 300 MG capsule Take 1 capsule (300 mg total) by mouth every 6 (six) hours.    denosumab (PROLIA) 60 mg/mL Syrg Inject 60 mg into the skin.    multivitamin capsule Take 1 capsule by mouth once daily.    sertraline (ZOLOFT) 25 MG tablet Take 1 tablet (25 mg total) by mouth once daily.     No current facility-administered medications for this visit.        Review of patient's allergies indicates:   Allergen Reactions    Ciprofloxacin Anaphylaxis     paralysis    Pcn [penicillins] Anaphylaxis     paralysis    Eggplant Blisters    Eggs [egg derived]      Inside of mouth peel      Tomato (solanum lycopersicum) Blisters       Family History   Problem  Relation Age of Onset    Breast cancer Mother        Social History     Socioeconomic History    Marital status: Single     Spouse name: Not on file    Number of children: Not on file    Years of education: Not on file    Highest education level: Not on file   Occupational History    Not on file   Social Needs    Financial resource strain: Not on file    Food insecurity:     Worry: Not on file     Inability: Not on file    Transportation needs:     Medical: Not on file     Non-medical: Not on file   Tobacco Use    Smoking status: Never Smoker    Smokeless tobacco: Never Used   Substance and Sexual Activity    Alcohol use: Yes     Comment: occasionally    Drug use: No    Sexual activity: Not on file   Lifestyle    Physical activity:     Days per week: Not on file     Minutes per session: Not on file    Stress: Not on file   Relationships    Social connections:     Talks on phone: Not on file     Gets together: Not on file     Attends Evangelical service: Not on file     Active member of club or organization: Not on file     Attends meetings of clubs or organizations: Not on file     Relationship status: Not on file   Other Topics Concern    Not on file   Social History Narrative    Not on file       Chief Complaint:   Chief Complaint   Patient presents with    Knee Pain     left knee-sx consents        Date of surgery:  October 30, 2018 left total knee arthroplasty and right partial medial meniscectomy    History of present illness:  59-year-old female s/p left total knee arthroplasty and right knee arthroscopy.  Patient did have a history of an open knee ligament reconstruction done of the left knee by Dr. Covarrubias back in 1981.  Patient was doing great until about a week ago.  Started to have severe pain along the lateral aspect of her left total knee.  Was hard to put weight on.  It would only last a few seconds and then would go away.  Would have no pain at the time. Cannot straighten it at this  time.  Has a small effusion now.  Pain is 0/10 at rest up to a 10/10 with activity.  Knee aspirate and infection labs were all normal. CT scan showed a small effusion but no obvious loosening of the components.  Bone scan was suspicious for possible loosening unfortunately    Answers for HPI/ROS submitted by the patient on 10/29/2019   Leg pain  unexpected weight change: No  appetite change : No  sleep disturbance: No  IMMUNOCOMPROMISED: No  nervous/ anxious: No  dysphoric mood: No  rash: No  visual disturbance: No  eye redness: No  eye pain: No  ear pain: No  tinnitus: No  hearing loss: No  sinus pressure : No  nosebleeds: No  enviro allergies: No  food allergies: No  cough: No  shortness of breath: No  sweating: No  dysuria: No  frequency: No  difficulty urinating: No  hematuria: No  painful intercourse: No  chest pain: No  palpitations: No  nausea: No  vomiting: No  diarrhea: No  blood in stool: No  constipation: No  headaches: No  dizziness: No  numbness: No  seizures: No  joint swelling: No  myalgia: No  weakness: No  back pain: No  Pain Chronicity: recurrent  History of trauma: Yes  Onset: 1 to 4 weeks ago  Frequency: intermittently  Progression since onset: gradually worsening  injury location: at home  pain- numeric: 10/10  pain location: left knee  pain quality: sharp  Radiating Pain: No  Aggravating factors: walking  fever: No  inability to bear weight: Yes  itching: No  joint locking: No  limited range of motion: No  stiffness: No  tingling: No  Treatments tried: brace/corset, cold, OTC pain meds, rest  physical therapy: not tried  Improvement on treatment: no relief      Physical Examination:    Vital Signs:    There were no vitals filed for this visit.    Body mass index is 31.64 kg/m².    This a well-developed, well nourished patient in no acute distress.  They are alert and oriented and cooperative to examination.  Pt. walks without an antalgic gait.       Examination of left knee shows a moderate  joint effusion.  Incisions are well healed.  There is no erythema or redness.  No drainage. Patient has full range of motion from 0-130 degrees but has some hesitation with full extension.  Knee is stable with varus and valgus stress.  Some laxity with anterior drawer exam.  No signs of mid flexion instability.  Some tenderness over the IT band and lateral knee  .  X-rays:  Four views of both knees are reviewed which show well-aligned left total knee arthroplasty. Moderate effusion.  Mild lateral patellar tracking.    CT scan of the left knee:Status post left knee joint replacement with metallic femoral and tibial components in good position.  Lucency around the prosthesis consistent with loosening or osteomyelitis is not seen.  A fracture is not noted.  There is a moderate size joint effusion demonstrated.    Bone scan:Findings consistent with left TKA and with increased tracer localization in the distal femur proximal tibia suggesting loosening.    Assessment::  Status post left total knee arthroplasty  New left knee pain with effusion.  Concern for possible aseptic loosening     Plan:  Reviewed the findings with her today.  Had a long discussion with her today about aseptic loosening.  We talked about the only real treatment is for revision total knee arthroplasty. Risks, benefits, and alternatives to the procedure were explained to the patient including but not limited to damage to nerves, arteries, blood vessels, bones, tendons, ligaments, stiffness, instability, infection, DVT, PE, as well as general anesthetic complications including seizure, stroke, heart attack and even death. The patient understood these risks and wished to proceed and signed the informed consent.       This note was created using [employer] voice recognition software that occasionally misinterpreted phrases or words.

## 2019-11-18 NOTE — TELEPHONE ENCOUNTER
----- Message from Payal Muller LPN sent at 11/18/2019 11:46 AM CST -----  Patient is scheduled to have  Total knee arthroplasty revision on 12/3/19 with Dr. Paula. She needs to have medical clearance prior. Please contact to schedule or advise,    Thanks

## 2019-11-18 NOTE — PROGRESS NOTES
Past Medical History:   Diagnosis Date    Breast cancer     left    Cancer     breast; skin    High cholesterol     PONV (postoperative nausea and vomiting)        Past Surgical History:   Procedure Laterality Date    APPENDECTOMY      AXILLARY NODE DISSECTION Left 3/14/2019    Procedure: LYMPHADENECTOMY, AXILLARY;  Surgeon: Didier Garcia MD;  Location: [facility] OR;  Service: General;  Laterality: Left;    BREAST SURGERY Left 02/11/2019    lumpectomy    JOINT REPLACEMENT Left 10/30/2018    knee    KNEE ARTHROPLASTY Left 10/30/2018    Procedure: ARTHROPLASTY, KNEE;  Surgeon: Ata Paula MD;  Location: [facility] OR;  Service: Orthopedics;  Laterality: Left;    KNEE ARTHROSCOPY W/ MENISCECTOMY Right 10/30/2018    Procedure: ARTHROSCOPY, KNEE, WITH MENISCECTOMY;  Surgeon: Ata Paula MD;  Location: [facility] OR;  Service: Orthopedics;  Laterality: Right;    KNEE SURGERY Left 1980s    SENTINEL LYMPH NODE BIOPSY Left 3/14/2019    Procedure: BIOPSY, LYMPH NODE, SENTINEL;  Surgeon: Didier Garcia MD;  Location: [facility] OR;  Service: General;  Laterality: Left;  left axillary sentinel node biopsy, possible axillary dissection       Current Outpatient Medications   Medication Sig    anastrozole (ARIMIDEX) 1 mg Tab     clindamycin (CLEOCIN) 300 MG capsule Take 1 capsule (300 mg total) by mouth every 6 (six) hours.    denosumab (PROLIA) 60 mg/mL Syrg Inject 60 mg into the skin.    multivitamin capsule Take 1 capsule by mouth once daily.    sertraline (ZOLOFT) 25 MG tablet Take 1 tablet (25 mg total) by mouth once daily.     No current facility-administered medications for this visit.        Review of patient's allergies indicates:   Allergen Reactions    Ciprofloxacin Anaphylaxis     paralysis    Pcn [penicillins] Anaphylaxis     paralysis    Eggplant Blisters    Eggs [egg derived]      Inside of mouth peel      Tomato (solanum lycopersicum) Blisters       Family History   Problem  Relation Age of Onset    Breast cancer Mother        Social History     Socioeconomic History    Marital status: Single     Spouse name: Not on file    Number of children: Not on file    Years of education: Not on file    Highest education level: Not on file   Occupational History    Not on file   Social Needs    Financial resource strain: Not on file    Food insecurity:     Worry: Not on file     Inability: Not on file    Transportation needs:     Medical: Not on file     Non-medical: Not on file   Tobacco Use    Smoking status: Never Smoker    Smokeless tobacco: Never Used   Substance and Sexual Activity    Alcohol use: Yes     Comment: occasionally    Drug use: No    Sexual activity: Not on file   Lifestyle    Physical activity:     Days per week: Not on file     Minutes per session: Not on file    Stress: Not on file   Relationships    Social connections:     Talks on phone: Not on file     Gets together: Not on file     Attends Mosque service: Not on file     Active member of club or organization: Not on file     Attends meetings of clubs or organizations: Not on file     Relationship status: Not on file   Other Topics Concern    Not on file   Social History Narrative    Not on file       Chief Complaint:   Chief Complaint   Patient presents with    Knee Pain     left knee-sx consents        Date of surgery:  October 30, 2018 left total knee arthroplasty and right partial medial meniscectomy    History of present illness:  59-year-old female s/p left total knee arthroplasty and right knee arthroscopy.  Patient did have a history of an open knee ligament reconstruction done of the left knee by Dr. Covarrubias back in 1981.  Patient was doing great until about a week ago.  Started to have severe pain along the lateral aspect of her left total knee.  Was hard to put weight on.  It would only last a few seconds and then would go away.  Would have no pain at the time. Cannot straighten it at this  time.  Has a small effusion now.  Pain is 0/10 at rest up to a 10/10 with activity.  Knee aspirate and infection labs were all normal. CT scan showed a small effusion but no obvious loosening of the components.  Bone scan was suspicious for possible loosening unfortunately    Answers for HPI/ROS submitted by the patient on 10/29/2019   Leg pain  unexpected weight change: No  appetite change : No  sleep disturbance: No  IMMUNOCOMPROMISED: No  nervous/ anxious: No  dysphoric mood: No  rash: No  visual disturbance: No  eye redness: No  eye pain: No  ear pain: No  tinnitus: No  hearing loss: No  sinus pressure : No  nosebleeds: No  enviro allergies: No  food allergies: No  cough: No  shortness of breath: No  sweating: No  dysuria: No  frequency: No  difficulty urinating: No  hematuria: No  painful intercourse: No  chest pain: No  palpitations: No  nausea: No  vomiting: No  diarrhea: No  blood in stool: No  constipation: No  headaches: No  dizziness: No  numbness: No  seizures: No  joint swelling: No  myalgia: No  weakness: No  back pain: No  Pain Chronicity: recurrent  History of trauma: Yes  Onset: 1 to 4 weeks ago  Frequency: intermittently  Progression since onset: gradually worsening  injury location: at home  pain- numeric: 10/10  pain location: left knee  pain quality: sharp  Radiating Pain: No  Aggravating factors: walking  fever: No  inability to bear weight: Yes  itching: No  joint locking: No  limited range of motion: No  stiffness: No  tingling: No  Treatments tried: brace/corset, cold, OTC pain meds, rest  physical therapy: not tried  Improvement on treatment: no relief      Physical Examination:    Vital Signs:    There were no vitals filed for this visit.    Body mass index is 31.64 kg/m².    This a well-developed, well nourished patient in no acute distress.  They are alert and oriented and cooperative to examination.  Pt. walks without an antalgic gait.       Examination of left knee shows a moderate  joint effusion.  Incisions are well healed.  There is no erythema or redness.  No drainage. Patient has full range of motion from 0-130 degrees but has some hesitation with full extension.  Knee is stable with varus and valgus stress.  Some laxity with anterior drawer exam.  No signs of mid flexion instability.  Some tenderness over the IT band and lateral knee  .  X-rays:  Four views of both knees are reviewed which show well-aligned left total knee arthroplasty. Moderate effusion.  Mild lateral patellar tracking.    CT scan of the left knee:Status post left knee joint replacement with metallic femoral and tibial components in good position.  Lucency around the prosthesis consistent with loosening or osteomyelitis is not seen.  A fracture is not noted.  There is a moderate size joint effusion demonstrated.    Bone scan:Findings consistent with left TKA and with increased tracer localization in the distal femur proximal tibia suggesting loosening.    Assessment::  Status post left total knee arthroplasty  New left knee pain with effusion.  Concern for possible aseptic loosening     Plan:  Reviewed the findings with her today.  Had a long discussion with her today about aseptic loosening.  We talked about the only real treatment is for revision total knee arthroplasty. Risks, benefits, and alternatives to the procedure were explained to the patient including but not limited to damage to nerves, arteries, blood vessels, bones, tendons, ligaments, stiffness, instability, infection, DVT, PE, as well as general anesthetic complications including seizure, stroke, heart attack and even death. The patient understood these risks and wished to proceed and signed the informed consent.       This note was created using M Modal voice recognition software that occasionally misinterpreted phrases or words.

## 2019-11-19 RX ORDER — MUPIROCIN 20 MG/G
OINTMENT TOPICAL
Status: CANCELLED | OUTPATIENT
Start: 2019-11-19

## 2019-11-19 RX ORDER — CLINDAMYCIN PHOSPHATE 900 MG/50ML
900 INJECTION, SOLUTION INTRAVENOUS
Status: CANCELLED | OUTPATIENT
Start: 2019-11-19

## 2019-11-22 ENCOUNTER — PATIENT MESSAGE (OUTPATIENT)
Dept: FAMILY MEDICINE | Facility: CLINIC | Age: 59
End: 2019-11-22

## 2019-11-26 ENCOUNTER — HOSPITAL ENCOUNTER (OUTPATIENT)
Dept: PREADMISSION TESTING | Facility: HOSPITAL | Age: 59
Discharge: HOME | End: 2019-11-26
Attending: ORTHOPAEDIC SURGERY
Payer: COMMERCIAL

## 2019-11-26 ENCOUNTER — OFFICE VISIT (OUTPATIENT)
Dept: PHYSICAL MEDICINE AND REHAB | Facility: CLINIC | Age: 59
End: 2019-11-26
Payer: COMMERCIAL

## 2019-11-26 VITALS
HEART RATE: 56 BPM | HEIGHT: 67 IN | WEIGHT: 202 LBS | BODY MASS INDEX: 31.71 KG/M2 | DIASTOLIC BLOOD PRESSURE: 60 MMHG | SYSTOLIC BLOOD PRESSURE: 132 MMHG

## 2019-11-26 DIAGNOSIS — Z01.818 PRE-OP EXAM: ICD-10-CM

## 2019-11-26 DIAGNOSIS — T84.038D ASEPTIC LOOSENING OF PROSTHETIC KNEE, SUBSEQUENT ENCOUNTER: Primary | ICD-10-CM

## 2019-11-26 DIAGNOSIS — Z96.659 ASEPTIC LOOSENING OF PROSTHETIC KNEE, SUBSEQUENT ENCOUNTER: Primary | ICD-10-CM

## 2019-11-26 DIAGNOSIS — M25.562 ACUTE PAIN OF LEFT KNEE: ICD-10-CM

## 2019-11-26 LAB
ABO + RH BLD: NORMAL
ANION GAP SERPL CALC-SCNC: 7 MMOL/L (ref 8–16)
BACTERIA #/AREA URNS HPF: NORMAL /HPF
BASOPHILS # BLD AUTO: 0.02 K/UL (ref 0–0.2)
BASOPHILS NFR BLD: 0.6 % (ref 0–1.9)
BILIRUB UR QL STRIP: NEGATIVE
BLD GP AB SCN CELLS X3 SERPL QL: NORMAL
BUN SERPL-MCNC: 12 MG/DL (ref 6–20)
CALCIUM SERPL-MCNC: 10.8 MG/DL (ref 8.7–10.5)
CHLORIDE SERPL-SCNC: 108 MMOL/L (ref 95–110)
CLARITY UR: CLEAR
CO2 SERPL-SCNC: 28 MMOL/L (ref 23–29)
COLOR UR: YELLOW
CREAT SERPL-MCNC: 0.9 MG/DL (ref 0.5–1.4)
DIFFERENTIAL METHOD: ABNORMAL
EOSINOPHIL # BLD AUTO: 0 K/UL (ref 0–0.5)
EOSINOPHIL NFR BLD: 0.9 % (ref 0–8)
ERYTHROCYTE [DISTWIDTH] IN BLOOD BY AUTOMATED COUNT: 13.7 % (ref 11.5–14.5)
EST. GFR  (AFRICAN AMERICAN): >60 ML/MIN/1.73 M^2
EST. GFR  (NON AFRICAN AMERICAN): >60 ML/MIN/1.73 M^2
GLUCOSE SERPL-MCNC: 82 MG/DL (ref 70–110)
GLUCOSE UR QL STRIP: NEGATIVE
HCT VFR BLD AUTO: 42.8 % (ref 37–48.5)
HGB BLD-MCNC: 13.7 G/DL (ref 12–16)
HGB UR QL STRIP: NEGATIVE
IMM GRANULOCYTES # BLD AUTO: 0 K/UL (ref 0–0.04)
KETONES UR QL STRIP: NEGATIVE
LEUKOCYTE ESTERASE UR QL STRIP: ABNORMAL
LYMPHOCYTES # BLD AUTO: 1 K/UL (ref 1–4.8)
LYMPHOCYTES NFR BLD: 32.8 % (ref 18–48)
MCH RBC QN AUTO: 29.7 PG (ref 27–31)
MCHC RBC AUTO-ENTMCNC: 32 G/DL (ref 32–36)
MCV RBC AUTO: 93 FL (ref 82–98)
MICROSCOPIC COMMENT: NORMAL
MONOCYTES # BLD AUTO: 0.2 K/UL (ref 0.3–1)
MONOCYTES NFR BLD: 6.3 % (ref 4–15)
NEUTROPHILS # BLD AUTO: 1.9 K/UL (ref 1.8–7.7)
NEUTROPHILS NFR BLD: 59.4 % (ref 38–73)
NITRITE UR QL STRIP: NEGATIVE
NRBC BLD-RTO: 0 /100 WBC
PH UR STRIP: 7 [PH] (ref 5–8)
PLATELET # BLD AUTO: 198 K/UL (ref 150–350)
PMV BLD AUTO: 10.1 FL (ref 9.2–12.9)
POTASSIUM SERPL-SCNC: 4.1 MMOL/L (ref 3.5–5.1)
PROT UR QL STRIP: NEGATIVE
RBC # BLD AUTO: 4.62 M/UL (ref 4–5.4)
RBC #/AREA URNS HPF: 0 /HPF (ref 0–4)
SODIUM SERPL-SCNC: 143 MMOL/L (ref 136–145)
SP GR UR STRIP: 1.02 (ref 1–1.03)
SQUAMOUS #/AREA URNS HPF: 0 /HPF
URN SPEC COLLECT METH UR: ABNORMAL
UROBILINOGEN UR STRIP-ACNC: NEGATIVE EU/DL
WBC # BLD AUTO: 3.17 K/UL (ref 3.9–12.7)
WBC #/AREA URNS HPF: 1 /HPF (ref 0–5)

## 2019-11-26 PROCEDURE — 99900104 DSU ONLY-NO CHARGE-EA ADD'L HR (STAT)

## 2019-11-26 PROCEDURE — 99900103 DSU ONLY-NO CHARGE-INITIAL HR (STAT)

## 2019-11-26 PROCEDURE — 87081 CULTURE SCREEN ONLY: CPT

## 2019-11-26 PROCEDURE — 81000 URINALYSIS NONAUTO W/SCOPE: CPT

## 2019-11-26 PROCEDURE — 80048 BASIC METABOLIC PNL TOTAL CA: CPT

## 2019-11-26 PROCEDURE — 99213 PR OFFICE/OUTPT VISIT, EST, LEVL III, 20-29 MIN: ICD-10-PCS | Mod: 25,S$GLB,, | Performed by: PHYSICAL MEDICINE & REHABILITATION

## 2019-11-26 PROCEDURE — 86901 BLOOD TYPING SEROLOGIC RH(D): CPT

## 2019-11-26 PROCEDURE — 64640 PR DESTRUCT BY NEURO AGENT; OTHER PERIPH NERVE: ICD-10-PCS | Mod: LT,S$GLB,, | Performed by: PHYSICAL MEDICINE & REHABILITATION

## 2019-11-26 PROCEDURE — 99999 PR PBB SHADOW E&M-EST. PATIENT-LVL III: ICD-10-PCS | Mod: PBBFAC,,, | Performed by: PHYSICAL MEDICINE & REHABILITATION

## 2019-11-26 PROCEDURE — 99213 OFFICE O/P EST LOW 20 MIN: CPT | Mod: 25,S$GLB,, | Performed by: PHYSICAL MEDICINE & REHABILITATION

## 2019-11-26 PROCEDURE — 36415 COLL VENOUS BLD VENIPUNCTURE: CPT

## 2019-11-26 PROCEDURE — 64640 INJECTION TREATMENT OF NERVE: CPT | Mod: LT,S$GLB,, | Performed by: PHYSICAL MEDICINE & REHABILITATION

## 2019-11-26 PROCEDURE — 85025 COMPLETE CBC W/AUTO DIFF WBC: CPT

## 2019-11-26 PROCEDURE — 99999 PR PBB SHADOW E&M-EST. PATIENT-LVL III: CPT | Mod: PBBFAC,,, | Performed by: PHYSICAL MEDICINE & REHABILITATION

## 2019-11-26 NOTE — PRE ADMISSION SCREENING
JOINT CAMP ASSESSMENT    Name Caryn Toledo   MRN [id number]    Age/Sex 59 y.o. female    Surgeon Dr. Ata Paula   Joint Camp Date 11/26/2019   Surgery Date 12/3/2019   Procedure Left Knee Arthroplasty - Revision   Insurance Payor: AETNA / Plan: AET[employer] OPEN CHOICE / Product Type: PPO /    Care Team Patient Care Team:  Terrance Nolen MD as PCP - General (Family Medicine)  Ata Paula MD as Consulting Physician (Sports Medicine)    Pharmacy   [place], [place] - [address] [facility]  [address] [facility] [facility] [address]  Phone: [phone] Fax: [phone]     AM-PAC Score   24   Risk Assessment Score 2     Past Medical History:   Diagnosis Date    Breast cancer     left    Cancer     breast; skin    High cholesterol     PONV (postoperative nausea and vomiting)        Past Surgical History:   Procedure Laterality Date    APPENDECTOMY      AXILLARY NODE DISSECTION Left 3/14/2019    Procedure: LYMPHADENECTOMY, AXILLARY;  Surgeon: Didier Garcia MD;  Location: [facility] OR;  Service: General;  Laterality: Left;    BREAST SURGERY Left 02/11/2019    lumpectomy    JOINT REPLACEMENT Left 10/30/2018    knee    KNEE ARTHROPLASTY Left 10/30/2018    Procedure: ARTHROPLASTY, KNEE;  Surgeon: Ata Paula MD;  Location: [facility] OR;  Service: Orthopedics;  Laterality: Left;    KNEE ARTHROSCOPY W/ MENISCECTOMY Right 10/30/2018    Procedure: ARTHROSCOPY, KNEE, WITH MENISCECTOMY;  Surgeon: Ata Paula MD;  Location: [facility] OR;  Service: Orthopedics;  Laterality: Right;    KNEE SURGERY Left 1980s    SENTINEL LYMPH NODE BIOPSY Left 3/14/2019    Procedure: BIOPSY, LYMPH NODE, SENTINEL;  Surgeon: Didier Garcia MD;  Location: [facility] OR;  Service: General;  Laterality: Left;  left axillary sentinel node biopsy, possible axillary dissection         Home Enviroment     Living Arrangement: Lives with spouse  Home Environment: 1-story house/ trailer, number  of outside stairs: 1, walk-in shower  Home Safety Concerns: Pets in the home: dogs (2).    DISCHARGE CAREGIVER/SUPPORT SYSTEM     Identified post-op caregiver: Patient has children / family / friends to help, spouse and mother.  Patient's caregiver(s) will be able to provide physical assistance. Patient will have someone to assist overnight.      Caregiver present at pre-op interview:  No      PRE-OPERATIVE FUNCTIONAL STATUS     Employment: Employed full time    Pre-op Functional Status: Patient is independent with mobility/ambulation, transfers, ADL's, IADL's.    Use of assistive device for ambulation: none  ADL: self care  ADL Limitations: difficulty with walking  Medical Restrictions: Decreased range of motions in extremities    POTENTIAL BARRIERS TO DISCHARGE/POTENTIAL POST-OP COMPLICATIONS     Pt with hx of post operative nausea and vomiting. Patient has unusual and severe reactions (anaphylaxis) to medications when taking them for the 1st time. Consider pain management options. Patient had Iovera treatment on 11/26/2019 with Dr. Bolivar to decrease her needs for narcotics after surgery. Patient had original left knee arthroplasty on 10/30/2018.    DISCHARGE PLAN     Expected LOS of 2 days or less for joint replacement discussed with patient. We also discussed a discharge path of HH for approximately two weeks with a transition to outpatient PT on the third week given no post-op complications.      Patient in agreement with discharge plan: Yes    Discharge to: Discharge home with home health (PT/OT) x2 weeks with transition to outpatient PT     HH:  [facility]     OP PT: [facility]     Home DME: rolling walker and bedside commode    Needed DME at D/C: none     Rx: Per Dr. Paula at discharge     Meds to Beds: N/A  Patient expected to discharge on Aspirin 325mg by mouth twice daily for DVT prophylaxis.

## 2019-11-26 NOTE — DISCHARGE INSTRUCTIONS
To confirm, Your doctor has instructed you that surgery is scheduled for:     Please report to [facility], Registration the morning of surgery. You must check-in and receive a wristband before going to your procedure.    Pre-Op will call the afternoon prior to surgery between 1:00 and 6:00 PM with the final arrival time.  Phone number: [phone]    PLEASE NOTE:  The surgery schedule has many variables which may affect the time of your surgery case.  Family members should be available if your surgery time changes.  Plan to be here the day of your procedure between 4-6 hours.    MEDICATIONS:  TAKE ONLY THESE MEDICATIONS WITH A SMALL SIP OF WATER THE MORNING OF YOUR PROCEDURE:  ARIMIDEX, ZOLOFT    DO NOT TAKE THESE MEDICATIONS 5-7 DAYS PRIOR to your procedure or per your surgeon's request: ASPIRIN, ALEVE, ADVIL, IBUPROFEN, FISH OIL VITAMIN E, HERBALS  (May take Tylenol)  MVI    ONLY if you are prescribed any types of blood thinners such as:  Aspirin, Coumadin, Plavix, Pradaxa, Xarelto, Aggrenox, Effient, Eliquis, Savasya, Brilinta, or any other, ask your surgeon whether you should stop taking them and how long before surgery you should stop.  You may also need to verify with the prescribing physician if it is ok to stop your medication.      INSTRUCTIONS IMPORTANT!!  · Do not eat or drink anything between midnight and the time of your procedure- this includes gum, mints, and candy.  · Do not smoke or drink alcoholic beverages 24 hours prior to your procedure.  · Shower the night before AND the morning of your procedure with a Chlorhexidine wash such as Hibiclens or Dial antibacterial soap from the neck down.  Do not get it on your face or in your eyes.  You may use your own shampoo and face wash. This helps your skin to be as bacteria free as possible.    · If you wear contact lenses, dentures, hearing aids or glasses, bring a container to put them in during surgery and give to a family member  for safe keeping.  Please leave all jewelry, piercing's and valuables at home.   · DO NOT remove hair from the surgery site.  Do not shave the incision site unless you are given specific instructions to do so.    · ONLY if you have been diagnosed with sleep apnea please bring your C-PAP machine.  · ONLY if you wear home oxygen please bring your portable oxygen tank the day of your procedure.  · ONLY if you have a history of OPEN HEART SURGERY you will need a clearance from your Cardiologist per Anesthesia.      · ONLY for patients requiring bowel prep, written instructions will be given by your doctor's office.  · ONLY if you have a neuro stimulator, please bring the controller with you the morning of surgery  · ONLY if a type and screen test is needed before surgery, please return:  · If your doctor has scheduled you for an overnight stay, bring a small overnight bag with any personal items you need.  · Make arrangements in advance for transportation home by a responsible adult.  It is not safe to drive a vehicle during the 24 hours after anesthesia.      · Visiting hours are 10:00AM to 8:30PM.  For the safety of all patients, visitors under the age of 12 are not allowed above the first floor of the hospital.    · All [facility] facilities and properties are tobacco free.  Smoking is NOT allowed.       If you have any questions about these instructions, call Pre-Op Admit  Nursing at [phone] or the Pre-Op Day Surgery Unit at [phone].

## 2019-11-26 NOTE — LETTER
November 26, 2019      Ata Paula MD  [address] [place] [address] [address]  [place] [id number]           [facility]  [address] [place] [address] [address]   [place] [address]  Phone: [phone]  Fax: [phone]          Patient: Caryn Toledo   MR Number: [id number]   YOB: 1960   Date of Visit: 11/26/2019       Dear Dr. Ata Paula:    Thank you for referring Caryn Toledo to me for evaluation. Attached you will find relevant portions of my assessment and plan of care.    If you have questions, please do not hesitate to call me. I look forward to following Caryn Toledo along with you.    Sincerely,    Parisa Bolivar,     Enclosure  CC:  No Recipients    If you would like to receive this communication electronically, please contact [email or web address] or [phone] to request more information on [employer] access.    For providers and/or their staff who would like to refer a patient to [facility], please contact us through our one-stop-shop provider referral line, [facility], at [phone].    If you feel you have received this communication in error or would no longer like to receive these types of communications, please e-mail [email or web address]

## 2019-11-26 NOTE — PRE ADMISSION SCREENING
Patient Name: Caryn Toledo  YOB: 1960   MRN: [id number]     [facility]-PAC   Basic Mobility Inpatient Short Form 6 Clicks         How much difficulty does the patient currently have  Unable  A Lot  A Little  None      1. Turning over in bed (including adjusting bedclothes, sheets and blankets)?     1 []    2 []    3 []    4 [x]        2. Sitting down on and standing up from a chair with arms (e.g., wheelchair, bedside commode, etc.)     1 []  2 []  3 []     4 [x]      3. Moving from lying on back to sitting on the side of the bed?     1 []  2 []  3 []    4 [x]    How much help from another person does the patient currently need  Total  A Lot  A Little  None      4. Moving to and from a bed to a chair (including a wheelchair)?    1 []  2 []  3 []    4 [x]      5. Need to walk in hospital room?    1 []  2 []  3 []    4 [x]      6. Climbing 3-5 steps with a railing?    1 []  2 []  3 []    4 [x]       Raw Score:     24             CMS 0-100% Score:    0        %   Standardized Score:    61.14           CMS Modifier:        [facility] AM-PAC   Basic Mobility Inpatient Short Form 6 Clicks Score Conversion Table*         *Use this form to convert -PAC Basic Mobility Inpatient Raw Scores.   [facility] Inpatient Basic Mobility Short Form Scoring Example   1. Add the number values associated with the response to each item. For example, items totals yield a Raw Score of 21.   2. Match the raw score to the t-Scale scores (t-Scale score = 50.25, SE = 4.69).   3. Find the associated CMS % (CMS % = 28.97%).   4. Locate the correct CMS Functional Modifier Code, or G Code (G code = CJ)     NOTE: Each -PAC Short Form has a separate conversion table. Make sure that you use the correct conversion table.       Instruction Manual - page 45 contains conversion table

## 2019-11-26 NOTE — PROGRESS NOTES
HPI:  Patient is a 59 y.o. year old female being referred by orthopedics for cryoablation of left knee. She is scheduled for knee revision surgery next week. She has difficulty w. Opiates and is needing to get alternate method for pain relief.  She is having a lot of pain w. Walking.    LABS  lft'S EGFR CR NL    Past Medical History:   Diagnosis Date    Arthritis     Breast cancer     left    Cancer     breast; skin    High cholesterol     PONV (postoperative nausea and vomiting)     Wears glasses      Past Surgical History:   Procedure Laterality Date    APPENDECTOMY      AXILLARY NODE DISSECTION Left 3/14/2019    Procedure: LYMPHADENECTOMY, AXILLARY;  Surgeon: Didier Garcia MD;  Location: [facility] OR;  Service: General;  Laterality: Left;    BREAST SURGERY Left 02/11/2019    lumpectomy    JOINT REPLACEMENT Left 10/30/2018    knee    KNEE ARTHROPLASTY Left 10/30/2018    Procedure: ARTHROPLASTY, KNEE;  Surgeon: Ata Paula MD;  Location: [facility] OR;  Service: Orthopedics;  Laterality: Left;    KNEE ARTHROSCOPY W/ MENISCECTOMY Right 10/30/2018    Procedure: ARTHROSCOPY, KNEE, WITH MENISCECTOMY;  Surgeon: Ata Paula MD;  Location: [facility] OR;  Service: Orthopedics;  Laterality: Right;    KNEE SURGERY Left 1980s    SENTINEL LYMPH NODE BIOPSY Left 3/14/2019    Procedure: BIOPSY, LYMPH NODE, SENTINEL;  Surgeon: Didier Garcia MD;  Location: [facility] OR;  Service: General;  Laterality: Left;  left axillary sentinel node biopsy, possible axillary dissection    TUMOR REMOVAL Left     thigh     Family History   Problem Relation Age of Onset    Breast cancer Mother      Social History     Socioeconomic History    Marital status: Single     Spouse name: Not on file    Number of children: Not on file    Years of education: Not on file    Highest education level: Not on file   Occupational History    Not on file   Social Needs    Financial resource strain: Not on file    Food  "insecurity:     Worry: Not on file     Inability: Not on file    Transportation needs:     Medical: Not on file     Non-medical: Not on file   Tobacco Use    Smoking status: Never Smoker    Smokeless tobacco: Never Used   Substance and Sexual Activity    Alcohol use: Yes     Comment: occasionally    Drug use: No    Sexual activity: Not on file   Lifestyle    Physical activity:     Days per week: Not on file     Minutes per session: Not on file    Stress: Not on file   Relationships    Social connections:     Talks on phone: Not on file     Gets together: Not on file     Attends Anabaptist service: Not on file     Active member of club or organization: Not on file     Attends meetings of clubs or organizations: Not on file     Relationship status: Not on file   Other Topics Concern    Not on file   Social History Narrative    Not on file       Review of patient's allergies indicates:   Allergen Reactions    Ciprofloxacin Anaphylaxis     paralysis    Pcn [penicillins] Anaphylaxis     paralysis    Eggplant Blisters    Eggs [egg derived]      Inside of mouth peel      Hydrocodone Nausea Only     "causes a migraine"    Morphine Nausea Only     "causes a migraine"    Oxycodone Nausea Only     "causes a migraine"    Tomato (solanum lycopersicum) Blisters       Current Outpatient Medications:     anastrozole (ARIMIDEX) 1 mg Tab, once daily ., Disp: , Rfl: 3    clindamycin (CLEOCIN) 300 MG capsule, Take 1 capsule (300 mg total) by mouth every 6 (six) hours., Disp: 3 capsule, Rfl: 0    denosumab (PROLIA) 60 mg/mL Syrg, Inject 60 mg into the skin., Disp: , Rfl:     multivitamin capsule, Take 1 capsule by mouth once daily., Disp: , Rfl:     sertraline (ZOLOFT) 25 MG tablet, Take 1 tablet (25 mg total) by mouth once daily., Disp: 30 tablet, Rfl: 11      Review of Systems  No nausea, vomiting, fevers, Chills , contipation, diarrhea or sweats    Physical Exam:      Vitals:    11/26/19 0858   BP: 132/60 "   Pulse: (!) 56     alert and oriented ×4 follows commands answers all questions appropriately   sensation to light touch grossly intact  Positive crepitus   No knee laxity  Antalgic gait  No knee effusion no clubbing cyanosis or edema      Assessment:  Left Knee pain  awaiting revision surgery- unable to take opiates    Plan:  Cryoablation of left knee today    PROCEDURE NOTE:    PROCEDURE:  Ultrasound assisted cryoablation of the anterior femoral cutaneous nerve, inferior and superior branches of infrapatellar saphenous nerve on the LefT     The patient was placed supine on the exam table and the proximal medial aspect of the tibia and anterior aspect of distal femur was prepped with sterile chlorohexedine.  A line was drawn extending approximately 5 cm medial to inferior pole of the patella distally to a point approximately 5 cm medial to the tibial tubercle.  A second line was drawn in a medial to lateral direction the width of the patella approximately 13.5 cm proximal to the patella.  Ultrasound was then used to visualize the anterior femoral cutaneous nerve proximal to the previously drawn line.  This nerve was visualized in a plane less than 2 cm deep from the skin and in line with the previously drawn line.  This spot was then marked.  Next, the ultrasound was used to visualize the infrapatellar saphenous nerve branches.  These were tracked posteriorly before branching, and this nerve was also visualized less than 2 cm deep from the skin, and in a plane in line with the previously drawn line.  This spot was marked as well.  We then infiltrated the skin with MARCAINE along both locations were the nerves were visualized under ultrasound using a 27g needle. We then introduced the cryoanalgesia device along these locations and this device penetrated the skin, creating cryoanalgesia to both branches of the infrapatellar saphenous nerve and a third treatment to the anterior femoral cutaneous nerve. 3 punctures of  the skin were made to treat the 2 branches of the ISN and another 3 punctures were made to treat the AFCN. There were a total of 3 nerves treated with cryoanalgesia. The patient tolerated the procedure well with no problems, and reported immediate reduction in knee pain post procedure.

## 2019-11-26 NOTE — PRE ADMISSION SCREENING
"               CJR Risk Assessment Scale    Patient Name: Caryn Toledo  YOB: 1960  MRN: [id number]            RIsk Factor Measure Recommendation Patient Data Scale/Score   BMI >40 Reconsider surgery, weight loss   Estimated body mass index is 31.64 kg/m² as calculated from the following:    Height as of an earlier encounter on 11/26/19: 5' 7" (1.702 m).    Weight as of an earlier encounter on 11/26/19: 91.6 kg (202 lb).   [] 0 = 1 - 24.9  [] 1 = 25-29.9  [x] 2 = 30-34.9  [] 3 = 35-39.9  [] 4 = 40-44.9  [] 5 = 45-99.9   Hemoglobin AIC (if applicable) >9 Delay surgery until DM under control  Refer for:  · Nutrition Therapy  · Exercise   · Medication    Lab Results   Component Value Date    HGBA1C 5.3 10/05/2018       Lab Results   Component Value Date    GLU 82 11/26/2019      [x] 0 = 4.0-5.6  [] 1 = 5.7-6.4  [] 2 = 6.5-6.9  [] 3 = 7.0-7.9  [] 4 = 8.0-8.9  [] 5 = 9.0-12   Hemoglobin (Anemia) <9 Delay surgery   Correct anemia Lab Results   Component Value Date    HGB 13.7 11/26/2019    [] 20 - <9.0                    Albumin <3 Delay surgery &Workup Lab Results   Component Value Date    ALBUMIN 4.2 01/31/2019    [] 20 - <3.0   Smoking Cessation >4 Weeks Delay Surgery  Refer to OP Cessation Class    Never Smoker [] 20 - current smoker                                _____ PPD                    Hx of MI, PE, Arrhythmia, CVA, DVT <30 Days Delay Surgery    N/A [] 20      Infection Variable Delay surgery and re-evaluate   N/A [] 20 - recent/current infection     Depression (PHQ) >10 out of 27 Delay Surgery and re-evaluate  Medication  Counseling              [x] 0     []1     []2     []3      []4      [] 5                    (1-4)      (5-9)  (10-14)  (15-19)   (20-27)     Memory Impairment & Memory loss (Mini-Cog Screening Tool) Advanced dementia and/or Parkinson's Reconsider surgery     [x] 0     []1     []2     []3     []4     [] 5     Physical Conditioning (Modified AM-PAC Per Physical Therapy at " [facility]) Unable to ambulate on day of surgery Delay surgery and re-evaluate  Pre-Rehabilitation   (PT evaluation)       [x]  0   []4       []8     []12        []16     []20       (<20%)   (<40%)   (<60%)   (<80% )    (>80%)     Home Environment/Caregiver support  (Per /Navigator Interview)    Availability of basic services and/or approprate assistance during post-operative period Delay surgery and re-evaluate  Safe home environment  Health   1 week post-surgery  Transportation  availability  Ability to obtain DME/Medications post-op    [x] 0     []1     []2     []3     []4     [] 5  [x] 0     []1     []2     []3     []4     [] 5  [x] 0     []1     []2     []3     []4     [] 5  [x] 0     []1     []2     []3     []4     [] 5         MD Contact: Dr. Paula Comments:  Total Score:  2

## 2019-11-27 ENCOUNTER — OFFICE VISIT (OUTPATIENT)
Dept: FAMILY MEDICINE | Facility: CLINIC | Age: 59
End: 2019-11-27
Payer: COMMERCIAL

## 2019-11-27 ENCOUNTER — HOSPITAL ENCOUNTER (OUTPATIENT)
Dept: RADIOLOGY | Facility: CLINIC | Age: 59
Discharge: HOME | End: 2019-11-27
Attending: FAMILY MEDICINE
Payer: COMMERCIAL

## 2019-11-27 VITALS
HEART RATE: 67 BPM | DIASTOLIC BLOOD PRESSURE: 68 MMHG | SYSTOLIC BLOOD PRESSURE: 112 MMHG | BODY MASS INDEX: 31.52 KG/M2 | HEIGHT: 67 IN | OXYGEN SATURATION: 96 % | TEMPERATURE: 98 F | WEIGHT: 200.81 LBS

## 2019-11-27 DIAGNOSIS — Z01.818 PREOP EXAMINATION: ICD-10-CM

## 2019-11-27 DIAGNOSIS — Z01.818 PREOP EXAMINATION: Primary | ICD-10-CM

## 2019-11-27 PROCEDURE — 99999 PR PBB SHADOW E&M-EST. PATIENT-LVL IV: ICD-10-PCS | Mod: PBBFAC,,, | Performed by: FAMILY MEDICINE

## 2019-11-27 PROCEDURE — 71046 XR CHEST PA AND LATERAL: ICD-10-PCS | Mod: 26,,, | Performed by: RADIOLOGY

## 2019-11-27 PROCEDURE — 71046 X-RAY EXAM CHEST 2 VIEWS: CPT | Mod: 26,,, | Performed by: RADIOLOGY

## 2019-11-27 PROCEDURE — 99214 OFFICE O/P EST MOD 30 MIN: CPT | Mod: S$GLB,,, | Performed by: FAMILY MEDICINE

## 2019-11-27 PROCEDURE — 99999 PR PBB SHADOW E&M-EST. PATIENT-LVL IV: CPT | Mod: PBBFAC,,, | Performed by: FAMILY MEDICINE

## 2019-11-27 PROCEDURE — 99214 PR OFFICE/OUTPT VISIT, EST, LEVL IV, 30-39 MIN: ICD-10-PCS | Mod: S$GLB,,, | Performed by: FAMILY MEDICINE

## 2019-11-27 PROCEDURE — 71046 X-RAY EXAM CHEST 2 VIEWS: CPT | Mod: TC,FY,PO

## 2019-11-27 NOTE — PROGRESS NOTES
Subjective:       Patient ID: Caryn Toledo is a 59 y.o. female.    Chief Complaint: Pre-op Exam (Left TKA revision )    Patient presents today for Pre-op clearance for Left Knee Revision Arthroplasty with Dr. Paula on 12/3/2019.   No prior anesthesia problems, no current sign or symptom of infection and no recent C-P or Neuro symptoms.  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      Other   This is a chronic problem. The current episode started more than 1 month ago. The problem occurs constantly. The problem has been gradually worsening. Associated symptoms include arthralgias. Pertinent negatives include no abdominal pain, chest pain, coughing, fever, nausea, rash or urinary symptoms.     Review of Systems   Constitutional: Negative for fever.   Respiratory: Negative for cough and shortness of breath.    Cardiovascular: Negative for chest pain.   Gastrointestinal: Negative for abdominal pain and nausea.   Musculoskeletal: Positive for arthralgias.   Skin: Negative for rash.   All other systems reviewed and are negative.      Objective:      Physical Exam   Constitutional: She appears well-developed. No distress.   Eyes: Pupils are equal, round, and reactive to light. Conjunctivae are normal.   Neck: Carotid bruit is not present.   Cardiovascular: Normal rate and regular rhythm.   No murmur heard.  Pulmonary/Chest: Effort normal and breath sounds normal.   Abdominal: Soft. Bowel sounds are normal. There is no tenderness.   Musculoskeletal:        Left knee: She exhibits deformity.   Left knee- s/p TKA   Neurological: She is alert. She has normal strength.   Reflex Scores:       Patellar reflexes are 2+ on the right side and 1+ on the left side.  Interactive   Skin: Skin is warm and dry.       Assessment:       1. Preop examination        Plan:           CBC:   Lab  Results   Component Value Date    WBC 3.17 (L) 2019    HGB 13.7 2019    HCT 42.8 2019     2019    MCV 93 2019    RDW 13.7 2019     BMP:  Lab Results   Component Value Date     2019    K 4.1 2019     2019    CO2 28 2019    BUN 12 2019    CREATININE 0.9 2019    GLU 82 2019    CALCIUM 10.8 (H) 2019    ALKPHOS 84 2019    PROT 7.5 2019    ALBUMIN 4.2 2019    BILITOT 0.4 2019    AST 16 2019    ALT 18 2019     Urinalysis:   Lab Results   Component Value Date    COLORU Yellow 2019    APPEARANCEUA Clear 2019    PHUR 7.0 2019    SPECGRAV 1.020 2019    GLUCUA Negative 2019    KETONESU Negative 2019    BILIRUBINUA Negative 2019    LEUKOCYTESUR Trace (A) 2019         EK2019  NSR; Heart Rate 72; no change from 2018 EKG    Chest Xray:  FINDINGS:  The heart is upper limits of normal in size.  No confluent infiltrates are seen.  The bony structures are unremarkable.  Mild degenerative changes are seen in the midthoracic region.      Impression       No evidence of acute cardiopulmonary disease and no significant change relative to 2018      Electronically signed by: Terrance Piña MD  Date: 2019       Matias score is 0.02%.  Patient is at low risk for rajeev-operative Cardiac event.  She is medically cleared for surgery.    Patient reminded re no ASA, NSAID's or Fish Oil week prior to surgery.    Patient Instructions   Joint Formula Supreme - from [employer] - online

## 2019-11-28 LAB — MRSA SPEC QL CULT: NORMAL

## 2019-11-29 ENCOUNTER — TELEPHONE (OUTPATIENT)
Dept: ORTHOPEDICS | Facility: CLINIC | Age: 59
End: 2019-11-29

## 2019-11-29 NOTE — TELEPHONE ENCOUNTER
Spoke to patient after 4th attempt. Explained pending insurance status (refer to previous call & note). Pt stated she will also contact her insurance to speed up the process on her end as well. Informed pt we will keep her informed on the status.

## 2019-11-29 NOTE — TELEPHONE ENCOUNTER
Attempted to contact pt to inform her that her surgery scheduled for 12/3/2019 is still pending approval with her insurance. Otilia Benjamin LPN will continue to check the status & keep our office updated. Attempted to contact pt 3'xs on the mobile/home listed to no avail. Unable to leave VM due to patient's mailbox being full.

## 2019-12-02 ENCOUNTER — ANESTHESIA EVENT (OUTPATIENT)
Dept: SURGERY | Facility: HOSPITAL | Age: 59
DRG: 468 | End: 2019-12-02
Payer: COMMERCIAL

## 2019-12-03 ENCOUNTER — ANESTHESIA (OUTPATIENT)
Dept: SURGERY | Facility: HOSPITAL | Age: 59
DRG: 468 | End: 2019-12-03
Payer: COMMERCIAL

## 2019-12-03 ENCOUNTER — HOSPITAL ENCOUNTER (INPATIENT)
Facility: HOSPITAL | Age: 59
LOS: 1 days | Discharge: HOME HEALTH | DRG: 468 | End: 2019-12-04
Attending: ORTHOPAEDIC SURGERY | Admitting: ORTHOPAEDIC SURGERY
Payer: COMMERCIAL

## 2019-12-03 DIAGNOSIS — T84.038D ASEPTIC LOOSENING OF PROSTHETIC KNEE, SUBSEQUENT ENCOUNTER: Primary | ICD-10-CM

## 2019-12-03 DIAGNOSIS — Z96.659 ASEPTIC LOOSENING OF PROSTHETIC KNEE, SUBSEQUENT ENCOUNTER: Primary | ICD-10-CM

## 2019-12-03 PROCEDURE — 25000003 PHARM REV CODE 250: Performed by: ANESTHESIOLOGY

## 2019-12-03 PROCEDURE — 99900103 DSU ONLY-NO CHARGE-INITIAL HR (STAT): Performed by: ORTHOPAEDIC SURGERY

## 2019-12-03 PROCEDURE — 76942 ECHO GUIDE FOR BIOPSY: CPT | Mod: 26,,, | Performed by: ANESTHESIOLOGY

## 2019-12-03 PROCEDURE — D9220A PRA ANESTHESIA: Mod: ANES,,, | Performed by: ANESTHESIOLOGY

## 2019-12-03 PROCEDURE — 27201423 OPTIME MED/SURG SUP & DEVICES STERILE SUPPLY: Performed by: ORTHOPAEDIC SURGERY

## 2019-12-03 PROCEDURE — 63600175 PHARM REV CODE 636 W HCPCS: Performed by: ORTHOPAEDIC SURGERY

## 2019-12-03 PROCEDURE — 27200688 HC TRAY, SPINAL-HYPER/ ISOBARIC: Performed by: ANESTHESIOLOGY

## 2019-12-03 PROCEDURE — 63600175 PHARM REV CODE 636 W HCPCS: Performed by: NURSE PRACTITIONER

## 2019-12-03 PROCEDURE — D9220A PRA ANESTHESIA: ICD-10-PCS | Mod: CRNA,,, | Performed by: NURSE ANESTHETIST, CERTIFIED REGISTERED

## 2019-12-03 PROCEDURE — C1713 ANCHOR/SCREW BN/BN,TIS/BN: HCPCS | Performed by: ORTHOPAEDIC SURGERY

## 2019-12-03 PROCEDURE — 37000009 HC ANESTHESIA EA ADD 15 MINS: Performed by: ORTHOPAEDIC SURGERY

## 2019-12-03 PROCEDURE — 27487 REVISE/REPLACE KNEE JOINT: CPT | Mod: LT,,, | Performed by: ORTHOPAEDIC SURGERY

## 2019-12-03 PROCEDURE — 76942 PR U/S GUIDANCE FOR NEEDLE GUIDANCE: ICD-10-PCS | Mod: 26,,, | Performed by: ANESTHESIOLOGY

## 2019-12-03 PROCEDURE — 63600175 PHARM REV CODE 636 W HCPCS: Performed by: ANESTHESIOLOGY

## 2019-12-03 PROCEDURE — S0020 INJECTION, BUPIVICAINE HYDRO: HCPCS | Performed by: ANESTHESIOLOGY

## 2019-12-03 PROCEDURE — D9220A PRA ANESTHESIA: ICD-10-PCS | Mod: ANES,,, | Performed by: ANESTHESIOLOGY

## 2019-12-03 PROCEDURE — 27200750 HC INSULATED NEEDLE/ STIMUPLEX: Performed by: ANESTHESIOLOGY

## 2019-12-03 PROCEDURE — 64447 NJX AA&/STRD FEMORAL NRV IMG: CPT | Performed by: ANESTHESIOLOGY

## 2019-12-03 PROCEDURE — 37000008 HC ANESTHESIA 1ST 15 MINUTES: Performed by: ORTHOPAEDIC SURGERY

## 2019-12-03 PROCEDURE — 63600175 PHARM REV CODE 636 W HCPCS: Performed by: NURSE ANESTHETIST, CERTIFIED REGISTERED

## 2019-12-03 PROCEDURE — 94761 N-INVAS EAR/PLS OXIMETRY MLT: CPT

## 2019-12-03 PROCEDURE — 99900104 DSU ONLY-NO CHARGE-EA ADD'L HR (STAT): Performed by: ORTHOPAEDIC SURGERY

## 2019-12-03 PROCEDURE — 71000033 HC RECOVERY, INTIAL HOUR: Performed by: ORTHOPAEDIC SURGERY

## 2019-12-03 PROCEDURE — C1776 JOINT DEVICE (IMPLANTABLE): HCPCS | Performed by: ORTHOPAEDIC SURGERY

## 2019-12-03 PROCEDURE — 25000003 PHARM REV CODE 250: Performed by: ORTHOPAEDIC SURGERY

## 2019-12-03 PROCEDURE — 27487 PR REVISE KNEE JOINT REPLACE,ALL PARTS: ICD-10-PCS | Mod: LT,,, | Performed by: ORTHOPAEDIC SURGERY

## 2019-12-03 PROCEDURE — C9290 INJ, BUPIVACAINE LIPOSOME: HCPCS | Performed by: ANESTHESIOLOGY

## 2019-12-03 PROCEDURE — 63600175 PHARM REV CODE 636 W HCPCS

## 2019-12-03 PROCEDURE — 71000039 HC RECOVERY, EACH ADD'L HOUR: Performed by: ORTHOPAEDIC SURGERY

## 2019-12-03 PROCEDURE — 25000003 PHARM REV CODE 250: Performed by: NURSE ANESTHETIST, CERTIFIED REGISTERED

## 2019-12-03 PROCEDURE — D9220A PRA ANESTHESIA: Mod: CRNA,,, | Performed by: NURSE ANESTHETIST, CERTIFIED REGISTERED

## 2019-12-03 PROCEDURE — 36000711: Performed by: ORTHOPAEDIC SURGERY

## 2019-12-03 PROCEDURE — 36000710: Performed by: ORTHOPAEDIC SURGERY

## 2019-12-03 PROCEDURE — S0077 INJECTION, CLINDAMYCIN PHOSP: HCPCS | Performed by: ORTHOPAEDIC SURGERY

## 2019-12-03 PROCEDURE — 12000002 HC ACUTE/MED SURGE SEMI-PRIVATE ROOM

## 2019-12-03 PROCEDURE — 64447 PR NERVE BLOCK INJ, ANES/STEROID, FEMORAL, INCL IMAG GUIDANCE: ICD-10-PCS | Mod: 59,LT,, | Performed by: ANESTHESIOLOGY

## 2019-12-03 PROCEDURE — 64447 NJX AA&/STRD FEMORAL NRV IMG: CPT | Mod: 59,LT,, | Performed by: ANESTHESIOLOGY

## 2019-12-03 PROCEDURE — C1729 CATH, DRAINAGE: HCPCS | Performed by: ORTHOPAEDIC SURGERY

## 2019-12-03 DEVICE — BASEPLATE TIB TOTAL STAB SZ 5: Type: IMPLANTABLE DEVICE | Site: KNEE | Status: FUNCTIONAL

## 2019-12-03 DEVICE — IMPLANTABLE DEVICE: Type: IMPLANTABLE DEVICE | Site: KNEE | Status: FUNCTIONAL

## 2019-12-03 DEVICE — STEM EXT FEM KNEE TS 15X100MM: Type: IMPLANTABLE DEVICE | Site: KNEE | Status: FUNCTIONAL

## 2019-12-03 DEVICE — COMP FEM TOTAL STABILIZED SZ 4: Type: IMPLANTABLE DEVICE | Site: KNEE | Status: FUNCTIONAL

## 2019-12-03 DEVICE — CEMENT BONE ANTIBIO SIMPLEX P: Type: IMPLANTABLE DEVICE | Site: KNEE | Status: FUNCTIONAL

## 2019-12-03 RX ORDER — PROPOFOL 10 MG/ML
VIAL (ML) INTRAVENOUS CONTINUOUS PRN
Status: DISCONTINUED | OUTPATIENT
Start: 2019-12-03 | End: 2019-12-03

## 2019-12-03 RX ORDER — ENOXAPARIN SODIUM 100 MG/ML
40 INJECTION SUBCUTANEOUS EVERY 24 HOURS
Status: DISCONTINUED | OUTPATIENT
Start: 2019-12-03 | End: 2019-12-04 | Stop reason: HOSPADM

## 2019-12-03 RX ORDER — SODIUM,POTASSIUM PHOSPHATES 280-250MG
2 POWDER IN PACKET (EA) ORAL
Status: DISCONTINUED | OUTPATIENT
Start: 2019-12-03 | End: 2019-12-04 | Stop reason: HOSPADM

## 2019-12-03 RX ORDER — LANOLIN ALCOHOL/MO/W.PET/CERES
800 CREAM (GRAM) TOPICAL
Status: DISCONTINUED | OUTPATIENT
Start: 2019-12-03 | End: 2019-12-04 | Stop reason: HOSPADM

## 2019-12-03 RX ORDER — SERTRALINE HYDROCHLORIDE 25 MG/1
25 TABLET, FILM COATED ORAL DAILY
Status: DISCONTINUED | OUTPATIENT
Start: 2019-12-04 | End: 2019-12-04 | Stop reason: HOSPADM

## 2019-12-03 RX ORDER — SODIUM CHLORIDE AND POTASSIUM CHLORIDE 150; 900 MG/100ML; MG/100ML
INJECTION, SOLUTION INTRAVENOUS CONTINUOUS
Status: DISCONTINUED | OUTPATIENT
Start: 2019-12-03 | End: 2019-12-04 | Stop reason: HOSPADM

## 2019-12-03 RX ORDER — ONDANSETRON 2 MG/ML
4 INJECTION INTRAMUSCULAR; INTRAVENOUS ONCE AS NEEDED
Status: DISCONTINUED | OUTPATIENT
Start: 2019-12-03 | End: 2019-12-03 | Stop reason: HOSPADM

## 2019-12-03 RX ORDER — POTASSIUM CHLORIDE 20 MEQ/15ML
40 SOLUTION ORAL
Status: DISCONTINUED | OUTPATIENT
Start: 2019-12-03 | End: 2019-12-04 | Stop reason: HOSPADM

## 2019-12-03 RX ORDER — OXYCODONE HCL 10 MG/1
10 TABLET, FILM COATED, EXTENDED RELEASE ORAL ONCE
Status: DISCONTINUED | OUTPATIENT
Start: 2019-12-03 | End: 2019-12-03

## 2019-12-03 RX ORDER — CELECOXIB 100 MG/1
200 CAPSULE ORAL 2 TIMES DAILY
Status: DISCONTINUED | OUTPATIENT
Start: 2019-12-04 | End: 2019-12-04 | Stop reason: HOSPADM

## 2019-12-03 RX ORDER — EPHEDRINE SULFATE 50 MG/ML
INJECTION, SOLUTION INTRAVENOUS
Status: DISCONTINUED | OUTPATIENT
Start: 2019-12-03 | End: 2019-12-03

## 2019-12-03 RX ORDER — PREGABALIN 75 MG/1
75 CAPSULE ORAL ONCE
Status: COMPLETED | OUTPATIENT
Start: 2019-12-03 | End: 2019-12-03

## 2019-12-03 RX ORDER — LOPERAMIDE HYDROCHLORIDE 2 MG/1
2 CAPSULE ORAL CONTINUOUS PRN
Status: DISCONTINUED | OUTPATIENT
Start: 2019-12-03 | End: 2019-12-04 | Stop reason: HOSPADM

## 2019-12-03 RX ORDER — SODIUM CHLORIDE, SODIUM LACTATE, POTASSIUM CHLORIDE, CALCIUM CHLORIDE 600; 310; 30; 20 MG/100ML; MG/100ML; MG/100ML; MG/100ML
INJECTION, SOLUTION INTRAVENOUS CONTINUOUS
Status: DISCONTINUED | OUTPATIENT
Start: 2019-12-03 | End: 2019-12-03

## 2019-12-03 RX ORDER — MEPERIDINE HYDROCHLORIDE 50 MG/ML
12.5 INJECTION INTRAMUSCULAR; INTRAVENOUS; SUBCUTANEOUS ONCE AS NEEDED
Status: COMPLETED | OUTPATIENT
Start: 2019-12-03 | End: 2019-12-03

## 2019-12-03 RX ORDER — CLINDAMYCIN PHOSPHATE 900 MG/50ML
900 INJECTION, SOLUTION INTRAVENOUS
Status: COMPLETED | OUTPATIENT
Start: 2019-12-03 | End: 2019-12-03

## 2019-12-03 RX ORDER — IBUPROFEN 200 MG
24 TABLET ORAL
Status: DISCONTINUED | OUTPATIENT
Start: 2019-12-03 | End: 2019-12-04 | Stop reason: HOSPADM

## 2019-12-03 RX ORDER — MUPIROCIN 20 MG/G
OINTMENT TOPICAL
Status: DISCONTINUED | OUTPATIENT
Start: 2019-12-03 | End: 2019-12-03 | Stop reason: HOSPADM

## 2019-12-03 RX ORDER — BUPIVACAINE HYDROCHLORIDE 5 MG/ML
INJECTION, SOLUTION EPIDURAL; INTRACAUDAL
Status: DISCONTINUED | OUTPATIENT
Start: 2019-12-03 | End: 2019-12-03

## 2019-12-03 RX ORDER — GLUCAGON 1 MG
1 KIT INJECTION
Status: DISCONTINUED | OUTPATIENT
Start: 2019-12-03 | End: 2019-12-04 | Stop reason: HOSPADM

## 2019-12-03 RX ORDER — BUPIVACAINE HYDROCHLORIDE 7.5 MG/ML
INJECTION, SOLUTION EPIDURAL; RETROBULBAR
Status: DISCONTINUED | OUTPATIENT
Start: 2019-12-03 | End: 2019-12-03

## 2019-12-03 RX ORDER — DOCUSATE SODIUM 100 MG/1
100 CAPSULE, LIQUID FILLED ORAL EVERY 12 HOURS
Status: DISCONTINUED | OUTPATIENT
Start: 2019-12-03 | End: 2019-12-04 | Stop reason: HOSPADM

## 2019-12-03 RX ORDER — CELECOXIB 100 MG/1
400 CAPSULE ORAL ONCE
Status: COMPLETED | OUTPATIENT
Start: 2019-12-03 | End: 2019-12-03

## 2019-12-03 RX ORDER — TRANEXAMIC ACID 100 MG/ML
INJECTION, SOLUTION INTRAVENOUS
Status: DISCONTINUED | OUTPATIENT
Start: 2019-12-03 | End: 2019-12-03

## 2019-12-03 RX ORDER — SODIUM CHLORIDE 0.9 % (FLUSH) 0.9 %
10 SYRINGE (ML) INJECTION
Status: DISCONTINUED | OUTPATIENT
Start: 2019-12-03 | End: 2019-12-04 | Stop reason: HOSPADM

## 2019-12-03 RX ORDER — LIDOCAINE HYDROCHLORIDE 10 MG/ML
1 INJECTION, SOLUTION EPIDURAL; INFILTRATION; INTRACAUDAL; PERINEURAL ONCE
Status: DISCONTINUED | OUTPATIENT
Start: 2019-12-03 | End: 2019-12-03

## 2019-12-03 RX ORDER — MUPIROCIN 20 MG/G
1 OINTMENT TOPICAL 2 TIMES DAILY
Status: DISCONTINUED | OUTPATIENT
Start: 2019-12-03 | End: 2019-12-04 | Stop reason: HOSPADM

## 2019-12-03 RX ORDER — PROPOFOL 10 MG/ML
VIAL (ML) INTRAVENOUS
Status: DISCONTINUED | OUTPATIENT
Start: 2019-12-03 | End: 2019-12-03

## 2019-12-03 RX ORDER — MIDAZOLAM HYDROCHLORIDE 1 MG/ML
INJECTION, SOLUTION INTRAMUSCULAR; INTRAVENOUS
Status: DISCONTINUED | OUTPATIENT
Start: 2019-12-03 | End: 2019-12-03

## 2019-12-03 RX ORDER — FENTANYL CITRATE 50 UG/ML
INJECTION, SOLUTION INTRAMUSCULAR; INTRAVENOUS
Status: DISCONTINUED | OUTPATIENT
Start: 2019-12-03 | End: 2019-12-03

## 2019-12-03 RX ORDER — FENTANYL CITRATE 50 UG/ML
25 INJECTION, SOLUTION INTRAMUSCULAR; INTRAVENOUS EVERY 5 MIN PRN
Status: DISCONTINUED | OUTPATIENT
Start: 2019-12-03 | End: 2019-12-03 | Stop reason: HOSPADM

## 2019-12-03 RX ORDER — ONDANSETRON HYDROCHLORIDE 2 MG/ML
INJECTION, SOLUTION INTRAMUSCULAR; INTRAVENOUS
Status: DISCONTINUED | OUTPATIENT
Start: 2019-12-03 | End: 2019-12-03

## 2019-12-03 RX ORDER — PREGABALIN 75 MG/1
75 CAPSULE ORAL 2 TIMES DAILY
Status: DISCONTINUED | OUTPATIENT
Start: 2019-12-03 | End: 2019-12-04 | Stop reason: HOSPADM

## 2019-12-03 RX ORDER — IBUPROFEN 200 MG
16 TABLET ORAL
Status: DISCONTINUED | OUTPATIENT
Start: 2019-12-03 | End: 2019-12-04 | Stop reason: HOSPADM

## 2019-12-03 RX ORDER — ANASTROZOLE 1 MG/1
1 TABLET ORAL DAILY
Status: DISCONTINUED | OUTPATIENT
Start: 2019-12-04 | End: 2019-12-04 | Stop reason: HOSPADM

## 2019-12-03 RX ORDER — GLYCOPYRROLATE 0.2 MG/ML
INJECTION INTRAMUSCULAR; INTRAVENOUS
Status: DISCONTINUED | OUTPATIENT
Start: 2019-12-03 | End: 2019-12-03

## 2019-12-03 RX ORDER — ACETAMINOPHEN 10 MG/ML
INJECTION, SOLUTION INTRAVENOUS
Status: DISCONTINUED | OUTPATIENT
Start: 2019-12-03 | End: 2019-12-03

## 2019-12-03 RX ORDER — ONDANSETRON 2 MG/ML
8 INJECTION INTRAMUSCULAR; INTRAVENOUS EVERY 8 HOURS PRN
Status: DISCONTINUED | OUTPATIENT
Start: 2019-12-03 | End: 2019-12-04 | Stop reason: HOSPADM

## 2019-12-03 RX ORDER — ACETAMINOPHEN 10 MG/ML
1000 INJECTION, SOLUTION INTRAVENOUS EVERY 8 HOURS
Status: COMPLETED | OUTPATIENT
Start: 2019-12-03 | End: 2019-12-04

## 2019-12-03 RX ORDER — FAMOTIDINE 20 MG/1
20 TABLET, FILM COATED ORAL 2 TIMES DAILY
Status: DISCONTINUED | OUTPATIENT
Start: 2019-12-03 | End: 2019-12-04 | Stop reason: HOSPADM

## 2019-12-03 RX ORDER — VANCOMYCIN HCL IN 5 % DEXTROSE 1G/250ML
1000 PLASTIC BAG, INJECTION (ML) INTRAVENOUS
Status: COMPLETED | OUTPATIENT
Start: 2019-12-03 | End: 2019-12-03

## 2019-12-03 RX ADMIN — ACETAMINOPHEN 1000 MG: 10 INJECTION, SOLUTION INTRAVENOUS at 11:12

## 2019-12-03 RX ADMIN — PROPOFOL 20 MG: 10 INJECTION, EMULSION INTRAVENOUS at 12:12

## 2019-12-03 RX ADMIN — FENTANYL CITRATE 25 MCG: 50 INJECTION, SOLUTION INTRAMUSCULAR; INTRAVENOUS at 02:12

## 2019-12-03 RX ADMIN — VANCOMYCIN HYDROCHLORIDE 1000 MG: 1 INJECTION, POWDER, LYOPHILIZED, FOR SOLUTION INTRAVENOUS at 09:12

## 2019-12-03 RX ADMIN — SODIUM CHLORIDE, SODIUM LACTATE, POTASSIUM CHLORIDE, AND CALCIUM CHLORIDE: .6; .31; .03; .02 INJECTION, SOLUTION INTRAVENOUS at 12:12

## 2019-12-03 RX ADMIN — MIDAZOLAM 2 MG: 1 INJECTION INTRAMUSCULAR; INTRAVENOUS at 12:12

## 2019-12-03 RX ADMIN — ENOXAPARIN SODIUM 40 MG: 100 INJECTION SUBCUTANEOUS at 09:12

## 2019-12-03 RX ADMIN — FENTANYL CITRATE 25 MCG: 50 INJECTION, SOLUTION INTRAMUSCULAR; INTRAVENOUS at 03:12

## 2019-12-03 RX ADMIN — PROPOFOL 70 MCG/KG/MIN: 10 INJECTION, EMULSION INTRAVENOUS at 12:12

## 2019-12-03 RX ADMIN — GLYCOPYRROLATE 0.2 MG: 0.2 INJECTION, SOLUTION INTRAMUSCULAR; INTRAVENOUS at 12:12

## 2019-12-03 RX ADMIN — ROPIVACAINE HYDROCHLORIDE: 5 INJECTION, SOLUTION EPIDURAL; INFILTRATION; PERINEURAL at 01:12

## 2019-12-03 RX ADMIN — BUPIVACAINE HYDROCHLORIDE 2 ML: 7.5 INJECTION, SOLUTION EPIDURAL; RETROBULBAR at 07:12

## 2019-12-03 RX ADMIN — CLINDAMYCIN PHOSPHATE 900 MG: 18 INJECTION, SOLUTION INTRAVENOUS at 12:12

## 2019-12-03 RX ADMIN — MUPIROCIN: 20 OINTMENT TOPICAL at 10:12

## 2019-12-03 RX ADMIN — DOCUSATE SODIUM 100 MG: 100 CAPSULE, LIQUID FILLED ORAL at 09:12

## 2019-12-03 RX ADMIN — MEPERIDINE HYDROCHLORIDE 12.5 MG: 50 INJECTION, SOLUTION INTRAMUSCULAR; INTRAVENOUS; SUBCUTANEOUS at 04:12

## 2019-12-03 RX ADMIN — PREGABALIN 75 MG: 75 CAPSULE ORAL at 09:12

## 2019-12-03 RX ADMIN — FENTANYL CITRATE 100 MCG: 50 INJECTION, SOLUTION INTRAMUSCULAR; INTRAVENOUS at 10:12

## 2019-12-03 RX ADMIN — SODIUM CHLORIDE AND POTASSIUM CHLORIDE: .9; .15 SOLUTION INTRAVENOUS at 05:12

## 2019-12-03 RX ADMIN — MIDAZOLAM 2 MG: 1 INJECTION INTRAMUSCULAR; INTRAVENOUS at 10:12

## 2019-12-03 RX ADMIN — PREGABALIN 75 MG: 75 CAPSULE ORAL at 10:12

## 2019-12-03 RX ADMIN — TRANEXAMIC ACID 900 MG: 100 INJECTION, SOLUTION INTRAVENOUS at 03:12

## 2019-12-03 RX ADMIN — BUPIVACAINE HYDROCHLORIDE 10 ML: 5 INJECTION, SOLUTION EPIDURAL; INTRACAUDAL; PERINEURAL at 07:12

## 2019-12-03 RX ADMIN — TRANEXAMIC ACID 900 MG: 100 INJECTION, SOLUTION INTRAVENOUS at 12:12

## 2019-12-03 RX ADMIN — MUPIROCIN 1 G: 20 OINTMENT TOPICAL at 09:12

## 2019-12-03 RX ADMIN — FENTANYL CITRATE 50 MCG: 50 INJECTION, SOLUTION INTRAMUSCULAR; INTRAVENOUS at 12:12

## 2019-12-03 RX ADMIN — FAMOTIDINE 20 MG: 20 TABLET ORAL at 09:12

## 2019-12-03 RX ADMIN — BUPIVACAINE 20 ML: 13.3 INJECTION, SUSPENSION, LIPOSOMAL INFILTRATION at 10:12

## 2019-12-03 RX ADMIN — CELECOXIB 400 MG: 100 CAPSULE ORAL at 10:12

## 2019-12-03 RX ADMIN — ACETAMINOPHEN 1000 MG: 10 INJECTION, SOLUTION INTRAVENOUS at 12:12

## 2019-12-03 RX ADMIN — EPHEDRINE SULFATE 10 MG: 50 INJECTION, SOLUTION INTRAMUSCULAR; INTRAVENOUS; SUBCUTANEOUS at 01:12

## 2019-12-03 RX ADMIN — ONDANSETRON 4 MG: 2 INJECTION, SOLUTION INTRAMUSCULAR; INTRAVENOUS at 02:12

## 2019-12-03 RX ADMIN — ONDANSETRON 4 MG: 2 INJECTION, SOLUTION INTRAMUSCULAR; INTRAVENOUS at 12:12

## 2019-12-03 NOTE — OP NOTE
[facility]  Orthopedic Surgery  Operative Note    SUMMARY     Date of Procedure: 12/3/2019     Procedure: Procedure(s) (LRB):  REVISION, ARTHROPLASTY, KNEE (Left)       Surgeon(s) and Role:     * Ata Paula MD - Primary    Assistant: Lemuel Yanez    Pre-Operative Diagnosis: Aseptic loosening of prosthetic knee, subsequent encounter [T84.038D, Z96.659]    Post-Operative Diagnosis: Post-Op Diagnosis Codes:     * Aseptic loosening of prosthetic knee, subsequent encounter [T84.038D, Z96.659]    Anesthesia: Spinal/General    Complications: No    Estimated Blood Loss (EBL): 100ml           Implants:   Implant Name Type Inv. Item Serial No.  Lot No. LRB No. Used   PIN PINABALL HEADLESS - [id number]  PIN PINABALL HEADLESS  [employer]. [id number] Left 1   CEMENT BONE ANTIBIO SIMPLEX P - [id number]  CEMENT BONE ANTIBIO SIMPLEX P  [employer] [id number] Left 2   triathalon fluted stem     [id number] Left 2   BASEPLATE TIB TOTAL STAB SZ 5 - [id number]  BASEPLATE TIB TOTAL STAB SZ 5  [employer] [id number] Left 1   COMP FEM TOTAL STABILIZED SZ 4 - [id number]  COMP FEM TOTAL STABILIZED SZ 4  [employer] [id number] Left 1   STEM EXT FEM KNEE TS [id number] - [id number]  STEM EXT FEM KNEE TS [id number]  [employer] [id number] Left 1   triathalon femoral distal augments - left     [id number] Left 1   INSERT TIBIAL SZ 5 16MMX3 - [id number]  INSERT TIBIAL SZ 5 16MMX3  [employer] [id number] Left 1       Tourniquet time: 135min at 300mmHg    Specimens:   Specimen (12h ago, onward)    None                  Condition: Good    Disposition: PACU - hemodynamically stable.    Attestation: I was present and scrubbed for the entire procedure.    INDICATIONS FOR THE PROCEDURE: A 60yo female with a history of  left total knee arthroplasty about a year ago.  Patient was doing fine until about a month ago and started having persistent effusions an acute sharp pain. Evaluation and workup  was performed.  No findings consistent with possible infection but bone scan was indicative of possible aseptic loosening.  Patient wished to proceed with revision of her total knee.    PROCEDURE IN DETAIL: Risks, benefits and alternatives of the procedure were   explained to the patient including, but not limited to damage to nerves,   arteries or blood vessels. Also explained risk of infection, stiffness, DVT, PE,   polyethylene wear as well as anesthetic complications including seizure, stroke,   heart attack and death, understood this and signed informed consent. The   patient's Left knee was marked prior to coming to the Operating Room. The patient was brought to the operating room, placed on the operating table in a supine position.A  formal timeout was done in which correct patient, procedure and op site were all   correctly identified and confirmed by the entire operating team.  900mg CLINDAMYCIN was given prior to surgical incision. Spinal anesthesia was induced. The patient'sLeft  lower extremity was prepped and   draped in normal sterile fashion. TheLeft  leg was exsanguinated with an   Esmarch. Tourniquet was inflated up 300 mmHg. Standard anterior approach to   the knee was made. Medial parapatellar arthrotomy was made, leaving about 1/8   inch of tissue for later repair. Proximal medial tibial release was performed,   making sure not to release any of the MCL.  We then took some time a doing a exposures both the medial gutter as well as the lateral gutter.  Good exposure of the tibia was performed. We then removed the locking bar and the polyethylene from the component on the tibia.  We then proceeded to resect some tissue within the suprapatellar pouch as well exposing the femoral implant in its entirety fraying it from scar so the bone prosthetic interface could be seen.  We then used flexible osteotomes all around the femur and were able to loosen it up and remove it.  We lost some bone anteriorly  as well as distally.  No posterior bone was lost and minimal bone loss within the chamfer cuts.  We judged this to be around 5 millimeters of bone.  We then turned our attention to the tibia.  Again flexible osteotome was used. When we referring it up anteriorly the tibia literally popped up.  We are able to remove the implant with no cement on it at all.  There was clear failure of the cement to bond to the implant. This was likely the source of the patient's pain and symptoms. We then spent a little time removing cement off the proximal tibia using a skim cut with the sagittal saw and then getting some of the cement out of the keel and punch areas within the canal. Once the cement was removed we turned our attention to preparing for new implant placement.  We then drilled our tibial canal.  We then reamed up to a 10 stem for the tibia. We then placed the cutting block on the Reamer and placed it just so we would do a skim cut and get fresh bone.  This was then pinned into place. The Reamer was removed and then we made our skim cut. After this was done we sized our tibia to a size 5.  This was inserted.  We judged our rotation and the tibial component was lined up with the medial 3rd of the tibial tubercle.  This was held into place and then pinned.  We then made our reamings for and punch for our keel.  We then placed a trial 5 tibial component with a 10 x 100 millimeter stem down into the tibia.  We turned our attention to the femur.  We then made our hole for our femoral kevin.  We then reamed up to a size 15.  Leaving the kevin in we then pinned our block and made a distal femoral cut.  Again just a skim cut to make it flat.  We planned on using 5 millimeter augments to help bring the joint line down for the loss of bone when removing the component. We then placed our size 4 cutting block on and placed it parallel to the epicondylar axis.  We also used an anterior reference to reference are prior anterior cut.   This would give us nice skim cuts with almost an air ball posteriorly so that our rotation did not change.  We pinned this into place and then made our cuts.  We then placed the box guide on and made our cuts for the box and removed the bone.  We then placed our trial on.  We placed a size 4 femoral trial with 5 millimeter augments on both the medial and lateral condyle.  We then trialed with a total stabilized insert from 1st an 11 up to about a 16.  The 16 had good stability in varus and valgus with just a little lateral gapping.        We then started preparing final   components on the back table. Stems and augments were assembled onto the components on the back table and tighten.  Anterior, medial and lateral structures were injected with our local   Cocktail. Bony surfaces   copiously irrigated with Pulsavac and then thoroughly dried. Once the cement   was the appropriate consistency, first the tibia and then the femur were   cemented into place, removing excess cement. A 16 TS trial was placed. The knee   was held in compression and then the patella was placed. Cement was allowed to   cure. Once the cement was cured, we then removed excess cement. Again trialed   one final time, again seeing a 16. We then tapped into place the   final 16 meniscal bearing into place and placed the locking bar in as well. We placed a drain in the joint to prevent postop hematoma.  Extra time was spent getting good hemostasis.  Arthrotomy was closed using our StrataFix.   Subcutaneous tissue was closed using 2-0 Vicryl and skin was using a running 3-0   StrataFix and Dermabond.Instrument, sponge, and needle counts were correct prior to wound closure and at the conclusion of the case.  Sterile dressing was applied including a Cryo/Cuff.   They were extubated, awakened and transferred from the Operating Room to the   Recovery Room in stable condition.

## 2019-12-03 NOTE — TRANSFER OF CARE
"Anesthesia Transfer of Care Note    Patient: Caryn Toledo    Procedure(s) Performed: Procedure(s) (LRB):  REVISION, ARTHROPLASTY, KNEE (Left)    Patient location: PACU    Anesthesia Type: MAC, regional and spinal    Transport from OR: Transported from OR on room air with adequate spontaneous ventilation    Post pain: adequate analgesia    Post assessment: no apparent anesthetic complications and tolerated procedure well    Post vital signs: stable    Level of consciousness: awake    Nausea/Vomiting: no nausea/vomiting    Transfer of care protocol was followed      Last vitals:   Visit Vitals  /79 (BP Location: Right arm, Patient Position: Lying)   Pulse (!) 56   Temp 36.7 °C (98.1 °F) (Temporal)   Resp 18   Ht 5' 7" (1.702 m)   Wt 91.6 kg (202 lb)   SpO2 99%   Breastfeeding? No   BMI 31.64 kg/m²     "

## 2019-12-03 NOTE — INTERVAL H&P NOTE
The patient has been examined and the H&P has been reviewed:    I concur with the findings and no changes have occurred since H&P was written.    Anesthesia/Surgery risks, benefits and alternative options discussed and understood by patient/family.          Active Hospital Problems    Diagnosis  POA    Aseptic loosening of prosthetic knee, subsequent encounter [T89.709D, Z96.545]  Not Applicable      Resolved Hospital Problems   No resolved problems to display.

## 2019-12-03 NOTE — PLAN OF CARE
Pt transferred to room 405. VSS, denies pain, dressing to L knee cdi, cryo in place to L knee, benson catheter in place and flowing freely, IV fluids infusing, tolerated ice chips without N/V, SCD to R leg and L foot on. Ok per Dr. Ward to transfer the pt to the floor. Pt transported via bed. Pt's mother at the bedside. Call light within reach. Report given to MELI Gomes.

## 2019-12-04 VITALS
RESPIRATION RATE: 16 BRPM | DIASTOLIC BLOOD PRESSURE: 55 MMHG | OXYGEN SATURATION: 93 % | HEIGHT: 67 IN | WEIGHT: 202 LBS | SYSTOLIC BLOOD PRESSURE: 95 MMHG | HEART RATE: 65 BPM | BODY MASS INDEX: 31.71 KG/M2 | TEMPERATURE: 97 F

## 2019-12-04 LAB
ANION GAP SERPL CALC-SCNC: 7 MMOL/L (ref 8–16)
BASOPHILS # BLD AUTO: 0 K/UL (ref 0–0.2)
BASOPHILS NFR BLD: 0 % (ref 0–1.9)
BUN SERPL-MCNC: 10 MG/DL (ref 6–20)
CALCIUM SERPL-MCNC: 8 MG/DL (ref 8.7–10.5)
CHLORIDE SERPL-SCNC: 110 MMOL/L (ref 95–110)
CO2 SERPL-SCNC: 23 MMOL/L (ref 23–29)
CREAT SERPL-MCNC: 0.8 MG/DL (ref 0.5–1.4)
DIFFERENTIAL METHOD: ABNORMAL
EOSINOPHIL # BLD AUTO: 0 K/UL (ref 0–0.5)
EOSINOPHIL NFR BLD: 0.4 % (ref 0–8)
ERYTHROCYTE [DISTWIDTH] IN BLOOD BY AUTOMATED COUNT: 14 % (ref 11.5–14.5)
EST. GFR  (AFRICAN AMERICAN): >60 ML/MIN/1.73 M^2
EST. GFR  (NON AFRICAN AMERICAN): >60 ML/MIN/1.73 M^2
GLUCOSE SERPL-MCNC: 88 MG/DL (ref 70–110)
HCT VFR BLD AUTO: 31.2 % (ref 37–48.5)
HGB BLD-MCNC: 9.9 G/DL (ref 12–16)
IMM GRANULOCYTES # BLD AUTO: 0.01 K/UL (ref 0–0.04)
LYMPHOCYTES # BLD AUTO: 1.1 K/UL (ref 1–4.8)
LYMPHOCYTES NFR BLD: 23.8 % (ref 18–48)
MCH RBC QN AUTO: 30.1 PG (ref 27–31)
MCHC RBC AUTO-ENTMCNC: 31.7 G/DL (ref 32–36)
MCV RBC AUTO: 95 FL (ref 82–98)
MONOCYTES # BLD AUTO: 0.4 K/UL (ref 0.3–1)
MONOCYTES NFR BLD: 8.7 % (ref 4–15)
NEUTROPHILS # BLD AUTO: 3 K/UL (ref 1.8–7.7)
NEUTROPHILS NFR BLD: 66.9 % (ref 38–73)
NRBC BLD-RTO: 0 /100 WBC
PLATELET # BLD AUTO: 144 K/UL (ref 150–350)
PMV BLD AUTO: 10.3 FL (ref 9.2–12.9)
POTASSIUM SERPL-SCNC: 4.2 MMOL/L (ref 3.5–5.1)
RBC # BLD AUTO: 3.29 M/UL (ref 4–5.4)
SODIUM SERPL-SCNC: 140 MMOL/L (ref 136–145)
WBC # BLD AUTO: 4.5 K/UL (ref 3.9–12.7)

## 2019-12-04 PROCEDURE — 25000003 PHARM REV CODE 250: Performed by: ORTHOPAEDIC SURGERY

## 2019-12-04 PROCEDURE — 97116 GAIT TRAINING THERAPY: CPT

## 2019-12-04 PROCEDURE — 63600175 PHARM REV CODE 636 W HCPCS: Performed by: ORTHOPAEDIC SURGERY

## 2019-12-04 PROCEDURE — 63600175 PHARM REV CODE 636 W HCPCS: Performed by: ANESTHESIOLOGY

## 2019-12-04 PROCEDURE — G8989 SELF CARE D/C STATUS: HCPCS | Mod: CI

## 2019-12-04 PROCEDURE — 36415 COLL VENOUS BLD VENIPUNCTURE: CPT

## 2019-12-04 PROCEDURE — 25000003 PHARM REV CODE 250: Performed by: ANESTHESIOLOGY

## 2019-12-04 PROCEDURE — 94761 N-INVAS EAR/PLS OXIMETRY MLT: CPT

## 2019-12-04 PROCEDURE — G8987 SELF CARE CURRENT STATUS: HCPCS | Mod: CI

## 2019-12-04 PROCEDURE — 97161 PT EVAL LOW COMPLEX 20 MIN: CPT

## 2019-12-04 PROCEDURE — 80048 BASIC METABOLIC PNL TOTAL CA: CPT

## 2019-12-04 PROCEDURE — 85025 COMPLETE CBC W/AUTO DIFF WBC: CPT

## 2019-12-04 PROCEDURE — 25000003 PHARM REV CODE 250: Performed by: NURSE PRACTITIONER

## 2019-12-04 PROCEDURE — G8988 SELF CARE GOAL STATUS: HCPCS | Mod: CI

## 2019-12-04 PROCEDURE — 97535 SELF CARE MNGMENT TRAINING: CPT

## 2019-12-04 PROCEDURE — 97165 OT EVAL LOW COMPLEX 30 MIN: CPT

## 2019-12-04 RX ORDER — ASPIRIN 325 MG
325 TABLET ORAL DAILY
Qty: 30 TABLET | Refills: 0 | Status: SHIPPED | OUTPATIENT
Start: 2019-12-04 | End: 2021-06-17

## 2019-12-04 RX ADMIN — DOCUSATE SODIUM 100 MG: 100 CAPSULE, LIQUID FILLED ORAL at 09:12

## 2019-12-04 RX ADMIN — ACETAMINOPHEN 1000 MG: 10 INJECTION, SOLUTION INTRAVENOUS at 06:12

## 2019-12-04 RX ADMIN — MUPIROCIN 1 G: 20 OINTMENT TOPICAL at 09:12

## 2019-12-04 RX ADMIN — CELECOXIB 200 MG: 100 CAPSULE ORAL at 09:12

## 2019-12-04 RX ADMIN — SERTRALINE HYDROCHLORIDE 25 MG: 25 TABLET ORAL at 09:12

## 2019-12-04 RX ADMIN — FAMOTIDINE 20 MG: 20 TABLET ORAL at 09:12

## 2019-12-04 RX ADMIN — SODIUM CHLORIDE AND POTASSIUM CHLORIDE: .9; .15 SOLUTION INTRAVENOUS at 06:12

## 2019-12-04 RX ADMIN — ANASTROZOLE 1 MG: 1 TABLET ORAL at 09:12

## 2019-12-04 RX ADMIN — PREGABALIN 75 MG: 75 CAPSULE ORAL at 09:12

## 2019-12-04 NOTE — ANESTHESIA POSTPROCEDURE EVALUATION
Anesthesia Post Evaluation    Patient: Caryn Toledo    Procedure(s) Performed: Procedure(s) (LRB):  REVISION, ARTHROPLASTY, KNEE (Left)    Final Anesthesia Type: spinal    Patient location during evaluation: PACU  Patient participation: Yes- Able to Participate  Level of consciousness: awake and alert  Post-procedure vital signs: reviewed and stable  Pain management: adequate  Airway patency: patent    PONV status at discharge: No PONV  Anesthetic complications: no      Cardiovascular status: hemodynamically stable  Respiratory status: unassisted and room air  Hydration status: euvolemic  Follow-up not needed.          Vitals Value Taken Time   /58 12/3/2019  5:22 PM   Temp 36.6 °C (97.9 °F) 12/3/2019  5:22 PM   Pulse 55 12/3/2019  5:22 PM   Resp 17 12/3/2019  5:22 PM   SpO2 98 % 12/3/2019  5:22 PM         Event Time     Out of Recovery 17:35:00          Pain/Glenys Score: Pain Rating Prior to Med Admin: 0 (12/3/2019  4:15 PM)  Glenys Score: 10 (12/3/2019  5:10 PM)

## 2019-12-04 NOTE — PLAN OF CARE
Plan of care reviewed with patient & mother. IV fluids & IV antibiotics given as per orders. L. Knee with ace wrap & drain intact. Cryotherapy in use. Pedal pulses WNL.Raphael catheter intact & patent. No complaints of pain during night. Remains free from falls/injury. Instructed to call for assistance as needed during night , verbalized understanding. Call light in reach.

## 2019-12-04 NOTE — PLAN OF CARE
Referral sent to  [facility], by MELI Becerra-.       12/04/19 1151   Post-Acute Status   Post-Acute Authorization Home Health/Hospice   Home Health/Hospice Status Referrals Sent

## 2019-12-04 NOTE — ANESTHESIA PROCEDURE NOTES
Peripheral Block    Patient location during procedure: pre-op   Block not for primary anesthetic.  Reason for block: at surgeon's request and post-op pain management   Post-op Pain Location: left knee   Start time: 12/3/2019 10:10 AM  Timeout: 12/3/2019 10:10 AM   End time: 12/3/2019 10:17 AM    Staffing  Authorizing Provider: Sree Ward MD  Performing Provider: Sree Ward MD    Preanesthetic Checklist  Completed: patient identified, site marked, surgical consent, pre-op evaluation, timeout performed, IV checked, risks and benefits discussed and monitors and equipment checked  Peripheral Block  Patient position: supine  Prep: ChloraPrep  Patient monitoring: heart rate, cardiac monitor, continuous pulse ox, continuous capnometry and frequent blood pressure checks  Block type: adductor canal  Laterality: left  Injection technique: single shot  Needle  Needle type: Stimuplex   Needle gauge: 21 G  Needle length: 4 in  Needle localization: anatomical landmarks and ultrasound guidance   -ultrasound image captured on disc.  Assessment  Injection assessment: negative aspiration, negative parasthesia and local visualized surrounding nerve  Paresthesia pain: none  Heart rate change: no  Slow fractionated injection: yes  Additional Notes  VSS.  [facility] RN monitoring vitals throughout procedure.  Patient tolerated procedure well.

## 2019-12-04 NOTE — NURSING
Raphael catheter discontinued,, tolerated well. Instructed patient to call when she has to void. Verbalized understanding. Call light in reach. Due to void at 12:20.

## 2019-12-04 NOTE — PT/OT/SLP EVAL
Occupational Therapy   Evaluation and Discharge Note    Name: Caryn Toledo  MRN: [id number]  Admitting Diagnosis:  Aseptic loosening of prosthetic knee, subsequent encounter 1 Day Post-Op    Recommendations:     Discharge Recommendations: home health PT  Discharge Equipment Recommendations:  none  Barriers to discharge:  None    Assessment:     Caryn Toledo is a 59 y.o. female with a medical diagnosis of Aseptic loosening of prosthetic knee, subsequent encounter. At this time, patient is Supervision level with ADLs requiring no physical assistance to perform self care tasks. No further acute OT needs.      Plan:     During this hospitalization, patient does not require further acute OT services.  Please re-consult if situation changes.    · Plan of Care Reviewed with: patient, mother    Subjective     Chief Complaint: none   Patient/Family Comments/goals: none     Occupational Profile:  Living Environment: Pt lives with  and mother in a [facility].   Previous level of function: Pt was independent with ADLs and mobility.  Equipment Used at home:  walker, standard  Assistance upon Discharge: Pt will receive assistance from family.     Pain/Comfort:  · Pain Rating 1: 0/10  · Pain Rating Post-Intervention 1: 0/10    Patients cultural, spiritual, Restoration conflicts given the current situation:      Objective:     Communicated with: nurse Garcia prior to session.  Patient found HOB elevated with SLIME drain, cryotherapy, SCD upon OT entry to room.    General Precautions: Standard, fall   Orthopedic Precautions:LLE weight bearing as tolerated   Braces: N/A     Occupational Performance:    Bed Mobility:    · Patient completed Scooting/Bridging with supervision  · Patient completed Supine to Sit with supervision  · Patient completed Sit to Supine with supervision    Functional Mobility/Transfers:  · Patient completed Sit <> Stand Transfer with contact guard assistance  with  rolling walker   · Patient completed  Toilet Transfer Stand Pivot technique with stand by assistance with  rolling walker  · Functional Mobility: Pt ambulated in room with CGA and RW from bed>bathroom>sink>chair. No LOB.    Activities of Daily Living:  · Grooming: supervision with oral and facial hygiene while standing at sink   · Lower Body Dressing: supervision to don/doff socks while seated.   · Toileting: supervision with rajeev-hygiene after voiding on toilet.     Cognitive/Visual Perceptual:  Cognitive/Psychosocial Skills:     -       Oriented to: x4   -       Follows Commands/attention:Follows multistep  commands  -       Communication: clear/fluent  -       Safety awareness/insight to disability: intact   -       Mood/Affect/Coping skills/emotional control: Appropriate to situation and Cooperative  Visual/Perceptual:      -Intact    Physical Exam:  Postural examination/scapula alignment:    -       Rounded shoulders  -       Forward head  Upper Extremity Range of Motion:     -       Right Upper Extremity: WFL  -       Left Upper Extremity: WFL  Upper Extremity Strength:    -       Right Upper Extremity: WFL  -       Left Upper Extremity: WFL   Strength:    -       Right Upper Extremity: WFL  -       Left Upper Extremity: WFL  Fine Motor Coordination:    -       Intact  Gross motor coordination:   WFL in B UE     AMPAC 6 Click ADL:  AMPAC Total Score: 23    Treatment & Education:  OT ed patient on safety with walker use for functional mobility with cues for hand placement & sequencing.   OT ed pt on use of adaptive equipment for LB dressing & safe item retrieval with reacher with demonstration provided.  OT ed pt on OT role & POC as well as discharge recommendations.    Education:    Patient left up in chair with all lines intact, call button in reach, nurse notified and pt's mother present    GOALS:   Multidisciplinary Problems     Occupational Therapy Goals     Not on file                History:     Past Medical History:   Diagnosis Date     Arthritis     Breast cancer     left    Cancer     breast; skin    High cholesterol     PONV (postoperative nausea and vomiting)     Wears glasses        Past Surgical History:   Procedure Laterality Date    APPENDECTOMY      AXILLARY NODE DISSECTION Left 3/14/2019    Procedure: LYMPHADENECTOMY, AXILLARY;  Surgeon: Didier Garcia MD;  Location: [facility];  Service: General;  Laterality: Left;    BREAST SURGERY Left 02/11/2019    lumpectomy    JOINT REPLACEMENT Left 10/30/2018    knee    KNEE ARTHROPLASTY Left 10/30/2018    Procedure: ARTHROPLASTY, KNEE;  Surgeon: Ata Paula MD;  Location: [facility] OR;  Service: Orthopedics;  Laterality: Left;    KNEE ARTHROSCOPY W/ MENISCECTOMY Right 10/30/2018    Procedure: ARTHROSCOPY, KNEE, WITH MENISCECTOMY;  Surgeon: Ata Paula MD;  Location: [facility] OR;  Service: Orthopedics;  Laterality: Right;    KNEE SURGERY Left 1980s    SENTINEL LYMPH NODE BIOPSY Left 3/14/2019    Procedure: BIOPSY, LYMPH NODE, SENTINEL;  Surgeon: Didier Garcia MD;  Location: [facility];  Service: General;  Laterality: Left;  left axillary sentinel node biopsy, possible axillary dissection    TUMOR REMOVAL Left     thigh       Time Tracking:     OT Date of Treatment: 12/04/19  OT Start Time: 0942  OT Stop Time: 1005  OT Total Time (min): 23 min    Billable Minutes:Evaluation 10  Self Care/Home Management 13    Juan M Trinh, OT  12/4/2019

## 2019-12-04 NOTE — PT/OT/SLP EVAL
Physical Therapy Evaluation    Patient Name:  Caryn Toledo   MRN:  [id number]    Recommendations:     Discharge Recommendations:  home health PT   Discharge Equipment Recommendations: none   Barriers to discharge: None    Assessment:     Caryn Toledo is a 59 y.o. female admitted with a medical diagnosis of Aseptic loosening of prosthetic knee, subsequent encounter.  She presents with the following impairments/functional limitations:  weakness, impaired functional mobilty, impaired self care skills, gait instability, impaired balance, pain, orthopedic precautions, decreased ROM. Pt pleasant and motivated to work with PT. Pt tolerated treatment well today. Pt ambulated 500ft in hallways without knee buckle or LOB. Pt to benefit from HHPT.    Rehab Prognosis: Good; patient would benefit from acute skilled PT services to address these deficits and reach maximum level of function.    Recent Surgery: Procedure(s) (LRB):  REVISION, ARTHROPLASTY, KNEE (Left) 1 Day Post-Op    Plan:     During this hospitalization, patient to be seen BID to address the identified rehab impairments via therapeutic activities, therapeutic exercises, gait training and progress toward the following goals:    · Plan of Care Expires:  01/03/20    Subjective   Pt states her  will be picking her up from the hospital this afternoon. Pt states her mother lives on same piece of property and will be available to assist. Pt states her knee feels unstable when she fully extends with walking, so she feels safest with a slight bend.  Chief Complaint: L knee pain with L knee extension  Patient/Family Comments/goals: to go home  Pain/Comfort:  · Pain Rating 1: (not rated)  · Location - Side 1: Left  · Location 1: knee  · Pain Addressed 1: Reposition, Distraction, Cessation of Activity    Patients cultural, spiritual, Judaism conflicts given the current situation: no    Living Environment:  Lives with  in [facility], [address]. [place] [address]  [address] [place] where her mother also lives.  Prior to admission, patients level of function was I and driving.  Equipment used at home: none.  DME owned (not currently used): rolling walker.  Upon discharge, patient will have assistance from family.    Objective:     Communicated with MELI Garcia prior to session.  Patient found up in chair with cryotherapy, SLIME drain  upon PT entry to room.    General Precautions: Standard, fall   Orthopedic Precautions:LLE weight bearing as tolerated   Braces: N/A     Exams:  · Postural Exam:  Patient presented with the following abnormalities:    · -       Rounded shoulders  · -       Forward head  · RLE ROM: WFL  · RLE Strength: WFL  · LLE ROM: demonstrates 90 degrees knee flexion in sitting  · LLE Strength: WFL    Functional Mobility:  · Transfers:     · Sit to Stand:  contact guard assistance with rolling walker  · Gait: 500ft with RW, CGA. Demos step-through gait pattern      Therapeutic Activities and Exercises:  Sitting up in chair BLE exercises: AP, LAQ, GS, HS x10-20 reps  - pt completed L LAQ with assistance using R LE (Increased pain noted)  Ambulated within hallways.  Cryotherapy reapplied. Pt requested to leave leg rests down at end of treatment.    AM-PAC 6 CLICK MOBILITY  Total Score:21     Patient left up in chair with call button in reach, MELI Garcia notified and Mother present.    GOALS:   Multidisciplinary Problems     Physical Therapy Goals        Problem: Physical Therapy Goal    Goal Priority Disciplines Outcome Goal Variances Interventions   Physical Therapy Goal     PT, PT/OT Ongoing, Progressing     Description:  Goals to be met by: 1/3/2020     Patient will increase functional independence with mobility by performin. Supine to sit with Modified [place]  2. Sit to supine with Modified [place]  3. Sit to stand transfer with Modified [place] and Contact Guard Assistance with RW  4. Gait  x 250 x2 feet with Contact Guard Assistance using  Rolling Walker.   5. Lower extremity exercise program x20 reps                    History:     Past Medical History:   Diagnosis Date    Arthritis     Breast cancer     left    Cancer     breast; skin    High cholesterol     PONV (postoperative nausea and vomiting)     Wears glasses        Past Surgical History:   Procedure Laterality Date    APPENDECTOMY      AXILLARY NODE DISSECTION Left 3/14/2019    Procedure: LYMPHADENECTOMY, AXILLARY;  Surgeon: Didier Garcia MD;  Location: [facility];  Service: General;  Laterality: Left;    BREAST SURGERY Left 02/11/2019    lumpectomy    JOINT REPLACEMENT Left 10/30/2018    knee    KNEE ARTHROPLASTY Left 10/30/2018    Procedure: ARTHROPLASTY, KNEE;  Surgeon: Ata Paula MD;  Location: [facility];  Service: Orthopedics;  Laterality: Left;    KNEE ARTHROSCOPY W/ MENISCECTOMY Right 10/30/2018    Procedure: ARTHROSCOPY, KNEE, WITH MENISCECTOMY;  Surgeon: Ata Paula MD;  Location: [facility];  Service: Orthopedics;  Laterality: Right;    KNEE SURGERY Left 1980s    SENTINEL LYMPH NODE BIOPSY Left 3/14/2019    Procedure: BIOPSY, LYMPH NODE, SENTINEL;  Surgeon: Didier Garcia MD;  Location: [facility];  Service: General;  Laterality: Left;  left axillary sentinel node biopsy, possible axillary dissection    TUMOR REMOVAL Left     thigh       Time Tracking:     PT Received On: 12/04/19  PT Start Time: 1011     PT Stop Time: 1027  PT Total Time (min): 16 min     Billable Minutes: Evaluation 16      Candice Vila, PT  12/04/2019

## 2019-12-04 NOTE — PLAN OF CARE
Pt is accepted by [facility].  Cm spoke with Dariela.  Pt is cleared for discharge from case  Manager.  Dre notified LANDON Garcia.       12/04/19 2414   Final Note   Assessment Type Final Discharge Note   Anticipated Discharge Disposition Home-Health   Hospital Follow Up  Appt(s) scheduled? Yes

## 2019-12-04 NOTE — ASSESSMENT & PLAN NOTE
Status post left knee revision on 12/3/2019 per Dr. Paula. Post op orders per surgery. Pain control. PT consulted.

## 2019-12-04 NOTE — ANESTHESIA PREPROCEDURE EVALUATION
12/03/2019  Caryn Toledo is a 59 y.o., female.    Pre-op Assessment    I have reviewed the Patient Summary Reports.     I have reviewed the Nursing Notes.   I have reviewed the Medications.     Review of Systems  Anesthesia Hx:  Hx of Anesthetic complications PONV Denies Family Hx of Anesthesia complications.  Personal Hx of Anesthesia complications, Post-Operative Nausea/Vomiting, in the past, but not with recent anesthetics / prophylaxis   Social:  Non-Smoker    Hematology/Oncology:         -- Cancer in past history: Breast left   Cardiovascular:   Hypercholesterolemia    Musculoskeletal:   Arthritis  S/p L TKA for revision        Physical Exam  General:  Well nourished    Airway/Jaw/Neck:  Airway Findings: Mouth Opening: Normal Tongue: Normal  General Airway Assessment: Adult  Mallampati: II  TM Distance: Normal, at least 6 cm        Eyes/Ears/Nose:  EYES/EARS/NOSE FINDINGS: Normal   Dental:  Dental Findings: In tact   Chest/Lungs:  Chest/Lungs Findings: Clear to auscultation, Normal Respiratory Rate     Heart/Vascular:  Heart Findings: Rate: Normal  Rhythm: Regular Rhythm        Mental Status:  Mental Status Findings:  Cooperative, Alert and Oriented         Anesthesia Plan  Type of Anesthesia, risks & benefits discussed:  Anesthesia Type:  spinal  Patient's Preference:   Intra-op Monitoring Plan: standard ASA monitors  Intra-op Monitoring Plan Comments:   Post Op Pain Control Plan: IV/PO Opioids PRN, multimodal analgesia and peripheral nerve block  Post Op Pain Control Plan Comments:   Induction:    Beta Blocker:  Patient is not currently on a Beta-Blocker (No further documentation required).       Informed Consent: Patient understands risks and agrees with Anesthesia plan.  Questions answered. Anesthesia consent signed with patient.  ASA Score: 2     Day of Surgery Review of History & Physical:  I have interviewed and examined the patient. I have reviewed the patient's H&P dated:    H&P update referred to the surgeon.         Ready For Surgery From Anesthesia Perspective.

## 2019-12-04 NOTE — PLAN OF CARE
Problem: Physical Therapy Goal  Goal: Physical Therapy Goal  Description  Goals to be met by: 1/3/2020     Patient will increase functional independence with mobility by performin. Supine to sit with Modified [place]  2. Sit to supine with Modified [place]  3. Sit to stand transfer with Modified [place] and Contact Guard Assistance with RW  4. Gait  x 250 x2 feet with Contact Guard Assistance using Rolling Walker.   5. Lower extremity exercise program x20 reps   Outcome: Ongoing, Progressing   Sitting up in chair to ambulating within hallways 500ft with RW and CGA. Seated BLE exercises completed. Demonstrates 90 degrees L knee flexion. Sit to stand CGA

## 2019-12-04 NOTE — SUBJECTIVE & OBJECTIVE
"Past Medical History:   Diagnosis Date    Arthritis     Breast cancer     left    Cancer     breast; skin    High cholesterol     PONV (postoperative nausea and vomiting)     Wears glasses        Past Surgical History:   Procedure Laterality Date    APPENDECTOMY      AXILLARY NODE DISSECTION Left 3/14/2019    Procedure: LYMPHADENECTOMY, AXILLARY;  Surgeon: Didier Garcia MD;  Location: [facility];  Service: General;  Laterality: Left;    BREAST SURGERY Left 02/11/2019    lumpectomy    JOINT REPLACEMENT Left 10/30/2018    knee    KNEE ARTHROPLASTY Left 10/30/2018    Procedure: ARTHROPLASTY, KNEE;  Surgeon: Ata Paula MD;  Location: [facility] OR;  Service: Orthopedics;  Laterality: Left;    KNEE ARTHROSCOPY W/ MENISCECTOMY Right 10/30/2018    Procedure: ARTHROSCOPY, KNEE, WITH MENISCECTOMY;  Surgeon: Ata Paula MD;  Location: [facility] OR;  Service: Orthopedics;  Laterality: Right;    KNEE SURGERY Left 1980s    SENTINEL LYMPH NODE BIOPSY Left 3/14/2019    Procedure: BIOPSY, LYMPH NODE, SENTINEL;  Surgeon: Didier Garcia MD;  Location: [facility];  Service: General;  Laterality: Left;  left axillary sentinel node biopsy, possible axillary dissection    TUMOR REMOVAL Left     thigh       Review of patient's allergies indicates:   Allergen Reactions    Ciprofloxacin Anaphylaxis     paralysis    Pcn [penicillins] Anaphylaxis     paralysis    Eggplant Blisters    Eggs [egg derived]      Inside of mouth peel      Hydrocodone Nausea Only     "causes a migraine"    Morphine Nausea Only     "causes a migraine"    Oxycodone Nausea Only     "causes a migraine"    Tomato (solanum lycopersicum) Blisters       No current facility-administered medications on file prior to encounter.      Current Outpatient Medications on File Prior to Encounter   Medication Sig    anastrozole (ARIMIDEX) 1 mg Tab once daily .    denosumab (PROLIA) 60 mg/mL Syrg Inject 60 mg into the skin.    multivitamin " capsule Take 1 capsule by mouth once daily.    sertraline (ZOLOFT) 25 MG tablet Take 1 tablet (25 mg total) by mouth once daily.    [DISCONTINUED] clindamycin (CLEOCIN) 300 MG capsule Take 1 capsule (300 mg total) by mouth every 6 (six) hours.     Family History     Problem Relation (Age of Onset)    Breast cancer Mother    Cancer Mother, Father        Tobacco Use    Smoking status: Never Smoker    Smokeless tobacco: Never Used   Substance and Sexual Activity    Alcohol use: Yes     Alcohol/week: 2.0 standard drinks     Types: 2 Glasses of wine per week     Comment: occasionally    Drug use: No    Sexual activity: Yes     Partners: Male     Review of Systems   Constitutional: Negative for diaphoresis, fatigue and fever.   HENT: Negative for congestion and ear pain.    Eyes: Negative for photophobia and visual disturbance.   Respiratory: Negative for cough and shortness of breath.    Cardiovascular: Negative for chest pain and leg swelling.   Gastrointestinal: Negative for abdominal distention and abdominal pain.   Endocrine: Negative for polyphagia and polyuria.   Genitourinary: Negative for dysuria, pelvic pain and urgency.   Musculoskeletal: Positive for arthralgias and gait problem.        Left knee   Skin: Negative for rash.   Neurological: Negative for speech difficulty and numbness.   Psychiatric/Behavioral: Negative for agitation and decreased concentration.     Objective:     Vital Signs (Most Recent):  Temp: 97.9 °F (36.6 °C) (12/03/19 1722)  Pulse: (!) 58 (12/03/19 1931)  Resp: 17 (12/03/19 1931)  BP: (!) 113/58 (12/03/19 1722)  SpO2: 97 % (12/03/19 1931) Vital Signs (24h Range):  Temp:  [97.5 °F (36.4 °C)-98.1 °F (36.7 °C)] 97.9 °F (36.6 °C)  Pulse:  [55-80] 58  Resp:  [12-20] 17  SpO2:  [92 %-100 %] 97 %  BP: (103-140)/(55-83) 113/58     Weight: 91.6 kg (202 lb)  Body mass index is 31.64 kg/m².    Physical Exam   Constitutional: She is oriented to person, place, and time. She appears  well-developed and well-nourished.   HENT:   Head: Normocephalic and atraumatic.   Right Ear: External ear normal.   Left Ear: External ear normal.   Mouth/Throat: Oropharynx is clear and moist.   Eyes: Pupils are equal, round, and reactive to light. Conjunctivae and EOM are normal.   Neck: Normal range of motion. Neck supple. No JVD present.   Cardiovascular: Normal rate, regular rhythm, normal heart sounds and intact distal pulses.   No murmur heard.  Pulmonary/Chest: Effort normal and breath sounds normal. She has no wheezes.   Abdominal: Soft. Bowel sounds are normal. There is no tenderness.   Musculoskeletal: Normal range of motion.   Except to left knee. Surgical dressing intact with ACE wrap and ice pack.    Neurological: She is alert and oriented to person, place, and time. Coordination normal.   Decreased sensation to BLE   Skin: Skin is warm and dry.   Psychiatric: She has a normal mood and affect. Her behavior is normal. Judgment normal.   Nursing note and vitals reviewed.        CRANIAL NERVES     CN III, IV, VI   Pupils are equal, round, and reactive to light.  Extraocular motions are normal.        Significant Labs: BMP: No results for input(s): GLU, NA, K, CL, CO2, BUN, CREATININE, CALCIUM, MG in the last 48 hours.  CBC: No results for input(s): WBC, HGB, HCT, PLT in the last 48 hours.    Significant Imaging: I have reviewed and interpreted all pertinent imaging results/findings within the past 24 hours.

## 2019-12-04 NOTE — PLAN OF CARE
Cm completed the assessment with pt and her mother at bedside.  Pt was independent in care before admission.  Spouse and mother will assist pt on discharge.  PCP is Dr. Nolen.  Insurance verified as [employer].   Pt denies diabetes, dialysis and coumadin.  Disposition:  Pt will discharge to home with family.  Pt signed the Pt's Choice Disclosure Form.  Pt will have [facility].       12/04/19 0945   Discharge Assessment   Assessment Type Discharge Planning Assessment   Confirmed/corrected address and phone number on facesheet? Yes   Assessment information obtained from? Patient   Communicated expected length of stay with patient/caregiver yes   Prior to hospitilization cognitive status: Alert/Oriented   Prior to hospitalization functional status: Independent;Assistive Equipment   Current cognitive status: Alert/Oriented   Current Functional Status: Assistive Equipment;Needs Assistance   Facility Arrived From: home   Lives With spouse;parent(s)   Able to Return to Prior Arrangements yes   Is patient able to care for self after discharge? Yes   Who are your caregiver(s) and their phone number(s)? Rigo Luna  [phone]   Patient's perception of discharge disposition home or selfcare   Readmission Within the Last 30 Days no previous admission in last 30 days   Patient currently being followed by outpatient case management? No   Patient currently receives any other outside agency services? No   Equipment Currently Used at Home walker, standard;bedside commode   Do you have any problems affording any of your prescribed medications? No   Is the patient taking medications as prescribed? yes  ([facility])   Does the patient have transportation home? Yes   Transportation Anticipated family or friend will provide   Dialysis Name and Scheduled days na   Discharge Plan A Home with family;Home Health   DME Needed Upon Discharge  none   Patient/Family in Agreement with Plan yes

## 2019-12-04 NOTE — HPI
Caryn Toledo is a 59 year old female with PMH of Breast and skin cancer who is status posREVISION, ARTHROPLASTY, KNEE (Left) rest left knee revision on 12/3/2019 per Dr. Paula. She reports bilateral lower extremity numbness. Denies discomfort. She is doing well post op.

## 2019-12-04 NOTE — NURSING
PIV removed. D/c instructions given and explained, pt verbalized understanding. Pt aware to start taking aspirin.  Pt aware of f/u appt. All questions answered. Pt left unit safely with all belongings via w/c escorted by transport tech.

## 2019-12-04 NOTE — ANESTHESIA PROCEDURE NOTES
Spinal    Diagnosis: osteoarthritis   Patient location during procedure: OR  Start time: 12/3/2019 12:20 PM  Timeout: 12/3/2019 12:20 PM  End time: 12/3/2019 12:25 PM    Staffing  Authorizing Provider: Sree Ward MD  Performing Provider: Sree Ward MD    Preanesthetic Checklist  Completed: patient identified, site marked, surgical consent, pre-op evaluation, timeout performed, IV checked, risks and benefits discussed and monitors and equipment checked  Spinal Block  Patient position: sitting  Prep: ChloraPrep  Patient monitoring: heart rate, cardiac monitor, continuous pulse ox, continuous capnometry and frequent blood pressure checks  Approach: midline  Location: L4-5  Injection technique: single shot  CSF Fluid: clear free-flowing CSF  Needle  Needle type: pencil-tip   Needle gauge: 25 G  Needle length: 3.5 in  Additional Documentation: incremental injection, negative aspiration for heme and no paresthesia on injection  Needle localization: anatomical landmarks  Assessment  Sensory level: T10   Dermatomal levels determined by alcohol wipe  Ease of block: easy  Patient's tolerance of the procedure: comfortable throughout block and no complaints

## 2019-12-04 NOTE — PLAN OF CARE
12/03/19 1931   Patient Assessment/Suction   Level of Consciousness (AVPU) alert   Respiratory Effort Unlabored   PRE-TX-O2   O2 Device (Oxygen Therapy) room air   SpO2 97 %   Pulse Oximetry Type Intermittent   $ Pulse Oximetry - Multiple Charge Pulse Oximetry - Multiple   Pulse (!) 58   Resp 17

## 2019-12-04 NOTE — NURSING
Bedside report received from [place]. Patient denies any pain or nausea at this time. SCD, foot pump in place. Cryo to left knee. VS stable

## 2019-12-04 NOTE — PT/OT/SLP PROGRESS
Physical Therapy Treatment    Patient Name:  Caryn Toledo   MRN:  [id number]    Recommendations:     Discharge Recommendations:  home health PT   Discharge Equipment Recommendations: none   Barriers to discharge: None    Assessment:     Caryn Toledo is a 59 y.o. female admitted with a medical diagnosis of Aseptic loosening of prosthetic knee, subsequent encounter.  She presents with the following impairments/functional limitations:  weakness, impaired functional mobilty, impaired self care skills, gait instability, impaired balance, pain, orthopedic precautions, decreased ROM. Pt seen BID today. Pt demonstrated increased confidence with ambulating within hallways in PM treatment. Would benefit from HHPT    Rehab Prognosis: Good; patient would benefit from acute skilled PT services to address these deficits and reach maximum level of function.    Recent Surgery: Procedure(s) (LRB):  REVISION, ARTHROPLASTY, KNEE (Left) 1 Day Post-Op    Plan:     During this hospitalization, patient to be seen BID to address the identified rehab impairments via therapeutic activities, therapeutic exercises, gait training and progress toward the following goals:    · Plan of Care Expires:  01/03/20    Subjective   Pt states she was able to get some good rest since AM treatment. Pt states she was fatigued at the end of gait training. Pt appreciative of care received and is ready to go home.  Chief Complaint: no complaints at this time  Patient/Family Comments/goals: go home  Pain/Comfort:  · Pain Rating 1: 0/10  · Location - Side 1: Left  · Location 1: knee  · Pain Addressed 1: Reposition, Distraction, Cessation of Activity      Objective:     Communicated with MELI Garcia prior to session.  Patient found HOB elevated, mother in room, with cryotherapy, SLIME drain upon PT entry to room.     General Precautions: Standard, fall   Orthopedic Precautions:LLE weight bearing as tolerated   Braces: N/A     Functional Mobility:  · Bed  Mobility:     · Supine to Sit: modified independence  · Transfers:     · Sit to Stand:  stand by assistance with no AD  · Gait: 500 ft with RW and CGA. Pt demonstrates step-through gait pattern, adequate gait speed, absent of knee buckle or LOB.       AM-PAC 6 CLICK MOBILITY  Turning over in bed (including adjusting bedclothes, sheets and blankets)?: 4  Sitting down on and standing up from a chair with arms (e.g., wheelchair, bedside commode, etc.): 4  Moving from lying on back to sitting on the side of the bed?: 4  Moving to and from a bed to a chair (including a wheelchair)?: 4  Need to walk in hospital room?: 4  Climbing 3-5 steps with a railing?: 1  Basic Mobility Total Score: 21       Therapeutic Activities and Exercises:  Sitting EOB to ambulating in hallways.  Requested to return to bed. Cryotherapy reapplied.    Patient left HOB elevated with call button in reach and RN Jose notified..    GOALS:   Multidisciplinary Problems     Physical Therapy Goals        Problem: Physical Therapy Goal    Goal Priority Disciplines Outcome Goal Variances Interventions   Physical Therapy Goal     PT, PT/OT Ongoing, Progressing     Description:  Goals to be met by: 1/3/2020     Patient will increase functional independence with mobility by performin. Supine to sit with Modified [place]  2. Sit to supine with Modified [place]  3. Sit to stand transfer with Modified [place] and Contact Guard Assistance with RW  4. Gait  x 250 x2 feet with Contact Guard Assistance using Rolling Walker.   5. Lower extremity exercise program x20 reps                    Time Tracking:     PT Received On: 19  PT Start Time: 1341     PT Stop Time: 1400  PT Total Time (min): 19 min     Billable Minutes: Gait Training 19    Treatment Type: Treatment  PT/PTA: PT           Candice Vila, PT  2019

## 2019-12-05 ENCOUNTER — TELEPHONE (OUTPATIENT)
Dept: ORTHOPEDICS | Facility: CLINIC | Age: 59
End: 2019-12-05

## 2019-12-05 NOTE — TELEPHONE ENCOUNTER
----- Message from Shira Krishna MA sent at 12/5/2019 12:28 PM CST -----  [facility]   mark   Needs order to remove vac   Call back

## 2019-12-05 NOTE — PLAN OF CARE
12/05/19 @ 8:47AM  Der received a message from MELI Brown, stating they did not received the discharge orders for home health.  Dre informed MELI Brown that it was sent to [place] and I spoke with Tracee from [facility].    9:30AM  Dre called Nory with [employer] at [facility].  Dre re-sent the orders via fax to [phone].  Dre mentioned to Nory that we cannot find [facility] for [facility] in [facility].  Nory told me they are working on it.        12/05/19 0934   Discharge Reassessment   Assessment Type Discharge Planning Reassessment   Provided patient/caregiver education on the expected discharge date and the discharge plan Yes

## 2019-12-06 NOTE — DISCHARGE SUMMARY
[facility] Hospital Medicine  Discharge Summary      Patient Name: Caryn Toledo  MRN: [id number]  Admission Date: 12/3/2019  Hospital Length of Stay: 1 days  Discharge Date and Time: 12/4/2019  4:00 PM  Attending Physician: No att. providers found   Discharging Provider: Marlyn Mosley NP  Primary Care Provider: Terrance Nolen MD      HPI:   Caryn Toledo is a 59 year old female with PMH of Breast and skin cancer who is status posREVISION, ARTHROPLASTY, KNEE (Left) rest left knee revision on 12/3/2019 per Dr. Paula. She reports bilateral lower extremity numbness. Denies discomfort. She is doing well post op.    Procedure(s) (LRB):  REVISION, ARTHROPLASTY, KNEE (Left)      Hospital Course:   Patient monitor closely during hospitalization.  She received pain control.  Postop orders per Orthopedic surgery.  CBC trended   And remained stable.  PT consulted.  Patient is doing well postoperatively and is ambulating in room.  She is stable for discharge from an orthopedic standpoint.    Physical exam left knee surgical dressing intact with Ace wrap and ice pack.  No edema     Consults:     No new Assessment & Plan notes have been filed under this hospital service since the last note was generated.  Service: Hospital Medicine    Final Active Diagnoses:    Diagnosis Date Noted POA    PRINCIPAL PROBLEM:  Aseptic loosening of prosthetic knee, subsequent encounter [T84.038D, Z96.659] 11/07/2019 Not Applicable    Malignant neoplasm of central portion of left breast in female, estrogen receptor positive [C50.112, Z17.0] 03/14/2019 Not Applicable      Problems Resolved During this Admission:       Discharged Condition: stable    Disposition: Home-Health Care [facility]    Follow Up:  Follow-up Information     Terrance Nolen MD In 1 week.    Specialty:  Family Medicine  Why:  hospital follow up.  Pt has a f/u with Nell ware in avs  Contact information:  [address] [place]  [address]  [phone]             Ata Paula MD On 12/9/2019.    Specialties:  Sports Medicine, Orthopedic Surgery  Why:  post op followup 12/9 @ 8am  Contact information:  [address] [place] [address] [address] Sanz Vargas  [place] [address]  [phone]             [facility] [place].    Specialties:  Hospice Services, Home Health Services  Contact information:  [address]  [place] [address]  [phone]                 Patient Instructions:      Ambulatory referral to Home Health   Referral Priority: Routine Referral Type: Home Health   Referral Reason: Specialty Services Required   Requested Specialty: Home Health Services   Number of Visits Requested: 1     Diet Cardiac     Notify your health care provider if you experience any of the following:  worsening rash     Notify your health care provider if you experience any of the following:  severe persistent headache     Notify your health care provider if you experience any of the following:  difficulty breathing or increased cough     Notify your health care provider if you experience any of the following:  redness, tenderness, or signs of infection (pain, swelling, redness, odor or green/yellow discharge around incision site)     Notify your health care provider if you experience any of the following:  severe uncontrolled pain     Notify your health care provider if you experience any of the following:  persistent nausea and vomiting or diarrhea     Notify your health care provider if you experience any of the following:  temperature >100.4     Activity as tolerated       Significant Diagnostic Studies: Labs:   BMP:   Recent Labs   Lab 12/04/19  0458   GLU 88      K 4.2      CO2 23   BUN 10   CREATININE 0.8   CALCIUM 8.0*    and CMP   Recent Labs   Lab 12/04/19  0458      K 4.2      CO2 23   GLU 88   BUN 10   CREATININE 0.8   CALCIUM 8.0*   ANIONGAP 7*   ESTGFRAFRICA >60   EGFRNONAA >60       Pending Diagnostic Studies:     None          Medications:  Reconciled Home Medications:      Medication List      START taking these medications    aspirin 325 MG tablet  Take 1 tablet (325 mg total) by mouth once daily.        CONTINUE taking these medications    anastrozole 1 mg Tab  Commonly known as:  ARIMIDEX  once daily .     multivitamin capsule  Take 1 capsule by mouth once daily.     Prolia 60 mg/mL Syrg  Generic drug:  denosumab  Inject 60 mg into the skin.     sertraline 25 MG tablet  Commonly known as:  ZOLOFT  Take 1 tablet (25 mg total) by mouth once daily.            Indwelling Lines/Drains at time of discharge:   Lines/Drains/Airways     Drain                 Closed/Suction Drain 12/03/19 1521 Left Knee Accordion 15 Fr. 2 days                Time spent on the discharge of patient: 60 minutes  Patient was seen and examined on the date of discharge and determined to be suitable for discharge.         Marlyn Mosley NP  Department of Hospital Medicine  [facility]

## 2019-12-06 NOTE — HOSPITAL COURSE
Patient monitor closely during hospitalization.  She received pain control.  Postop orders per Orthopedic surgery.  CBC trended   And remained stable.  PT consulted.  Patient is doing well postoperatively and is ambulating in room.  She is stable for discharge from an orthopedic standpoint.    Physical exam left knee surgical dressing intact with Ace wrap and ice pack.  No edema

## 2019-12-09 ENCOUNTER — OFFICE VISIT (OUTPATIENT)
Dept: ORTHOPEDICS | Facility: CLINIC | Age: 59
End: 2019-12-09
Payer: COMMERCIAL

## 2019-12-09 VITALS
DIASTOLIC BLOOD PRESSURE: 81 MMHG | BODY MASS INDEX: 31.71 KG/M2 | HEART RATE: 95 BPM | SYSTOLIC BLOOD PRESSURE: 123 MMHG | HEIGHT: 67 IN | WEIGHT: 202 LBS

## 2019-12-09 DIAGNOSIS — Z96.652 STATUS POST TOTAL KNEE REPLACEMENT USING CEMENT, LEFT: Primary | ICD-10-CM

## 2019-12-09 DIAGNOSIS — M25.562 ACUTE PAIN OF LEFT KNEE: Primary | ICD-10-CM

## 2019-12-09 PROCEDURE — 99999 PR PBB SHADOW E&M-EST. PATIENT-LVL III: ICD-10-PCS | Mod: PBBFAC,,, | Performed by: ORTHOPAEDIC SURGERY

## 2019-12-09 PROCEDURE — 99024 PR POST-OP FOLLOW-UP VISIT: ICD-10-PCS | Mod: S$GLB,,, | Performed by: ORTHOPAEDIC SURGERY

## 2019-12-09 PROCEDURE — 99024 POSTOP FOLLOW-UP VISIT: CPT | Mod: S$GLB,,, | Performed by: ORTHOPAEDIC SURGERY

## 2019-12-09 PROCEDURE — 99999 PR PBB SHADOW E&M-EST. PATIENT-LVL III: CPT | Mod: PBBFAC,,, | Performed by: ORTHOPAEDIC SURGERY

## 2019-12-09 NOTE — PROGRESS NOTES
Past Medical History:   Diagnosis Date    Arthritis     Breast cancer     left    Cancer     breast; skin    High cholesterol     PONV (postoperative nausea and vomiting)     Wears glasses        Past Surgical History:   Procedure Laterality Date    APPENDECTOMY      AXILLARY NODE DISSECTION Left 3/14/2019    Procedure: LYMPHADENECTOMY, AXILLARY;  Surgeon: Didier Garcia MD;  Location: [facility] OR;  Service: General;  Laterality: Left;    BREAST SURGERY Left 02/11/2019    lumpectomy    JOINT REPLACEMENT Left 10/30/2018    knee    KNEE ARTHROPLASTY Left 10/30/2018    Procedure: ARTHROPLASTY, KNEE;  Surgeon: Ata Paula MD;  Location: [facility] OR;  Service: Orthopedics;  Laterality: Left;    KNEE ARTHROSCOPY W/ MENISCECTOMY Right 10/30/2018    Procedure: ARTHROSCOPY, KNEE, WITH MENISCECTOMY;  Surgeon: Ata Paula MD;  Location: [facility] OR;  Service: Orthopedics;  Laterality: Right;    KNEE SURGERY Left 1980s    REVISION OF KNEE ARTHROPLASTY Left 12/3/2019    Procedure: REVISION, ARTHROPLASTY, KNEE;  Surgeon: Ata Paula MD;  Location: [facility] OR;  Service: Orthopedics;  Laterality: Left;  Edith Lamas Venessa  (notified 11/27/19 )    SENTINEL LYMPH NODE BIOPSY Left 3/14/2019    Procedure: BIOPSY, LYMPH NODE, SENTINEL;  Surgeon: Didier Garcia MD;  Location: [facility] OR;  Service: General;  Laterality: Left;  left axillary sentinel node biopsy, possible axillary dissection    TUMOR REMOVAL Left     thigh       Current Outpatient Medications   Medication Sig    anastrozole (ARIMIDEX) 1 mg Tab once daily .    aspirin 325 MG tablet Take 1 tablet (325 mg total) by mouth once daily.    denosumab (PROLIA) 60 mg/mL Syrg Inject 60 mg into the skin.    multivitamin capsule Take 1 capsule by mouth once daily.    sertraline (ZOLOFT) 25 MG tablet Take 1 tablet (25 mg total) by mouth once daily.     No current facility-administered medications for this visit.        Review of  patient's allergies indicates:   Allergen Reactions    Ciprofloxacin Anaphylaxis     paralysis    Pcn [penicillins] Anaphylaxis     paralysis    Eggplant Blisters    Eggs [egg derived]      Inside of mouth peel      Tomato (solanum lycopersicum) Blisters       Family History   Problem Relation Age of Onset    Breast cancer Mother     Cancer Mother         skin    Cancer Father         multiple myeloma       Social History     Socioeconomic History    Marital status: Single     Spouse name: Not on file    Number of children: Not on file    Years of education: Not on file    Highest education level: Not on file   Occupational History    Not on file   Social Needs    Financial resource strain: Not on file    Food insecurity:     Worry: Not on file     Inability: Not on file    Transportation needs:     Medical: Not on file     Non-medical: Not on file   Tobacco Use    Smoking status: Never Smoker    Smokeless tobacco: Never Used   Substance and Sexual Activity    Alcohol use: Yes     Alcohol/week: 2.0 standard drinks     Types: 2 Glasses of wine per week     Comment: occasionally    Drug use: No    Sexual activity: Yes     Partners: Male   Lifestyle    Physical activity:     Days per week: Not on file     Minutes per session: Not on file    Stress: Not on file   Relationships    Social connections:     Talks on phone: Not on file     Gets together: Not on file     Attends Jainism service: Not on file     Active member of club or organization: Not on file     Attends meetings of clubs or organizations: Not on file     Relationship status: Not on file   Other Topics Concern    Not on file   Social History Narrative    Not on file       Chief Complaint:   Chief Complaint   Patient presents with    Post-op Evaluation     s/p left knee TKA 12/3/19        Date of surgery:  October 30, 2018 left total knee arthroplasty and December 3, 2019 revision left total knee arthroplasty    History of  present illness:  59-year-old female s/p revision left total knee arthroplasty.  Patient did have some aseptic tibial loosening.  She is doing great.  Has not taken 1 single pain pill at all.  Pain is 0/10.  Walking without assistive device.    Answers for HPI/ROS submitted by the patient on 10/29/2019   Leg pain  unexpected weight change: No  appetite change : No  sleep disturbance: No  IMMUNOCOMPROMISED: No  nervous/ anxious: No  dysphoric mood: No  rash: No  visual disturbance: No  eye redness: No  eye pain: No  ear pain: No  tinnitus: No  hearing loss: No  sinus pressure : No  nosebleeds: No  enviro allergies: No  food allergies: No  cough: No  shortness of breath: No  sweating: No  dysuria: No  frequency: No  difficulty urinating: No  hematuria: No  painful intercourse: No  chest pain: No  palpitations: No  nausea: No  vomiting: No  diarrhea: No  blood in stool: No  constipation: No  headaches: No  dizziness: No  numbness: No  seizures: No  joint swelling: No  myalgia: No  weakness: No  back pain: No  Pain Chronicity: recurrent  History of trauma: Yes  Onset: 1 to 4 weeks ago  Frequency: intermittently  Progression since onset: gradually worsening  injury location: at home  pain- numeric: 10/10  pain location: left knee  pain quality: sharp  Radiating Pain: No  Aggravating factors: walking  fever: No  inability to bear weight: Yes  itching: No  joint locking: No  limited range of motion: No  stiffness: No  tingling: No  Treatments tried: brace/corset, cold, OTC pain meds, rest  physical therapy: not tried  Improvement on treatment: no relief      Physical Examination:    Vital Signs:    Vitals:    12/09/19 0805   BP: 123/81   Pulse: 95       Body mass index is 31.64 kg/m².    This a well-developed, well nourished patient in no acute distress.  They are alert and oriented and cooperative to examination.  Pt. walks without an antalgic gait.       Examination of left knee shows mild effusion.  Incisions are  healing well. No redness or erythema.  Range of motion is about 3° to 90°.    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  Continue with physical therapy.  Follow up in 4 weeks.    This note was created using M Modal voice recognition software that occasionally misinterpreted phrases or words.

## 2019-12-11 ENCOUNTER — OFFICE VISIT (OUTPATIENT)
Dept: FAMILY MEDICINE | Facility: CLINIC | Age: 59
End: 2019-12-11
Payer: COMMERCIAL

## 2019-12-11 VITALS
DIASTOLIC BLOOD PRESSURE: 64 MMHG | RESPIRATION RATE: 18 BRPM | SYSTOLIC BLOOD PRESSURE: 118 MMHG | HEART RATE: 77 BPM | BODY MASS INDEX: 31.15 KG/M2 | OXYGEN SATURATION: 97 % | HEIGHT: 67 IN | TEMPERATURE: 98 F | WEIGHT: 198.44 LBS

## 2019-12-11 DIAGNOSIS — Z09 HOSPITAL DISCHARGE FOLLOW-UP: Primary | ICD-10-CM

## 2019-12-11 DIAGNOSIS — B35.1 NAIL FUNGUS: ICD-10-CM

## 2019-12-11 DIAGNOSIS — Z96.652 STATUS POST TOTAL KNEE REPLACEMENT USING CEMENT, LEFT: ICD-10-CM

## 2019-12-11 DIAGNOSIS — Z23 NEED FOR INFLUENZA VACCINATION: ICD-10-CM

## 2019-12-11 PROCEDURE — 99999 PR PBB SHADOW E&M-EST. PATIENT-LVL IV: CPT | Mod: PBBFAC,,, | Performed by: NURSE PRACTITIONER

## 2019-12-11 PROCEDURE — 99999 PR PBB SHADOW E&M-EST. PATIENT-LVL III: ICD-10-PCS | Mod: PBBFAC,,, | Performed by: NURSE PRACTITIONER

## 2019-12-11 PROCEDURE — 99214 PR OFFICE/OUTPT VISIT, EST, LEVL IV, 30-39 MIN: ICD-10-PCS | Mod: 25,S$GLB,, | Performed by: NURSE PRACTITIONER

## 2019-12-11 PROCEDURE — 90471 IMMUNIZATION ADMIN: CPT | Mod: S$GLB,,, | Performed by: NURSE PRACTITIONER

## 2019-12-11 PROCEDURE — 90686 FLU VACCINE (QUAD) GREATER THAN OR EQUAL TO 3YO PRESERVATIVE FREE IM: ICD-10-PCS | Mod: S$GLB,,, | Performed by: NURSE PRACTITIONER

## 2019-12-11 PROCEDURE — 90471 FLU VACCINE (QUAD) GREATER THAN OR EQUAL TO 3YO PRESERVATIVE FREE IM: ICD-10-PCS | Mod: S$GLB,,, | Performed by: NURSE PRACTITIONER

## 2019-12-11 PROCEDURE — 99214 OFFICE O/P EST MOD 30 MIN: CPT | Mod: 25,S$GLB,, | Performed by: NURSE PRACTITIONER

## 2019-12-11 PROCEDURE — 90686 IIV4 VACC NO PRSV 0.5 ML IM: CPT | Mod: S$GLB,,, | Performed by: NURSE PRACTITIONER

## 2019-12-11 PROCEDURE — 99999 PR PBB SHADOW E&M-EST. PATIENT-LVL III: CPT | Mod: PBBFAC,,, | Performed by: NURSE PRACTITIONER

## 2019-12-11 RX ORDER — CICLOPIROX 80 MG/ML
SOLUTION TOPICAL NIGHTLY
Qty: 6.6 ML | Refills: 0 | Status: ON HOLD | OUTPATIENT
Start: 2019-12-11 | End: 2020-11-19 | Stop reason: ALTCHOICE

## 2019-12-11 NOTE — PROGRESS NOTES
Subjective:       Patient ID: Caryn Toledo is a 59 y.o. female.    Chief Complaint: Hospital Follow Up (1 week )    Family and/or Caretaker present at visit?  Yes.  Diagnostic tests reviewed/disposition: No diagnosic tests pending after this hospitalization.  Disease/illness education: Post knee replacement.   Home health/community services discussion/referrals: Patient has home health established at [facility] with PT and home health.   Establishment or re-establishment of referral orders for community resources: No other necessary community resources.   Discussion with other health care providers: No discussion with other health care providers necessary.   HPI Patient is a 59 year old patient of Dr. Nolen, who presents today at hospital follow up appointment.    Pt is unknown to me.       Hospital records, including laboratory, radiologic, and other tests performed during the stay, have been obtained and reviewed.  Pt was discharged from the hospital on 12/04/2019. Patient recently underwent a knee replacement with cryoanalgesic. This has prevented her from having any severe pain. She is doing great with this treatment and has not needed any pain medication. She is doing PT treatment and able to do 120 degree flexion. She is following up with orthopedics.       Review of Systems   Constitutional: Negative for chills, fatigue and fever.   HENT: Negative for congestion, sinus pressure, sinus pain, sneezing and sore throat.    Respiratory: Negative for cough, chest tightness, shortness of breath and wheezing.    Cardiovascular: Negative for chest pain, palpitations and leg swelling.   Gastrointestinal: Negative for abdominal distention, abdominal pain, constipation, diarrhea, nausea and vomiting.   Genitourinary: Negative for decreased urine volume, difficulty urinating, dysuria, frequency and urgency.   Musculoskeletal: Positive for joint swelling. Negative for arthralgias, gait problem and  myalgias.   Skin: Negative for rash and wound.   Neurological: Negative for dizziness, light-headedness, numbness and headaches.       Objective:      Physical Exam   Constitutional: She is oriented to person, place, and time. She appears well-developed and well-nourished.   HENT:   Head: Normocephalic and atraumatic.   Right Ear: External ear normal.   Left Ear: External ear normal.   Nose: Nose normal.   Mouth/Throat: Oropharynx is clear and moist.   Eyes: Pupils are equal, round, and reactive to light.   Neck: Normal range of motion.   Cardiovascular: Normal rate, regular rhythm, normal heart sounds and intact distal pulses.   Pulmonary/Chest: Effort normal and breath sounds normal.   Abdominal: Soft. Bowel sounds are normal.   Musculoskeletal: Normal range of motion.        Legs:  Neurological: She is alert and oriented to person, place, and time.   Skin: Skin is warm and dry.   Nursing note and vitals reviewed.          Assessment:       1. Hospital discharge follow-up    2. Nail fungus    3. Status post total knee replacement using cement, left    4. Need for influenza vaccination        Plan:       Caryn was seen today for hospital follow up.    Diagnoses and all orders for this visit:    Hospital discharge follow-up  -     CBC auto differential; Future  -     Comprehensive metabolic panel; Future    Nail fungus  -     ciclopirox (PENLAC) 8 % Soln; Apply topically nightly.  -     Comprehensive metabolic panel; Future    Status post total knee replacement using cement, left  Continue follow up with orthopedics and continue PT.   Need for influenza vaccination  -     Influenza - Quadrivalent (PF)    Discussed worsening signs/symptoms and when to return to clinic or go to ED.   Patient expresses understanding and agrees with treatment plan.

## 2019-12-12 ENCOUNTER — TELEPHONE (OUTPATIENT)
Dept: ORTHOPEDICS | Facility: CLINIC | Age: 59
End: 2019-12-12

## 2019-12-12 NOTE — TELEPHONE ENCOUNTER
HH nurse called and stated that patient has a knot on the lower medial side of her incision that just popped up. Advised  nurse that per Dr. Paula to have the patient use heat to the area, She verbalized understanding.

## 2019-12-21 ENCOUNTER — EXTERNAL HOME HEALTH (OUTPATIENT)
Dept: HOME HEALTH SERVICES | Facility: HOSPITAL | Age: 59
End: 2019-12-21
Payer: COMMERCIAL

## 2019-12-27 ENCOUNTER — LAB VISIT (OUTPATIENT)
Dept: LAB | Facility: HOSPITAL | Age: 59
End: 2019-12-27
Attending: NURSE PRACTITIONER
Payer: COMMERCIAL

## 2019-12-27 DIAGNOSIS — Z09 HOSPITAL DISCHARGE FOLLOW-UP: ICD-10-CM

## 2019-12-27 DIAGNOSIS — B35.1 NAIL FUNGUS: ICD-10-CM

## 2019-12-27 LAB
ALBUMIN SERPL BCP-MCNC: 3.9 G/DL (ref 3.5–5.2)
ALP SERPL-CCNC: 100 U/L (ref 55–135)
ALT SERPL W/O P-5'-P-CCNC: 11 U/L (ref 10–44)
ANION GAP SERPL CALC-SCNC: 7 MMOL/L (ref 8–16)
AST SERPL-CCNC: 12 U/L (ref 10–40)
BASOPHILS # BLD AUTO: 0.03 K/UL (ref 0–0.2)
BASOPHILS NFR BLD: 0.9 % (ref 0–1.9)
BILIRUB SERPL-MCNC: 0.4 MG/DL (ref 0.1–1)
BUN SERPL-MCNC: 13 MG/DL (ref 6–20)
CALCIUM SERPL-MCNC: 9.4 MG/DL (ref 8.7–10.5)
CHLORIDE SERPL-SCNC: 107 MMOL/L (ref 95–110)
CO2 SERPL-SCNC: 27 MMOL/L (ref 23–29)
CREAT SERPL-MCNC: 0.8 MG/DL (ref 0.5–1.4)
DIFFERENTIAL METHOD: ABNORMAL
EOSINOPHIL # BLD AUTO: 0.1 K/UL (ref 0–0.5)
EOSINOPHIL NFR BLD: 1.7 % (ref 0–8)
ERYTHROCYTE [DISTWIDTH] IN BLOOD BY AUTOMATED COUNT: 14.3 % (ref 11.5–14.5)
EST. GFR  (AFRICAN AMERICAN): >60 ML/MIN/1.73 M^2
EST. GFR  (NON AFRICAN AMERICAN): >60 ML/MIN/1.73 M^2
GLUCOSE SERPL-MCNC: 87 MG/DL (ref 70–110)
HCT VFR BLD AUTO: 39.3 % (ref 37–48.5)
HGB BLD-MCNC: 11.8 G/DL (ref 12–16)
IMM GRANULOCYTES # BLD AUTO: 0.01 K/UL (ref 0–0.04)
IMM GRANULOCYTES NFR BLD AUTO: 0.3 % (ref 0–0.5)
LYMPHOCYTES # BLD AUTO: 1.2 K/UL (ref 1–4.8)
LYMPHOCYTES NFR BLD: 35.4 % (ref 18–48)
MCH RBC QN AUTO: 28.9 PG (ref 27–31)
MCHC RBC AUTO-ENTMCNC: 30 G/DL (ref 32–36)
MCV RBC AUTO: 96 FL (ref 82–98)
MONOCYTES # BLD AUTO: 0.2 K/UL (ref 0.3–1)
MONOCYTES NFR BLD: 6.4 % (ref 4–15)
NEUTROPHILS # BLD AUTO: 1.9 K/UL (ref 1.8–7.7)
NEUTROPHILS NFR BLD: 55.3 % (ref 38–73)
NRBC BLD-RTO: 0 /100 WBC
PLATELET # BLD AUTO: 234 K/UL (ref 150–350)
PMV BLD AUTO: 10.6 FL (ref 9.2–12.9)
POTASSIUM SERPL-SCNC: 4.7 MMOL/L (ref 3.5–5.1)
PROT SERPL-MCNC: 7.1 G/DL (ref 6–8.4)
RBC # BLD AUTO: 4.09 M/UL (ref 4–5.4)
SODIUM SERPL-SCNC: 141 MMOL/L (ref 136–145)
WBC # BLD AUTO: 3.45 K/UL (ref 3.9–12.7)

## 2019-12-27 PROCEDURE — 80053 COMPREHEN METABOLIC PANEL: CPT

## 2019-12-27 PROCEDURE — 36415 COLL VENOUS BLD VENIPUNCTURE: CPT | Mod: PO

## 2019-12-27 PROCEDURE — 85025 COMPLETE CBC W/AUTO DIFF WBC: CPT

## 2020-01-06 ENCOUNTER — OFFICE VISIT (OUTPATIENT)
Dept: ORTHOPEDICS | Facility: CLINIC | Age: 60
End: 2020-01-06
Payer: COMMERCIAL

## 2020-01-06 VITALS
HEART RATE: 66 BPM | HEIGHT: 67 IN | BODY MASS INDEX: 31.08 KG/M2 | SYSTOLIC BLOOD PRESSURE: 140 MMHG | WEIGHT: 198 LBS | DIASTOLIC BLOOD PRESSURE: 65 MMHG

## 2020-01-06 DIAGNOSIS — Z96.652 STATUS POST TOTAL KNEE REPLACEMENT USING CEMENT, LEFT: Primary | ICD-10-CM

## 2020-01-06 PROCEDURE — 99999 PR PBB SHADOW E&M-EST. PATIENT-LVL III: CPT | Mod: PBBFAC,,, | Performed by: ORTHOPAEDIC SURGERY

## 2020-01-06 PROCEDURE — 99024 PR POST-OP FOLLOW-UP VISIT: ICD-10-PCS | Mod: S$GLB,,, | Performed by: ORTHOPAEDIC SURGERY

## 2020-01-06 PROCEDURE — 99999 PR PBB SHADOW E&M-EST. PATIENT-LVL III: ICD-10-PCS | Mod: PBBFAC,,, | Performed by: ORTHOPAEDIC SURGERY

## 2020-01-06 PROCEDURE — 99024 POSTOP FOLLOW-UP VISIT: CPT | Mod: S$GLB,,, | Performed by: ORTHOPAEDIC SURGERY

## 2020-01-06 NOTE — PROGRESS NOTES
Past Medical History:   Diagnosis Date    Arthritis     Breast cancer     left    Cancer     breast; skin    High cholesterol     PONV (postoperative nausea and vomiting)     Wears glasses        Past Surgical History:   Procedure Laterality Date    APPENDECTOMY      AXILLARY NODE DISSECTION Left 3/14/2019    Procedure: LYMPHADENECTOMY, AXILLARY;  Surgeon: Didier Garcia MD;  Location: [facility] OR;  Service: General;  Laterality: Left;    BREAST SURGERY Left 02/11/2019    lumpectomy    JOINT REPLACEMENT Left 10/30/2018    knee    KNEE ARTHROPLASTY Left 10/30/2018    Procedure: ARTHROPLASTY, KNEE;  Surgeon: Ata Paula MD;  Location: [facility] OR;  Service: Orthopedics;  Laterality: Left;    KNEE ARTHROSCOPY W/ MENISCECTOMY Right 10/30/2018    Procedure: ARTHROSCOPY, KNEE, WITH MENISCECTOMY;  Surgeon: Ata Paula MD;  Location: [facility] OR;  Service: Orthopedics;  Laterality: Right;    KNEE SURGERY Left 1980s    REVISION OF KNEE ARTHROPLASTY Left 12/3/2019    Procedure: REVISION, ARTHROPLASTY, KNEE;  Surgeon: Ata Paula MD;  Location: [facility] OR;  Service: Orthopedics;  Laterality: Left;  Lucy- Bob Venessa  (notified 11/27/19 )    SENTINEL LYMPH NODE BIOPSY Left 3/14/2019    Procedure: BIOPSY, LYMPH NODE, SENTINEL;  Surgeon: Didier Garcia MD;  Location: [facility] OR;  Service: General;  Laterality: Left;  left axillary sentinel node biopsy, possible axillary dissection    TUMOR REMOVAL Left     thigh       Current Outpatient Medications   Medication Sig    anastrozole (ARIMIDEX) 1 mg Tab once daily .    aspirin 325 MG tablet Take 1 tablet (325 mg total) by mouth once daily.    ciclopirox (PENLAC) 8 % Soln Apply topically nightly.    denosumab (PROLIA) 60 mg/mL Syrg Inject 60 mg into the skin.    multivitamin capsule Take 1 capsule by mouth once daily.    sertraline (ZOLOFT) 25 MG tablet Take 1 tablet (25 mg total) by mouth once daily.     No current  facility-administered medications for this visit.        Review of patient's allergies indicates:   Allergen Reactions    Ciprofloxacin Anaphylaxis     paralysis    Pcn [penicillins] Anaphylaxis     paralysis    Eggplant Blisters    Eggs [egg derived]      Inside of mouth peel      Tomato (solanum lycopersicum) Blisters       Family History   Problem Relation Age of Onset    Breast cancer Mother     Cancer Mother         skin    Cancer Father         multiple myeloma       Social History     Socioeconomic History    Marital status: Single     Spouse name: Not on file    Number of children: Not on file    Years of education: Not on file    Highest education level: Not on file   Occupational History    Not on file   Social Needs    Financial resource strain: Not on file    Food insecurity:     Worry: Not on file     Inability: Not on file    Transportation needs:     Medical: Not on file     Non-medical: Not on file   Tobacco Use    Smoking status: Never Smoker    Smokeless tobacco: Never Used   Substance and Sexual Activity    Alcohol use: Yes     Alcohol/week: 2.0 standard drinks     Types: 2 Glasses of wine per week     Comment: occasionally    Drug use: No    Sexual activity: Yes     Partners: Male   Lifestyle    Physical activity:     Days per week: Not on file     Minutes per session: Not on file    Stress: Not on file   Relationships    Social connections:     Talks on phone: Not on file     Gets together: Not on file     Attends Islam service: Not on file     Active member of club or organization: Not on file     Attends meetings of clubs or organizations: Not on file     Relationship status: Not on file   Other Topics Concern    Not on file   Social History Narrative    Not on file       Chief Complaint:   Chief Complaint   Patient presents with    Post-op Evaluation     s/p right knee TKA revision 12/3/19       Date of surgery:  October 30, 2018 left total knee arthroplasty and  December 3, 2019 revision left total knee arthroplasty    History of present illness:  59-year-old female s/p revision left total knee arthroplasty.  Patient did have some aseptic tibial loosening.  She is doing great.  Has not taken 1 single pain pill at all.  Pain is 2/10.  Walking with just a cane.  She probably over did it a little.  [place]    Answers for HPI/ROS submitted by the patient on 10/29/2019   Leg pain  unexpected weight change: No  appetite change : No  sleep disturbance: No  IMMUNOCOMPROMISED: No  nervous/ anxious: No  dysphoric mood: No  rash: No  visual disturbance: No  eye redness: No  eye pain: No  ear pain: No  tinnitus: No  hearing loss: No  sinus pressure : No  nosebleeds: No  enviro allergies: No  food allergies: No  cough: No  shortness of breath: No  sweating: No  dysuria: No  frequency: No  difficulty urinating: No  hematuria: No  painful intercourse: No  chest pain: No  palpitations: No  nausea: No  vomiting: No  diarrhea: No  blood in stool: No  constipation: No  headaches: No  dizziness: No  numbness: No  seizures: No  joint swelling: No  myalgia: No  weakness: No  back pain: No  Pain Chronicity: recurrent  History of trauma: Yes  Onset: 1 to 4 weeks ago  Frequency: intermittently  Progression since onset: gradually worsening  injury location: at home  pain- numeric: 10/10  pain location: left knee  pain quality: sharp  Radiating Pain: No  Aggravating factors: walking  fever: No  inability to bear weight: Yes  itching: No  joint locking: No  limited range of motion: No  stiffness: No  tingling: No  Treatments tried: brace/corset, cold, OTC pain meds, rest  physical therapy: not tried  Improvement on treatment: no relief      Physical Examination:    Vital Signs:    Vitals:    01/06/20 0811   BP: (!) 140/65   Pulse: 66       Body mass index is 31.01 kg/m².    This a well-developed, well nourished patient in no acute distress.  They are alert and oriented and  cooperative to examination.  Pt. walks without an antalgic gait.       Examination of left knee shows mild effusion.  Incisions are well-healed with a few sutures coming out. No redness or erythema.  Range of motion is about 3° to 120°.    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  Continue with physical therapy at home.  Follow-up in 6 weeks with an x-ray of the left knee.    This note was created using M Modal voice recognition software that occasionally misinterpreted phrases or words.

## 2020-01-09 ENCOUNTER — PATIENT MESSAGE (OUTPATIENT)
Dept: HEMATOLOGY/ONCOLOGY | Facility: CLINIC | Age: 60
End: 2020-01-09

## 2020-01-09 ENCOUNTER — TELEPHONE (OUTPATIENT)
Dept: HEMATOLOGY/ONCOLOGY | Facility: CLINIC | Age: 60
End: 2020-01-09

## 2020-01-09 DIAGNOSIS — C50.112 MALIGNANT NEOPLASM OF CENTRAL PORTION OF LEFT BREAST IN FEMALE, ESTROGEN RECEPTOR POSITIVE: Primary | ICD-10-CM

## 2020-01-09 DIAGNOSIS — Z17.0 MALIGNANT NEOPLASM OF CENTRAL PORTION OF LEFT BREAST IN FEMALE, ESTROGEN RECEPTOR POSITIVE: Primary | ICD-10-CM

## 2020-01-20 ENCOUNTER — PATIENT MESSAGE (OUTPATIENT)
Dept: HEMATOLOGY/ONCOLOGY | Facility: CLINIC | Age: 60
End: 2020-01-20

## 2020-01-27 ENCOUNTER — TELEPHONE (OUTPATIENT)
Dept: HEMATOLOGY/ONCOLOGY | Facility: CLINIC | Age: 60
End: 2020-01-27

## 2020-01-27 NOTE — TELEPHONE ENCOUNTER
LM encouraging pt to call this nurse back to discuss obtaining records from her previous [facility]. Reminded pt this nurse sent a portal message last week with details.

## 2020-02-04 ENCOUNTER — PATIENT OUTREACH (OUTPATIENT)
Dept: ADMINISTRATIVE | Facility: HOSPITAL | Age: 60
End: 2020-02-04

## 2020-02-04 NOTE — LETTER
"February 11, 2020    Caryn RUBIO [place]  [address] [place] [address]             [facility]  [address]  [facility] [address]  Phone: [phone] [facility] is committed to your overall health.  To help you get the most out of each of your visits, we will review your information to make sure you are up to date on all of your recommended tests and/or procedures.       Dr. Terrance Nolen MD has found that your chart shows you may be due for a:     COLORECTAL CANCER SCREENING     Our records show you are due for colon cancer screening.  If you have already had your screening, or you have made an appointment for your screening, congratulations!  You're on the road to good health. If you haven't signed up for a colorectal screening please accept this invitation to be screened.       According to the American Cancer Society, colon cancer is the third most common cancer for people in the [place].  A simple screening test "Fit Kit" - done in your own home - can help find colon cancer at an e eleno stage when it can be treated, even before any signs or symptoms develop. THIS IS A YEARLY TEST.     Testing for blood in your stool (feces or bowel movement) is the first step. If you have an upcoming visit with your Primary Care Physician you can  a Fit Kit during your visit or you can  a Fit Kit at your Primary Care Clinic prior to your visit.     The Fit Kit includes:     Instructions on how to perform the test   (1) Sheet of tissue paper   (1) Small Absorption pad   (1) Bottle to hold the sample and a small probe to help you take the sample   (1) Small plastic bio-hazard bag   (1) Postage-paid return envelope     Please do your test (the instructions will tell you how) and then return your sample in the postage-paid return envelope within 24 hours of collection.     If your test results are negative, you won't need testing again for another year.  If results show you need " "more testing, we will call you with next steps.     Regular colorectal cancer screening is one of the most powerful weapons for preventing colon cancer.  The website https://www.cancer.org/cancer/colon-rectal-cancer.html can answer many of your questions about this cancer and its prevention.  Just search for "colorectal cancer".         If you have had any of the above done at another facility, please bring the records or information with you so that your record at [facility] will be complete.  If you would like to schedule any of these, please contact the clinic at [phone].     If you are currently taking medication, please bring it with you to your appointment for review.     Also, if you have any type of Advanced Directives, please bring them with you to your office visit so we may scan them into your chart.     Thank You,     Your [facility] Team,   MD Cheli Farooq LPN Clinical Care Coordinator   [facility] [address] [place] [address]   Phone [phone]   Fax [phone]        "

## 2020-02-04 NOTE — PROGRESS NOTES
Health Maintenance Due   Topic Date Due    HIV Screening  01[birth year]/1974    TETANUS VACCINE  01/12/1977    Pneumococcal Vaccine (Highest Risk) (1 of 3 - PCV13) 01/12/1978    Shingles Vaccine (1 of 2) 01/12/2009    Colonoscopy  01/12/2009

## 2020-02-13 ENCOUNTER — PATIENT OUTREACH (OUTPATIENT)
Dept: ADMINISTRATIVE | Facility: OTHER | Age: 60
End: 2020-02-13

## 2020-02-14 DIAGNOSIS — Z96.652 STATUS POST TOTAL KNEE REPLACEMENT USING CEMENT, LEFT: Primary | ICD-10-CM

## 2020-02-17 ENCOUNTER — PATIENT MESSAGE (OUTPATIENT)
Dept: HEMATOLOGY/ONCOLOGY | Facility: CLINIC | Age: 60
End: 2020-02-17

## 2020-02-17 ENCOUNTER — HOSPITAL ENCOUNTER (OUTPATIENT)
Dept: RADIOLOGY | Facility: HOSPITAL | Age: 60
Discharge: HOME | End: 2020-02-17
Attending: ORTHOPAEDIC SURGERY
Payer: COMMERCIAL

## 2020-02-17 ENCOUNTER — OFFICE VISIT (OUTPATIENT)
Dept: ORTHOPEDICS | Facility: CLINIC | Age: 60
End: 2020-02-17
Payer: COMMERCIAL

## 2020-02-17 VITALS
BODY MASS INDEX: 31.08 KG/M2 | HEIGHT: 67 IN | WEIGHT: 198 LBS | HEART RATE: 81 BPM | SYSTOLIC BLOOD PRESSURE: 130 MMHG | DIASTOLIC BLOOD PRESSURE: 84 MMHG

## 2020-02-17 DIAGNOSIS — Z96.652 STATUS POST TOTAL KNEE REPLACEMENT USING CEMENT, LEFT: ICD-10-CM

## 2020-02-17 DIAGNOSIS — R22.42 MASS OF LEFT THIGH: ICD-10-CM

## 2020-02-17 DIAGNOSIS — R22.42 MASS OF LEFT THIGH: Primary | ICD-10-CM

## 2020-02-17 DIAGNOSIS — Z96.652 STATUS POST TOTAL KNEE REPLACEMENT USING CEMENT, LEFT: Primary | ICD-10-CM

## 2020-02-17 PROCEDURE — 99999 PR PBB SHADOW E&M-EST. PATIENT-LVL III: ICD-10-PCS | Mod: PBBFAC,,, | Performed by: ORTHOPAEDIC SURGERY

## 2020-02-17 PROCEDURE — 73564 X-RAY EXAM KNEE 4 OR MORE: CPT | Mod: 26,LT,, | Performed by: RADIOLOGY

## 2020-02-17 PROCEDURE — 73562 XR KNEE ORTHO LEFT WITH FLEXION: ICD-10-PCS | Mod: 26,59,RT, | Performed by: RADIOLOGY

## 2020-02-17 PROCEDURE — 73562 X-RAY EXAM OF KNEE 3: CPT | Mod: 26,59,RT, | Performed by: RADIOLOGY

## 2020-02-17 PROCEDURE — 73564 XR KNEE ORTHO LEFT WITH FLEXION: ICD-10-PCS | Mod: 26,LT,, | Performed by: RADIOLOGY

## 2020-02-17 PROCEDURE — 73562 X-RAY EXAM OF KNEE 3: CPT | Mod: TC,PN,RT

## 2020-02-17 PROCEDURE — 99999 PR PBB SHADOW E&M-EST. PATIENT-LVL III: CPT | Mod: PBBFAC,,, | Performed by: ORTHOPAEDIC SURGERY

## 2020-02-17 PROCEDURE — 99024 PR POST-OP FOLLOW-UP VISIT: ICD-10-PCS | Mod: S$GLB,,, | Performed by: ORTHOPAEDIC SURGERY

## 2020-02-17 PROCEDURE — 99024 POSTOP FOLLOW-UP VISIT: CPT | Mod: S$GLB,,, | Performed by: ORTHOPAEDIC SURGERY

## 2020-02-17 NOTE — PROGRESS NOTES
Past Medical History:   Diagnosis Date    Arthritis     Breast cancer     left    Cancer     breast; skin    High cholesterol     PONV (postoperative nausea and vomiting)     Wears glasses        Past Surgical History:   Procedure Laterality Date    APPENDECTOMY      AXILLARY NODE DISSECTION Left 3/14/2019    Procedure: LYMPHADENECTOMY, AXILLARY;  Surgeon: Didier Garcia MD;  Location: [facility] OR;  Service: General;  Laterality: Left;    BREAST SURGERY Left 02/11/2019    lumpectomy    JOINT REPLACEMENT Left 10/30/2018    knee    KNEE ARTHROPLASTY Left 10/30/2018    Procedure: ARTHROPLASTY, KNEE;  Surgeon: Ata Paula MD;  Location: [facility] OR;  Service: Orthopedics;  Laterality: Left;    KNEE ARTHROSCOPY W/ MENISCECTOMY Right 10/30/2018    Procedure: ARTHROSCOPY, KNEE, WITH MENISCECTOMY;  Surgeon: Ata Paula MD;  Location: [facility] OR;  Service: Orthopedics;  Laterality: Right;    KNEE SURGERY Left 1980s    REVISION OF KNEE ARTHROPLASTY Left 12/3/2019    Procedure: REVISION, ARTHROPLASTY, KNEE;  Surgeon: Ata Paula MD;  Location: [facility] OR;  Service: Orthopedics;  Laterality: Left;  Lucy- Bob Venessa  (notified 11/27/19 )    SENTINEL LYMPH NODE BIOPSY Left 3/14/2019    Procedure: BIOPSY, LYMPH NODE, SENTINEL;  Surgeon: Didier Garcia MD;  Location: [facility] OR;  Service: General;  Laterality: Left;  left axillary sentinel node biopsy, possible axillary dissection    TUMOR REMOVAL Left     thigh       Current Outpatient Medications   Medication Sig    anastrozole (ARIMIDEX) 1 mg Tab once daily .    aspirin 325 MG tablet Take 1 tablet (325 mg total) by mouth once daily.    ciclopirox (PENLAC) 8 % Soln Apply topically nightly.    denosumab (PROLIA) 60 mg/mL Syrg Inject 60 mg into the skin.    multivitamin capsule Take 1 capsule by mouth once daily.    sertraline (ZOLOFT) 25 MG tablet Take 1 tablet (25 mg total) by mouth once daily.     No current  facility-administered medications for this visit.        Review of patient's allergies indicates:   Allergen Reactions    Ciprofloxacin Anaphylaxis     paralysis    Pcn [penicillins] Anaphylaxis     paralysis    Eggplant Blisters    Eggs [egg derived]      Inside of mouth peel      Tomato (solanum lycopersicum) Blisters       Family History   Problem Relation Age of Onset    Breast cancer Mother     Cancer Mother         skin    Cancer Father         multiple myeloma       Social History     Socioeconomic History    Marital status: Single     Spouse name: Not on file    Number of children: Not on file    Years of education: Not on file    Highest education level: Not on file   Occupational History    Not on file   Social Needs    Financial resource strain: Not on file    Food insecurity:     Worry: Not on file     Inability: Not on file    Transportation needs:     Medical: Not on file     Non-medical: Not on file   Tobacco Use    Smoking status: Never Smoker    Smokeless tobacco: Never Used   Substance and Sexual Activity    Alcohol use: Yes     Alcohol/week: 2.0 standard drinks     Types: 2 Glasses of wine per week     Comment: occasionally    Drug use: No    Sexual activity: Yes     Partners: Male   Lifestyle    Physical activity:     Days per week: Not on file     Minutes per session: Not on file    Stress: Not on file   Relationships    Social connections:     Talks on phone: Not on file     Gets together: Not on file     Attends Mosque service: Not on file     Active member of club or organization: Not on file     Attends meetings of clubs or organizations: Not on file     Relationship status: Not on file   Other Topics Concern    Not on file   Social History Narrative    Not on file       Chief Complaint:   Chief Complaint   Patient presents with    Post-op Evaluation     s/p right TKA revision 12/3/19        Date of surgery:  October 30, 2018 left total knee arthroplasty and  December 3, 2019 revision left total knee arthroplasty    History of present illness:  60-year-old female s/p revision left total knee arthroplasty.She is doing great.  Rates her pain today is 0/10.  Did have an issue a couple weeks ago where she had a lot of swelling in the suprapatellar area as well as pain in the quad insertion.  She had rested for a whole day and then it went away.  Also having more pain up in the proximal thigh where she had the tumor resection.  Does not know if they are connected.  She feels like the mass might be coming back.    Answers for HPI/ROS submitted by the patient on 10/29/2019   Leg pain  unexpected weight change: No  appetite change : No  sleep disturbance: No  IMMUNOCOMPROMISED: No  nervous/ anxious: No  dysphoric mood: No  rash: No  visual disturbance: No  eye redness: No  eye pain: No  ear pain: No  tinnitus: No  hearing loss: No  sinus pressure : No  nosebleeds: No  enviro allergies: No  food allergies: No  cough: No  shortness of breath: No  sweating: No  dysuria: No  frequency: No  difficulty urinating: No  hematuria: No  painful intercourse: No  chest pain: No  palpitations: No  nausea: No  vomiting: No  diarrhea: No  blood in stool: No  constipation: No  headaches: No  dizziness: No  numbness: No  seizures: No  joint swelling: No  myalgia: No  weakness: No  back pain: No  Pain Chronicity: recurrent  History of trauma: Yes  Onset: 1 to 4 weeks ago  Frequency: intermittently  Progression since onset: gradually worsening  injury location: at home  pain- numeric: 10/10  pain location: left knee  pain quality: sharp  Radiating Pain: No  Aggravating factors: walking  fever: No  inability to bear weight: Yes  itching: No  joint locking: No  limited range of motion: No  stiffness: No  tingling: No  Treatments tried: brace/corset, cold, OTC pain meds, rest  physical therapy: not tried  Improvement on treatment: no relief      Physical Examination:    Vital Signs:    Vitals:     02/17/20 0817   BP: 130/84   Pulse: 81       Body mass index is 31.01 kg/m².    This a well-developed, well nourished patient in no acute distress.  They are alert and oriented and cooperative to examination.  Pt. walks without an antalgic gait.       Examination of left knee shows mild effusion.  Incisions are well-healed No redness or erythema.  Range of motion is about 0° to 130°.  A little fullness particularly in the suprapatellar area.    X-rays:  Four views of the left knee are ordered and reviewed which show well-aligned revision total knee arthroplasty      Assessment::  Status post revision left total knee arthroplasty for aseptic tibial loosening  Rule out left thigh mass recurrence    Plan:  I agreed to  get another MRI of her left thigh.  We will get 1 with without contrast to make sure that the mass has not recurred and is just some scar tissue.  Follow-up after the MRI is completed.    This note was created using M [employer] voice recognition software that occasionally misinterpreted phrases or words.

## 2020-02-19 ENCOUNTER — TELEPHONE (OUTPATIENT)
Dept: ORTHOPEDICS | Facility: CLINIC | Age: 60
End: 2020-02-19

## 2020-02-19 ENCOUNTER — HOSPITAL ENCOUNTER (OUTPATIENT)
Dept: RADIOLOGY | Facility: HOSPITAL | Age: 60
Discharge: HOME | End: 2020-02-19
Attending: ORTHOPAEDIC SURGERY
Payer: COMMERCIAL

## 2020-02-19 DIAGNOSIS — R22.42 MASS OF LEFT THIGH: Primary | ICD-10-CM

## 2020-02-19 DIAGNOSIS — R22.42 MASS OF LEFT THIGH: ICD-10-CM

## 2020-02-19 PROCEDURE — 25500020 PHARM REV CODE 255: Performed by: ORTHOPAEDIC SURGERY

## 2020-02-19 PROCEDURE — 73720 MRI LWR EXTREMITY W/O&W/DYE: CPT | Mod: TC,LT

## 2020-02-19 PROCEDURE — A9585 GADOBUTROL INJECTION: HCPCS | Performed by: ORTHOPAEDIC SURGERY

## 2020-02-19 PROCEDURE — 73720 MRI LWR EXTREMITY W/O&W/DYE: CPT | Mod: 26,LT,, | Performed by: RADIOLOGY

## 2020-02-19 PROCEDURE — 73720 MRI FEMUR W WO CONTRAST LEFT: ICD-10-PCS | Mod: 26,LT,, | Performed by: RADIOLOGY

## 2020-02-19 RX ORDER — GADOBUTROL 604.72 MG/ML
9 INJECTION INTRAVENOUS
Status: COMPLETED | OUTPATIENT
Start: 2020-02-19 | End: 2020-02-19

## 2020-02-19 RX ADMIN — GADOBUTROL 9 ML: 604.72 INJECTION INTRAVENOUS at 10:02

## 2020-02-19 NOTE — TELEPHONE ENCOUNTER
----- Message from Ata Paula MD sent at 2/19/2020  3:19 PM CST -----  Results noted. No recurrence of mass!!! Some edema in the bone likely residual from recent surgery and the body adapting to difference in stiffness

## 2020-02-19 NOTE — PROGRESS NOTES
Results noted. No recurrence of mass!!! Some edema in the bone likely residual from recent surgery and the body adapting to difference in stiffness

## 2020-03-04 ENCOUNTER — PATIENT MESSAGE (OUTPATIENT)
Dept: HEMATOLOGY/ONCOLOGY | Facility: CLINIC | Age: 60
End: 2020-03-04

## 2020-03-06 ENCOUNTER — LAB VISIT (OUTPATIENT)
Dept: LAB | Facility: HOSPITAL | Age: 60
End: 2020-03-06
Attending: INTERNAL MEDICINE
Payer: COMMERCIAL

## 2020-03-06 DIAGNOSIS — Z17.0 MALIGNANT NEOPLASM OF CENTRAL PORTION OF LEFT BREAST IN FEMALE, ESTROGEN RECEPTOR POSITIVE: ICD-10-CM

## 2020-03-06 DIAGNOSIS — C50.112 MALIGNANT NEOPLASM OF CENTRAL PORTION OF LEFT BREAST IN FEMALE, ESTROGEN RECEPTOR POSITIVE: ICD-10-CM

## 2020-03-06 LAB
ALBUMIN SERPL BCP-MCNC: 4.1 G/DL (ref 3.5–5.2)
ALP SERPL-CCNC: 81 U/L (ref 55–135)
ALT SERPL W/O P-5'-P-CCNC: 13 U/L (ref 10–44)
ANION GAP SERPL CALC-SCNC: 10 MMOL/L (ref 8–16)
AST SERPL-CCNC: 14 U/L (ref 10–40)
BASOPHILS # BLD AUTO: 0.02 K/UL (ref 0–0.2)
BASOPHILS NFR BLD: 0.6 % (ref 0–1.9)
BILIRUB SERPL-MCNC: 0.4 MG/DL (ref 0.1–1)
BUN SERPL-MCNC: 18 MG/DL (ref 8–23)
CALCIUM SERPL-MCNC: 10 MG/DL (ref 8.7–10.5)
CHLORIDE SERPL-SCNC: 106 MMOL/L (ref 95–110)
CO2 SERPL-SCNC: 26 MMOL/L (ref 23–29)
CREAT SERPL-MCNC: 0.9 MG/DL (ref 0.5–1.4)
DIFFERENTIAL METHOD: ABNORMAL
EOSINOPHIL # BLD AUTO: 0 K/UL (ref 0–0.5)
EOSINOPHIL NFR BLD: 0.8 % (ref 0–8)
ERYTHROCYTE [DISTWIDTH] IN BLOOD BY AUTOMATED COUNT: 14.3 % (ref 11.5–14.5)
EST. GFR  (AFRICAN AMERICAN): >60 ML/MIN/1.73 M^2
EST. GFR  (NON AFRICAN AMERICAN): >60 ML/MIN/1.73 M^2
GLUCOSE SERPL-MCNC: 93 MG/DL (ref 70–110)
HCT VFR BLD AUTO: 41.3 % (ref 37–48.5)
HGB BLD-MCNC: 12.9 G/DL (ref 12–16)
LYMPHOCYTES # BLD AUTO: 1.1 K/UL (ref 1–4.8)
LYMPHOCYTES NFR BLD: 29.8 % (ref 18–48)
MCH RBC QN AUTO: 29.3 PG (ref 27–31)
MCHC RBC AUTO-ENTMCNC: 31.2 G/DL (ref 32–36)
MCV RBC AUTO: 94 FL (ref 82–98)
MONOCYTES # BLD AUTO: 0.3 K/UL (ref 0.3–1)
MONOCYTES NFR BLD: 8 % (ref 4–15)
NEUTROPHILS # BLD AUTO: 2.2 K/UL (ref 1.8–7.7)
NEUTROPHILS NFR BLD: 60.8 % (ref 38–73)
NRBC BLD-RTO: 0 /100 WBC
PLATELET # BLD AUTO: 227 K/UL (ref 150–350)
PMV BLD AUTO: 10.9 FL (ref 9.2–12.9)
POTASSIUM SERPL-SCNC: 4.2 MMOL/L (ref 3.5–5.1)
PROT SERPL-MCNC: 7.3 G/DL (ref 6–8.4)
RBC # BLD AUTO: 4.4 M/UL (ref 4–5.4)
SODIUM SERPL-SCNC: 142 MMOL/L (ref 136–145)
WBC # BLD AUTO: 3.62 K/UL (ref 3.9–12.7)

## 2020-03-06 PROCEDURE — 36415 COLL VENOUS BLD VENIPUNCTURE: CPT | Mod: PO

## 2020-03-06 PROCEDURE — 85025 COMPLETE CBC W/AUTO DIFF WBC: CPT

## 2020-03-06 PROCEDURE — 80053 COMPREHEN METABOLIC PANEL: CPT

## 2020-03-09 ENCOUNTER — OFFICE VISIT (OUTPATIENT)
Dept: HEMATOLOGY/ONCOLOGY | Facility: CLINIC | Age: 60
End: 2020-03-09
Payer: COMMERCIAL

## 2020-03-09 VITALS
BODY MASS INDEX: 30.2 KG/M2 | OXYGEN SATURATION: 95 % | TEMPERATURE: 99 F | DIASTOLIC BLOOD PRESSURE: 67 MMHG | HEART RATE: 70 BPM | RESPIRATION RATE: 18 BRPM | SYSTOLIC BLOOD PRESSURE: 127 MMHG | WEIGHT: 192.44 LBS | HEIGHT: 67 IN

## 2020-03-09 DIAGNOSIS — C50.112 MALIGNANT NEOPLASM OF CENTRAL PORTION OF LEFT BREAST IN FEMALE, ESTROGEN RECEPTOR POSITIVE: ICD-10-CM

## 2020-03-09 DIAGNOSIS — S83.241D ACUTE MEDIAL MENISCAL TEAR, RIGHT, SUBSEQUENT ENCOUNTER: Primary | ICD-10-CM

## 2020-03-09 DIAGNOSIS — Z79.811 PROPHYLACTIC USE OF ANASTROZOLE (ARIMIDEX): ICD-10-CM

## 2020-03-09 DIAGNOSIS — Z17.0 MALIGNANT NEOPLASM OF CENTRAL PORTION OF LEFT BREAST IN FEMALE, ESTROGEN RECEPTOR POSITIVE: ICD-10-CM

## 2020-03-09 PROCEDURE — 99999 PR PBB SHADOW E&M-EST. PATIENT-LVL IV: CPT | Mod: PBBFAC,,, | Performed by: INTERNAL MEDICINE

## 2020-03-09 PROCEDURE — 99214 OFFICE O/P EST MOD 30 MIN: CPT | Mod: S$GLB,,, | Performed by: INTERNAL MEDICINE

## 2020-03-09 PROCEDURE — 99999 PR PBB SHADOW E&M-EST. PATIENT-LVL IV: ICD-10-PCS | Mod: PBBFAC,,, | Performed by: INTERNAL MEDICINE

## 2020-03-09 PROCEDURE — 99214 PR OFFICE/OUTPT VISIT, EST, LEVL IV, 30-39 MIN: ICD-10-PCS | Mod: S$GLB,,, | Performed by: INTERNAL MEDICINE

## 2020-03-09 NOTE — PROGRESS NOTES
Subjective:       Patient ID: Caryn Toledo is a 60 y.o. female.    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  She does have significant family history however.  Her mother developed breast cancer in her 30s and she has 2 maternal aunts who developed breast cancer at young ages.  Father had multiple myeloma.  Age of menarche was 13.  She had menopause at age 35.  She has never taken hormone replacement therapy or oral contraceptives.  She has had 3 pregnancies the 1st child born when she was 21 years old.  This was found on a screening test. She underwent partial mastectomy and biopsy revealed invasive carcinoma and DCIS sentinel lymph node was negative for malignancy with clear margins.  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  She completed radiation to the breast started  Arimidex.  Had resection and radiation to the soft tissue pseudo sarcoma had follow-up in December 2019 and will no longer go back to [place] for [facility] to follow she will continue her care here Patient is from [place] has had issues with lymphedema ever since and has a lymphedema clinic appointment.  But felt that she was doing better and no longer needs this referral had knee surgery 3 months ago here to resume Prolia weeks she had a bone scan done today shows loosening.  Patient is concerned the Arimidex may be causing it as she has ongoing issues with the use of Arimidex with mood swings. And bone pain. Recently given Zoloft states is working really  Here for resuming Prolia 3 months after having knee surgery       Oncologic History      Oncologic Treatment     Pathology1. Left breast, partial mastectomy:  - Invasive mucinous carcinoma, Redway grade 1, (T=2, N=1, M=1), 3mm in longest linear dimension  - Ductal carcinoma in-situ (DCIS), solid and mucinous types, low grade, 9mm in longest linear dimension  - Margins negative for carcinoma and negative for DCIS  - No lymphovascular invasion identified  - Fibrocystic changes present  2. Left breast additional superior margin, excision:  - Benign breast tissue with fibrocystic changes  - Negative for atypia or malignancy  3. Left breast additional anterior margin, excision:  - Benign breast tissue with fibrocystic changes  - Negative for atypia or malignancy  4. Left breast additional deep margin, excision:  - BenignTumor Size: Greatest dimension of largest invasive focus: 3mm  Additional dimensions: 2 x 2 mm  Histologic Type: Mucinous carcinoma  Histologic Grade: (Cecilia histologic score):  Glandular (Acinar)/Tubular differentiation: Score 2 (10% to 75%) of tumor area forming glandular/tubular  structures)  Nuclear Pleomorphism: Score 1 (nuclei small with little increase in size in comparison with normal breast  epithelial cells, regular outlines, uniform nuclear chromatin, little variation in size  Mitotic Rate: Score 1 (< or = 3 mitoses per mm^2)  Overall Grade: Grade 1 (scores of 3, 4, or 5)  Tumor Focality: Unifocal  Ductal carcinoma in-situ (DCIS): Present      HPI:     Social History     Socioeconomic History    Marital status: Single     Spouse name: Not on file    Number of children: Not on file    Years of education: Not on file    Highest education level: Not on file   Occupational History    Not on file   Social Needs    Financial resource strain: Not on file    Food insecurity:     Worry: Not on file     Inability: Not on file    Transportation needs:     Medical: Not on file     Non-medical: Not on file   Tobacco Use    Smoking status: Never Smoker    Smokeless tobacco:  Never Used   Substance and Sexual Activity    Alcohol use: Yes     Alcohol/week: 2.0 standard drinks     Types: 2 Glasses of wine per week     Comment: occasionally    Drug use: No    Sexual activity: Yes     Partners: Male   Lifestyle    Physical activity:     Days per week: Not on file     Minutes per session: Not on file    Stress: Not on file   Relationships    Social connections:     Talks on phone: Not on file     Gets together: Not on file     Attends [employer] service: Not on file     Active member of club or organization: Not on file     Attends meetings of clubs or organizations: Not on file     Relationship status: Not on file   Other Topics Concern    Not on file   Social History Narrative    Not on file     Family History   Problem Relation Age of Onset    Breast cancer Mother     Cancer Mother         skin    Cancer Father         multiple myeloma     Past Surgical History:   Procedure Laterality Date    APPENDECTOMY      AXILLARY NODE DISSECTION Left 3/14/2019    Procedure: LYMPHADENECTOMY, AXILLARY;  Surgeon: Didier Garcia MD;  Location: [facility] OR;  Service: General;  Laterality: Left;    BREAST SURGERY Left 02/11/2019    lumpectomy    JOINT REPLACEMENT Left 10/30/2018    knee    KNEE ARTHROPLASTY Left 10/30/2018    Procedure: ARTHROPLASTY, KNEE;  Surgeon: Ata Paula MD;  Location: [facility] OR;  Service: Orthopedics;  Laterality: Left;    KNEE ARTHROSCOPY W/ MENISCECTOMY Right 10/30/2018    Procedure: ARTHROSCOPY, KNEE, WITH MENISCECTOMY;  Surgeon: Ata Paula MD;  Location: [facility] OR;  Service: Orthopedics;  Laterality: Right;    KNEE SURGERY Left 1980s    REVISION OF KNEE ARTHROPLASTY Left 12/3/2019    Procedure: REVISION, ARTHROPLASTY, KNEE;  Surgeon: tAa Paula MD;  Location: [facility] OR;  Service: Orthopedics;  Laterality: Left;  Edith Clifford  (notified 11/27/19 )    SENTINEL LYMPH NODE BIOPSY Left 3/14/2019    Procedure: BIOPSY, LYMPH  "NODE, SENTINEL;  Surgeon: Didier Garcia MD;  [facility];  Service: General;  Laterality: Left;  left axillary sentinel node biopsy, possible axillary dissection    TUMOR REMOVAL Left     thigh     Past Medical History:   Diagnosis Date    Arthritis     Breast cancer     left    Cancer     breast; skin    High cholesterol     PONV (postoperative nausea and vomiting)     Wears glasses        Current Outpatient Medications:     anastrozole (ARIMIDEX) 1 mg Tab, once daily ., Disp: , Rfl: 3    aspirin 325 MG tablet, Take 1 tablet (325 mg total) by mouth once daily., Disp: 30 tablet, Rfl: 0    ciclopirox (PENLAC) 8 % Soln, Apply topically nightly., Disp: 6.6 mL, Rfl: 0    denosumab (PROLIA) 60 mg/mL Syrg, Inject 60 mg into the skin., Disp: , Rfl:     multivitamin capsule, Take 1 capsule by mouth once daily., Disp: , Rfl:     sertraline (ZOLOFT) 25 MG tablet, Take 1 tablet (25 mg total) by mouth once daily., Disp: 30 tablet, Rfl: 11  Review of patient's allergies indicates:   Allergen Reactions    Ciprofloxacin Anaphylaxis     paralysis    Pcn [penicillins] Anaphylaxis     paralysis    Eggplant Blisters    Eggs [egg derived]      Inside of mouth peel      Hydrocodone Nausea Only     "causes a migraine"    Morphine Nausea Only     "causes a migraine"    Oxycodone Nausea Only     "causes a migraine"    Tomato (solanum lycopersicum) Blisters         REVIEW OF SYSTEMS:     CONSTITUTIONAL: The patient denies any weight change. There is no apparent    change in appetite, fever, night sweats, headaches,  asfatigue, dizziness, or    weakness.      SKIN: Denies rash, issues with nails, non-healing sores, bleeding, blotching    skin or abnormal bruising. Denies new moles or changes to existing moles.      BREASTS:  Complaints of boggy and lumpy areas to left breast complains of swelling and lymphedema to left which occasionally gets pain full    EYES: Denies eye pain, blurred vision, swelling, " redness or discharge.      ENT AND MOUTH: Denies runny nose, stuffiness, sinus trouble or sores. Denies    nosebleeds. Denies, hoarseness, change in voice or swelling in front of the    neck.      CARDIOVASCULAR: Denies chest pain, discomfort or palpitations. Denies neck    swelling or episodes of passing out.      RESPIRATORY: Denies cough, sputum production, blood in sputum, and denies    shortness of breath.      GI: Denies trouble swallowing, indigestion, heartburn, abdominal pain, nausea,    vomiting, diarrhea, altered bowel habits, blood in stool, discoloration of    stools, change in nature of stool, bloating, increased abdominal girth.      GENITOURINARY: No discharge. No pelvic pain or lumps. No rash around groin or  lesions. No urinary frequency, hesitation, painful urination or blood in    urine. Denies incontinence. No problems with intercourse.      MUSCULOSKELETAL:   Complains of left arm swelling bogginess to and fullness to left breast.Muscle aches and pains pain from arthroplasty site in the recent.  Mood swings    NEUROLOGICAL: Denies tingling, numbness, altered mentation changes to nerve    function in the face, weakness to one or both of the body. Denies changes to    gait and denies multiple falls or accidents.      PSYCHIATRIC:   Insomnia.  Mood swings improved on Zoloft     The patient denies recent foreign travel or recent exposure to chemicals or    products of concern or infectious diseases.     PHYSICAL EXAM:     Wt Readings from Last 3 Encounters:   02/17/20 89.8 kg (198 lb)   01/06/20 89.8 kg (198 lb)   12/11/19 90 kg (198 lb 6.6 oz)     Temp Readings from Last 3 Encounters:   12/11/19 98.3 °F (36.8 °C) (Oral)   12/04/19 96.9 °F (36.1 °C)   11/27/19 98.4 °F (36.9 °C)     BP Readings from Last 3 Encounters:   02/17/20 130/84   01/06/20 (!) 140/65   12/11/19 118/64     Pulse Readings from Last 3 Encounters:   02/17/20 81   01/06/20 66   12/11/19 77     GENERAL: Comfortable looking patient.  Patient is in no distress.  Awake, alert and oriented to time, person and place.  No anxiety, or agitation.      HEENT: Normal conjunctivae and eyelids. WNL.  PERRLA 3 to 4 mm. No icterus, no pallor, no congestion, and no discharge noted.     NECK:  Supple. Trachea is central.  No crepitus.  No JVD or masses.    RESPIRATORY:  No intercostal retractions.  No dullness to percussion.  Chest is clear to auscultation.  No rales, rhonchi or wheezes.  No crepitus.  Good air entry bilaterally.    CARDIOVASCULAR:  S1 and S2 are normally heard without murmurs or gallops.  All peripheral pulses are present.    ABDOMEN:  Normal abdomen.  No hepatosplenomegaly.  No free fluid.  Bowel sounds are present.  No hernia noted. No masses.  No rebound or tenderness.  No guarding or rigidity.  Umbilicus is midline.    LYMPHATICS:  No axillary, cervical, supraclavicular, submental, or inguinal lymphadenopathy. Left arm slightly swollen left breast is boggy but no discrete masses are felthere  Are lumpy  Area around left breast bilateral nipples are retracted. Lumpectomy area is hardly visit some lymphedema around left arm and axilla is obvious.    SKIN/MUSCULOSKELETAL:  There is no evidence of excoriation marks or ecchmosis.  No rashes.  No cyanosis.  No clubbing.  No joint or skeletal deformities noted.  Normal range of motion.    NEUROLOGIC:  Higher functions are appropriate.  No cranial nerve deficits.  Normal alis.  Normal strength.  Motor and sensory functions are normal.  Deep tendon reflexes are normal.    GENITAL/RECTAL:  Exams are deferred.      Laboratory:     CBC:  Lab Results   Component Value Date    WBC 3.62 (L) 03/06/2020    RBC 4.40 03/06/2020    HGB 12.9 03/06/2020    HCT 41.3 03/06/2020    MCV 94 03/06/2020    MCH 29.3 03/06/2020    MCHC 31.2 (L) 03/06/2020    RDW 14.3 03/06/2020     03/06/2020    MPV 10.9 03/06/2020    GRAN 2.2 03/06/2020    GRAN 60.8 03/06/2020    LYMPH 1.1 03/06/2020    LYMPH 29.8 03/06/2020     MONO 0.3 03/06/2020    MONO 8.0 03/06/2020    EOS 0.0 03/06/2020    BASO 0.02 03/06/2020    EOSINOPHIL 0.8 03/06/2020    BASOPHIL 0.6 03/06/2020       BMP: BMP  Lab Results   Component Value Date     03/06/2020    K 4.2 03/06/2020     03/06/2020    CO2 26 03/06/2020    BUN 18 03/06/2020    CREATININE 0.9 03/06/2020    CALCIUM 10.0 03/06/2020    ANIONGAP 10 03/06/2020    ESTGFRAFRICA >60.0 03/06/2020    EGFRNONAA >60.0 03/06/2020       LFT:   Lab Results   Component Value Date    ALT 13 03/06/2020    AST 14 03/06/2020    ALKPHOS 81 03/06/2020    BILITOT 0.4 03/06/2020       Assessment/Plan:        invasive breast cancer.1/2019  Patient status post lumpectomy to left breast stage I.  Radiation. Currently on Arimidex.  She feels she can continue using the Arimidex        patient has a soft tissue pseudo sarcoma follow-up at [facility] as well.went to [place] 12/2019 had dx testing done. Follows with Dr Sawyer had MRI done as well she no longer needs any further scans     patient could be switched off of Arimidex to Aromasin he for side effects are not alleviated by use of Zoloft she likes it as it improved her husbands behavior.!!!!   HAS LOST 10 LBS ON KETO DIET   had hemtoma post op resolved now   didn't make it to lymphedema clinic but dosnt feel she needs it   had knee sx 12/3  prolia ordered start and rtc 6 months cbc, cmp, ca 2729 cxr   her future f/u will be here she will not go back to [place] unless she has to   calcium supplements daily   and dental precautions discussed.

## 2020-03-12 ENCOUNTER — PATIENT MESSAGE (OUTPATIENT)
Dept: HEMATOLOGY/ONCOLOGY | Facility: CLINIC | Age: 60
End: 2020-03-12

## 2020-03-12 DIAGNOSIS — C50.112 MALIGNANT NEOPLASM OF CENTRAL PORTION OF LEFT BREAST IN FEMALE, ESTROGEN RECEPTOR POSITIVE: ICD-10-CM

## 2020-03-12 DIAGNOSIS — Z17.0 MALIGNANT NEOPLASM OF CENTRAL PORTION OF LEFT BREAST IN FEMALE, ESTROGEN RECEPTOR POSITIVE: ICD-10-CM

## 2020-03-12 DIAGNOSIS — Z79.811 PROPHYLACTIC USE OF ANASTROZOLE (ARIMIDEX): Primary | ICD-10-CM

## 2020-03-16 ENCOUNTER — HOSPITAL ENCOUNTER (OUTPATIENT)
Dept: RADIOLOGY | Facility: HOSPITAL | Age: 60
Discharge: HOME | End: 2020-03-16
Attending: INTERNAL MEDICINE
Payer: COMMERCIAL

## 2020-03-16 ENCOUNTER — PATIENT OUTREACH (OUTPATIENT)
Dept: ADMINISTRATIVE | Facility: HOSPITAL | Age: 60
End: 2020-03-16

## 2020-03-16 DIAGNOSIS — Z17.0 MALIGNANT NEOPLASM OF CENTRAL PORTION OF LEFT BREAST IN FEMALE, ESTROGEN RECEPTOR POSITIVE: ICD-10-CM

## 2020-03-16 DIAGNOSIS — C50.112 MALIGNANT NEOPLASM OF CENTRAL PORTION OF LEFT BREAST IN FEMALE, ESTROGEN RECEPTOR POSITIVE: ICD-10-CM

## 2020-03-16 PROCEDURE — 77080 DEXA BONE DENSITY SPINE HIP: ICD-10-PCS | Mod: 26,,, | Performed by: RADIOLOGY

## 2020-03-16 PROCEDURE — 77080 DXA BONE DENSITY AXIAL: CPT | Mod: 26,,, | Performed by: RADIOLOGY

## 2020-03-16 PROCEDURE — 77080 DXA BONE DENSITY AXIAL: CPT | Mod: TC,PO

## 2020-03-16 NOTE — PROGRESS NOTES
Chart review completed 03/16/2020.  Care Everywhere updates requested and reviewed.  Immunizations reconciled. Media reviewed.     PORTAL MESSAGE SENT    Health Maintenance Due   Topic Date Due    HIV Screening  01/12/1974    TETANUS VACCINE  01/12/1977    Pneumococcal Vaccine (Highest Risk) (1 of 3 - PCV13) 01/12/1978    Shingles Vaccine (1 of 2) 01/12/2009    Colonoscopy  01/12/2009

## 2020-04-06 ENCOUNTER — TELEPHONE (OUTPATIENT)
Dept: INFUSION THERAPY | Facility: HOSPITAL | Age: 60
End: 2020-04-06

## 2020-04-07 ENCOUNTER — TELEPHONE (OUTPATIENT)
Dept: INFUSION THERAPY | Facility: HOSPITAL | Age: 60
End: 2020-04-07

## 2020-04-08 ENCOUNTER — TELEPHONE (OUTPATIENT)
Dept: INFUSION THERAPY | Facility: HOSPITAL | Age: 60
End: 2020-04-08

## 2020-04-09 ENCOUNTER — PATIENT MESSAGE (OUTPATIENT)
Dept: HEMATOLOGY/ONCOLOGY | Facility: CLINIC | Age: 60
End: 2020-04-09

## 2020-04-13 ENCOUNTER — TELEPHONE (OUTPATIENT)
Dept: INFUSION THERAPY | Facility: HOSPITAL | Age: 60
End: 2020-04-13

## 2020-04-13 ENCOUNTER — PATIENT MESSAGE (OUTPATIENT)
Dept: HEMATOLOGY/ONCOLOGY | Facility: CLINIC | Age: 60
End: 2020-04-13

## 2020-04-14 ENCOUNTER — INFUSION (OUTPATIENT)
Dept: INFUSION THERAPY | Facility: HOSPITAL | Age: 60
End: 2020-04-14
Attending: INTERNAL MEDICINE
Payer: COMMERCIAL

## 2020-04-14 VITALS
BODY MASS INDEX: 30.71 KG/M2 | HEART RATE: 74 BPM | DIASTOLIC BLOOD PRESSURE: 76 MMHG | SYSTOLIC BLOOD PRESSURE: 115 MMHG | WEIGHT: 196.13 LBS | TEMPERATURE: 98 F | RESPIRATION RATE: 18 BRPM

## 2020-04-14 DIAGNOSIS — Z17.0 MALIGNANT NEOPLASM OF CENTRAL PORTION OF LEFT BREAST IN FEMALE, ESTROGEN RECEPTOR POSITIVE: ICD-10-CM

## 2020-04-14 DIAGNOSIS — Z79.811 PROPHYLACTIC USE OF ANASTROZOLE (ARIMIDEX): Primary | ICD-10-CM

## 2020-04-14 DIAGNOSIS — C50.112 MALIGNANT NEOPLASM OF CENTRAL PORTION OF LEFT BREAST IN FEMALE, ESTROGEN RECEPTOR POSITIVE: ICD-10-CM

## 2020-04-14 PROCEDURE — 96372 THER/PROPH/DIAG INJ SC/IM: CPT

## 2020-04-14 PROCEDURE — 63600175 PHARM REV CODE 636 W HCPCS: Mod: JG | Performed by: INTERNAL MEDICINE

## 2020-04-14 RX ADMIN — DENOSUMAB 60 MG: 60 INJECTION SUBCUTANEOUS at 03:04

## 2020-04-30 ENCOUNTER — PATIENT OUTREACH (OUTPATIENT)
Dept: ADMINISTRATIVE | Facility: HOSPITAL | Age: 60
End: 2020-04-30

## 2020-04-30 NOTE — PROGRESS NOTES
Chart review completed 04/30/2020.  Care Everywhere updates requested and reviewed.  Immunizations reconciled. Media reviewed.

## 2020-05-27 ENCOUNTER — PATIENT MESSAGE (OUTPATIENT)
Dept: ORTHOPEDICS | Facility: CLINIC | Age: 60
End: 2020-05-27

## 2020-07-17 NOTE — ANESTHESIA PREPROCEDURE EVALUATION
10/30/2018  Caryn Toledo is a 58 y.o., female.    Anesthesia Evaluation    I have reviewed the Patient Summary Reports.    I have reviewed the Nursing Notes.   I have reviewed the Medications.     Review of Systems  Anesthesia Hx:  Hx of Anesthetic complications  Denies Family Hx of Anesthesia complications.  Personal Hx of Anesthesia complications, Post-Operative Nausea/Vomiting, in the past, but not with recent anesthetics / prophylaxis   Social:  Non-Smoker        Physical Exam  General:  Well nourished    Airway/Jaw/Neck:  Airway Findings: Mouth Opening: Normal Tongue: Normal  General Airway Assessment: Adult  Mallampati: II  TM Distance: Normal, at least 6 cm         Dental:  DENTAL FINDINGS: Normal   Chest/Lungs:  Chest/Lungs Clear    Heart/Vascular:  Heart Findings: Normal            Anesthesia Plan  Type of Anesthesia, risks & benefits discussed:  Anesthesia Type:  spinal  Patient's Preference:   Intra-op Monitoring Plan:   Intra-op Monitoring Plan Comments:   Post Op Pain Control Plan: multimodal analgesia and peripheral nerve block  Post Op Pain Control Plan Comments:   Induction:    Beta Blocker:  Patient is not currently on a Beta-Blocker (No further documentation required).       Informed Consent: Patient understands risks and agrees with Anesthesia plan.  Questions answered. Anesthesia consent signed with patient.  ASA Score: 1     Day of Surgery Review of History & Physical:    H&P update referred to the surgeon.         Ready For Surgery From Anesthesia Perspective.        (1) bedfast

## 2020-07-20 ENCOUNTER — PATIENT MESSAGE (OUTPATIENT)
Dept: ADMINISTRATIVE | Facility: HOSPITAL | Age: 60
End: 2020-07-20

## 2020-09-10 DIAGNOSIS — G47.9 SLEEP DISTURBANCE: ICD-10-CM

## 2020-09-10 DIAGNOSIS — F32.9 REACTIVE DEPRESSION: ICD-10-CM

## 2020-09-10 DIAGNOSIS — F43.0 STRESS REACTION: ICD-10-CM

## 2020-09-11 RX ORDER — SERTRALINE HYDROCHLORIDE 25 MG/1
25 TABLET, FILM COATED ORAL DAILY
Qty: 30 TABLET | Refills: 11 | Status: ON HOLD | OUTPATIENT
Start: 2020-09-11 | End: 2020-11-19 | Stop reason: ALTCHOICE

## 2020-09-28 ENCOUNTER — HOSPITAL ENCOUNTER (OUTPATIENT)
Dept: RADIOLOGY | Facility: HOSPITAL | Age: 60
Discharge: HOME | End: 2020-09-28
Attending: INTERNAL MEDICINE
Payer: COMMERCIAL

## 2020-09-28 DIAGNOSIS — Z79.811 PROPHYLACTIC USE OF ANASTROZOLE (ARIMIDEX): ICD-10-CM

## 2020-09-28 PROCEDURE — 71046 X-RAY EXAM CHEST 2 VIEWS: CPT | Mod: 26,,, | Performed by: RADIOLOGY

## 2020-09-28 PROCEDURE — 71046 XR CHEST PA AND LATERAL: ICD-10-PCS | Mod: 26,,, | Performed by: RADIOLOGY

## 2020-09-28 PROCEDURE — 71046 X-RAY EXAM CHEST 2 VIEWS: CPT | Mod: TC,FY

## 2020-09-29 ENCOUNTER — TELEPHONE (OUTPATIENT)
Dept: ORTHOPEDICS | Facility: CLINIC | Age: 60
End: 2020-09-29

## 2020-09-29 NOTE — TELEPHONE ENCOUNTER
----- Message from Shira Krishna MA sent at 9/29/2020  9:22 AM CDT -----  Contact: pt  Date for return to work 07/01/2020   Call back

## 2020-10-05 ENCOUNTER — TELEPHONE (OUTPATIENT)
Dept: HEMATOLOGY/ONCOLOGY | Facility: CLINIC | Age: 60
End: 2020-10-05

## 2020-10-05 ENCOUNTER — OFFICE VISIT (OUTPATIENT)
Dept: HEMATOLOGY/ONCOLOGY | Facility: CLINIC | Age: 60
End: 2020-10-05
Payer: COMMERCIAL

## 2020-10-05 VITALS
BODY MASS INDEX: 32.91 KG/M2 | SYSTOLIC BLOOD PRESSURE: 138 MMHG | WEIGHT: 210.13 LBS | DIASTOLIC BLOOD PRESSURE: 62 MMHG | OXYGEN SATURATION: 97 % | TEMPERATURE: 96 F | HEART RATE: 60 BPM

## 2020-10-05 DIAGNOSIS — Z17.0 MALIGNANT NEOPLASM OF CENTRAL PORTION OF LEFT BREAST IN FEMALE, ESTROGEN RECEPTOR POSITIVE: Primary | ICD-10-CM

## 2020-10-05 DIAGNOSIS — Z17.0 MALIGNANT NEOPLASM OF CENTRAL PORTION OF LEFT BREAST IN FEMALE, ESTROGEN RECEPTOR POSITIVE: ICD-10-CM

## 2020-10-05 DIAGNOSIS — Z79.811 PROPHYLACTIC USE OF ANASTROZOLE (ARIMIDEX): Primary | ICD-10-CM

## 2020-10-05 DIAGNOSIS — C50.112 MALIGNANT NEOPLASM OF CENTRAL PORTION OF LEFT BREAST IN FEMALE, ESTROGEN RECEPTOR POSITIVE: Primary | ICD-10-CM

## 2020-10-05 DIAGNOSIS — C50.112 MALIGNANT NEOPLASM OF CENTRAL PORTION OF LEFT BREAST IN FEMALE, ESTROGEN RECEPTOR POSITIVE: ICD-10-CM

## 2020-10-05 PROCEDURE — 99999 PR PBB SHADOW E&M-EST. PATIENT-LVL IV: CPT | Mod: PBBFAC,,, | Performed by: INTERNAL MEDICINE

## 2020-10-05 PROCEDURE — 99214 PR OFFICE/OUTPT VISIT, EST, LEVL IV, 30-39 MIN: ICD-10-PCS | Mod: S$GLB,,, | Performed by: INTERNAL MEDICINE

## 2020-10-05 PROCEDURE — 99214 OFFICE O/P EST MOD 30 MIN: CPT | Mod: S$GLB,,, | Performed by: INTERNAL MEDICINE

## 2020-10-05 PROCEDURE — 99999 PR PBB SHADOW E&M-EST. PATIENT-LVL IV: ICD-10-PCS | Mod: PBBFAC,,, | Performed by: INTERNAL MEDICINE

## 2020-10-05 RX ORDER — ANASTROZOLE 1 MG/1
1 TABLET ORAL DAILY
Qty: 90 TABLET | Refills: 4 | Status: SHIPPED | OUTPATIENT
Start: 2020-10-05 | End: 2021-11-08

## 2020-10-05 RX ORDER — ANASTROZOLE 1 MG/1
1 TABLET ORAL DAILY
Qty: 90 TABLET | Refills: 0 | Status: SHIPPED | OUTPATIENT
Start: 2020-10-05 | End: 2020-10-05 | Stop reason: SDUPTHER

## 2020-10-05 NOTE — PROGRESS NOTES
Subjective:       Patient ID: Caryn Toledo is a 60 y.o. female.    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  She does have significant family history however.  Her mother developed breast cancer in her 30s and she has 2 maternal aunts who developed breast cancer at young ages.  Father had multiple myeloma.  Age of menarche was 13.  She had menopause at age 35.  She has never taken hormone replacement therapy or oral contraceptives.  She has had 3 pregnancies the 1st child born when she was 21 years old.  This was found on a screening test. She underwent partial mastectomy and biopsy revealed invasive carcinoma and DCIS sentinel lymph node was negative for malignancy with clear margins.  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  She completed radiation to the breast started  Arimidex.  Had resection and radiation to the soft tissue pseudo sarcoma had follow-up in December 2019 and will no longer go back to [place] for [facility] to follow she will continue her care here Patient is from [place] has had issues with lymphedema ever since and has a lymphedema clinic appointment.  But felt that she was doing better and no longer needs this referral had knee surgery  here to continue Prolia.  And follow-up of breast cancer with labs and chest x-ray       Oncologic History     Oncologic Treatment     Pathology1. Left breast, partial mastectomy:  - Invasive mucinous carcinoma, Zurich grade 1, (T=2, N=1, M=1), 3mm in longest linear dimension  - Ductal carcinoma in-situ (DCIS), solid and mucinous types, low grade, 9mm in longest  linear dimension  - Margins negative for carcinoma and negative for DCIS  - No lymphovascular invasion identified  - Fibrocystic changes present  2. Left breast additional superior margin, excision:  - Benign breast tissue with fibrocystic changes  - Negative for atypia or malignancy  3. Left breast additional anterior margin, excision:  - Benign breast tissue with fibrocystic changes  - Negative for atypia or malignancy  4. Left breast additional deep margin, excision:  - BenignTumor Size: Greatest dimension of largest invasive focus: 3mm  Additional dimensions: 2 x 2 mm  Histologic Type: Mucinous carcinoma  Histologic Grade: (Chalmers histologic score):  Glandular (Acinar)/Tubular differentiation: Score 2 (10% to 75%) of tumor area forming glandular/tubular  structures)  Nuclear Pleomorphism: Score 1 (nuclei small with little increase in size in comparison with normal breast  epithelial cells, regular outlines, uniform nuclear chromatin, little variation in size  Mitotic Rate: Score 1 (< or = 3 mitoses per mm^2)  Overall Grade: Grade 1 (scores of 3, 4, or 5)  Tumor Focality: Unifocal  Ductal carcinoma in-situ (DCIS): Present    Pathologic Stage Classification (pTNM, AJCC 8th Edition):  Primary Tumor (pT): pT1a: Tumor <1mm but < or =5 mm in greatest dimension  Regional Lymph Nodes (pN): pNX: Regional lymph nodes cannot be assessed  Additional pathologic findings:  - Fibrocystic changes  Ancillary Studies:  Prognostic/Predictive markers were performed on the previous core biopsy specimen ([id number]) and were  reported as follows:  ER: Positive (>90%, strong)  DE: Positive (>90%, strong)  Her-2 IHC: Negative (score 0)  Ki-67: Approximately 7%  HPI:     Social History     Socioeconomic History    Marital status: Single     Spouse name: Not on file    Number of children: Not on file    Years of education: Not on file    Highest education level: Not on file   Occupational History    Not on file   Social Needs     Financial resource strain: Not on file    Food insecurity     Worry: Not on file     Inability: Not on file    Transportation needs     Medical: Not on file     Non-medical: Not on file   Tobacco Use    Smoking status: Never Smoker    Smokeless tobacco: Never Used   Substance and Sexual Activity    Alcohol use: Yes     Alcohol/week: 2.0 standard drinks     Types: 2 Glasses of wine per week     Comment: occasionally    Drug use: No    Sexual activity: Yes     Partners: Male   Lifestyle    Physical activity     Days per week: Not on file     Minutes per session: Not on file    Stress: Not on file   Relationships    Social connections     Talks on phone: Not on file     Gets together: Not on file     Attends [employer] service: Not on file     Active member of club or organization: Not on file     Attends meetings of clubs or organizations: Not on file     Relationship status: Not on file   Other Topics Concern    Not on file   Social History Narrative    Not on file     Family History   Problem Relation Age of Onset    Breast cancer Mother     Cancer Mother         skin    Cancer Father         multiple myeloma     Past Surgical History:   Procedure Laterality Date    APPENDECTOMY      AXILLARY NODE DISSECTION Left 3/14/2019    Procedure: LYMPHADENECTOMY, AXILLARY;  Surgeon: Didier Garcia MD;  Location: [facility] OR;  Service: General;  Laterality: Left;    BREAST SURGERY Left 02/11/2019    lumpectomy    JOINT REPLACEMENT Left 10/30/2018    knee    KNEE ARTHROPLASTY Left 10/30/2018    Procedure: ARTHROPLASTY, KNEE;  Surgeon: Ata Paula MD;  Location: [facility] OR;  Service: Orthopedics;  Laterality: Left;    KNEE ARTHROSCOPY W/ MENISCECTOMY Right 10/30/2018    Procedure: ARTHROSCOPY, KNEE, WITH MENISCECTOMY;  Surgeon: Ata Paula MD;  Location: [facility] OR;  Service: Orthopedics;  Laterality: Right;    KNEE SURGERY Left 1980s    REVISION OF KNEE ARTHROPLASTY Left 12/3/2019     "Procedure: REVISION, ARTHROPLASTY, KNEE;  Surgeon: Ata Paula MD;  Location: [facility] OR;  Service: Orthopedics;  Laterality: Left;  Edith Clifford  (notified 11/27/19 )    SENTINEL LYMPH NODE BIOPSY Left 3/14/2019    Procedure: BIOPSY, LYMPH NODE, SENTINEL;  Surgeon: Didier Garcia MD;  Location: [facility];  Service: General;  Laterality: Left;  left axillary sentinel node biopsy, possible axillary dissection    TUMOR REMOVAL Left     thigh     Past Medical History:   Diagnosis Date    Arthritis     Breast cancer     left    Cancer     breast; skin    High cholesterol     PONV (postoperative nausea and vomiting)     Wears glasses        Current Outpatient Medications:     anastrozole (ARIMIDEX) 1 mg Tab, Take 1 tablet (1 mg total) by mouth once daily., Disp: 90 tablet, Rfl: 0    denosumab (PROLIA) 60 mg/mL Syrg, Inject 60 mg into the skin., Disp: , Rfl:     multivitamin capsule, Take 1 capsule by mouth once daily., Disp: , Rfl:     aspirin 325 MG tablet, Take 1 tablet (325 mg total) by mouth once daily. (Patient not taking: Reported on 10/5/2020), Disp: 30 tablet, Rfl: 0    ciclopirox (PENLAC) 8 % Soln, Apply topically nightly., Disp: 6.6 mL, Rfl: 0    sertraline (ZOLOFT) 25 MG tablet, Take 1 tablet (25 mg total) by mouth once daily., Disp: 30 tablet, Rfl: 11  Review of patient's allergies indicates:   Allergen Reactions    Ciprofloxacin Anaphylaxis     paralysis    Pcn [penicillins] Anaphylaxis     paralysis    Eggplant Blisters    Eggs [egg derived]      Inside of mouth peel      Hydrocodone Nausea Only     "causes a migraine"    Morphine Nausea Only     "causes a migraine"    Oxycodone Nausea Only     "causes a migraine"    Tomato (solanum lycopersicum) Blisters         REVIEW OF SYSTEMS:     CONSTITUTIONAL: The patient denies any weight change. There is no apparent    change in appetite, fever, night sweats, headaches,  asfatigue, dizziness, or    weakness.      SKIN: " Denies rash, issues with nails, non-healing sores, bleeding, blotching    skin or abnormal bruising. Denies new moles or changes to existing moles.  Complains of chest wall pain that radiates to the back tightness under left arm around the site of mastectomy she has a history of lymphedema which is mild she has gone to lymphedema clinic .  Reports to lumpy areas to thighs bilaterally she has a history of pseudo sarcoma in the past    BREASTS:  Complaints of boggy and lumpy areas to left breast complains of swelling and lymphedema to left which occasionally gets pain full nipple retraction since surgery with crusting in the nipple    EYES: Denies eye pain, blurred vision, swelling, redness or discharge.      ENT AND MOUTH: Denies runny nose, stuffiness, sinus trouble or sores. Denies    nosebleeds. Denies, hoarseness, change in voice or swelling in front of the    neck.      CARDIOVASCULAR: Denies chest pain, discomfort or palpitations. Denies neck    swelling or episodes of passing out.      RESPIRATORY: Denies cough, sputum production, blood in sputum, and denies    shortness of breath.      GI: Denies trouble swallowing, indigestion, heartburn, abdominal pain, nausea,    vomiting, diarrhea, altered bowel habits, blood in stool, discoloration of    stools, change in nature of stool, bloating, increased abdominal girth.      GENITOURINARY: No discharge. No pelvic pain or lumps. No rash around groin or  lesions. No urinary frequency, hesitation, painful urination or blood in    urine. Denies incontinence. No problems with intercourse.      MUSCULOSKELETAL:   Complains of left arm swelling bogginess to and fullness to left breast.Muscle aches and pains pain from arthroplasty site in the recent.  Mood swings    NEUROLOGICAL: Denies tingling, numbness, altered mentation changes to nerve    function in the face, weakness to one or both of the body. Denies changes to    gait and denies multiple falls or accidents.       PSYCHIATRIC:   Insomnia.  Mood swings improved on Zoloft patient has discontinued Zoloft because of apathy and sees a psychiatrist now and feels really well from this follow-up     The patient denies recent foreign travel or recent exposure to chemicals or    products of concern or infectious diseases.     PHYSICAL EXAM:     Wt Readings from Last 3 Encounters:   10/05/20 95.3 kg (210 lb 1.6 oz)   04/14/20 89 kg (196 lb 1.6 oz)   03/09/20 87.3 kg (192 lb 7.4 oz)     Temp Readings from Last 3 Encounters:   10/05/20 96.3 °F (35.7 °C) (Temporal)   04/14/20 97.6 °F (36.4 °C)   03/09/20 98.6 °F (37 °C) (Oral)     BP Readings from Last 3 Encounters:   10/05/20 138/62   04/14/20 115/76   03/09/20 127/67     Pulse Readings from Last 3 Encounters:   10/05/20 60   04/14/20 74   03/09/20 70     GENERAL: Comfortable looking patient. Patient is in no distress.  Awake, alert and oriented to time, person and place.  No anxiety, or agitation.      HEENT: Normal conjunctivae and eyelids. WNL.  PERRLA 3 to 4 mm. No icterus, no pallor, no congestion, and no discharge noted.     NECK:  Supple. Trachea is central.  No crepitus.  No JVD or masses.    RESPIRATORY:  No intercostal retractions.  No dullness to percussion.  Chest is clear to auscultation.  No rales, rhonchi or wheezes.  No crepitus.  Good air entry bilaterally.    CARDIOVASCULAR:  S1 and S2 are normally heard without murmurs or gallops.  All peripheral pulses are present.    ABDOMEN:  Normal abdomen.  No hepatosplenomegaly.  No free fluid.  Bowel sounds are present.  No hernia noted. No masses.  No rebound or tenderness.  No guarding or rigidity.  Umbilicus is midline.    LYMPHATICS:  No axillary, cervical, supraclavicular, submental, or inguinal lymphadenopathy. Left arm slightly swollen left breast is boggy but no discrete masses are felthere  Are lumpy  Area around left breast bilateral nipples are retracted. Lumpectomy area is hardly visit some lymphedema around left  arm and axilla is obvious.    SKIN/MUSCULOSKELETAL:  There is no evidence of excoriation marks or ecchmosis.  No rashes.  No cyanosis.  No clubbing.  No joint or skeletal deformities noted.  Normal range of motion.    NEUROLOGIC:  Higher functions are appropriate.  No cranial nerve deficits.  Normal alis.  Normal strength.  Motor and sensory functions are normal.  Deep tendon reflexes are normal.    GENITAL/RECTAL:  Exams are deferred.      Laboratory:     CBC:  Lab Results   Component Value Date    WBC 3.79 (L) 09/28/2020    RBC 4.36 09/28/2020    HGB 13.2 09/28/2020    HCT 40.6 09/28/2020    MCV 93 09/28/2020    MCH 30.3 09/28/2020    MCHC 32.5 09/28/2020    RDW 14.0 09/28/2020     09/28/2020    MPV 10.3 09/28/2020    GRAN 2.0 09/28/2020    GRAN 53.8 09/28/2020    LYMPH 1.4 09/28/2020    LYMPH 37.2 09/28/2020    MONO 0.3 09/28/2020    MONO 6.6 09/28/2020    EOS 0.1 09/28/2020    BASO 0.02 09/28/2020    EOSINOPHIL 1.6 09/28/2020    BASOPHIL 0.5 09/28/2020       BMP: BMP  Lab Results   Component Value Date     09/28/2020    K 4.1 09/28/2020     09/28/2020    CO2 26 09/28/2020    BUN 12 09/28/2020    CREATININE 0.8 09/28/2020    CALCIUM 8.9 09/28/2020    ANIONGAP 9 09/28/2020    ESTGFRAFRICA >60 09/28/2020    EGFRNONAA >60 09/28/2020       LFT:   Lab Results   Component Value Date    ALT 26 09/28/2020    AST 18 09/28/2020    ALKPHOS 48 (L) 09/28/2020    BILITOT 0.5 09/28/2020       Assessment/Plan:        invasive breast cancer.1/2019 T1AN0 Patient status post lumpectomy to left breast stage I.  Radiation. Currently on Arimidex.    Pt c/o lumpy area to breast and crusting of inverted nipple, due for rodri in 11/2020 as last mammo was done at [facility] 11./2019    Will refer to gen sx to evaluate     pt complains of pain and discomfort radiating to the back which comes and anti-inflammatories help will obtain CT of chest and see her back in clinic to discuss in lieu of the fact she also has lumpy areas  around breast as mentioned above  Patient has 2 new lumps showing up bilaterally on both thighs.  patient has a history of soft tissue pseudo sarcoma follow-up at [facility] as well.went to [place] 12/2019 had dx testing done. Follows with Dr Sawyer had MRI done as well she no longer needs any further scans will have these evaluated by general surgery as well and removed if deemed possible       Zoloft ; for depression anxiety has been discontinued by patient due to apathy she sees psychiatry and feels this helps a       had knee sx 12/3 1019    prolia due October 16 2020 ordered and okay to proceed    I will see her with CT chest and if workup is negative she will rtc 6 months cbc, cmp, ca 2729 cxr   her future f/u will be here she will not go back to [place] unless she has to   calcium supplements daily   and dental precautions discussed.Advance Care planning/ directives /living will/patient's wishes discussed with patient.  Patient has been given guidelines and instructions on completing these directives  COVID social distancing, face mask use, hand washing techniques and personal hygiene routine discussed with patient  Good exercise, nutrition and weight management discussed with patient  Health maintenance activities and follow-up with PCPs recommendations discussed with patient

## 2020-10-05 NOTE — Clinical Note
Cancel cxr get ct chest send to Dr. Garcia to evaluate the two lumps on thigh abd breast inverted nipple with crusting  Rtc with ct chest done

## 2020-10-05 NOTE — TELEPHONE ENCOUNTER
Orders scheduled for pt as requested. Apt reminders placed in mail.       ----- Message from Anca Hansen MD sent at 10/5/2020 10:31 AM CDT -----  Manjeet devineia rtc 6 months with cbc, cmp, ca 2729 and cxr

## 2020-10-08 ENCOUNTER — HOSPITAL ENCOUNTER (OUTPATIENT)
Dept: RADIOLOGY | Facility: HOSPITAL | Age: 60
Discharge: HOME | End: 2020-10-08
Attending: INTERNAL MEDICINE
Payer: COMMERCIAL

## 2020-10-08 DIAGNOSIS — C50.112 MALIGNANT NEOPLASM OF CENTRAL PORTION OF LEFT BREAST IN FEMALE, ESTROGEN RECEPTOR POSITIVE: ICD-10-CM

## 2020-10-08 DIAGNOSIS — Z17.0 MALIGNANT NEOPLASM OF CENTRAL PORTION OF LEFT BREAST IN FEMALE, ESTROGEN RECEPTOR POSITIVE: ICD-10-CM

## 2020-10-08 PROCEDURE — 25500020 PHARM REV CODE 255

## 2020-10-08 PROCEDURE — 71260 CT CHEST WITH CONTRAST: ICD-10-PCS | Mod: 26,,, | Performed by: RADIOLOGY

## 2020-10-08 PROCEDURE — 71260 CT THORAX DX C+: CPT | Mod: TC

## 2020-10-08 PROCEDURE — 71260 CT THORAX DX C+: CPT | Mod: 26,,, | Performed by: RADIOLOGY

## 2020-10-08 RX ADMIN — IOHEXOL 75 ML: 350 INJECTION, SOLUTION INTRAVENOUS at 10:10

## 2020-10-14 ENCOUNTER — TELEPHONE (OUTPATIENT)
Dept: SURGERY | Facility: CLINIC | Age: 60
End: 2020-10-14

## 2020-10-14 NOTE — TELEPHONE ENCOUNTER
----- Message from Coco Dangelo sent at 10/14/2020  8:33 AM CDT -----  Regarding: Advice  Contact: patient  Type: Needs Medical Advice    Who Called:  patient    Best Call Back Number: [phone] (home)       Additional Information: patient called in stated that her appt that is scheduled for 11/3 show New Patient and she would like it changed because she is not a New patient and because of insurance reason. Patient is also asking for a MRI prior to the appt to access the new tumors.

## 2020-10-14 NOTE — TELEPHONE ENCOUNTER
Returned pt call changed from np to ep and also explained to pt that I have to speak with dr chu before ordering any testing,as explained to her on 10/13

## 2020-10-16 ENCOUNTER — INFUSION (OUTPATIENT)
Dept: INFUSION THERAPY | Facility: HOSPITAL | Age: 60
End: 2020-10-16
Attending: INTERNAL MEDICINE
Payer: COMMERCIAL

## 2020-10-16 ENCOUNTER — PATIENT MESSAGE (OUTPATIENT)
Dept: HEMATOLOGY/ONCOLOGY | Facility: CLINIC | Age: 60
End: 2020-10-16

## 2020-10-16 ENCOUNTER — TELEPHONE (OUTPATIENT)
Dept: HEMATOLOGY/ONCOLOGY | Facility: CLINIC | Age: 60
End: 2020-10-16

## 2020-10-16 ENCOUNTER — OFFICE VISIT (OUTPATIENT)
Dept: HEMATOLOGY/ONCOLOGY | Facility: CLINIC | Age: 60
End: 2020-10-16
Payer: COMMERCIAL

## 2020-10-16 VITALS
SYSTOLIC BLOOD PRESSURE: 133 MMHG | TEMPERATURE: 98 F | HEART RATE: 71 BPM | RESPIRATION RATE: 16 BRPM | WEIGHT: 206.13 LBS | BODY MASS INDEX: 32.28 KG/M2 | DIASTOLIC BLOOD PRESSURE: 75 MMHG | OXYGEN SATURATION: 97 %

## 2020-10-16 VITALS
OXYGEN SATURATION: 97 % | RESPIRATION RATE: 18 BRPM | SYSTOLIC BLOOD PRESSURE: 133 MMHG | TEMPERATURE: 98 F | WEIGHT: 205.63 LBS | HEIGHT: 67 IN | DIASTOLIC BLOOD PRESSURE: 75 MMHG | BODY MASS INDEX: 32.27 KG/M2 | HEART RATE: 71 BPM

## 2020-10-16 DIAGNOSIS — Z79.811 PROPHYLACTIC USE OF ANASTROZOLE (ARIMIDEX): ICD-10-CM

## 2020-10-16 DIAGNOSIS — D05.12 DUCTAL CARCINOMA IN SITU (DCIS) OF LEFT BREAST: ICD-10-CM

## 2020-10-16 DIAGNOSIS — C50.112 MALIGNANT NEOPLASM OF CENTRAL PORTION OF LEFT BREAST IN FEMALE, ESTROGEN RECEPTOR POSITIVE: Primary | ICD-10-CM

## 2020-10-16 DIAGNOSIS — Z79.811 PROPHYLACTIC USE OF ANASTROZOLE (ARIMIDEX): Primary | ICD-10-CM

## 2020-10-16 DIAGNOSIS — Z17.0 MALIGNANT NEOPLASM OF CENTRAL PORTION OF LEFT BREAST IN FEMALE, ESTROGEN RECEPTOR POSITIVE: ICD-10-CM

## 2020-10-16 DIAGNOSIS — C50.112 MALIGNANT NEOPLASM OF CENTRAL PORTION OF LEFT BREAST IN FEMALE, ESTROGEN RECEPTOR POSITIVE: ICD-10-CM

## 2020-10-16 DIAGNOSIS — Z17.0 MALIGNANT NEOPLASM OF CENTRAL PORTION OF LEFT BREAST IN FEMALE, ESTROGEN RECEPTOR POSITIVE: Primary | ICD-10-CM

## 2020-10-16 DIAGNOSIS — M25.559 PAIN IN UNSPECIFIED HIP: ICD-10-CM

## 2020-10-16 PROCEDURE — 63600175 PHARM REV CODE 636 W HCPCS: Mod: JG | Performed by: INTERNAL MEDICINE

## 2020-10-16 PROCEDURE — 96372 THER/PROPH/DIAG INJ SC/IM: CPT

## 2020-10-16 PROCEDURE — 99214 OFFICE O/P EST MOD 30 MIN: CPT | Mod: S$GLB,,, | Performed by: INTERNAL MEDICINE

## 2020-10-16 PROCEDURE — 99999 PR PBB SHADOW E&M-EST. PATIENT-LVL IV: CPT | Mod: PBBFAC,,, | Performed by: INTERNAL MEDICINE

## 2020-10-16 PROCEDURE — 99999 PR PBB SHADOW E&M-EST. PATIENT-LVL IV: ICD-10-PCS | Mod: PBBFAC,,, | Performed by: INTERNAL MEDICINE

## 2020-10-16 PROCEDURE — 99214 PR OFFICE/OUTPT VISIT, EST, LEVL IV, 30-39 MIN: ICD-10-PCS | Mod: S$GLB,,, | Performed by: INTERNAL MEDICINE

## 2020-10-16 RX ADMIN — DENOSUMAB 60 MG: 60 INJECTION SUBCUTANEOUS at 02:10

## 2020-10-16 NOTE — Clinical Note
Change cxr to ct chest in 6months   Mri of left hip and rt  jace for proliamur watch for DR chu follow up I nov of this pt

## 2020-10-16 NOTE — TELEPHONE ENCOUNTER
Lm to discuss upcoming apts scheduled with Dr. Hansen and to scheduled mri orders as requested. Instructed pt to call  to discuss.    ----- Message from Anca Hansen MD sent at 10/16/2020  2:27 PM CDT -----  Change cxr to ct chest in 6months Mri of left hip and rt  jace for proliamur watch for DR chu follow up I nov of this pt

## 2020-10-16 NOTE — PLAN OF CARE
Problem: Infection  Goal: Infection Symptom Resolution  Outcome: Ongoing, Progressing  Intervention: Prevent or Manage Infection  Flowsheets (Taken 10/16/2020 7736)  Infection Management: aseptic technique maintained  Isolation Precautions: protective environment maintained

## 2020-10-16 NOTE — TELEPHONE ENCOUNTER
Orders scheduled for pt as requested. My chart message sent to pt with details of apt dates/times. Will send apt reminders in mail once pt confirms.      ----- Message from Anca Hansen MD sent at 10/16/2020  2:10 PM CDT -----  Ok for jade, rtc 6 months with cbc,cmp, [id number] cxr

## 2020-10-16 NOTE — PROGRESS NOTES
Subjective:       Patient ID: Caryn Toledo is a 60 y.o. female.    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  She does have significant family history however.  Her mother developed breast cancer in her 30s and she has 2 maternal aunts who developed breast cancer at young ages.  Father had multiple myeloma.  Age of menarche was 13.  She had menopause at age 35.  She has never taken hormone replacement therapy or oral contraceptives.  She has had 3 pregnancies the 1st child born when she was 21 years old.  This was found on a screening test. She underwent partial mastectomy and biopsy revealed invasive carcinoma and DCIS sentinel lymph node was negative for malignancy with clear margins.  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  She completed radiation to the breast started  Arimidex.  Had resection and radiation to the soft tissue pseudo sarcoma had follow-up in December 2019 and will no longer go back to [place] for [facility] to follow she will continue her care here Patient is from [place] has had issues with lymphedema ever since and has a lymphedema clinic appointment.  But felt that she was doing better and no longer needs this referral had knee surgery  here to continue Prolia.  And follow-up of breast cancer with labs and chest x-ray       Oncologic History     Oncologic Treatment     Pathology1. Left breast, partial mastectomy:  - Invasive mucinous carcinoma, [place] grade 1, (T=2, N=1, M=1), 3mm in longest linear dimension  - Ductal carcinoma in-situ (DCIS), solid and mucinous types, low grade, 9mm in longest  linear dimension  - Margins negative for carcinoma and negative for DCIS  - No lymphovascular invasion identified  - Fibrocystic changes present  2. Left breast additional superior margin, excision:  - Benign breast tissue with fibrocystic changes  - Negative for atypia or malignancy  3. Left breast additional anterior margin, excision:  - Benign breast tissue with fibrocystic changes  - Negative for atypia or malignancy  4. Left breast additional deep margin, excision:  - BenignTumor Size: Greatest dimension of largest invasive focus: 3mm  Additional dimensions: 2 x 2 mm  Histologic Type: Mucinous carcinoma  Histologic Grade: ([place] histologic score):  Glandular (Acinar)/Tubular differentiation: Score 2 (10% to 75%) of tumor area forming glandular/tubular  structures)  Nuclear Pleomorphism: Score 1 (nuclei small with little increase in size in comparison with normal breast  epithelial cells, regular outlines, uniform nuclear chromatin, little variation in size  Mitotic Rate: Score 1 (< or = 3 mitoses per mm^2)  Overall Grade: Grade 1 (scores of 3, 4, or 5)  Tumor Focality: Unifocal  Ductal carcinoma in-situ (DCIS): Present    Pathologic Stage Classification (pTNM, AJCC 8th Edition):  Primary Tumor (pT): pT1a: Tumor <1mm but < or =5 mm in greatest dimension  Regional Lymph Nodes (pN): pNX: Regional lymph nodes cannot be assessed  Additional pathologic findings:  - Fibrocystic changes  Ancillary Studies:  Prognostic/Predictive markers were performed on the previous core biopsy specimen ([id number]) and were  reported as follows:  ER: Positive (>90%, strong)  OR: Positive (>90%, strong)  Her-2 IHC: Negative (score 0)  Ki-67: Approximately 7%  HPI:     Social History     Socioeconomic History    Marital status: Single     Spouse name: Not on file    Number of children: Not on file    Years of education: Not on file    Highest education level: Not on file   Occupational History    Not on file   Social Needs     Financial resource strain: Not on file    Food insecurity     Worry: Not on file     Inability: Not on file    Transportation needs     Medical: Not on file     Non-medical: Not on file   Tobacco Use    Smoking status: Never Smoker    Smokeless tobacco: Never Used   Substance and Sexual Activity    Alcohol use: Yes     Alcohol/week: 2.0 standard drinks     Types: 2 Glasses of wine per week     Comment: occasionally    Drug use: No    Sexual activity: Yes     Partners: Male   Lifestyle    Physical activity     Days per week: Not on file     Minutes per session: Not on file    Stress: Not on file   Relationships    Social connections     Talks on phone: Not on file     Gets together: Not on file     Attends Rastafari service: Not on file     Active member of club or organization: Not on file     Attends meetings of clubs or organizations: Not on file     Relationship status: Not on file   Other Topics Concern    Not on file   Social History Narrative    Not on file     Family History   Problem Relation Age of Onset    Breast cancer Mother     Cancer Mother         skin    Cancer Father         multiple myeloma     Past Surgical History:   Procedure Laterality Date    APPENDECTOMY      AXILLARY NODE DISSECTION Left 3/14/2019    Procedure: LYMPHADENECTOMY, AXILLARY;  Surgeon: Didier Garcia MD;  Location: [facility] OR;  Service: General;  Laterality: Left;    BREAST SURGERY Left 02/11/2019    lumpectomy    JOINT REPLACEMENT Left 10/30/2018    knee    KNEE ARTHROPLASTY Left 10/30/2018    Procedure: ARTHROPLASTY, KNEE;  Surgeon: Ata Paula MD;  Location: [facility] OR;  Service: Orthopedics;  Laterality: Left;    KNEE ARTHROSCOPY W/ MENISCECTOMY Right 10/30/2018    Procedure: ARTHROSCOPY, KNEE, WITH MENISCECTOMY;  Surgeon: Ata Paula MD;  Location: [facility] OR;  Service: Orthopedics;  Laterality: Right;    KNEE SURGERY Left 1980s    REVISION OF KNEE ARTHROPLASTY Left 12/3/2019     "Procedure: REVISION, ARTHROPLASTY, KNEE;  Surgeon: Ata Paula MD;  Location: [facility];  Service: Orthopedics;  Laterality: Left;  Edith Clifford  (notified 11/27/19 )    SENTINEL LYMPH NODE BIOPSY Left 3/14/2019    Procedure: BIOPSY, LYMPH NODE, SENTINEL;  Surgeon: Didier Garcia MD;  Location: [facility];  Service: General;  Laterality: Left;  left axillary sentinel node biopsy, possible axillary dissection    TUMOR REMOVAL Left     thigh     Past Medical History:   Diagnosis Date    Arthritis     Breast cancer     left    Cancer     breast; skin    High cholesterol     PONV (postoperative nausea and vomiting)     Wears glasses        Current Outpatient Medications:     anastrozole (ARIMIDEX) 1 mg Tab, Take 1 tablet (1 mg total) by mouth once daily., Disp: 90 tablet, Rfl: 4    ciclopirox (PENLAC) 8 % Soln, Apply topically nightly., Disp: 6.6 mL, Rfl: 0    denosumab (PROLIA) 60 mg/mL Syrg, Inject 60 mg into the skin., Disp: , Rfl:     multivitamin capsule, Take 1 capsule by mouth once daily., Disp: , Rfl:     sertraline (ZOLOFT) 25 MG tablet, Take 1 tablet (25 mg total) by mouth once daily., Disp: 30 tablet, Rfl: 11    aspirin 325 MG tablet, Take 1 tablet (325 mg total) by mouth once daily. (Patient not taking: Reported on 10/5/2020), Disp: 30 tablet, Rfl: 0  Review of patient's allergies indicates:   Allergen Reactions    Ciprofloxacin Anaphylaxis     paralysis    Pcn [penicillins] Anaphylaxis     paralysis    Eggplant Blisters    Eggs [egg derived]      Inside of mouth peel      Hydrocodone Nausea Only     "causes a migraine"    Morphine Nausea Only     "causes a migraine"    Oxycodone Nausea Only     "causes a migraine"    Tomato (solanum lycopersicum) Blisters         REVIEW OF SYSTEMS:     CONSTITUTIONAL: The patient denies any weight change. There is no apparent    change in appetite, fever, night sweats, headaches,  asfatigue, dizziness, or    weakness.      SKIN: " Denies rash, issues with nails, non-healing sores, bleeding, blotching    skin or abnormal bruising. Denies new moles or changes to existing moles.  Complains of chest wall pain that radiates to the back tightness under left arm around the site of mastectomy she has a history of lymphedema which is mild she has gone to lymphedema clinic .  Reports to lumpy areas to thighs bilaterally she has a history of pseudo sarcoma in the past    BREASTS:  Complaints of boggy and lumpy areas to left breast complains of swelling and lymphedema to left which occasionally gets pain full nipple retraction since surgery with crusting in the nipple    EYES: Denies eye pain, blurred vision, swelling, redness or discharge.      ENT AND MOUTH: Denies runny nose, stuffiness, sinus trouble or sores. Denies    nosebleeds. Denies, hoarseness, change in voice or swelling in front of the    neck.      CARDIOVASCULAR: Denies chest pain, discomfort or palpitations. Denies neck    swelling or episodes of passing out.      RESPIRATORY: Denies cough, sputum production, blood in sputum, and denies    shortness of breath.      GI: Denies trouble swallowing, indigestion, heartburn, abdominal pain, nausea,    vomiting, diarrhea, altered bowel habits, blood in stool, discoloration of    stools, change in nature of stool, bloating, increased abdominal girth.      GENITOURINARY: No discharge. No pelvic pain or lumps. No rash around groin or  lesions. No urinary frequency, hesitation, painful urination or blood in    urine. Denies incontinence. No problems with intercourse.      MUSCULOSKELETAL:   Complains of left arm swelling bogginess to and fullness to left breast.Muscle aches and pains pain from arthroplasty site in the recent.  Mood swings    NEUROLOGICAL: Denies tingling, numbness, altered mentation changes to nerve    function in the face, weakness to one or both of the body. Denies changes to    gait and denies multiple falls or accidents.       PSYCHIATRIC:   Insomnia.  Mood swings improved on Zoloft patient has discontinued Zoloft because of apathy and sees a psychiatrist now and feels really well from this follow-up     The patient denies recent foreign travel or recent exposure to chemicals or    products of concern or infectious diseases.     PHYSICAL EXAM:     Wt Readings from Last 3 Encounters:   10/16/20 93.5 kg (206 lb 2.1 oz)   10/05/20 95.3 kg (210 lb 1.6 oz)   04/14/20 89 kg (196 lb 1.6 oz)     Temp Readings from Last 3 Encounters:   10/16/20 97.8 °F (36.6 °C) (Temporal)   10/05/20 96.3 °F (35.7 °C) (Temporal)   04/14/20 97.6 °F (36.4 °C)     BP Readings from Last 3 Encounters:   10/16/20 133/75   10/05/20 138/62   04/14/20 115/76     Pulse Readings from Last 3 Encounters:   10/16/20 71   10/05/20 60   04/14/20 74     GENERAL: Comfortable looking patient. Patient is in no distress.  Awake, alert and oriented to time, person and place.  No anxiety, or agitation.      HEENT: Normal conjunctivae and eyelids. WNL.  PERRLA 3 to 4 mm. No icterus, no pallor, no congestion, and no discharge noted.     NECK:  Supple. Trachea is central.  No crepitus.  No JVD or masses.    RESPIRATORY:  No intercostal retractions.  No dullness to percussion.  Chest is clear to auscultation.  No rales, rhonchi or wheezes.  No crepitus.  Good air entry bilaterally.    CARDIOVASCULAR:  S1 and S2 are normally heard without murmurs or gallops.  All peripheral pulses are present.    ABDOMEN:  Normal abdomen.  No hepatosplenomegaly.  No free fluid.  Bowel sounds are present.  No hernia noted. No masses.  No rebound or tenderness.  No guarding or rigidity.  Umbilicus is midline.    LYMPHATICS:  No axillary, cervical, supraclavicular, submental, or inguinal lymphadenopathy. Left arm slightly swollen left breast is boggy but no discrete masses are felthere  Are lumpy  Area around left breast bilateral nipples are retracted. Lumpectomy area is hardly visit some lymphedema  around left arm and axilla is obvious.    SKIN/MUSCULOSKELETAL:  There is no evidence of excoriation marks or ecchmosis.  No rashes.  No cyanosis.  No clubbing.  No joint or skeletal deformities noted.  Normal range of motion.    NEUROLOGIC:  Higher functions are appropriate.  No cranial nerve deficits.  Normal alis.  Normal strength.  Motor and sensory functions are normal.  Deep tendon reflexes are normal.    GENITAL/RECTAL:  Exams are deferred.      Laboratory:     CBC:  Lab Results   Component Value Date    WBC 3.79 (L) 09/28/2020    RBC 4.36 09/28/2020    HGB 13.2 09/28/2020    HCT 40.6 09/28/2020    MCV 93 09/28/2020    MCH 30.3 09/28/2020    MCHC 32.5 09/28/2020    RDW 14.0 09/28/2020     09/28/2020    MPV 10.3 09/28/2020    GRAN 2.0 09/28/2020    GRAN 53.8 09/28/2020    LYMPH 1.4 09/28/2020    LYMPH 37.2 09/28/2020    MONO 0.3 09/28/2020    MONO 6.6 09/28/2020    EOS 0.1 09/28/2020    BASO 0.02 09/28/2020    EOSINOPHIL 1.6 09/28/2020    BASOPHIL 0.5 09/28/2020       BMP: BMP  Lab Results   Component Value Date     09/28/2020    K 4.1 09/28/2020     09/28/2020    CO2 26 09/28/2020    BUN 12 09/28/2020    CREATININE 0.8 09/28/2020    CALCIUM 8.9 09/28/2020    ANIONGAP 9 09/28/2020    ESTGFRAFRICA >60 09/28/2020    EGFRNONAA >60 09/28/2020       LFT:   Lab Results   Component Value Date    ALT 26 09/28/2020    AST 18 09/28/2020    ALKPHOS 48 (L) 09/28/2020    BILITOT 0.5 09/28/2020     Impression:10/2020     The there is a sub 4 mm noncalcified pulmonary nodule seen within the right upper lobe of the lung.  Follow-up CT in 12 months time to ensure stability is recommended.  Assessment/Plan:        invasive breast cancer.1/2019 T1AN0 Patient status post lumpectomy to left breast stage I.  Radiation. Currently on Arimidex.    Pt c/o lumpy area to breast and crusting of inverted nipple, due for rodri in 11/2020 as last mammo was done at [facility] 11./2019    Pending gen sx evaluation   pt complained  of pain and discomfort radiating to the back which comes and anti-inflammatories help CT of chest  Done and acceptable   Patient has 2 new lumps showing up bilaterally on both thighs.  patient has a history of soft tissue pseudo sarcoma follow-up at [facility] as well.went to [facility] 12/2019 had dx testing done. Follows with Dr Sawyer had MRI done as well she no longer needs any further scans will have these evaluated by general surgery as well and removed if deemed possible       Zoloft ; for depression anxiety has been discontinued by patient due to apathy she sees psychiatry and feels this helps a       had knee sx 12/3 1019    prolia due October 16 2020  Today ordered and okay to proceed     she will rtc 6 months cbc, cmp, ca 2729 CT chest to follow pulmonary nodules  Will follow Dr. Roberson's visit for thigh masses with MRIs done per patient's request prior to his appointment.  MRI of left hip and right femur has been ordered by me today in preparation for his appointment mid November   her future f/u will be here she will not go back to [place] unless she has to   calcium supplements daily   and dental precautions discussed.Advance Care planning/ directives /living will/patient's wishes discussed with patient.  Patient has been given guidelines and instructions on completing these directives  COVID social distancing, face mask use, hand washing techniques and personal hygiene routine discussed with patient  Good exercise, nutrition and weight management discussed with patient  Health maintenance activities and follow-up with PCPs recommendations discussed with patient

## 2020-10-20 ENCOUNTER — TELEPHONE (OUTPATIENT)
Dept: INFUSION THERAPY | Facility: HOSPITAL | Age: 60
End: 2020-10-20

## 2020-10-22 ENCOUNTER — HOSPITAL ENCOUNTER (OUTPATIENT)
Dept: RADIOLOGY | Facility: HOSPITAL | Age: 60
Discharge: HOME | End: 2020-10-22
Attending: INTERNAL MEDICINE
Payer: COMMERCIAL

## 2020-10-22 DIAGNOSIS — Z17.0 MALIGNANT NEOPLASM OF CENTRAL PORTION OF LEFT BREAST IN FEMALE, ESTROGEN RECEPTOR POSITIVE: ICD-10-CM

## 2020-10-22 DIAGNOSIS — C50.112 MALIGNANT NEOPLASM OF CENTRAL PORTION OF LEFT BREAST IN FEMALE, ESTROGEN RECEPTOR POSITIVE: ICD-10-CM

## 2020-10-22 PROCEDURE — 77062 MAMMO DIGITAL DIAGNOSTIC BILAT WITH TOMO: ICD-10-PCS | Mod: 26,,, | Performed by: RADIOLOGY

## 2020-10-22 PROCEDURE — 77062 BREAST TOMOSYNTHESIS BI: CPT | Mod: 26,,, | Performed by: RADIOLOGY

## 2020-10-22 PROCEDURE — 77066 MAMMO DIGITAL DIAGNOSTIC BILAT WITH TOMO: ICD-10-PCS | Mod: 26,,, | Performed by: RADIOLOGY

## 2020-10-22 PROCEDURE — 77066 DX MAMMO INCL CAD BI: CPT | Mod: 26,,, | Performed by: RADIOLOGY

## 2020-10-22 PROCEDURE — 77062 BREAST TOMOSYNTHESIS BI: CPT | Mod: TC

## 2020-10-28 ENCOUNTER — PATIENT MESSAGE (OUTPATIENT)
Dept: HEMATOLOGY/ONCOLOGY | Facility: CLINIC | Age: 60
End: 2020-10-28

## 2020-11-02 ENCOUNTER — HOSPITAL ENCOUNTER (OUTPATIENT)
Dept: RADIOLOGY | Facility: HOSPITAL | Age: 60
Discharge: HOME | End: 2020-11-02
Attending: INTERNAL MEDICINE
Payer: COMMERCIAL

## 2020-11-02 DIAGNOSIS — Z17.0 MALIGNANT NEOPLASM OF CENTRAL PORTION OF LEFT BREAST IN FEMALE, ESTROGEN RECEPTOR POSITIVE: ICD-10-CM

## 2020-11-02 DIAGNOSIS — C50.112 MALIGNANT NEOPLASM OF CENTRAL PORTION OF LEFT BREAST IN FEMALE, ESTROGEN RECEPTOR POSITIVE: ICD-10-CM

## 2020-11-02 DIAGNOSIS — C49.9 MALIGNANT NEOPLASM OF CONNECTIVE AND SOFT TISSUE, UNSPECIFIED: ICD-10-CM

## 2020-11-02 LAB
CREAT SERPL-MCNC: 0.9 MG/DL (ref 0.5–1.4)
SAMPLE: NORMAL

## 2020-11-02 PROCEDURE — 72197 MRI PELVIS W WO CONTRAST: ICD-10-PCS | Mod: 26,,, | Performed by: RADIOLOGY

## 2020-11-02 PROCEDURE — 72197 MRI PELVIS W/O & W/DYE: CPT | Mod: TC

## 2020-11-02 PROCEDURE — A9585 GADOBUTROL INJECTION: HCPCS | Performed by: INTERNAL MEDICINE

## 2020-11-02 PROCEDURE — 72197 MRI PELVIS W/O & W/DYE: CPT | Mod: 26,,, | Performed by: RADIOLOGY

## 2020-11-02 PROCEDURE — 25500020 PHARM REV CODE 255: Performed by: INTERNAL MEDICINE

## 2020-11-02 RX ORDER — GADOBUTROL 604.72 MG/ML
9 INJECTION INTRAVENOUS
Status: COMPLETED | OUTPATIENT
Start: 2020-11-02 | End: 2020-11-02

## 2020-11-02 RX ADMIN — GADOBUTROL 9 ML: 604.72 INJECTION INTRAVENOUS at 03:11

## 2020-11-03 ENCOUNTER — OFFICE VISIT (OUTPATIENT)
Dept: SURGERY | Facility: CLINIC | Age: 60
End: 2020-11-03
Payer: COMMERCIAL

## 2020-11-03 VITALS
WEIGHT: 207.69 LBS | SYSTOLIC BLOOD PRESSURE: 141 MMHG | HEART RATE: 75 BPM | DIASTOLIC BLOOD PRESSURE: 88 MMHG | BODY MASS INDEX: 32.53 KG/M2

## 2020-11-03 DIAGNOSIS — Z01.818 PREOP TESTING: ICD-10-CM

## 2020-11-03 DIAGNOSIS — R22.9 SUBCUTANEOUS MASS: Primary | ICD-10-CM

## 2020-11-03 PROCEDURE — 99999 PR PBB SHADOW E&M-EST. PATIENT-LVL IV: CPT | Mod: PBBFAC,,, | Performed by: SURGERY

## 2020-11-03 PROCEDURE — 99999 PR PBB SHADOW E&M-EST. PATIENT-LVL IV: ICD-10-PCS | Mod: PBBFAC,,, | Performed by: SURGERY

## 2020-11-03 PROCEDURE — 99213 PR OFFICE/OUTPT VISIT, EST, LEVL III, 20-29 MIN: ICD-10-PCS | Mod: S$GLB,,, | Performed by: SURGERY

## 2020-11-03 PROCEDURE — 99213 OFFICE O/P EST LOW 20 MIN: CPT | Mod: S$GLB,,, | Performed by: SURGERY

## 2020-11-03 RX ORDER — SODIUM CHLORIDE 9 MG/ML
INJECTION, SOLUTION INTRAVENOUS CONTINUOUS
Status: CANCELLED | OUTPATIENT
Start: 2020-11-19

## 2020-11-03 NOTE — PATIENT INSTRUCTIONS
..PRE-OP INSTRUCTIONS    Your procedure has been scheduled at:    [facility]pre-admit nurse  [phone]      DAY Thursday     DATE 11/19/2020       Please call the pre-op nurse to schedule your pre-op testing and registration  Someone from the hospital will call you the evening before surgery to let you know what time you need to be at the hospital for surgery.                                               1:  DO NOT EAT OR DRINK ANYTHING AFTER MIDNIGHT THE NIGHT BEFORE SURGERY.     2:  You will need to stop any blood thinners 1 week prior to surgery.  This includes Aspirin, fish oil, Pradaxa, Coumadin, Plavix, Pletal.  Please contact the prescribing doctor to be sure it is ok to stop these medicines.    3:  Pre-admit nurse will go over your medicines and let you know which ones not to take the morning of surgery    4:  Plan to have someone drive you home after you are released from the hospital.  You WILL NOT be able to drive yourself.    5:  If you have any questions or need to change your surgery date, please call Cecilia at [phone]    AFTER SURGERY:    You can shower 48 hours after surgery, REMOVE WET BANDAGES AND BANDAIDS, leave the steri- strips on.  If you have not had a bowel movement within 3 days after surgery you may take a laxative of your choice.  Do not lift anything over 5-10 pounds.    You need to have a follow up appointment 7-14 days after surgery, call the office to schedule or if you have questions or concerns.    For [facility] patients, you will receive a [facility] message with a link to schedule your post op appointment.

## 2020-11-04 NOTE — PROGRESS NOTES
Subjective:       Patient ID: Caryn Toledo is a 60 y.o. female.    Chief Complaint: No chief complaint on file.      HPI 60-year-old female known to me from previous partial mastectomy for infiltrating breast cancer.  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  She has since had a left knee replacement and revision.  She has developed a subcutaneous lesion on the left lateral leg and 1 on the anterior right upper leg.  These of the consistencies of lipoma.  However, due to her history she would like to have these excised as she is quite concerned about them understandably.  There has been no evidence of infection.    Past Medical History:   Diagnosis Date    Arthritis     Breast cancer     left    Cancer     breast; skin    High cholesterol     PONV (postoperative nausea and vomiting)     Wears glasses      Past Surgical History:   Procedure Laterality Date    APPENDECTOMY      AXILLARY NODE DISSECTION Left 3/14/2019    Procedure: LYMPHADENECTOMY, AXILLARY;  Surgeon: Didier Garcia MD;  Location: [facility] OR;  Service: General;  Laterality: Left;    BREAST SURGERY Left 02/11/2019    lumpectomy    JOINT REPLACEMENT Left 10/30/2018    knee    KNEE ARTHROPLASTY Left 10/30/2018    Procedure: ARTHROPLASTY, KNEE;  Surgeon: Ata Paula MD;  Location: [facility] OR;  Service: Orthopedics;  Laterality: Left;    KNEE ARTHROSCOPY W/ MENISCECTOMY Right 10/30/2018    Procedure: ARTHROSCOPY, KNEE, WITH MENISCECTOMY;  Surgeon: Ata Paula MD;  Location: [facility] OR;  Service: Orthopedics;  Laterality: Right;    KNEE SURGERY Left 1980s    REVISION OF KNEE ARTHROPLASTY Left 12/3/2019    Procedure: REVISION, ARTHROPLASTY, KNEE;  Surgeon: Ata Paula MD;  Location: [facility] OR;  Service: Orthopedics;  Laterality: Left;  Edith Clifford  (notified 11/27/19 )    SENTINEL LYMPH NODE BIOPSY Left 3/14/2019    Procedure: BIOPSY, LYMPH NODE, SENTINEL;   "Surgeon: Didier Garcia MD;  Location: [facility];  Service: General;  Laterality: Left;  left axillary sentinel node biopsy, possible axillary dissection    TUMOR REMOVAL Left     thigh         Current Outpatient Medications:     anastrozole (ARIMIDEX) 1 mg Tab, Take 1 tablet (1 mg total) by mouth once daily., Disp: 90 tablet, Rfl: 4    aspirin 325 MG tablet, Take 1 tablet (325 mg total) by mouth once daily., Disp: 30 tablet, Rfl: 0    ciclopirox (PENLAC) 8 % Soln, Apply topically nightly., Disp: 6.6 mL, Rfl: 0    denosumab (PROLIA) 60 mg/mL Syrg, Inject 60 mg into the skin., Disp: , Rfl:     multivitamin capsule, Take 1 capsule by mouth once daily., Disp: , Rfl:     sertraline (ZOLOFT) 25 MG tablet, Take 1 tablet (25 mg total) by mouth once daily., Disp: 30 tablet, Rfl: 11    Review of patient's allergies indicates:   Allergen Reactions    Ciprofloxacin Anaphylaxis     paralysis    Pcn [penicillins] Anaphylaxis     paralysis    Eggplant Blisters    Eggs [egg derived]      Inside of mouth peel      Hydrocodone Nausea Only     "causes a migraine"    Morphine Nausea Only     "causes a migraine"    Oxycodone Nausea Only     "causes a migraine"    Tomato (solanum lycopersicum) Blisters       Family History   Problem Relation Age of Onset    Breast cancer Mother     Cancer Mother         skin    Cancer Father         multiple myeloma     Social History     Socioeconomic History    Marital status: Single     Spouse name: Not on file    Number of children: Not on file    Years of education: Not on file    Highest education level: Not on file   Occupational History    Not on file   Social Needs    Financial resource strain: Not on file    Food insecurity     Worry: Not on file     Inability: Not on file    Transportation needs     Medical: Not on file     Non-medical: Not on file   Tobacco Use    Smoking status: Never Smoker    Smokeless tobacco: Never Used   Substance and Sexual Activity    " Alcohol use: Yes     Alcohol/week: 2.0 standard drinks     Types: 2 Glasses of wine per week     Comment: occasionally    Drug use: No    Sexual activity: Yes     Partners: Male   Lifestyle    Physical activity     Days per week: Not on file     Minutes per session: Not on file    Stress: Not on file   Relationships    Social connections     Talks on phone: Not on file     Gets together: Not on file     Attends Baptism service: Not on file     Active member of club or organization: Not on file     Attends meetings of clubs or organizations: Not on file     Relationship status: Not on file   Other Topics Concern    Not on file   Social History Narrative    Not on file       Review of Systems   Constitutional: Negative for activity change, chills, fever and unexpected weight change.   HENT: Negative.    Eyes: Negative for redness and visual disturbance.   Respiratory: Negative.    Cardiovascular: Negative for chest pain and palpitations.   Gastrointestinal: Negative.  Negative for vomiting.   Endocrine: Negative.    Genitourinary: Negative for dysuria, frequency and hematuria.   Musculoskeletal: Negative for arthralgias, back pain and neck pain.   Skin: Negative for rash and wound.   Allergic/Immunologic: Negative.    Neurological: Negative for dizziness, weakness and headaches.   Hematological: Negative for adenopathy.   Psychiatric/Behavioral: Negative for agitation and dysphoric mood. The patient is not nervous/anxious.      Objective:     Physical Exam  Constitutional:       General: She is not in acute distress.     Appearance: She is well-developed.   HENT:      Head: Normocephalic and atraumatic.      Mouth/Throat:      Pharynx: No oropharyngeal exudate.   Eyes:      General: No scleral icterus.     Conjunctiva/sclera: Conjunctivae normal.      Pupils: Pupils are equal, round, and reactive to light.   Neck:      Musculoskeletal: Normal range of motion.      Thyroid: No thyromegaly.   Cardiovascular:       Rate and Rhythm: Normal rate and regular rhythm.      Heart sounds: No murmur.   Pulmonary:      Effort: Pulmonary effort is normal.      Breath sounds: Normal breath sounds. No wheezing or rales.   Abdominal:      General: Bowel sounds are normal.      Palpations: Abdomen is soft.   Musculoskeletal: Normal range of motion.      Comments: 3 cm subcutaneous nodule of the left lateral upper leg.  2-3 cm subcutaneous nodule of the right upper thigh.   Lymphadenopathy:      Cervical: No cervical adenopathy.   Skin:     General: Skin is warm and dry.      Findings: No erythema or rash.   Neurological:      Mental Status: She is alert and oriented to person, place, and time.      Cranial Nerves: No cranial nerve deficit.   Psychiatric:         Behavior: Behavior normal.       Assessment:     Encounter Diagnoses   Name Primary?    Preop testing     Subcutaneous mass Yes       Plan:      1.  Plan excision of the subcutaneous lesions as per the patient wishes.  2. Risks and benefits of the planned procedure were discussed at length with the patient.  Risks and benefits of not proceeding with the procedure were discussed as well. All questions were answered. The patient expressed clear understanding and would like to proceed with the procedure as discussed.

## 2020-11-04 NOTE — H&P (VIEW-ONLY)
Subjective:       Patient ID: Caryn Toledo is a 60 y.o. female.    Chief Complaint: No chief complaint on file.      HPI 60-year-old female known to me from previous partial mastectomy for infiltrating breast cancer.  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  She has since had a left knee replacement and revision.  She has developed a subcutaneous lesion on the left lateral leg and 1 on the anterior right upper leg.  These of the consistencies of lipoma.  However, due to her history she would like to have these excised as she is quite concerned about them understandably.  There has been no evidence of infection.    Past Medical History:   Diagnosis Date    Arthritis     Breast cancer     left    Cancer     breast; skin    High cholesterol     PONV (postoperative nausea and vomiting)     Wears glasses      Past Surgical History:   Procedure Laterality Date    APPENDECTOMY      AXILLARY NODE DISSECTION Left 3/14/2019    Procedure: LYMPHADENECTOMY, AXILLARY;  Surgeon: Didier Garcia MD;  Location: [facility] OR;  Service: General;  Laterality: Left;    BREAST SURGERY Left 02/11/2019    lumpectomy    JOINT REPLACEMENT Left 10/30/2018    knee    KNEE ARTHROPLASTY Left 10/30/2018    Procedure: ARTHROPLASTY, KNEE;  Surgeon: Ata Paula MD;  Location: [facility] OR;  Service: Orthopedics;  Laterality: Left;    KNEE ARTHROSCOPY W/ MENISCECTOMY Right 10/30/2018    Procedure: ARTHROSCOPY, KNEE, WITH MENISCECTOMY;  Surgeon: Ata Paula MD;  Location: [facility] OR;  Service: Orthopedics;  Laterality: Right;    KNEE SURGERY Left 1980s    REVISION OF KNEE ARTHROPLASTY Left 12/3/2019    Procedure: REVISION, ARTHROPLASTY, KNEE;  Surgeon: Ata Paula MD;  Location: [facility] OR;  Service: Orthopedics;  Laterality: Left;  Edith Clifford  (notified 11/27/19 )    SENTINEL LYMPH NODE BIOPSY Left 3/14/2019    Procedure: BIOPSY, LYMPH NODE, SENTINEL;   "Surgeon: Didier Garcia MD;  Location: [facility];  Service: General;  Laterality: Left;  left axillary sentinel node biopsy, possible axillary dissection    TUMOR REMOVAL Left     thigh         Current Outpatient Medications:     anastrozole (ARIMIDEX) 1 mg Tab, Take 1 tablet (1 mg total) by mouth once daily., Disp: 90 tablet, Rfl: 4    aspirin 325 MG tablet, Take 1 tablet (325 mg total) by mouth once daily., Disp: 30 tablet, Rfl: 0    ciclopirox (PENLAC) 8 % Soln, Apply topically nightly., Disp: 6.6 mL, Rfl: 0    denosumab (PROLIA) 60 mg/mL Syrg, Inject 60 mg into the skin., Disp: , Rfl:     multivitamin capsule, Take 1 capsule by mouth once daily., Disp: , Rfl:     sertraline (ZOLOFT) 25 MG tablet, Take 1 tablet (25 mg total) by mouth once daily., Disp: 30 tablet, Rfl: 11    Review of patient's allergies indicates:   Allergen Reactions    Ciprofloxacin Anaphylaxis     paralysis    Pcn [penicillins] Anaphylaxis     paralysis    Eggplant Blisters    Eggs [egg derived]      Inside of mouth peel      Hydrocodone Nausea Only     "causes a migraine"    Morphine Nausea Only     "causes a migraine"    Oxycodone Nausea Only     "causes a migraine"    Tomato (solanum lycopersicum) Blisters       Family History   Problem Relation Age of Onset    Breast cancer Mother     Cancer Mother         skin    Cancer Father         multiple myeloma     Social History     Socioeconomic History    Marital status: Single     Spouse name: Not on file    Number of children: Not on file    Years of education: Not on file    Highest education level: Not on file   Occupational History    Not on file   Social Needs    Financial resource strain: Not on file    Food insecurity     Worry: Not on file     Inability: Not on file    Transportation needs     Medical: Not on file     Non-medical: Not on file   Tobacco Use    Smoking status: Never Smoker    Smokeless tobacco: Never Used   Substance and Sexual Activity    " Alcohol use: Yes     Alcohol/week: 2.0 standard drinks     Types: 2 Glasses of wine per week     Comment: occasionally    Drug use: No    Sexual activity: Yes     Partners: Male   Lifestyle    Physical activity     Days per week: Not on file     Minutes per session: Not on file    Stress: Not on file   Relationships    Social connections     Talks on phone: Not on file     Gets together: Not on file     Attends Congregational service: Not on file     Active member of club or organization: Not on file     Attends meetings of clubs or organizations: Not on file     Relationship status: Not on file   Other Topics Concern    Not on file   Social History Narrative    Not on file       Review of Systems   Constitutional: Negative for activity change, chills, fever and unexpected weight change.   HENT: Negative.    Eyes: Negative for redness and visual disturbance.   Respiratory: Negative.    Cardiovascular: Negative for chest pain and palpitations.   Gastrointestinal: Negative.  Negative for vomiting.   Endocrine: Negative.    Genitourinary: Negative for dysuria, frequency and hematuria.   Musculoskeletal: Negative for arthralgias, back pain and neck pain.   Skin: Negative for rash and wound.   Allergic/Immunologic: Negative.    Neurological: Negative for dizziness, weakness and headaches.   Hematological: Negative for adenopathy.   Psychiatric/Behavioral: Negative for agitation and dysphoric mood. The patient is not nervous/anxious.      Objective:     Physical Exam  Constitutional:       General: She is not in acute distress.     Appearance: She is well-developed.   HENT:      Head: Normocephalic and atraumatic.      Mouth/Throat:      Pharynx: No oropharyngeal exudate.   Eyes:      General: No scleral icterus.     Conjunctiva/sclera: Conjunctivae normal.      Pupils: Pupils are equal, round, and reactive to light.   Neck:      Musculoskeletal: Normal range of motion.      Thyroid: No thyromegaly.   Cardiovascular:       Rate and Rhythm: Normal rate and regular rhythm.      Heart sounds: No murmur.   Pulmonary:      Effort: Pulmonary effort is normal.      Breath sounds: Normal breath sounds. No wheezing or rales.   Abdominal:      General: Bowel sounds are normal.      Palpations: Abdomen is soft.   Musculoskeletal: Normal range of motion.      Comments: 3 cm subcutaneous nodule of the left lateral upper leg.  2-3 cm subcutaneous nodule of the right upper thigh.   Lymphadenopathy:      Cervical: No cervical adenopathy.   Skin:     General: Skin is warm and dry.      Findings: No erythema or rash.   Neurological:      Mental Status: She is alert and oriented to person, place, and time.      Cranial Nerves: No cranial nerve deficit.   Psychiatric:         Behavior: Behavior normal.       Assessment:     Encounter Diagnoses   Name Primary?    Preop testing     Subcutaneous mass Yes       Plan:      1.  Plan excision of the subcutaneous lesions as per the patient wishes.  2. Risks and benefits of the planned procedure were discussed at length with the patient.  Risks and benefits of not proceeding with the procedure were discussed as well. All questions were answered. The patient expressed clear understanding and would like to proceed with the procedure as discussed.

## 2020-11-16 ENCOUNTER — LAB VISIT (OUTPATIENT)
Dept: PRIMARY CARE CLINIC | Facility: CLINIC | Age: 60
End: 2020-11-16
Payer: COMMERCIAL

## 2020-11-16 DIAGNOSIS — Z01.818 PREOP TESTING: ICD-10-CM

## 2020-11-16 PROCEDURE — U0003 INFECTIOUS AGENT DETECTION BY NUCLEIC ACID (DNA OR RNA); SEVERE ACUTE RESPIRATORY SYNDROME CORONAVIRUS 2 (SARS-COV-2) (CORONAVIRUS DISEASE [COVID-19]), AMPLIFIED PROBE TECHNIQUE, MAKING USE OF HIGH THROUGHPUT TECHNOLOGIES AS DESCRIBED BY CMS-2020-01-R: HCPCS

## 2020-11-17 NOTE — PRE-PROCEDURE INSTRUCTIONS
Pt was called to give pre-op instructions - no answer- voicemail full.  Pre-op instructions given per Arelit.

## 2020-11-18 ENCOUNTER — ANESTHESIA EVENT (OUTPATIENT)
Dept: SURGERY | Facility: HOSPITAL | Age: 60
End: 2020-11-18
Payer: COMMERCIAL

## 2020-11-18 LAB — SARS-COV-2 RNA RESP QL NAA+PROBE: NOT DETECTED

## 2020-11-19 ENCOUNTER — HOSPITAL ENCOUNTER (OUTPATIENT)
Facility: HOSPITAL | Age: 60
Discharge: HOME | End: 2020-11-19
Attending: SURGERY | Admitting: SURGERY
Payer: COMMERCIAL

## 2020-11-19 ENCOUNTER — ANESTHESIA (OUTPATIENT)
Dept: SURGERY | Facility: HOSPITAL | Age: 60
End: 2020-11-19
Payer: COMMERCIAL

## 2020-11-19 VITALS
RESPIRATION RATE: 18 BRPM | WEIGHT: 195 LBS | BODY MASS INDEX: 30.61 KG/M2 | HEIGHT: 67 IN | DIASTOLIC BLOOD PRESSURE: 58 MMHG | OXYGEN SATURATION: 98 % | HEART RATE: 61 BPM | TEMPERATURE: 98 F | SYSTOLIC BLOOD PRESSURE: 118 MMHG

## 2020-11-19 DIAGNOSIS — R22.9 SUBCUTANEOUS MASS: Primary | ICD-10-CM

## 2020-11-19 DIAGNOSIS — Z01.818 PREOP TESTING: ICD-10-CM

## 2020-11-19 PROCEDURE — 71000015 HC POSTOP RECOV 1ST HR: Performed by: SURGERY

## 2020-11-19 PROCEDURE — 88307 TISSUE EXAM BY PATHOLOGIST: CPT | Mod: 59 | Performed by: PATHOLOGY

## 2020-11-19 PROCEDURE — 25000003 PHARM REV CODE 250: Performed by: ANESTHESIOLOGY

## 2020-11-19 PROCEDURE — 27337 EXC THIGH/KNEE LES SC 3 CM/>: CPT | Mod: 51,RT,, | Performed by: SURGERY

## 2020-11-19 PROCEDURE — 93005 ELECTROCARDIOGRAM TRACING: CPT

## 2020-11-19 PROCEDURE — 36000707: Performed by: SURGERY

## 2020-11-19 PROCEDURE — 27043 PR EXCISION TUMOR SOFT TISSUE PELVIS&HIP SUBQ 3+CM: ICD-10-PCS | Mod: LT,,, | Performed by: SURGERY

## 2020-11-19 PROCEDURE — 37000008 HC ANESTHESIA 1ST 15 MINUTES: Performed by: SURGERY

## 2020-11-19 PROCEDURE — 37000009 HC ANESTHESIA EA ADD 15 MINS: Performed by: SURGERY

## 2020-11-19 PROCEDURE — 36000706: Performed by: SURGERY

## 2020-11-19 PROCEDURE — D9220A PRA ANESTHESIA: ICD-10-PCS | Mod: CRNA,,, | Performed by: NURSE ANESTHETIST, CERTIFIED REGISTERED

## 2020-11-19 PROCEDURE — 71000033 HC RECOVERY, INTIAL HOUR: Performed by: SURGERY

## 2020-11-19 PROCEDURE — 93010 EKG 12-LEAD: ICD-10-PCS | Mod: ,,, | Performed by: SPECIALIST

## 2020-11-19 PROCEDURE — D9220A PRA ANESTHESIA: Mod: CRNA,,, | Performed by: NURSE ANESTHETIST, CERTIFIED REGISTERED

## 2020-11-19 PROCEDURE — 25000003 PHARM REV CODE 250: Performed by: NURSE ANESTHETIST, CERTIFIED REGISTERED

## 2020-11-19 PROCEDURE — 27337 PR EXCISON TUMOR SOFT TISSUE THIGH/KNEE SUBQ 3+CM: ICD-10-PCS | Mod: 51,RT,, | Performed by: SURGERY

## 2020-11-19 PROCEDURE — 63600175 PHARM REV CODE 636 W HCPCS: Performed by: NURSE ANESTHETIST, CERTIFIED REGISTERED

## 2020-11-19 PROCEDURE — D9220A PRA ANESTHESIA: Mod: ANES,,, | Performed by: ANESTHESIOLOGY

## 2020-11-19 PROCEDURE — 71000039 HC RECOVERY, EACH ADD'L HOUR: Performed by: SURGERY

## 2020-11-19 PROCEDURE — 27043 EXC HIP PELVIS LES SC 3 CM/>: CPT | Mod: LT,,, | Performed by: SURGERY

## 2020-11-19 PROCEDURE — PATHNN PATH DEFICIENCY: Mod: ,,, | Performed by: SURGERY

## 2020-11-19 PROCEDURE — PATHNN PATH DEFICIENCY: ICD-10-PCS | Mod: ,,, | Performed by: SURGERY

## 2020-11-19 PROCEDURE — 25000003 PHARM REV CODE 250: Performed by: SURGERY

## 2020-11-19 PROCEDURE — 88307 PR  SURG PATH,LEVEL V: ICD-10-PCS | Mod: 26,,, | Performed by: PATHOLOGY

## 2020-11-19 PROCEDURE — 88307 TISSUE EXAM BY PATHOLOGIST: CPT | Mod: 26,,, | Performed by: PATHOLOGY

## 2020-11-19 PROCEDURE — 27200651 HC AIRWAY, LMA: Performed by: ANESTHESIOLOGY

## 2020-11-19 PROCEDURE — 93010 ELECTROCARDIOGRAM REPORT: CPT | Mod: ,,, | Performed by: SPECIALIST

## 2020-11-19 PROCEDURE — D9220A PRA ANESTHESIA: ICD-10-PCS | Mod: ANES,,, | Performed by: ANESTHESIOLOGY

## 2020-11-19 RX ORDER — SODIUM CHLORIDE 0.9 % (FLUSH) 0.9 %
3 SYRINGE (ML) INJECTION EVERY 8 HOURS
Status: DISCONTINUED | OUTPATIENT
Start: 2020-11-19 | End: 2020-11-19 | Stop reason: HOSPADM

## 2020-11-19 RX ORDER — EPHEDRINE SULFATE 50 MG/ML
INJECTION, SOLUTION INTRAVENOUS
Status: DISCONTINUED | OUTPATIENT
Start: 2020-11-19 | End: 2020-11-19

## 2020-11-19 RX ORDER — SODIUM CHLORIDE 9 MG/ML
INJECTION, SOLUTION INTRAVENOUS CONTINUOUS
Status: DISCONTINUED | OUTPATIENT
Start: 2020-11-19 | End: 2020-11-19 | Stop reason: HOSPADM

## 2020-11-19 RX ORDER — FENTANYL CITRATE 50 UG/ML
25 INJECTION, SOLUTION INTRAMUSCULAR; INTRAVENOUS EVERY 5 MIN PRN
Status: DISCONTINUED | OUTPATIENT
Start: 2020-11-19 | End: 2020-11-19 | Stop reason: HOSPADM

## 2020-11-19 RX ORDER — HYDROMORPHONE HYDROCHLORIDE 2 MG/ML
0.2 INJECTION, SOLUTION INTRAMUSCULAR; INTRAVENOUS; SUBCUTANEOUS EVERY 5 MIN PRN
Status: DISCONTINUED | OUTPATIENT
Start: 2020-11-19 | End: 2020-11-19 | Stop reason: HOSPADM

## 2020-11-19 RX ORDER — HYDROCODONE BITARTRATE AND ACETAMINOPHEN 5; 325 MG/1; MG/1
1 TABLET ORAL EVERY 6 HOURS PRN
Qty: 10 TABLET | Refills: 0 | Status: SHIPPED | OUTPATIENT
Start: 2020-11-19 | End: 2021-04-22

## 2020-11-19 RX ORDER — SODIUM CHLORIDE, SODIUM LACTATE, POTASSIUM CHLORIDE, CALCIUM CHLORIDE 600; 310; 30; 20 MG/100ML; MG/100ML; MG/100ML; MG/100ML
INJECTION, SOLUTION INTRAVENOUS CONTINUOUS
Status: ACTIVE | OUTPATIENT
Start: 2020-11-19

## 2020-11-19 RX ORDER — ONDANSETRON 2 MG/ML
4 INJECTION INTRAMUSCULAR; INTRAVENOUS DAILY PRN
Status: DISCONTINUED | OUTPATIENT
Start: 2020-11-19 | End: 2020-11-19 | Stop reason: HOSPADM

## 2020-11-19 RX ORDER — MIDAZOLAM HYDROCHLORIDE 1 MG/ML
INJECTION, SOLUTION INTRAMUSCULAR; INTRAVENOUS
Status: DISCONTINUED | OUTPATIENT
Start: 2020-11-19 | End: 2020-11-19

## 2020-11-19 RX ORDER — FENTANYL CITRATE 50 UG/ML
INJECTION, SOLUTION INTRAMUSCULAR; INTRAVENOUS
Status: DISCONTINUED | OUTPATIENT
Start: 2020-11-19 | End: 2020-11-19

## 2020-11-19 RX ORDER — PROPOFOL 10 MG/ML
VIAL (ML) INTRAVENOUS
Status: DISCONTINUED | OUTPATIENT
Start: 2020-11-19 | End: 2020-11-19

## 2020-11-19 RX ORDER — LIDOCAINE HYDROCHLORIDE 10 MG/ML
1 INJECTION, SOLUTION EPIDURAL; INFILTRATION; INTRACAUDAL; PERINEURAL ONCE
Status: ACTIVE | OUTPATIENT
Start: 2020-11-19

## 2020-11-19 RX ORDER — SODIUM CHLORIDE 9 MG/ML
INJECTION, SOLUTION INTRAVENOUS CONTINUOUS
Status: CANCELLED | OUTPATIENT
Start: 2020-11-19

## 2020-11-19 RX ORDER — LIDOCAINE HYDROCHLORIDE 20 MG/ML
INJECTION INTRAVENOUS
Status: DISCONTINUED | OUTPATIENT
Start: 2020-11-19 | End: 2020-11-19

## 2020-11-19 RX ORDER — DIPHENHYDRAMINE HYDROCHLORIDE 50 MG/ML
25 INJECTION INTRAMUSCULAR; INTRAVENOUS EVERY 6 HOURS PRN
Status: DISCONTINUED | OUTPATIENT
Start: 2020-11-19 | End: 2020-11-19 | Stop reason: HOSPADM

## 2020-11-19 RX ORDER — SODIUM CHLORIDE 0.9 % (FLUSH) 0.9 %
3 SYRINGE (ML) INJECTION
Status: DISCONTINUED | OUTPATIENT
Start: 2020-11-19 | End: 2020-11-19 | Stop reason: HOSPADM

## 2020-11-19 RX ORDER — CLINDAMYCIN PHOSPHATE 900 MG/50ML
900 INJECTION, SOLUTION INTRAVENOUS
Status: COMPLETED | OUTPATIENT
Start: 2020-11-19 | End: 2020-11-19

## 2020-11-19 RX ORDER — BUPIVACAINE HYDROCHLORIDE AND EPINEPHRINE 2.5; 5 MG/ML; UG/ML
INJECTION, SOLUTION EPIDURAL; INFILTRATION; INTRACAUDAL; PERINEURAL
Status: DISCONTINUED | OUTPATIENT
Start: 2020-11-19 | End: 2020-11-19 | Stop reason: HOSPADM

## 2020-11-19 RX ADMIN — LIDOCAINE HYDROCHLORIDE 100 MG: 20 INJECTION, SOLUTION INTRAVENOUS at 12:11

## 2020-11-19 RX ADMIN — PROPOFOL 150 MG: 10 INJECTION, EMULSION INTRAVENOUS at 12:11

## 2020-11-19 RX ADMIN — EPHEDRINE SULFATE 10 MG: 50 INJECTION, SOLUTION INTRAMUSCULAR; INTRAVENOUS; SUBCUTANEOUS at 12:11

## 2020-11-19 RX ADMIN — CLINDAMYCIN PHOSPHATE 900 MG: 18 INJECTION, SOLUTION INTRAVENOUS at 12:11

## 2020-11-19 RX ADMIN — MIDAZOLAM 2 MG: 1 INJECTION INTRAMUSCULAR; INTRAVENOUS at 12:11

## 2020-11-19 RX ADMIN — SODIUM CHLORIDE, SODIUM GLUCONATE, SODIUM ACETATE, POTASSIUM CHLORIDE, MAGNESIUM CHLORIDE, SODIUM PHOSPHATE, DIBASIC, AND POTASSIUM PHOSPHATE: .53; .5; .37; .037; .03; .012; .00082 INJECTION, SOLUTION INTRAVENOUS at 12:11

## 2020-11-19 RX ADMIN — FENTANYL CITRATE 100 MCG: 50 INJECTION, SOLUTION INTRAMUSCULAR; INTRAVENOUS at 12:11

## 2020-11-19 RX ADMIN — EPHEDRINE SULFATE 15 MG: 50 INJECTION, SOLUTION INTRAMUSCULAR; INTRAVENOUS; SUBCUTANEOUS at 12:11

## 2020-11-19 NOTE — DISCHARGE INSTRUCTIONS
We hope your stay was comfortable as you heal now, mend and rest.    For we have enjoyed taking care of you by giving your our best.    And as you get better, by regaining your health and strength;   We count it as a privilege to have served you and hope your time at [facility] was well spent.      Thank  You!!      !Post op instructions for prevention of DVT  What is deep vein thrombosis?  Deep vein thrombosis (DVT) is the medical term for blood clots in the deep veins of the leg.  These blood clots can be dangerous.  A DVT can block a blood vessel and keep blood from getting where it needs to go.  Another problem is that the clot can travel to other parts of the body such as the lungs.  A clot that travels to the lungs is called a pulmonary embolus (PE) and can cause serious problems with breathing which can lead to death.  Am I at risk for DVT/PE?  If you are not very active, you are at risk of DVT.  Anyone confined to bed, sitting for long periods of time, recovering from surgery, etc. increases the risk of DVT.  Other risk factors are cancer diagnosis, certain medications, estrogen replacement in any form,older age, obesity, pregnancy, smoking, history of clotting disorders, and dehydration.  How will I know if I have a DVT?   Swelling in the lower leg   Pain   Warmth, redness, hardness or bulging of the vein  If you have any of these symptoms, call your doctors office right away.  Some people will not have any symptoms until the clot moves to the lungs.  What are the symptoms of a PE?   Panting, shortness of breath, or trouble breathing   Sharp, knife-like chest pain when you breathe   Coughing or coughing up blood   Rapid heartbeat  If you have any of these symptoms or get worse quickly, call 911 for emergency treatment.  How can I prevent a DVT?   Avoid long periods of inactivity and dont cross your legs--get up and walk around every hour or so.   Stay active--walking after surgery is highly  encouraged.  This means you should get out of the house and walk in the neighborhood.  Going up and down stairs will not impair healing (unless advised against such activity by your doctor).     Drink plenty of noncaffeinated, nonalcoholic fluids each day to prevent dehydration.   Wear special support stockings, if they have been advised by your doctor.   If you travel, stop at least once an hour and walk around.   Avoid smoking (assistance with stopping is available through your healthcare provider)  Always notify your doctor if you are not able to follow the post operative instructions that are given to you at the time of discharge.  It may be necessary to prescribe one of the medications available to prevent DVT.      General Information:    1.  Do not drink alcoholic beverages including beer for 24 hours or as long as you are on pain medication..  2.  Do not drive a motor vehicle, operate machinery or power tools, or signs legal papers for 24 hours or as long as you are on pain medication.   3.  You may experience light-headedness, dizziness, and sleepiness following surgery. Please do not stay alone. A responsible adult should be with you for this 24 hour period.  4.  Go home and rest.    5. Progress slowly to a normal diet unless instructed.  Otherwise, begin with liquids such as soft drinks, then soup and crackers working up to solid foods. Drink plenty of nonalcoholic fluids.  6.  Certain anesthetics and pain medications produce nausea and vomiting in certain       individuals. If nausea becomes a problem at home, call you doctor.    7. A nurse will be calling you sometime after surgery. Do not be alarmed. This is our way of finding out how you are doing.    8. Several times every hour while you are awake, take 2-3 deep breaths and cough. If you had stomach surgery hold a pillow or rolled towel firmly against your stomach before you cough. This will help with any pain the cough might cause.  9. Several  times every hour while you are awake, pump and flex your feet 5-6 times and do foot circles. This will help prevent blood clots.    10.Call your doctor for severe pain, bleeding, fever, or signs or symptoms of infection (pain, swelling, redness, foul odor, drainage).

## 2020-11-19 NOTE — DISCHARGE SUMMARY
Discharge Summary  General Surgery      Admit Date: 11/19/2020    Discharge Date :11/19/2020    Attending Physician: Didier Garcia     Discharge Physician: Didier Garcia    Discharged Condition: good    Discharge Diagnosis: Subcutaneous mass [R22.9]    Treatments/Procedures: Procedure(s) (LRB):  REMOVAL-MASS (Left)    Hospital Course: Uneventful; Discharged home from Recovery    Significant Diagnostic Studies: none    Disposition: Home or Self Care    Diet: Regular    Follow up: Office 10-14 days    Activity: No heavy lifting till seen in office.    Patient Instructions:   Current Discharge Medication List      START taking these medications    Details   HYDROcodone-acetaminophen (NORCO) 5-325 mg per tablet Take 1 tablet by mouth every 6 (six) hours as needed for Pain.  Qty: 10 tablet, Refills: 0    Comments: n/a          CONTINUE these medications which have NOT CHANGED    Details   anastrozole (ARIMIDEX) 1 mg Tab Take 1 tablet (1 mg total) by mouth once daily.  Qty: 90 tablet, Refills: 4    Associated Diagnoses: Prophylactic use of anastrozole (Arimidex)      multivitamin capsule Take 1 capsule by mouth once daily.      aspirin 325 MG tablet Take 1 tablet (325 mg total) by mouth once daily.  Qty: 30 tablet, Refills: 0      denosumab (PROLIA) 60 mg/mL Syrg Inject 60 mg into the skin.             Discharge Procedure Orders   Diet general

## 2020-11-19 NOTE — ANESTHESIA PROCEDURE NOTES
Intubation  Performed by: Lorenzo Enciso CRNA  Authorized by: Mynor Ryan MD     Intubation:     Induction:  Intravenous    Intubated:  Postinduction    Mask Ventilation:  Easy mask    Attempts:  1    Difficult Airway Encountered?: No      Complications:  None    Airway Device:  Supraglottic airway/LMA    Airway Device Size:  3.0

## 2020-11-19 NOTE — OP NOTE
Patient: Caryn Toledo     Date of Procedure: 11/19/2020    Procedure: Excision of left hip mass  4 cm  Excision of right leg mass 3 cm    Surgeon: Didier Roberson MD    Resident: Malaika Sanz    Pre-op Diagnosis: Subcutaneous mass [R22.9]     Post-op Diagnosis: Subcutaneous mass [R22.9]    Findings:  Left hip mass 4 cm  Right leg mass 3 cm    Specimen:  Left hip mass 4 cm  Right leg mass 3 cm    EBL: 5 cc    Complications: None      Procedure in detail:      After appropriate consent was obtained, consent forms signed and questions answered, the operative site was marked and the patient was taken to the operating room.  The patient was positioned and general anesthesia was induced. Pre-operative antibiotic was administered. Time out procedure was then performed with the members of the surgical team. The operative field was then prepped and draped in the usual sterile fashion.      Skin incision was made overlying the left lateral hip mass.  Dissection performed into the subcutaneous tissues and a lesion consistent with a lipoma was excised from surrounding tissue and removed.  0.25% Marcaine was injected.  Deep tissues reapproximated with interrupted 3 0 Vicryl suture and the skin was closed with 4 Monocryl subcuticular stitch.  Attention was then turned to the right leg mass. Skin incision was made.  The mass was dissected free of surrounding tissues and removed in its entirety.  0.25% Marcaine was injected.  Deep tissues were reapproximated with interrupted 3 0 Vicryl suture and the skin was closed with 4 Monocryl subcuticular stitches.     Steri-Strips and dressings were  applied.  The patient was then awakened, extubated, and transferred to the recovery room in stable condition.  The procedure was tolerated without complication.

## 2020-11-19 NOTE — INTERVAL H&P NOTE
The patient has been examined and the H&P has been reviewed:    I concur with the findings and no changes have occurred since H&P was written.    Surgery risks, benefits and alternative options discussed and understood by patient/family.          Active Hospital Problems    Diagnosis  POA    Subcutaneous mass [R22.9]  Yes      Resolved Hospital Problems   No resolved problems to display.

## 2020-11-19 NOTE — ANESTHESIA PREPROCEDURE EVALUATION
11/19/2020  Caryn Toledo is a 60 y.o., female.    Anesthesia Evaluation    I have reviewed the Patient Summary Reports.    I have reviewed the Nursing Notes.    I have reviewed the Medications.     Review of Systems  Anesthesia Hx:  Hx of Anesthetic complications (PONV)    Social:  Non-Smoker    Hematology/Oncology:         -- Cancer in past history: Breast left lymphedema   Cardiovascular:   hyperlipidemia    Pulmonary:  Pulmonary Normal    Renal/:  Renal/ Normal     Musculoskeletal:   Arthritis     Neurological:  Neurology Normal    Endocrine:  Endocrine Normal        Physical Exam  General:  Well nourished    Airway/Jaw/Neck:  Airway Findings: Mouth Opening: Normal Tongue: Normal  General Airway Assessment: Adult  Oropharynx Findings:  Mallampati: II  Jaw/Neck Findings:  Neck ROM: Normal ROM     Eyes/Ears/Nose:  Eyes/Ears/Nose Findings:    Dental:  Dental Findings:   Chest/Lungs:  Chest/Lungs Findings: Normal Respiratory Rate     Heart/Vascular:  Heart Findings: Rate: Normal  Rhythm: Regular Rhythm        Mental Status:  Mental Status Findings:  Cooperative, Alert and Oriented         Anesthesia Plan  Type of Anesthesia, risks & benefits discussed:  Anesthesia Type:  general  Patient's Preference:   Intra-op Monitoring Plan: standard ASA monitors  Intra-op Monitoring Plan Comments:   Post Op Pain Control Plan: multimodal analgesia  Post Op Pain Control Plan Comments:   Induction:   IV  Beta Blocker:  Patient is not currently on a Beta-Blocker (No further documentation required).       Informed Consent: Patient understands risks and agrees with Anesthesia plan.  Questions answered. Anesthesia consent signed with patient.  ASA Score: 3     Day of Surgery Review of History & Physical:            Ready For Surgery From Anesthesia Perspective.

## 2020-11-19 NOTE — PLAN OF CARE
Released from Pacu when criteria met pain controlled skin w+d No nausea No emesis incision intact aaox4 encouraged deep breaths Pt has all belongings  In pre op  steristrips dry intact on top r thigh   And side l leg with steripstrips and bandaid

## 2020-11-19 NOTE — TRANSFER OF CARE
"Anesthesia Transfer of Care Note    Patient: Caryn Toledo    Procedure(s) Performed: Procedure(s) (LRB):  REMOVAL-MASS (Left)    Patient location: PACU    Anesthesia Type: general    Transport from OR: Transported from OR on 2-3 L/min O2 by NC with adequate spontaneous ventilation    Post pain: adequate analgesia    Post assessment: no apparent anesthetic complications and tolerated procedure well    Post vital signs: stable    Level of consciousness: awake    Nausea/Vomiting: no nausea/vomiting    Complications: none    Transfer of care protocol was followed      Last vitals:   Visit Vitals  /72 (BP Location: Left arm, Patient Position: Lying)   Pulse (!) 58   Temp 36.3 °C (97.3 °F) (Temporal)   Resp 18   Ht 5' 7" (1.702 m)   Wt 88.5 kg (195 lb)   SpO2 97%   Breastfeeding No   BMI 30.54 kg/m²     "

## 2020-11-19 NOTE — ANESTHESIA POSTPROCEDURE EVALUATION
Anesthesia Post Evaluation    Patient: Caryn Toledo    Procedure(s) Performed: Procedure(s) (LRB):  REMOVAL-MASS (Left)    Final Anesthesia Type: general    Patient location during evaluation: PACU  Patient participation: Yes- Able to Participate  Level of consciousness: awake and alert and oriented  Post-procedure vital signs: reviewed and stable  Pain management: adequate  Airway patency: patent    PONV status at discharge: No PONV  Anesthetic complications: no      Cardiovascular status: blood pressure returned to baseline and stable  Respiratory status: unassisted and spontaneous ventilation  Hydration status: euvolemic  Follow-up not needed.          Vitals Value Taken Time   /58 11/19/20 1405   Temp 36.7 °C (98 °F) 11/19/20 1405   Pulse 60 11/19/20 1405   Resp 18 11/19/20 1405   SpO2 97 % 11/19/20 1405         Event Time   Out of Recovery 14:00:00         Pain/Glenys Score: Glenys Score: 10 (11/19/2020  2:05 PM)

## 2020-11-24 ENCOUNTER — TELEPHONE (OUTPATIENT)
Dept: HEMATOLOGY/ONCOLOGY | Facility: CLINIC | Age: 60
End: 2020-11-24

## 2020-11-24 LAB
FINAL PATHOLOGIC DIAGNOSIS: NORMAL
GROSS: NORMAL
Lab: NORMAL

## 2020-11-24 NOTE — TELEPHONE ENCOUNTER
"Pt called regarding concern of possible "reaction to armidex".Called pt back at [phone]. Reports a sudden onset of intense Rt sided facial spasm, jaw pain, ear pain, difficulty swallowing so she went to ER. Pt was at [place] ER at time of call, currently being seen by NP, Hernan Nolan. NP on speaker phone says [facility] advised NP to give steriod for possible reaction and this was done. Pt advised to call office again for additional questions or concerns. Pt speech clear, without altered mental status. Voiced understanding.     ----- Message from Darcy Cano sent at 11/24/2020 12:36 PM CST -----  Contact: pt  Type: Needs Medical Advice    Who Called:  PT  Best Call Back Number: [phone]    Additional Information: Requesting a call back regarding pt is getting a sharp pain in right jaw and mouth  and ear and pt is on her way to er and wanted to update her doctor with medication she is taking   Please Advise ---Thank you        "

## 2020-12-03 ENCOUNTER — OFFICE VISIT (OUTPATIENT)
Dept: SURGERY | Facility: CLINIC | Age: 60
End: 2020-12-03
Payer: COMMERCIAL

## 2020-12-03 VITALS — TEMPERATURE: 98 F

## 2020-12-03 DIAGNOSIS — Z98.890 POST-OPERATIVE STATE: Primary | ICD-10-CM

## 2020-12-03 PROCEDURE — 99024 PR POST-OP FOLLOW-UP VISIT: ICD-10-PCS | Mod: S$GLB,,, | Performed by: PHYSICIAN ASSISTANT

## 2020-12-03 PROCEDURE — 99024 POSTOP FOLLOW-UP VISIT: CPT | Mod: S$GLB,,, | Performed by: PHYSICIAN ASSISTANT

## 2020-12-03 PROCEDURE — 99999 PR PBB SHADOW E&M-EST. PATIENT-LVL III: ICD-10-PCS | Mod: PBBFAC,,, | Performed by: PHYSICIAN ASSISTANT

## 2020-12-03 PROCEDURE — 99999 PR PBB SHADOW E&M-EST. PATIENT-LVL III: CPT | Mod: PBBFAC,,, | Performed by: PHYSICIAN ASSISTANT

## 2020-12-03 RX ORDER — CYCLOBENZAPRINE HCL 5 MG
5 TABLET ORAL
COMMUNITY
Start: 2020-11-24 | End: 2020-12-04

## 2020-12-03 NOTE — PROGRESS NOTES
Caryn Toledo is status post excision of lipoma from bilateral upper thighs and presents today for follow-up care.  She is doing well and has no complaints.     PE: Incisions clean, dry, and intact. Suture protruding from one incision was removed.     Pathology: lipoma x 2. The patient was provided a copy of the report    A/P:  Normal post-operative course.    Return to clinic as needed.

## 2021-01-04 ENCOUNTER — PATIENT MESSAGE (OUTPATIENT)
Dept: ADMINISTRATIVE | Facility: HOSPITAL | Age: 61
End: 2021-01-04

## 2021-04-05 ENCOUNTER — PATIENT MESSAGE (OUTPATIENT)
Dept: ADMINISTRATIVE | Facility: HOSPITAL | Age: 61
End: 2021-04-05

## 2021-04-19 ENCOUNTER — HOSPITAL ENCOUNTER (OUTPATIENT)
Dept: RADIOLOGY | Facility: HOSPITAL | Age: 61
Discharge: HOME | End: 2021-04-19
Attending: INTERNAL MEDICINE
Payer: COMMERCIAL

## 2021-04-19 DIAGNOSIS — Z17.0 MALIGNANT NEOPLASM OF CENTRAL PORTION OF LEFT BREAST IN FEMALE, ESTROGEN RECEPTOR POSITIVE: ICD-10-CM

## 2021-04-19 DIAGNOSIS — C50.112 MALIGNANT NEOPLASM OF CENTRAL PORTION OF LEFT BREAST IN FEMALE, ESTROGEN RECEPTOR POSITIVE: ICD-10-CM

## 2021-04-19 LAB
CREAT SERPL-MCNC: 0.8 MG/DL (ref 0.5–1.4)
SAMPLE: NORMAL

## 2021-04-19 PROCEDURE — 25500020 PHARM REV CODE 255: Performed by: INTERNAL MEDICINE

## 2021-04-19 PROCEDURE — 71260 CT THORAX DX C+: CPT | Mod: TC

## 2021-04-19 PROCEDURE — 71260 CT THORAX DX C+: CPT | Mod: 26,,, | Performed by: RADIOLOGY

## 2021-04-19 PROCEDURE — 71260 CT CHEST WITH CONTRAST: ICD-10-PCS | Mod: 26,,, | Performed by: RADIOLOGY

## 2021-04-19 RX ADMIN — IOHEXOL 75 ML: 350 INJECTION, SOLUTION INTRAVENOUS at 11:04

## 2021-04-22 ENCOUNTER — OFFICE VISIT (OUTPATIENT)
Dept: FAMILY MEDICINE | Facility: CLINIC | Age: 61
End: 2021-04-22
Payer: COMMERCIAL

## 2021-04-22 VITALS
TEMPERATURE: 98 F | HEART RATE: 71 BPM | OXYGEN SATURATION: 95 % | DIASTOLIC BLOOD PRESSURE: 62 MMHG | SYSTOLIC BLOOD PRESSURE: 128 MMHG | RESPIRATION RATE: 18 BRPM | WEIGHT: 201.06 LBS | HEIGHT: 67 IN | BODY MASS INDEX: 31.56 KG/M2

## 2021-04-22 DIAGNOSIS — E78.5 HYPERLIPIDEMIA, UNSPECIFIED HYPERLIPIDEMIA TYPE: Primary | ICD-10-CM

## 2021-04-22 PROCEDURE — 99999 PR PBB SHADOW E&M-EST. PATIENT-LVL IV: CPT | Mod: PBBFAC,,, | Performed by: FAMILY MEDICINE

## 2021-04-22 PROCEDURE — 99999 PR PBB SHADOW E&M-EST. PATIENT-LVL IV: ICD-10-PCS | Mod: PBBFAC,,, | Performed by: FAMILY MEDICINE

## 2021-04-22 PROCEDURE — 99396 PR PREVENTIVE VISIT,EST,40-64: ICD-10-PCS | Mod: S$GLB,,, | Performed by: FAMILY MEDICINE

## 2021-04-22 PROCEDURE — 99396 PREV VISIT EST AGE 40-64: CPT | Mod: S$GLB,,, | Performed by: FAMILY MEDICINE

## 2021-04-23 ENCOUNTER — LAB VISIT (OUTPATIENT)
Dept: LAB | Facility: HOSPITAL | Age: 61
End: 2021-04-23
Attending: FAMILY MEDICINE
Payer: COMMERCIAL

## 2021-04-23 ENCOUNTER — INFUSION (OUTPATIENT)
Dept: INFUSION THERAPY | Facility: HOSPITAL | Age: 61
End: 2021-04-23
Attending: INTERNAL MEDICINE
Payer: COMMERCIAL

## 2021-04-23 ENCOUNTER — OFFICE VISIT (OUTPATIENT)
Dept: HEMATOLOGY/ONCOLOGY | Facility: CLINIC | Age: 61
End: 2021-04-23
Payer: COMMERCIAL

## 2021-04-23 VITALS
WEIGHT: 200.38 LBS | TEMPERATURE: 97 F | RESPIRATION RATE: 17 BRPM | DIASTOLIC BLOOD PRESSURE: 58 MMHG | SYSTOLIC BLOOD PRESSURE: 107 MMHG | HEIGHT: 67 IN | HEART RATE: 74 BPM | BODY MASS INDEX: 31.45 KG/M2 | OXYGEN SATURATION: 97 %

## 2021-04-23 DIAGNOSIS — C50.112 MALIGNANT NEOPLASM OF CENTRAL PORTION OF LEFT BREAST IN FEMALE, ESTROGEN RECEPTOR POSITIVE: ICD-10-CM

## 2021-04-23 DIAGNOSIS — Z17.0 MALIGNANT NEOPLASM OF CENTRAL PORTION OF LEFT BREAST IN FEMALE, ESTROGEN RECEPTOR POSITIVE: ICD-10-CM

## 2021-04-23 DIAGNOSIS — Z79.811 PROPHYLACTIC USE OF ANASTROZOLE (ARIMIDEX): ICD-10-CM

## 2021-04-23 DIAGNOSIS — D05.12 DUCTAL CARCINOMA IN SITU (DCIS) OF LEFT BREAST: Primary | ICD-10-CM

## 2021-04-23 DIAGNOSIS — Z79.811 PROPHYLACTIC USE OF ANASTROZOLE (ARIMIDEX): Primary | ICD-10-CM

## 2021-04-23 DIAGNOSIS — E78.5 HYPERLIPIDEMIA, UNSPECIFIED HYPERLIPIDEMIA TYPE: ICD-10-CM

## 2021-04-23 LAB
CHOLEST SERPL-MCNC: 225 MG/DL (ref 120–199)
CHOLEST/HDLC SERPL: 5.2 {RATIO} (ref 2–5)
HDLC SERPL-MCNC: 43 MG/DL (ref 40–75)
HDLC SERPL: 19.1 % (ref 20–50)
LDLC SERPL CALC-MCNC: 162.2 MG/DL (ref 63–159)
NONHDLC SERPL-MCNC: 182 MG/DL
TRIGL SERPL-MCNC: 99 MG/DL (ref 30–150)

## 2021-04-23 PROCEDURE — 99214 PR OFFICE/OUTPT VISIT, EST, LEVL IV, 30-39 MIN: ICD-10-PCS | Mod: S$GLB,,, | Performed by: INTERNAL MEDICINE

## 2021-04-23 PROCEDURE — 99214 OFFICE O/P EST MOD 30 MIN: CPT | Mod: S$GLB,,, | Performed by: INTERNAL MEDICINE

## 2021-04-23 PROCEDURE — 80061 LIPID PANEL: CPT | Performed by: FAMILY MEDICINE

## 2021-04-23 PROCEDURE — 99999 PR PBB SHADOW E&M-EST. PATIENT-LVL IV: ICD-10-PCS | Mod: PBBFAC,,, | Performed by: INTERNAL MEDICINE

## 2021-04-23 PROCEDURE — 36415 COLL VENOUS BLD VENIPUNCTURE: CPT | Mod: PO | Performed by: FAMILY MEDICINE

## 2021-04-23 PROCEDURE — 99999 PR PBB SHADOW E&M-EST. PATIENT-LVL IV: CPT | Mod: PBBFAC,,, | Performed by: INTERNAL MEDICINE

## 2021-04-23 PROCEDURE — 63600175 PHARM REV CODE 636 W HCPCS: Mod: JG | Performed by: INTERNAL MEDICINE

## 2021-04-23 PROCEDURE — 96372 THER/PROPH/DIAG INJ SC/IM: CPT

## 2021-04-23 RX ADMIN — DENOSUMAB 60 MG: 60 INJECTION SUBCUTANEOUS at 04:04

## 2021-04-29 DIAGNOSIS — Z12.11 COLON CANCER SCREENING: ICD-10-CM

## 2021-06-16 ENCOUNTER — PATIENT OUTREACH (OUTPATIENT)
Dept: ADMINISTRATIVE | Facility: OTHER | Age: 61
End: 2021-06-16

## 2021-06-16 DIAGNOSIS — M25.512 LEFT SHOULDER PAIN, UNSPECIFIED CHRONICITY: Primary | ICD-10-CM

## 2021-06-17 ENCOUNTER — HOSPITAL ENCOUNTER (OUTPATIENT)
Dept: RADIOLOGY | Facility: HOSPITAL | Age: 61
Discharge: HOME | End: 2021-06-17
Attending: ORTHOPAEDIC SURGERY
Payer: COMMERCIAL

## 2021-06-17 ENCOUNTER — OFFICE VISIT (OUTPATIENT)
Dept: ORTHOPEDICS | Facility: CLINIC | Age: 61
End: 2021-06-17
Payer: COMMERCIAL

## 2021-06-17 VITALS — HEIGHT: 67 IN | WEIGHT: 200 LBS | BODY MASS INDEX: 31.39 KG/M2 | RESPIRATION RATE: 18 BRPM

## 2021-06-17 DIAGNOSIS — M25.512 LEFT SHOULDER PAIN, UNSPECIFIED CHRONICITY: ICD-10-CM

## 2021-06-17 DIAGNOSIS — M25.512 LEFT SHOULDER PAIN, UNSPECIFIED CHRONICITY: Primary | ICD-10-CM

## 2021-06-17 DIAGNOSIS — M77.8 SHOULDER TENDINITIS, LEFT: ICD-10-CM

## 2021-06-17 PROCEDURE — 99999 PR PBB SHADOW E&M-EST. PATIENT-LVL III: ICD-10-PCS | Mod: PBBFAC,,, | Performed by: ORTHOPAEDIC SURGERY

## 2021-06-17 PROCEDURE — 73030 XR SHOULDER TRAUMA 3 VIEW LEFT: ICD-10-PCS | Mod: 26,LT,, | Performed by: RADIOLOGY

## 2021-06-17 PROCEDURE — 99213 PR OFFICE/OUTPT VISIT, EST, LEVL III, 20-29 MIN: ICD-10-PCS | Mod: S$GLB,,, | Performed by: ORTHOPAEDIC SURGERY

## 2021-06-17 PROCEDURE — 99213 OFFICE O/P EST LOW 20 MIN: CPT | Mod: S$GLB,,, | Performed by: ORTHOPAEDIC SURGERY

## 2021-06-17 PROCEDURE — 73030 X-RAY EXAM OF SHOULDER: CPT | Mod: 26,LT,, | Performed by: RADIOLOGY

## 2021-06-17 PROCEDURE — 99999 PR PBB SHADOW E&M-EST. PATIENT-LVL III: CPT | Mod: PBBFAC,,, | Performed by: ORTHOPAEDIC SURGERY

## 2021-06-17 PROCEDURE — 73030 X-RAY EXAM OF SHOULDER: CPT | Mod: TC,PN,LT

## 2021-06-22 ENCOUNTER — TELEPHONE (OUTPATIENT)
Dept: FAMILY MEDICINE | Facility: CLINIC | Age: 61
End: 2021-06-22

## 2021-07-07 ENCOUNTER — PATIENT MESSAGE (OUTPATIENT)
Dept: ADMINISTRATIVE | Facility: HOSPITAL | Age: 61
End: 2021-07-07

## 2021-08-04 ENCOUNTER — HOSPITAL ENCOUNTER (OUTPATIENT)
Dept: RADIOLOGY | Facility: CLINIC | Age: 61
Discharge: HOME | End: 2021-08-04
Attending: PODIATRIST
Payer: COMMERCIAL

## 2021-08-04 ENCOUNTER — OFFICE VISIT (OUTPATIENT)
Dept: PODIATRY | Facility: CLINIC | Age: 61
End: 2021-08-04
Payer: COMMERCIAL

## 2021-08-04 VITALS — OXYGEN SATURATION: 97 % | HEART RATE: 66 BPM | HEIGHT: 67 IN | BODY MASS INDEX: 31.32 KG/M2

## 2021-08-04 DIAGNOSIS — M21.6X2 ACQUIRED BILATERAL PES CAVUS: ICD-10-CM

## 2021-08-04 DIAGNOSIS — M20.11 HALLUX VALGUS OF RIGHT FOOT: ICD-10-CM

## 2021-08-04 DIAGNOSIS — M21.6X1 ACQUIRED BILATERAL PES CAVUS: ICD-10-CM

## 2021-08-04 DIAGNOSIS — M20.41 HAMMER TOE OF RIGHT FOOT: ICD-10-CM

## 2021-08-04 DIAGNOSIS — M79.671 RIGHT FOOT PAIN: Primary | ICD-10-CM

## 2021-08-04 PROCEDURE — 73630 X-RAY EXAM OF FOOT: CPT | Mod: RT,S$GLB,, | Performed by: RADIOLOGY

## 2021-08-04 PROCEDURE — 99203 OFFICE O/P NEW LOW 30 MIN: CPT | Mod: S$GLB,,, | Performed by: PODIATRIST

## 2021-08-04 PROCEDURE — 73630 XR FOOT COMPLETE 3 VIEW RIGHT: ICD-10-PCS | Mod: RT,S$GLB,, | Performed by: RADIOLOGY

## 2021-08-04 PROCEDURE — 99203 PR OFFICE/OUTPT VISIT, NEW, LEVL III, 30-44 MIN: ICD-10-PCS | Mod: S$GLB,,, | Performed by: PODIATRIST

## 2021-08-23 ENCOUNTER — IMMUNIZATION (OUTPATIENT)
Dept: PRIMARY CARE CLINIC | Facility: CLINIC | Age: 61
End: 2021-08-23
Payer: COMMERCIAL

## 2021-08-23 DIAGNOSIS — Z23 NEED FOR VACCINATION: Primary | ICD-10-CM

## 2021-08-23 PROCEDURE — 0031A COVID-19,VECTOR-NR,RS-AD26,PF,0.5 ML DOSE VACCINE (JANSSEN): CPT | Mod: CV19,S$GLB,, | Performed by: FAMILY MEDICINE

## 2021-08-23 PROCEDURE — 91303 COVID-19,VECTOR-NR,RS-AD26,PF,0.5 ML DOSE VACCINE (JANSSEN): CPT | Mod: S$GLB,,, | Performed by: FAMILY MEDICINE

## 2021-08-23 PROCEDURE — 91303 COVID-19,VECTOR-NR,RS-AD26,PF,0.5 ML DOSE VACCINE (JANSSEN): ICD-10-PCS | Mod: S$GLB,,, | Performed by: FAMILY MEDICINE

## 2021-08-23 PROCEDURE — 0031A COVID-19,VECTOR-NR,RS-AD26,PF,0.5 ML DOSE VACCINE (JANSSEN): ICD-10-PCS | Mod: CV19,S$GLB,, | Performed by: FAMILY MEDICINE

## 2021-08-28 ENCOUNTER — PATIENT OUTREACH (OUTPATIENT)
Dept: ADMINISTRATIVE | Facility: OTHER | Age: 61
End: 2021-08-28

## 2021-09-03 ENCOUNTER — OFFICE VISIT (OUTPATIENT)
Dept: OTOLARYNGOLOGY | Facility: CLINIC | Age: 61
End: 2021-09-03
Payer: COMMERCIAL

## 2021-09-03 VITALS — BODY MASS INDEX: 30.42 KG/M2 | WEIGHT: 193.81 LBS | HEIGHT: 67 IN

## 2021-09-03 DIAGNOSIS — J34.89 NASAL OBSTRUCTION: Primary | ICD-10-CM

## 2021-09-03 DIAGNOSIS — J34.2 NASAL SEPTAL DEVIATION: ICD-10-CM

## 2021-09-03 DIAGNOSIS — S02.2XXS FRACTURE OF NASAL BONES, SEQUELA: ICD-10-CM

## 2021-09-03 DIAGNOSIS — J34.89 NASAL VALVE STENOSIS: ICD-10-CM

## 2021-09-03 PROCEDURE — 99999 PR PBB SHADOW E&M-EST. PATIENT-LVL III: CPT | Mod: PBBFAC,,, | Performed by: OTOLARYNGOLOGY

## 2021-09-03 PROCEDURE — 99999 PR PBB SHADOW E&M-EST. PATIENT-LVL III: ICD-10-PCS | Mod: PBBFAC,,, | Performed by: OTOLARYNGOLOGY

## 2021-09-03 PROCEDURE — 99203 PR OFFICE/OUTPT VISIT, NEW, LEVL III, 30-44 MIN: ICD-10-PCS | Mod: S$GLB,,, | Performed by: OTOLARYNGOLOGY

## 2021-09-03 PROCEDURE — 99203 OFFICE O/P NEW LOW 30 MIN: CPT | Mod: S$GLB,,, | Performed by: OTOLARYNGOLOGY

## 2021-10-05 ENCOUNTER — LAB VISIT (OUTPATIENT)
Dept: PRIMARY CARE CLINIC | Facility: CLINIC | Age: 61
End: 2021-10-05
Attending: INTERNAL MEDICINE
Payer: COMMERCIAL

## 2021-10-05 ENCOUNTER — TELEPHONE (OUTPATIENT)
Dept: OTOLARYNGOLOGY | Facility: CLINIC | Age: 61
End: 2021-10-05

## 2021-10-05 DIAGNOSIS — Z17.0 MALIGNANT NEOPLASM OF CENTRAL PORTION OF LEFT BREAST IN FEMALE, ESTROGEN RECEPTOR POSITIVE: ICD-10-CM

## 2021-10-05 DIAGNOSIS — Z01.818 PREOP TESTING: ICD-10-CM

## 2021-10-05 DIAGNOSIS — C50.112 MALIGNANT NEOPLASM OF CENTRAL PORTION OF LEFT BREAST IN FEMALE, ESTROGEN RECEPTOR POSITIVE: ICD-10-CM

## 2021-10-05 PROCEDURE — U0003 INFECTIOUS AGENT DETECTION BY NUCLEIC ACID (DNA OR RNA); SEVERE ACUTE RESPIRATORY SYNDROME CORONAVIRUS 2 (SARS-COV-2) (CORONAVIRUS DISEASE [COVID-19]), AMPLIFIED PROBE TECHNIQUE, MAKING USE OF HIGH THROUGHPUT TECHNOLOGIES AS DESCRIBED BY CMS-2020-01-R: HCPCS | Performed by: OTOLARYNGOLOGY

## 2021-10-05 PROCEDURE — U0005 INFEC AGEN DETEC AMPLI PROBE: HCPCS | Performed by: OTOLARYNGOLOGY

## 2021-10-06 LAB
SARS-COV-2 RNA RESP QL NAA+PROBE: NOT DETECTED
SARS-COV-2- CYCLE NUMBER: NORMAL

## 2021-10-07 ENCOUNTER — PATIENT MESSAGE (OUTPATIENT)
Dept: ADMINISTRATIVE | Facility: HOSPITAL | Age: 61
End: 2021-10-07

## 2021-10-08 PROBLEM — J34.2 DEVIATED SEPTUM: Status: ACTIVE | Noted: 2021-10-08

## 2021-10-12 ENCOUNTER — TELEPHONE (OUTPATIENT)
Dept: HEMATOLOGY/ONCOLOGY | Facility: CLINIC | Age: 61
End: 2021-10-12

## 2021-10-12 DIAGNOSIS — Z79.811 PROPHYLACTIC USE OF ANASTROZOLE (ARIMIDEX): ICD-10-CM

## 2021-10-12 DIAGNOSIS — Z85.831 HISTORY OF SARCOMA OF SOFT TISSUE: ICD-10-CM

## 2021-10-12 DIAGNOSIS — D05.12 DUCTAL CARCINOMA IN SITU (DCIS) OF LEFT BREAST: Primary | ICD-10-CM

## 2021-10-25 ENCOUNTER — HOSPITAL ENCOUNTER (OUTPATIENT)
Dept: RADIOLOGY | Facility: HOSPITAL | Age: 61
Discharge: HOME | End: 2021-10-25
Attending: INTERNAL MEDICINE
Payer: COMMERCIAL

## 2021-10-25 DIAGNOSIS — Z17.0 MALIGNANT NEOPLASM OF CENTRAL PORTION OF LEFT BREAST IN FEMALE, ESTROGEN RECEPTOR POSITIVE: ICD-10-CM

## 2021-10-25 DIAGNOSIS — C50.112 MALIGNANT NEOPLASM OF CENTRAL PORTION OF LEFT BREAST IN FEMALE, ESTROGEN RECEPTOR POSITIVE: ICD-10-CM

## 2021-10-25 PROCEDURE — 71260 CT THORAX DX C+: CPT | Mod: 26,,, | Performed by: RADIOLOGY

## 2021-10-25 PROCEDURE — 77066 DX MAMMO INCL CAD BI: CPT | Mod: 26,,, | Performed by: RADIOLOGY

## 2021-10-25 PROCEDURE — 71260 CT CHEST WITH CONTRAST: ICD-10-PCS | Mod: 26,,, | Performed by: RADIOLOGY

## 2021-10-25 PROCEDURE — 77066 DX MAMMO INCL CAD BI: CPT | Mod: TC

## 2021-10-25 PROCEDURE — 77062 BREAST TOMOSYNTHESIS BI: CPT | Mod: 26,,, | Performed by: RADIOLOGY

## 2021-10-25 PROCEDURE — 77066 MAMMO DIGITAL DIAGNOSTIC BILAT WITH TOMO: ICD-10-PCS | Mod: 26,,, | Performed by: RADIOLOGY

## 2021-10-25 PROCEDURE — 25500020 PHARM REV CODE 255

## 2021-10-25 PROCEDURE — 77062 MAMMO DIGITAL DIAGNOSTIC BILAT WITH TOMO: ICD-10-PCS | Mod: 26,,, | Performed by: RADIOLOGY

## 2021-10-25 PROCEDURE — 71260 CT THORAX DX C+: CPT | Mod: TC

## 2021-10-25 RX ADMIN — IOHEXOL 75 ML: 350 INJECTION, SOLUTION INTRAVENOUS at 02:10

## 2021-10-27 ENCOUNTER — TELEPHONE (OUTPATIENT)
Dept: HEMATOLOGY/ONCOLOGY | Facility: CLINIC | Age: 61
End: 2021-10-27

## 2021-10-27 ENCOUNTER — OFFICE VISIT (OUTPATIENT)
Dept: HEMATOLOGY/ONCOLOGY | Facility: CLINIC | Age: 61
End: 2021-10-27
Payer: COMMERCIAL

## 2021-10-27 ENCOUNTER — INFUSION (OUTPATIENT)
Dept: INFUSION THERAPY | Facility: HOSPITAL | Age: 61
End: 2021-10-27
Attending: INTERNAL MEDICINE
Payer: COMMERCIAL

## 2021-10-27 VITALS
WEIGHT: 191.13 LBS | RESPIRATION RATE: 18 BRPM | HEIGHT: 67 IN | TEMPERATURE: 98 F | OXYGEN SATURATION: 97 % | HEART RATE: 88 BPM | DIASTOLIC BLOOD PRESSURE: 60 MMHG | SYSTOLIC BLOOD PRESSURE: 113 MMHG | BODY MASS INDEX: 30 KG/M2

## 2021-10-27 VITALS
SYSTOLIC BLOOD PRESSURE: 119 MMHG | DIASTOLIC BLOOD PRESSURE: 81 MMHG | HEART RATE: 60 BPM | TEMPERATURE: 98 F | WEIGHT: 190.81 LBS | BODY MASS INDEX: 29.88 KG/M2 | RESPIRATION RATE: 18 BRPM | OXYGEN SATURATION: 98 %

## 2021-10-27 DIAGNOSIS — C50.112 MALIGNANT NEOPLASM OF CENTRAL PORTION OF LEFT BREAST IN FEMALE, ESTROGEN RECEPTOR POSITIVE: Primary | ICD-10-CM

## 2021-10-27 DIAGNOSIS — Z17.0 MALIGNANT NEOPLASM OF CENTRAL PORTION OF LEFT BREAST IN FEMALE, ESTROGEN RECEPTOR POSITIVE: ICD-10-CM

## 2021-10-27 DIAGNOSIS — Z17.0 MALIGNANT NEOPLASM OF CENTRAL PORTION OF LEFT BREAST IN FEMALE, ESTROGEN RECEPTOR POSITIVE: Primary | ICD-10-CM

## 2021-10-27 DIAGNOSIS — Z79.811 PROPHYLACTIC USE OF ANASTROZOLE (ARIMIDEX): Primary | ICD-10-CM

## 2021-10-27 DIAGNOSIS — C50.112 MALIGNANT NEOPLASM OF CENTRAL PORTION OF LEFT BREAST IN FEMALE, ESTROGEN RECEPTOR POSITIVE: ICD-10-CM

## 2021-10-27 PROCEDURE — 99999 PR PBB SHADOW E&M-EST. PATIENT-LVL III: CPT | Mod: PBBFAC,,, | Performed by: INTERNAL MEDICINE

## 2021-10-27 PROCEDURE — 99214 OFFICE O/P EST MOD 30 MIN: CPT | Mod: S$GLB,,, | Performed by: INTERNAL MEDICINE

## 2021-10-27 PROCEDURE — 99999 PR PBB SHADOW E&M-EST. PATIENT-LVL III: ICD-10-PCS | Mod: PBBFAC,,, | Performed by: INTERNAL MEDICINE

## 2021-10-27 PROCEDURE — 96372 THER/PROPH/DIAG INJ SC/IM: CPT

## 2021-10-27 PROCEDURE — 63600175 PHARM REV CODE 636 W HCPCS: Mod: JG | Performed by: INTERNAL MEDICINE

## 2021-10-27 PROCEDURE — 99214 PR OFFICE/OUTPT VISIT, EST, LEVL IV, 30-39 MIN: ICD-10-PCS | Mod: S$GLB,,, | Performed by: INTERNAL MEDICINE

## 2021-10-27 RX ADMIN — DENOSUMAB 60 MG: 60 INJECTION SUBCUTANEOUS at 12:10

## 2021-10-31 ENCOUNTER — IMMUNIZATION (OUTPATIENT)
Dept: PRIMARY CARE CLINIC | Facility: CLINIC | Age: 61
End: 2021-10-31
Payer: COMMERCIAL

## 2021-10-31 DIAGNOSIS — Z23 NEED FOR VACCINATION: Primary | ICD-10-CM

## 2021-10-31 PROCEDURE — 91303 COVID-19,VECTOR-NR,RS-AD26,PF,0.5 ML DOSE VACCINE (JANSSEN): CPT | Mod: PBBFAC | Performed by: INTERNAL MEDICINE

## 2021-10-31 PROCEDURE — 0034A COVID-19,VECTOR-NR,RS-AD26,PF,0.5 ML DOSE VACCINE (JANSSEN): ICD-10-PCS | Mod: S$GLB,,, | Performed by: INTERNAL MEDICINE

## 2021-10-31 PROCEDURE — 0034A COVID-19,VECTOR-NR,RS-AD26,PF,0.5 ML DOSE VACCINE (JANSSEN): CPT | Mod: S$GLB,,, | Performed by: INTERNAL MEDICINE

## 2021-11-24 ENCOUNTER — PATIENT OUTREACH (OUTPATIENT)
Dept: ADMINISTRATIVE | Facility: HOSPITAL | Age: 61
End: 2021-11-24
Payer: COMMERCIAL

## 2021-11-24 ENCOUNTER — PATIENT MESSAGE (OUTPATIENT)
Dept: ADMINISTRATIVE | Facility: HOSPITAL | Age: 61
End: 2021-11-24
Payer: COMMERCIAL

## 2022-02-03 NOTE — H&P
[facility] Hospital Medicine  History & Physical    Patient Name: Caryn Toledo  MRN: [id number]  Admission Date: 12/3/2019  Attending Physician: Marlyn Mosley NP  Primary Care Provider: Terrance Nolen MD         Patient information was obtained from patient and ER records.     Subjective:     Principal Problem:Aseptic loosening of prosthetic knee, subsequent encounter    Chief Complaint: No chief complaint on file.       HPI: Caryn Toledo is a 59 year old female with PMH of Breast and skin cancer who is status posREVISION, ARTHROPLASTY, KNEE (Left) rest left knee revision on 12/3/2019 per Dr. Paula. She reports bilateral lower extremity numbness. Denies discomfort. She is doing well post op.    Past Medical History:   Diagnosis Date    Arthritis     Breast cancer     left    Cancer     breast; skin    High cholesterol     PONV (postoperative nausea and vomiting)     Wears glasses        Past Surgical History:   Procedure Laterality Date    APPENDECTOMY      AXILLARY NODE DISSECTION Left 3/14/2019    Procedure: LYMPHADENECTOMY, AXILLARY;  Surgeon: Didier Garcia MD;  Location: [facility] OR;  Service: General;  Laterality: Left;    BREAST SURGERY Left 02/11/2019    lumpectomy    JOINT REPLACEMENT Left 10/30/2018    knee    KNEE ARTHROPLASTY Left 10/30/2018    Procedure: ARTHROPLASTY, KNEE;  Surgeon: Ata Paula MD;  Location: [facility] OR;  Service: Orthopedics;  Laterality: Left;    KNEE ARTHROSCOPY W/ MENISCECTOMY Right 10/30/2018    Procedure: ARTHROSCOPY, KNEE, WITH MENISCECTOMY;  Surgeon: Ata Paula MD;  Location: [facility] OR;  Service: Orthopedics;  Laterality: Right;    KNEE SURGERY Left 1980s    SENTINEL LYMPH NODE BIOPSY Left 3/14/2019    Procedure: BIOPSY, LYMPH NODE, SENTINEL;  Surgeon: Didier Garcia MD;  Location: [facility] OR;  Service: General;  Laterality: Left;  left axillary sentinel node biopsy, possible axillary dissection     "TUMOR REMOVAL Left     thigh       Review of patient's allergies indicates:   Allergen Reactions    Ciprofloxacin Anaphylaxis     paralysis    Pcn [penicillins] Anaphylaxis     paralysis    Eggplant Blisters    Eggs [egg derived]      Inside of mouth peel      Hydrocodone Nausea Only     "causes a migraine"    Morphine Nausea Only     "causes a migraine"    Oxycodone Nausea Only     "causes a migraine"    Tomato (solanum lycopersicum) Blisters       No current facility-administered medications on file prior to encounter.      Current Outpatient Medications on File Prior to Encounter   Medication Sig    anastrozole (ARIMIDEX) 1 mg Tab once daily .    denosumab (PROLIA) 60 mg/mL Syrg Inject 60 mg into the skin.    multivitamin capsule Take 1 capsule by mouth once daily.    sertraline (ZOLOFT) 25 MG tablet Take 1 tablet (25 mg total) by mouth once daily.    [DISCONTINUED] clindamycin (CLEOCIN) 300 MG capsule Take 1 capsule (300 mg total) by mouth every 6 (six) hours.     Family History     Problem Relation (Age of Onset)    Breast cancer Mother    Cancer Mother, Father        Tobacco Use    Smoking status: Never Smoker    Smokeless tobacco: Never Used   Substance and Sexual Activity    Alcohol use: Yes     Alcohol/week: 2.0 standard drinks     Types: 2 Glasses of wine per week     Comment: occasionally    Drug use: No    Sexual activity: Yes     Partners: Male     Review of Systems   Constitutional: Negative for diaphoresis, fatigue and fever.   HENT: Negative for congestion and ear pain.    Eyes: Negative for photophobia and visual disturbance.   Respiratory: Negative for cough and shortness of breath.    Cardiovascular: Negative for chest pain and leg swelling.   Gastrointestinal: Negative for abdominal distention and abdominal pain.   Endocrine: Negative for polyphagia and polyuria.   Genitourinary: Negative for dysuria, pelvic pain and urgency.   Musculoskeletal: Positive for arthralgias and gait " problem.        Left knee   Skin: Negative for rash.   Neurological: Negative for speech difficulty and numbness.   Psychiatric/Behavioral: Negative for agitation and decreased concentration.     Objective:     Vital Signs (Most Recent):  Temp: 97.9 °F (36.6 °C) (12/03/19 1722)  Pulse: (!) 58 (12/03/19 1931)  Resp: 17 (12/03/19 1931)  BP: (!) 113/58 (12/03/19 1722)  SpO2: 97 % (12/03/19 1931) Vital Signs (24h Range):  Temp:  [97.5 °F (36.4 °C)-98.1 °F (36.7 °C)] 97.9 °F (36.6 °C)  Pulse:  [55-80] 58  Resp:  [12-20] 17  SpO2:  [92 %-100 %] 97 %  BP: (103-140)/(55-83) 113/58     Weight: 91.6 kg (202 lb)  Body mass index is 31.64 kg/m².    Physical Exam   Constitutional: She is oriented to person, place, and time. She appears well-developed and well-nourished.   HENT:   Head: Normocephalic and atraumatic.   Right Ear: External ear normal.   Left Ear: External ear normal.   Mouth/Throat: Oropharynx is clear and moist.   Eyes: Pupils are equal, round, and reactive to light. Conjunctivae and EOM are normal.   Neck: Normal range of motion. Neck supple. No JVD present.   Cardiovascular: Normal rate, regular rhythm, normal heart sounds and intact distal pulses.   No murmur heard.  Pulmonary/Chest: Effort normal and breath sounds normal. She has no wheezes.   Abdominal: Soft. Bowel sounds are normal. There is no tenderness.   Musculoskeletal: Normal range of motion.   Except to left knee. Surgical dressing intact with ACE wrap and ice pack.    Neurological: She is alert and oriented to person, place, and time. Coordination normal.   Decreased sensation to BLE   Skin: Skin is warm and dry.   Psychiatric: She has a normal mood and affect. Her behavior is normal. Judgment normal.   Nursing note and vitals reviewed.        CRANIAL NERVES     CN III, IV, VI   Pupils are equal, round, and reactive to light.  Extraocular motions are normal.        Significant Labs: BMP: No results for input(s): GLU, NA, K, CL, CO2, BUN, CREATININE,  CALCIUM, MG in the last 48 hours.  CBC: No results for input(s): WBC, HGB, HCT, PLT in the last 48 hours.    Significant Imaging: I have reviewed and interpreted all pertinent imaging results/findings within the past 24 hours.    Assessment/Plan:     * Aseptic loosening of prosthetic knee, subsequent encounter  Status post left knee revision on 12/3/2019 per Dr. Paula. Post op orders per surgery. Pain control. PT consulted.      Malignant neoplasm of central portion of left breast in female, estrogen receptor positive  Resume on Arimidex         VTE Risk Mitigation (From admission, onward)         Ordered     IP VTE HIGH RISK PATIENT  Once      12/03/19 1805     Place ANANDA hose  Until discontinued      12/03/19 1805     Place sequential compression device  Until discontinued      12/03/19 1805     enoxaparin injection 40 mg  Daily      12/03/19 6310                   Marlyn Mosley NP  Department of Hospital Medicine   [facility]   No

## 2022-03-20 NOTE — PROGRESS NOTES
Results noted are concerning for possible loosening . F/u to discuss. No urgency if traveling  
DISPLAY PLAN FREE TEXT

## 2022-04-06 ENCOUNTER — PATIENT MESSAGE (OUTPATIENT)
Dept: ADMINISTRATIVE | Facility: HOSPITAL | Age: 62
End: 2022-04-06
Payer: COMMERCIAL

## 2022-04-19 ENCOUNTER — TELEPHONE (OUTPATIENT)
Dept: HEMATOLOGY/ONCOLOGY | Facility: CLINIC | Age: 62
End: 2022-04-19
Payer: COMMERCIAL

## 2022-04-19 DIAGNOSIS — M81.0 OSTEOPOROSIS, UNSPECIFIED OSTEOPOROSIS TYPE, UNSPECIFIED PATHOLOGICAL FRACTURE PRESENCE: Primary | ICD-10-CM

## 2022-04-19 NOTE — TELEPHONE ENCOUNTER
Called the pt to advise that bone density needed prior to next prolia injection on 4/27/2022. Pt is scheduled for bone scan with her lab appt       ----- Message from Sangita Roberson RN sent at 4/18/2022  8:57 AM CDT -----  Pt needs to have a DEXA scan before she gets her Prolia. Will you please make sure she's scheduled?     Thanks!  ----- Message -----  From: Tammy Humphries  Sent: 4/13/2022  10:23 AM CDT  To: [facility] Staff- [place]    Please review patient's Appointment Desk for an upcoming PROLIA INJECTION appointment.       The following are needed for this appointment.   Please contact patient for any tests or follow-up needed:      ORDER.  Current / active in the Epic Therapy Plan, signed within a year.  LABS. Serum Calcium within 6 weeks of treatment.    DEXA SCAN within the last 2 years   DENTAL PRECAUTION CONFIRMED WITH PATIENT. No recent issues (infections, etc). No invasive procedures recently done or planned (root canal, extraction, implants, etc.)  A patient will need to bring a Dental Clearance signed by patient's dental provider if there are recent dental issues/procedures within the last 3 months.   FOLLOW-UP APPOINTMENT within the last 12 months with the diagnosis mentioned in the visit notes.         Any item unavailable by the day prior to the scheduled appointment will cause the appointment to be CANCELLED.        To reschedule appointments, please contact [facility] INFUSION SCHEDULING POOL or call [phone] or [phone].       Thank you,  [facility], Infusion Department

## 2022-04-25 ENCOUNTER — HOSPITAL ENCOUNTER (OUTPATIENT)
Dept: RADIOLOGY | Facility: HOSPITAL | Age: 62
Discharge: HOME | End: 2022-04-25
Attending: INTERNAL MEDICINE
Payer: COMMERCIAL

## 2022-04-25 ENCOUNTER — OFFICE VISIT (OUTPATIENT)
Dept: FAMILY MEDICINE | Facility: CLINIC | Age: 62
End: 2022-04-25
Payer: COMMERCIAL

## 2022-04-25 VITALS
SYSTOLIC BLOOD PRESSURE: 126 MMHG | WEIGHT: 204.38 LBS | HEART RATE: 68 BPM | BODY MASS INDEX: 32.08 KG/M2 | DIASTOLIC BLOOD PRESSURE: 84 MMHG | OXYGEN SATURATION: 96 % | HEIGHT: 67 IN

## 2022-04-25 DIAGNOSIS — R07.89 CHEST DISCOMFORT: ICD-10-CM

## 2022-04-25 DIAGNOSIS — G43.809 OTHER MIGRAINE WITHOUT STATUS MIGRAINOSUS, NOT INTRACTABLE: ICD-10-CM

## 2022-04-25 DIAGNOSIS — M81.0 OSTEOPOROSIS, UNSPECIFIED OSTEOPOROSIS TYPE, UNSPECIFIED PATHOLOGICAL FRACTURE PRESENCE: ICD-10-CM

## 2022-04-25 DIAGNOSIS — E78.00 HIGH CHOLESTEROL: ICD-10-CM

## 2022-04-25 DIAGNOSIS — R07.9 CHEST PAIN, UNSPECIFIED TYPE: ICD-10-CM

## 2022-04-25 DIAGNOSIS — K57.92 DIVERTICULITIS: Primary | ICD-10-CM

## 2022-04-25 PROCEDURE — 99999 PR PBB SHADOW E&M-EST. PATIENT-LVL V: ICD-10-PCS | Mod: PBBFAC,,, | Performed by: FAMILY MEDICINE

## 2022-04-25 PROCEDURE — 93005 EKG 12-LEAD: ICD-10-PCS | Mod: S$GLB,,, | Performed by: FAMILY MEDICINE

## 2022-04-25 PROCEDURE — 99999 PR PBB SHADOW E&M-EST. PATIENT-LVL V: CPT | Mod: PBBFAC,,, | Performed by: FAMILY MEDICINE

## 2022-04-25 PROCEDURE — 77080 DXA BONE DENSITY AXIAL: CPT | Mod: TC

## 2022-04-25 PROCEDURE — 99396 PR PREVENTIVE VISIT,EST,40-64: ICD-10-PCS | Mod: S$GLB,,, | Performed by: FAMILY MEDICINE

## 2022-04-25 PROCEDURE — 77080 DXA BONE DENSITY AXIAL: CPT | Mod: 26,,, | Performed by: RADIOLOGY

## 2022-04-25 PROCEDURE — 99396 PREV VISIT EST AGE 40-64: CPT | Mod: S$GLB,,, | Performed by: FAMILY MEDICINE

## 2022-04-25 PROCEDURE — 77080 DEXA BONE DENSITY SPINE HIP: ICD-10-PCS | Mod: 26,,, | Performed by: RADIOLOGY

## 2022-04-25 PROCEDURE — 93010 EKG 12-LEAD: ICD-10-PCS | Mod: S$GLB,,, | Performed by: INTERNAL MEDICINE

## 2022-04-25 PROCEDURE — 93005 ELECTROCARDIOGRAM TRACING: CPT | Mod: S$GLB,,, | Performed by: FAMILY MEDICINE

## 2022-04-25 PROCEDURE — 93010 ELECTROCARDIOGRAM REPORT: CPT | Mod: S$GLB,,, | Performed by: INTERNAL MEDICINE

## 2022-04-25 NOTE — PATIENT INSTRUCTIONS
Jesus Rubin,     If you are due for any health screening(s) below please notify me so we can arrange them to be ordered and scheduled to maintain your health. Most healthy patients complete it. Don't lose out on improving your health.     Health Maintenance   Topic Date Due    TETANUS VACCINE  Never done    Mammogram  10/25/2022    Lipid Panel  04/23/2026    Hepatitis C Screening  Completed          Colon Cancer Screening    Of cancers affecting both men and women, colorectal cancer is the third leading cancer killer in the [place]. But it doesnt have to be. Screening can prevent colorectal cancer or find it at an early stage when treatment often leads to a cure.    A colonoscopy is the preferred test for detecting colon cancer. It is needed only once every 10 years if results are negative. While sedated, a flexible, lighted tube with a tiny camera is inserted into the rectum and advanced through the colon to look for cancers. An alternative screening test that is used at home and returned to the lab may also be used. It detects hidden blood in bowel movements which could indicate cancer in the colon. If results are positive, you will need a colonoscopy to determine if the blood is a sign of cancer. This type of follow up (diagnostic) colonoscopy usually requires additional copays as required by your insurance provider. Please contact your PCP if you have any questions.    Although you are still overdue for this important screening, due to the COVID-19 pandemic, we recommend scheduling it in the near future.

## 2022-04-26 ENCOUNTER — TELEPHONE (OUTPATIENT)
Dept: FAMILY MEDICINE | Facility: CLINIC | Age: 62
End: 2022-04-26
Payer: COMMERCIAL

## 2022-04-26 ENCOUNTER — TELEPHONE (OUTPATIENT)
Dept: HEMATOLOGY/ONCOLOGY | Facility: CLINIC | Age: 62
End: 2022-04-26
Payer: COMMERCIAL

## 2022-04-26 NOTE — TELEPHONE ENCOUNTER
Pt given appt date and time information for tomorrow in clinic. Advised to call back if she would like to reschedule        ----- Message from Tami Langley sent at 4/26/2022  2:36 PM CDT -----  Contact: pt  Type: Needs Medical Advice    Who Called: pt  Best Call Back Number: [phone]  Inquiry/Question: pt needs to reschedule apt that is for tomorrow I am unable to pull up schedule to do so at this time, please advise pt  Thank you~

## 2022-04-26 NOTE — TELEPHONE ENCOUNTER
----- Message from Tami Langley sent at 4/26/2022  2:34 PM CDT -----  Contact: pt  Type: Return Call    Who Called: pt  Who Left Message for Pt: noelle   Does the patient know what this is regarding:yes   Best Call Back Number: [phone]  Thank you~

## 2022-04-27 ENCOUNTER — TELEPHONE (OUTPATIENT)
Dept: INFUSION THERAPY | Facility: HOSPITAL | Age: 62
End: 2022-04-27

## 2022-04-27 DIAGNOSIS — M81.0 OSTEOPOROSIS, UNSPECIFIED OSTEOPOROSIS TYPE, UNSPECIFIED PATHOLOGICAL FRACTURE PRESENCE: Primary | ICD-10-CM

## 2022-04-27 NOTE — PROGRESS NOTES
"    Subjective:   Patient ID: Caryn Toledo is a 62 y.o. female     Chief Complaint:Annual Exam      Pt with vague discomfort to chest wall. Going on for months. Intermittent. Denies any SOB/nausea/diaphoresis with symptoms. Not worse with exertion. Occurs at rest. Described as shooting pain across lateral chest wall. Also notes frequent "diverticulitis" flares. Described as discomfort in her abdomen. Going on for years. Has seen GI in past but has been years since seen.    Review of Systems   Respiratory: Negative for shortness of breath.    Cardiovascular: Positive for chest pain.   Gastrointestinal: Positive for abdominal pain.   Genitourinary: Negative for dysuria.     Past Medical History:   Diagnosis Date    Arthritis     Breast cancer     left    Cancer     breast; skin    High cholesterol     History of fracture of nose     PONV (postoperative nausea and vomiting)     Sarcoma of thigh, left     Wears glasses      Past Surgical History:   Procedure Laterality Date    APPENDECTOMY      AXILLARY NODE DISSECTION Left 3/14/2019    Procedure: LYMPHADENECTOMY, AXILLARY;  Surgeon: Didier Garcia MD;  Location: [facility];  Service: General;  Laterality: Left;    BREAST LUMPECTOMY      BREAST SURGERY Left 02/11/2019    lumpectomy    CLOSED REDUCTION OF FRACTURE OF NASAL BONE N/A 10/8/2021    Procedure: CLOSED REDUCTION, FRACTURE, NASAL BONE;  Surgeon: Daniele Castano MD;  Location: [facility];  Service: ENT;  Laterality: N/A;    ENDOSCOPIC NASAL SEPTOPLASTY N/A 10/8/2021    Procedure: SEPTOPLASTY, NOSE, ENDOSCOPIC;  Surgeon: Daniele Castano MD;  Location: [facility];  Service: ENT;  Laterality: N/A;    JOINT REPLACEMENT Left 10/30/2018    knee    KNEE ARTHROPLASTY Left 10/30/2018    Procedure: ARTHROPLASTY, KNEE;  Surgeon: Ata Paula MD;  Location: [facility];  Service: Orthopedics;  Laterality: Left;    KNEE ARTHROSCOPY W/ MENISCECTOMY Right 10/30/2018    Procedure: ARTHROSCOPY, " KNEE, WITH MENISCECTOMY;  Surgeon: Aat Paula MD;  Location: [facility];  Service: Orthopedics;  Laterality: Right;    KNEE SURGERY Left 1980s    NASAL STENOSIS REPAIR N/A 10/8/2021    Procedure: REPAIR, STENOSIS, NOSE, VESTIBULE;  Surgeon: Daniele Castano MD;  Location: [facility];  Service: ENT;  Laterality: N/A;    REVISION OF KNEE ARTHROPLASTY Left 12/3/2019    Procedure: REVISION, ARTHROPLASTY, KNEE;  Surgeon: Ata Paula MD;  Location: [facility];  Service: Orthopedics;  Laterality: Left;  Edith Clifford  (notified 11/27/19 )    SENTINEL LYMPH NODE BIOPSY Left 3/14/2019    Procedure: BIOPSY, LYMPH NODE, SENTINEL;  Surgeon: Didier Garcia MD;  Location: [facility];  Service: General;  Laterality: Left;  left axillary sentinel node biopsy, possible axillary dissection    SURGICAL FRACTURE OF NASAL TURBINATES N/A 10/8/2021    Procedure: SURGICAL FRACTURE, NASAL TURBINATE;  Surgeon: Daniele Castano MD;  Location: [facility];  Service: ENT;  Laterality: N/A;    SURGICAL REMOVAL OF MASS OF LOWER EXTREMITY Left 11/19/2020    Procedure: EXCISION, MASS, LOWER EXTREMITY;  Surgeon: Didier Garcia MD;  Location: [facility];  Service: General;  Laterality: Left;  left hip mass removal ,right anterior thigh mass removal     SURGICAL REMOVAL OF NASAL TURBINATE N/A 10/8/2021    Procedure: EXCISION, NASAL TURBINATE;  Surgeon: Daniele Castano MD;  Location: [facility];  Service: ENT;  Laterality: N/A;    TUMOR REMOVAL Left     thigh     Objective:     Vitals:    04/25/22 0833   BP: 126/84   Pulse: 68     Body mass index is 32.01 kg/m².  Physical Exam  Vitals and nursing note reviewed.   Constitutional:       Appearance: She is well-developed.   HENT:      Head: Normocephalic and atraumatic.   Eyes:      General: No scleral icterus.     Conjunctiva/sclera: Conjunctivae normal.   Cardiovascular:      Heart sounds: No murmur heard.  Pulmonary:      Effort: Pulmonary effort is normal. No  respiratory distress.   Musculoskeletal:         General: No deformity. Normal range of motion.      Cervical back: Normal range of motion and neck supple.   Skin:     Coloration: Skin is not pale.      Findings: No rash.   Neurological:      Mental Status: She is alert and oriented to person, place, and time.   Psychiatric:         Behavior: Behavior normal.         Thought Content: Thought content normal.         Judgment: Judgment normal.       Assessment:     1. Diverticulitis    2. High cholesterol    3. Chest discomfort    4. Other migraine without status migrainosus, not intractable    5. Chest pain, unspecified type      Plan:   Diverticulitis  -     Ambulatory referral/consult to Gastroenterology; Future; Expected date: 05/02/2022  -     Cancel: CBC Auto Differential; Future; Expected date: 04/25/2022  Advised due to intermittent but frequent nature strongly recommend f/u with GI. Discussed risks of perforation  High cholesterol  -     Lipid Panel; Future; Expected date: 04/25/2022  -     Hemoglobin A1C; Future; Expected date: 04/25/2022  -     NM Myocardial Perfusion Spect Multi Exer; Future; Expected date: 04/27/2022  -     Nuclear Stress Test; Future    Chest discomfort  -     IN OFFICE EKG 12-LEAD (to Muse)  -     NM Myocardial Perfusion Spect Multi Exer; Future; Expected date: 04/27/2022  -     Nuclear Stress Test; Future  Atypical chest pain. Counseled on expectation and if changes when to f/u in ER.   Other migraine without status migrainosus, not intractable  -     Ambulatory referral/consult to Neurology; Future; Expected date: 05/02/2022    Chest pain, unspecified type  -     NM Myocardial Perfusion Spect Multi Exer; Future; Expected date: 04/25/2022  -     Nuclear Stress Test; Future  -     NM Myocardial Perfusion Spect Multi Exer; Future; Expected date: 04/27/2022  -     Nuclear Stress Test; Future      Terrance Nolen MD  04/27/2022    Portions of this note have been dictated with KIRK  Modal.

## 2022-05-02 ENCOUNTER — LAB VISIT (OUTPATIENT)
Dept: LAB | Facility: HOSPITAL | Age: 62
End: 2022-05-02
Attending: INTERNAL MEDICINE
Payer: COMMERCIAL

## 2022-05-02 ENCOUNTER — OFFICE VISIT (OUTPATIENT)
Dept: GASTROENTEROLOGY | Facility: CLINIC | Age: 62
End: 2022-05-02
Payer: COMMERCIAL

## 2022-05-02 VITALS
HEIGHT: 67 IN | WEIGHT: 206.81 LBS | HEART RATE: 58 BPM | SYSTOLIC BLOOD PRESSURE: 132 MMHG | DIASTOLIC BLOOD PRESSURE: 71 MMHG | BODY MASS INDEX: 32.46 KG/M2

## 2022-05-02 DIAGNOSIS — K52.9 CHRONIC DIARRHEA: ICD-10-CM

## 2022-05-02 DIAGNOSIS — R10.32 LEFT LOWER QUADRANT PAIN: ICD-10-CM

## 2022-05-02 DIAGNOSIS — M81.0 OSTEOPOROSIS, UNSPECIFIED OSTEOPOROSIS TYPE, UNSPECIFIED PATHOLOGICAL FRACTURE PRESENCE: ICD-10-CM

## 2022-05-02 DIAGNOSIS — Z87.19 HISTORY OF DIVERTICULITIS: Primary | ICD-10-CM

## 2022-05-02 DIAGNOSIS — R19.8 IRREGULAR BOWEL HABITS: ICD-10-CM

## 2022-05-02 DIAGNOSIS — K21.9 GASTROESOPHAGEAL REFLUX DISEASE, UNSPECIFIED WHETHER ESOPHAGITIS PRESENT: ICD-10-CM

## 2022-05-02 LAB
BASOPHILS # BLD AUTO: 0.01 K/UL (ref 0–0.2)
BASOPHILS NFR BLD: 0.3 % (ref 0–1.9)
DIFFERENTIAL METHOD: ABNORMAL
EOSINOPHIL # BLD AUTO: 0.1 K/UL (ref 0–0.5)
EOSINOPHIL NFR BLD: 1.4 % (ref 0–8)
ERYTHROCYTE [DISTWIDTH] IN BLOOD BY AUTOMATED COUNT: 14.2 % (ref 11.5–14.5)
HCT VFR BLD AUTO: 39.7 % (ref 37–48.5)
HGB BLD-MCNC: 13.1 G/DL (ref 12–16)
IMM GRANULOCYTES # BLD AUTO: 0.01 K/UL (ref 0–0.04)
IMM GRANULOCYTES NFR BLD AUTO: 0.3 % (ref 0–0.5)
LYMPHOCYTES # BLD AUTO: 1.5 K/UL (ref 1–4.8)
LYMPHOCYTES NFR BLD: 41.3 % (ref 18–48)
MCH RBC QN AUTO: 30.3 PG (ref 27–31)
MCHC RBC AUTO-ENTMCNC: 33 G/DL (ref 32–36)
MCV RBC AUTO: 92 FL (ref 82–98)
MONOCYTES # BLD AUTO: 0.2 K/UL (ref 0.3–1)
MONOCYTES NFR BLD: 5.7 % (ref 4–15)
NEUTROPHILS # BLD AUTO: 1.9 K/UL (ref 1.8–7.7)
NEUTROPHILS NFR BLD: 51 % (ref 38–73)
NRBC BLD-RTO: 0 /100 WBC
PLATELET # BLD AUTO: 175 K/UL (ref 150–450)
PMV BLD AUTO: 10.6 FL (ref 9.2–12.9)
RBC # BLD AUTO: 4.33 M/UL (ref 4–5.4)
WBC # BLD AUTO: 3.66 K/UL (ref 3.9–12.7)

## 2022-05-02 PROCEDURE — 36415 COLL VENOUS BLD VENIPUNCTURE: CPT | Performed by: INTERNAL MEDICINE

## 2022-05-02 PROCEDURE — 85025 COMPLETE CBC W/AUTO DIFF WBC: CPT | Performed by: INTERNAL MEDICINE

## 2022-05-02 PROCEDURE — 99999 PR PBB SHADOW E&M-EST. PATIENT-LVL V: CPT | Mod: PBBFAC,,,

## 2022-05-02 PROCEDURE — 99999 PR PBB SHADOW E&M-EST. PATIENT-LVL V: ICD-10-PCS | Mod: PBBFAC,,,

## 2022-05-02 PROCEDURE — 99204 PR OFFICE/OUTPT VISIT, NEW, LEVL IV, 45-59 MIN: ICD-10-PCS | Mod: S$GLB,,,

## 2022-05-02 PROCEDURE — 99204 OFFICE O/P NEW MOD 45 MIN: CPT | Mod: S$GLB,,,

## 2022-05-02 RX ORDER — OMEPRAZOLE 40 MG/1
40 CAPSULE, DELAYED RELEASE ORAL EVERY MORNING
Qty: 30 CAPSULE | Refills: 0 | Status: SHIPPED | OUTPATIENT
Start: 2022-05-02 | End: 2022-08-25

## 2022-05-02 NOTE — PROGRESS NOTES
"Subjective:       Patient ID: Caryn Toledo is a 62 y.o. female Body mass index is 32.39 kg/m².    Chief Complaint: GI Problem    This patient is new to me.  Referring Provider: Dr. Terrance Nolen for diverticulitis.     GI Problem  The primary symptoms include abdominal pain (resolved; history of constant LLQ pain/ache that was sharp at times; attributes pain to diverticulitis flare) and diarrhea. Primary symptoms do not include fever, weight loss, fatigue, nausea, vomiting, melena, hematemesis, jaundice, hematochezia, dysuria or rash.   The diarrhea began more than 1 week ago (chronic; reports getting a stomach bug in 01/2022 that caused severe diarrhea for 5 days - resolved). The diarrhea is watery and semi-solid (rated stool a 6-7 on bristol scale; occurs after eating; explosive at times). Daily occurrences: reports having 2 bowel movements a day. Risk factors for illness producing diarrhea include travel to endemic areas (travels out of the country frequently - reports traveling to [place] the Friday before last).   The illness does not include chills, anorexia, dysphagia, odynophagia, bloating, constipation, back pain or itching. Significant associated medical issues include GERD (well managed on omeprozole 40 mg once every other day; reports reflux worsened after getting sick 01/2022) and diverticulitis (denies currently; reports diverticulitis "flare" last week and has had multiple flares since January; most recent flare occurred 04/24/22-04/26/22 & resolved on 04/27/22; denies being treated with antibiotics; last colonoscopy was over 10 years ago). Associated medical issues do not include inflammatory bowel disease, gallstones, liver disease, alcohol abuse, PUD, gastric bypass, bowel resection, irritable bowel syndrome or hemorrhoids.     Review of Systems   Constitutional: Negative for activity change, appetite change, chills, diaphoresis, fatigue, fever, unexpected weight change and weight loss. "   HENT: Negative for sore throat and trouble swallowing.    Respiratory: Negative for cough, choking and shortness of breath.    Cardiovascular: Negative for chest pain.   Gastrointestinal: Positive for abdominal pain (resolved; history of constant LLQ pain/ache that was sharp at times; attributes pain to diverticulitis flare) and diarrhea. Negative for abdominal distention, anal bleeding, anorexia, bloating, blood in stool, constipation, dysphagia, hematemesis, hematochezia, jaundice, melena, nausea, rectal pain and vomiting.   Genitourinary: Negative for dysuria.   Musculoskeletal: Negative for back pain.   Skin: Negative for itching and rash.       No LMP recorded. Patient is postmenopausal.  Past Medical History:   Diagnosis Date    Arthritis     Breast cancer     left    Cancer     breast; skin    High cholesterol     History of fracture of nose     PONV (postoperative nausea and vomiting)     Sarcoma of thigh, left     Wears glasses      Past Surgical History:   Procedure Laterality Date    APPENDECTOMY      AXILLARY NODE DISSECTION Left 3/14/2019    Procedure: LYMPHADENECTOMY, AXILLARY;  Surgeon: Didier Garcia MD;  Location: [facility];  Service: General;  Laterality: Left;    BREAST LUMPECTOMY      BREAST SURGERY Left 02/11/2019    lumpectomy    CLOSED REDUCTION OF FRACTURE OF NASAL BONE N/A 10/8/2021    Procedure: CLOSED REDUCTION, FRACTURE, NASAL BONE;  Surgeon: Daniele Castano MD;  Location: [facility];  Service: ENT;  Laterality: N/A;    COLONOSCOPY      ENDOSCOPIC NASAL SEPTOPLASTY N/A 10/8/2021    Procedure: SEPTOPLASTY, NOSE, ENDOSCOPIC;  Surgeon: Daniele Castano MD;  Location: [facility];  Service: ENT;  Laterality: N/A;    JOINT REPLACEMENT Left 10/30/2018    knee    KNEE ARTHROPLASTY Left 10/30/2018    Procedure: ARTHROPLASTY, KNEE;  Surgeon: Ata Paula MD;  Location: [facility];  Service: Orthopedics;  Laterality: Left;    KNEE ARTHROSCOPY W/ MENISCECTOMY Right  10/30/2018    Procedure: ARTHROSCOPY, KNEE, WITH MENISCECTOMY;  Surgeon: Ata Paula MD;  Location: [facility];  Service: Orthopedics;  Laterality: Right;    KNEE SURGERY Left 1980s    NASAL STENOSIS REPAIR N/A 10/8/2021    Procedure: REPAIR, STENOSIS, NOSE, VESTIBULE;  Surgeon: Daniele Castano MD;  Location: [facility];  Service: ENT;  Laterality: N/A;    REVISION OF KNEE ARTHROPLASTY Left 12/3/2019    Procedure: REVISION, ARTHROPLASTY, KNEE;  Surgeon: Ata Paula MD;  Location: [facility];  Service: Orthopedics;  Laterality: Left;  [place]  (notified 11/27/19 )    SENTINEL LYMPH NODE BIOPSY Left 3/14/2019    Procedure: BIOPSY, LYMPH NODE, SENTINEL;  Surgeon: Didier Garcia MD;  Location: [facility];  Service: General;  Laterality: Left;  left axillary sentinel node biopsy, possible axillary dissection    SURGICAL FRACTURE OF NASAL TURBINATES N/A 10/8/2021    Procedure: SURGICAL FRACTURE, NASAL TURBINATE;  Surgeon: Daniele Castano MD;  Location: [facility];  Service: ENT;  Laterality: N/A;    SURGICAL REMOVAL OF MASS OF LOWER EXTREMITY Left 11/19/2020    Procedure: EXCISION, MASS, LOWER EXTREMITY;  Surgeon: Didier Garcia MD;  Location: [facility];  Service: General;  Laterality: Left;  left hip mass removal ,right anterior thigh mass removal     SURGICAL REMOVAL OF NASAL TURBINATE N/A 10/8/2021    Procedure: EXCISION, NASAL TURBINATE;  Surgeon: Daniele Castano MD;  Location: [facility];  Service: ENT;  Laterality: N/A;    TUMOR REMOVAL Left     thigh     Family History   Problem Relation Age of Onset    Breast cancer Mother     Cancer Mother         skin    Cancer Father         multiple myeloma    Diverticulitis Father         colon resection    Diverticulitis Brother     Colon cancer Neg Hx      Social History     Tobacco Use    Smoking status: Never Smoker    Smokeless tobacco: Never Used   Substance Use Topics    Alcohol use: Yes     Alcohol/week: 2.0 standard  drinks     Types: 2 Glasses of wine per week     Comment: occasionally    Drug use: No     Wt Readings from Last 10 Encounters:   05/02/22 93.8 kg (206 lb 12.7 oz)   04/25/22 92.7 kg (204 lb 5.9 oz)   10/27/21 86.5 kg (190 lb 12.8 oz)   10/27/21 86.7 kg (191 lb 2.2 oz)   10/08/21 88.3 kg (194 lb 10.7 oz)   09/03/21 87.9 kg (193 lb 12.6 oz)   06/17/21 90.7 kg (200 lb)   04/23/21 90.9 kg (200 lb 6.4 oz)   04/22/21 91.2 kg (201 lb 1 oz)   11/19/20 88.5 kg (195 lb)     Lab Results   Component Value Date    WBC 3.66 (L) 05/02/2022    HGB 13.1 05/02/2022    HCT 39.7 05/02/2022    MCV 92 05/02/2022     05/02/2022     CMP  Sodium   Date Value Ref Range Status   04/25/2022 144 136 - 145 mmol/L Final     Potassium   Date Value Ref Range Status   04/25/2022 4.6 3.5 - 5.1 mmol/L Final     Chloride   Date Value Ref Range Status   04/25/2022 112 (H) 95 - 110 mmol/L Final     CO2   Date Value Ref Range Status   04/25/2022 24 23 - 29 mmol/L Final     Glucose   Date Value Ref Range Status   04/25/2022 100 70 - 110 mg/dL Final     BUN   Date Value Ref Range Status   04/25/2022 17 8 - 23 mg/dL Final     Creatinine   Date Value Ref Range Status   04/25/2022 0.9 0.5 - 1.4 mg/dL Final     Calcium   Date Value Ref Range Status   04/25/2022 9.1 8.7 - 10.5 mg/dL Final     Total Protein   Date Value Ref Range Status   04/25/2022 6.8 6.0 - 8.4 g/dL Final     Albumin   Date Value Ref Range Status   04/25/2022 3.9 3.5 - 5.2 g/dL Final     Total Bilirubin   Date Value Ref Range Status   04/25/2022 0.4 0.1 - 1.0 mg/dL Final     Comment:     For infants and newborns, interpretation of results should be based  on gestational age, weight and in agreement with clinical  observations.    Premature Infant recommended reference ranges:  Up to 24 hours.............<8.0 mg/dL  Up to 48 hours............<12.0 mg/dL  3-5 days..................<15.0 mg/dL  6-29 days.................<15.0 mg/dL       Alkaline Phosphatase   Date Value Ref Range Status    04/25/2022 45 (L) 55 - 135 U/L Final     AST   Date Value Ref Range Status   04/25/2022 15 10 - 40 U/L Final     ALT   Date Value Ref Range Status   04/25/2022 17 10 - 44 U/L Final     Anion Gap   Date Value Ref Range Status   04/25/2022 8 8 - 16 mmol/L Final     eGFR if    Date Value Ref Range Status   04/25/2022 >60 >60 mL/min/1.73 m^2 Final     eGFR if non    Date Value Ref Range Status   04/25/2022 >60 >60 mL/min/1.73 m^2 Final     Comment:     Calculation used to obtain the estimated glomerular filtration  rate (eGFR) is the CKD-EPI equation.        Reviewed prior medical records including radiology report of pelvis MRI & endoscopy history (see surgical history).    Objective:      Physical Exam  Vitals and nursing note reviewed.   Constitutional:       General: She is not in acute distress.     Appearance: Normal appearance. She is obese. She is not ill-appearing.   HENT:      Mouth/Throat:      Comments: Unable to assess due to COVID-19 concerns.  Eyes:      Extraocular Movements: Extraocular movements intact.      Pupils: Pupils are equal, round, and reactive to light.   Cardiovascular:      Rate and Rhythm: Normal rate and regular rhythm.   Pulmonary:      Effort: Pulmonary effort is normal. No respiratory distress.      Breath sounds: Normal breath sounds.   Abdominal:      General: Abdomen is protuberant. Bowel sounds are normal. There is no distension or abdominal bruit. There are no signs of injury.      Palpations: Abdomen is soft. There is no shifting dullness, fluid wave, hepatomegaly, splenomegaly or mass.      Tenderness: There is no abdominal tenderness. There is no guarding or rebound. Negative signs include Vargas's sign, Rovsing's sign and McBurney's sign.      Hernia: No hernia is present.   Skin:     General: Skin is warm and dry.      Coloration: Skin is not jaundiced or pale.   Neurological:      Mental Status: She is alert and oriented to person, place,  and time.   Psychiatric:         Attention and Perception: Attention normal.         Mood and Affect: Mood normal.         Speech: Speech normal.         Behavior: Behavior normal.         Assessment:       1. History of diverticulitis    2. Left lower quadrant pain    3. Chronic diarrhea    4. Irregular bowel habits    5. Gastroesophageal reflux disease, unspecified whether esophagitis present        Plan:       History of diverticulitis  - schedule Colonoscopy to be done in 4-6 weeks, discussed procedure with the patient, verbalized understanding  - discussed the diagnosis of diverticulosis and diverticulitis, advised to continue a low fiber diet for the next 4 weeks and then slowly increase fiber in diet. Advised a low fiber diet is recommended for diverticulitis (for about 4 weeks), but to prevent diverticulitis, high fiber diet is recommended.  -Recommended high fiber diet after episode has completely resolved. Recommended daily exercise, adequate water intake (six 8-oz glasses of water daily), and high fiber diet. OTC fiber supplements are recommended if diet does not reach daily fiber goal (25 grams daily), such as Metamucil, Citrucel, or FiberCon (take as directed, separate from other oral medications by >2 hours).  - instructed patient that if symptoms do not resolve or if worsen prior to colonoscopy to contact us or go to ER, patient verbalized understanding  - discussed with patient that if episodes of diverticulitis recur frequently, may need to consult general surgery    Left lower quadrant pain  -     CT Abdomen Pelvis W Wo Contrast; Future; Expected date: 05/02/2022    Chronic diarrhea & Irregular bowel habits  - schedule Colonoscopy to be done in 4-6 weeks, discussed procedure with the patient, verbalized understanding  -Consider stool studies & ibgard if symptoms persist    Gastroesophageal reflux disease, unspecified whether esophagitis present  -discussed about the different types of medications  used to treat reflux and how to use them, antacids can be used PRN for breakthrough heartburn symptoms by reducing stomach acid that is already produced, H2 blockers work by limiting the amount acid production, & PPI's work to block acid production and are taken daily, patient verbalized understanding.  -Educated patient on lifestyle modifications to help control/reduce reflux/abdominal pain including: avoid large meals, avoid eating within 2-3 hours of bedtime (avoid late night eating & lying down soon after eating), elevate head of bed if nocturnal symptoms are present, smoking cessation (if current smoker), & weight loss (if overweight).   -Educated to avoid known foods which trigger reflux symptoms & to minimize/avoid high-fat foods, chocolate, caffeine, citrus, alcohol, & tomato products.  -Advised to avoid/limit use of NSAID's, since they can cause GI upset, bleeding, and/or ulcers. If needed, take with food.   -CONTINUE: omeprazole (PRILOSEC) 40 MG capsule; Take 1 capsule (40 mg total) by mouth every morning.  Dispense: 30 capsule; Refill: 0  -Consider EGD if symptoms worsen    Follow up in about 4 weeks (around 5/30/2022).      If no improvement in symptoms or symptoms worsen, call/follow-up at clinic or go to ER.        30 minutes of total time spent on the encounter, which includes face to face time and non-face to face time preparing to see the patient (eg, review of tests), Obtaining and/or reviewing separately obtained history, Documenting clinical information in the electronic or other health record, Independently interpreting results (not separately reported) and communicating results to the patient/family/caregiver, or Care coordination (not separately reported).

## 2022-05-06 ENCOUNTER — HOSPITAL ENCOUNTER (OUTPATIENT)
Dept: RADIOLOGY | Facility: HOSPITAL | Age: 62
Discharge: HOME | End: 2022-05-06
Attending: FAMILY MEDICINE
Payer: COMMERCIAL

## 2022-05-06 ENCOUNTER — CLINICAL SUPPORT (OUTPATIENT)
Dept: CARDIOLOGY | Facility: HOSPITAL | Age: 62
End: 2022-05-06
Attending: FAMILY MEDICINE
Payer: COMMERCIAL

## 2022-05-06 VITALS — WEIGHT: 206 LBS | BODY MASS INDEX: 32.33 KG/M2 | HEIGHT: 67 IN

## 2022-05-06 DIAGNOSIS — R07.9 CHEST PAIN, UNSPECIFIED TYPE: ICD-10-CM

## 2022-05-06 DIAGNOSIS — R07.89 CHEST DISCOMFORT: ICD-10-CM

## 2022-05-06 DIAGNOSIS — E78.00 HIGH CHOLESTEROL: ICD-10-CM

## 2022-05-06 LAB
CV STRESS BASE HR: 61 BPM
DIASTOLIC BLOOD PRESSURE: 89 MMHG
NUC REST EJECTION FRACTION: 63
OHS CV CPX 1 MINUTE RECOVERY HEART RATE: 66 BPM
OHS CV CPX 85 PERCENT MAX PREDICTED HEART RATE MALE: 128
OHS CV CPX ESTIMATED METS: 10
OHS CV CPX MAX PREDICTED HEART RATE: 151
OHS CV CPX PATIENT IS FEMALE: 1
OHS CV CPX PATIENT IS MALE: 0
OHS CV CPX PEAK DIASTOLIC BLOOD PRESSURE: 84 MMHG
OHS CV CPX PEAK HEAR RATE: 110 BPM
OHS CV CPX PEAK RATE PRESSURE PRODUCT: NORMAL
OHS CV CPX PEAK SYSTOLIC BLOOD PRESSURE: 197 MMHG
OHS CV CPX PERCENT MAX PREDICTED HEART RATE ACHIEVED: 73
OHS CV CPX RATE PRESSURE PRODUCT PRESENTING: 9821
STRESS ECHO POST EXERCISE DUR MIN: 6 MINUTES
STRESS ECHO POST EXERCISE DUR SEC: 9 SECONDS
SYSTOLIC BLOOD PRESSURE: 161 MMHG

## 2022-05-06 PROCEDURE — 93018 CV STRESS TEST I&R ONLY: CPT | Mod: ,,, | Performed by: INTERNAL MEDICINE

## 2022-05-06 PROCEDURE — A9502 TC99M TETROFOSMIN: HCPCS | Mod: PO

## 2022-05-06 PROCEDURE — 93016 STRESS TEST WITH MYOCARDIAL PERFUSION (CUPID ONLY): ICD-10-PCS | Mod: ,,, | Performed by: INTERNAL MEDICINE

## 2022-05-06 PROCEDURE — 93018 PR CARDIAC STRESS TST,INTERP/REPT ONLY: ICD-10-PCS | Mod: ,,, | Performed by: INTERNAL MEDICINE

## 2022-05-06 PROCEDURE — 93017 CV STRESS TEST TRACING ONLY: CPT | Mod: PO

## 2022-05-06 PROCEDURE — 93016 CV STRESS TEST SUPVJ ONLY: CPT | Mod: ,,, | Performed by: INTERNAL MEDICINE

## 2022-05-06 PROCEDURE — 78452 STRESS TEST WITH MYOCARDIAL PERFUSION (CUPID ONLY): ICD-10-PCS | Mod: 26,,, | Performed by: INTERNAL MEDICINE

## 2022-05-06 PROCEDURE — 78452 HT MUSCLE IMAGE SPECT MULT: CPT | Mod: 26,,, | Performed by: INTERNAL MEDICINE

## 2022-05-09 ENCOUNTER — HOSPITAL ENCOUNTER (OUTPATIENT)
Dept: RADIOLOGY | Facility: HOSPITAL | Age: 62
Discharge: HOME | End: 2022-05-09
Payer: COMMERCIAL

## 2022-05-09 DIAGNOSIS — R07.9 CHEST PAIN, UNSPECIFIED TYPE: ICD-10-CM

## 2022-05-09 DIAGNOSIS — R10.32 LEFT LOWER QUADRANT PAIN: ICD-10-CM

## 2022-05-09 PROCEDURE — 74177 CT ABD & PELVIS W/CONTRAST: CPT | Mod: TC

## 2022-05-09 PROCEDURE — 25500020 PHARM REV CODE 255

## 2022-05-09 PROCEDURE — A9698 NON-RAD CONTRAST MATERIALNOC: HCPCS

## 2022-05-09 PROCEDURE — 74177 CT ABD & PELVIS W/CONTRAST: CPT | Mod: 26,,, | Performed by: RADIOLOGY

## 2022-05-09 PROCEDURE — 74177 CT ABDOMEN PELVIS WITH CONTRAST: ICD-10-PCS | Mod: 26,,, | Performed by: RADIOLOGY

## 2022-05-09 RX ADMIN — IOHEXOL 100 ML: 350 INJECTION, SOLUTION INTRAVENOUS at 12:05

## 2022-05-09 RX ADMIN — IOHEXOL 1000 ML: 12 SOLUTION ORAL at 12:05

## 2022-05-10 ENCOUNTER — OFFICE VISIT (OUTPATIENT)
Dept: HEMATOLOGY/ONCOLOGY | Facility: CLINIC | Age: 62
End: 2022-05-10
Payer: COMMERCIAL

## 2022-05-10 ENCOUNTER — INFUSION (OUTPATIENT)
Dept: INFUSION THERAPY | Facility: HOSPITAL | Age: 62
End: 2022-05-10
Attending: INTERNAL MEDICINE
Payer: COMMERCIAL

## 2022-05-10 VITALS
RESPIRATION RATE: 17 BRPM | OXYGEN SATURATION: 96 % | DIASTOLIC BLOOD PRESSURE: 72 MMHG | TEMPERATURE: 98 F | SYSTOLIC BLOOD PRESSURE: 124 MMHG | HEIGHT: 67 IN | WEIGHT: 204.31 LBS | BODY MASS INDEX: 32.07 KG/M2 | HEART RATE: 75 BPM

## 2022-05-10 VITALS
DIASTOLIC BLOOD PRESSURE: 76 MMHG | HEART RATE: 74 BPM | BODY MASS INDEX: 32.11 KG/M2 | RESPIRATION RATE: 12 BRPM | TEMPERATURE: 98 F | OXYGEN SATURATION: 94 % | WEIGHT: 205 LBS | SYSTOLIC BLOOD PRESSURE: 128 MMHG

## 2022-05-10 DIAGNOSIS — C50.112 MALIGNANT NEOPLASM OF CENTRAL PORTION OF LEFT BREAST IN FEMALE, ESTROGEN RECEPTOR POSITIVE: ICD-10-CM

## 2022-05-10 DIAGNOSIS — R91.8 LUNG NODULES: ICD-10-CM

## 2022-05-10 DIAGNOSIS — Z17.0 MALIGNANT NEOPLASM OF CENTRAL PORTION OF LEFT BREAST IN FEMALE, ESTROGEN RECEPTOR POSITIVE: ICD-10-CM

## 2022-05-10 DIAGNOSIS — Z79.811 PROPHYLACTIC USE OF ANASTROZOLE (ARIMIDEX): Primary | ICD-10-CM

## 2022-05-10 DIAGNOSIS — M81.0 OSTEOPOROSIS, UNSPECIFIED OSTEOPOROSIS TYPE, UNSPECIFIED PATHOLOGICAL FRACTURE PRESENCE: Primary | ICD-10-CM

## 2022-05-10 DIAGNOSIS — D05.12 DUCTAL CARCINOMA IN SITU (DCIS) OF LEFT BREAST: ICD-10-CM

## 2022-05-10 PROCEDURE — 99999 PR PBB SHADOW E&M-EST. PATIENT-LVL IV: ICD-10-PCS | Mod: PBBFAC,,, | Performed by: INTERNAL MEDICINE

## 2022-05-10 PROCEDURE — 99999 PR PBB SHADOW E&M-EST. PATIENT-LVL IV: CPT | Mod: PBBFAC,,, | Performed by: INTERNAL MEDICINE

## 2022-05-10 PROCEDURE — 63600175 PHARM REV CODE 636 W HCPCS: Mod: JG | Performed by: INTERNAL MEDICINE

## 2022-05-10 PROCEDURE — 99214 PR OFFICE/OUTPT VISIT, EST, LEVL IV, 30-39 MIN: ICD-10-PCS | Mod: S$GLB,,, | Performed by: INTERNAL MEDICINE

## 2022-05-10 PROCEDURE — 96372 THER/PROPH/DIAG INJ SC/IM: CPT

## 2022-05-10 PROCEDURE — 99214 OFFICE O/P EST MOD 30 MIN: CPT | Mod: S$GLB,,, | Performed by: INTERNAL MEDICINE

## 2022-05-10 RX ADMIN — DENOSUMAB 60 MG: 60 INJECTION SUBCUTANEOUS at 10:05

## 2022-05-10 NOTE — PLAN OF CARE
Problem: Fatigue  Goal: Improved Activity Tolerance  Intervention: Promote Improved Energy  Flowsheets (Taken 5/10/2022 1011)  Fatigue Management: fatigue-related activity identified  Activity Management: Ambulated -L4

## 2022-05-10 NOTE — PROGRESS NOTES
Subjective:       Patient ID: Caryn Toledo is a 62 y.o. female.    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  She does have significant family history however.  Her mother developed breast cancer in her 30s and she has 2 maternal aunts who developed breast cancer at young ages.  Father had multiple myeloma.  Age of menarche was 13.  She had menopause at age 35.  She has never taken hormone replacement therapy or oral contraceptives.  She has had 3 pregnancies the 1st child born when she was 21 years old.  This was found on a screening test. She underwent partial mastectomy and biopsy revealed invasive carcinoma and DCIS sentinel lymph node was negative for malignancy with clear margins.  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  She completed radiation to the breast started  Arimidex.  Had resection and radiation to the soft tissue pseudo sarcoma had follow-up in December 2019 and will no longer go back to [place] for [facility] to follow she will continue her care here Patient is from [place] has had issues with lymphedema ever since and has a lymphedema clinic appointment.  But felt that she was doing better and no longer needs this referral had knee surgery  here to continue Prolia.  And follow-up of breast cancer with labs and chest x-ray       Oncologic History     Oncologic Treatment     Pathology1. Left breast, partial mastectomy:  - Invasive mucinous carcinoma, Throckmorton grade 1, (T=2, N=1, M=1), 3mm in longest linear dimension  - Ductal carcinoma in-situ (DCIS), solid and mucinous types, low grade, 9mm in longest  linear dimension  - Margins negative for carcinoma and negative for DCIS  - No lymphovascular invasion identified  - Fibrocystic changes present  2. Left breast additional superior margin, excision:  - Benign breast tissue with fibrocystic changes  - Negative for atypia or malignancy  3. Left breast additional anterior margin, excision:  - Benign breast tissue with fibrocystic changes  - Negative for atypia or malignancy  4. Left breast additional deep margin, excision:  - BenignTumor Size: Greatest dimension of largest invasive focus: 3mm  Additional dimensions: 2 x 2 mm  Histologic Type: Mucinous carcinoma  Histologic Grade: (Byron histologic score):  Glandular (Acinar)/Tubular differentiation: Score 2 (10% to 75%) of tumor area forming glandular/tubular  structures)  Nuclear Pleomorphism: Score 1 (nuclei small with little increase in size in comparison with normal breast  epithelial cells, regular outlines, uniform nuclear chromatin, little variation in size  Mitotic Rate: Score 1 (< or = 3 mitoses per mm^2)  Overall Grade: Grade 1 (scores of 3, 4, or 5)  Tumor Focality: Unifocal  Ductal carcinoma in-situ (DCIS): Present    Pathologic Stage Classification (pTNM, AJCC 8th Edition):  Primary Tumor (pT): pT1a: Tumor <1mm but < or =5 mm in greatest dimension  Regional Lymph Nodes (pN): pNX: Regional lymph nodes cannot be assessed  Additional pathologic findings:  - Fibrocystic changes  Ancillary Studies:  Prognostic/Predictive markers were performed on the previous core biopsy specimen ([id number]) and were  reported as follows:  ER: Positive (>90%, strong)  CO: Positive (>90%, strong)  Her-2 IHC: Negative (score 0)  Ki-67: Approximately 7%   on arimidex  HPI:     Social History     Socioeconomic History    Marital status: Single   Tobacco Use    Smoking status: Never Smoker    Smokeless tobacco: Never Used   Substance and Sexual Activity    Alcohol use: Yes     Alcohol/week: 2.0 standard drinks     Types: 2  Glasses of wine per week     Comment: occasionally    Drug use: No    Sexual activity: Yes     Partners: Male     Family History   Problem Relation Age of Onset    Breast cancer Mother     Cancer Mother         skin    Cancer Father         multiple myeloma    Diverticulitis Father         colon resection    Diverticulitis Brother     Colon cancer Neg Hx      Past Surgical History:   Procedure Laterality Date    APPENDECTOMY      AXILLARY NODE DISSECTION Left 3/14/2019    Procedure: LYMPHADENECTOMY, AXILLARY;  Surgeon: Didier Garcia MD;  Location[facility];  Service: General;  Laterality: Left;    BREAST LUMPECTOMY      BREAST SURGERY Left 02/11/2019    lumpectomy    CLOSED REDUCTION OF FRACTURE OF NASAL BONE N/A 10/8/2021    Procedure: CLOSED REDUCTION, FRACTURE, NASAL BONE;  Surgeon: Daniele Castano MD;  Location: [facility];  Service: ENT;  Laterality: N/A;    COLONOSCOPY      ENDOSCOPIC NASAL SEPTOPLASTY N/A 10/8/2021    Procedure: SEPTOPLASTY, NOSE, ENDOSCOPIC;  Surgeon: Daniele Castano MD;  Location: [facility];  Service: ENT;  Laterality: N/A;    JOINT REPLACEMENT Left 10/30/2018    knee    KNEE ARTHROPLASTY Left 10/30/2018    Procedure: ARTHROPLASTY, KNEE;  Surgeon: Ata Paula MD;  Location: [facility] OR;  Service: Orthopedics;  Laterality: Left;    KNEE ARTHROSCOPY W/ MENISCECTOMY Right 10/30/2018    Procedure: ARTHROSCOPY, KNEE, WITH MENISCECTOMY;  Surgeon: Ata Paula MD;  Location: [facility] OR;  Service: Orthopedics;  Laterality: Right;    KNEE SURGERY Left 1980s    NASAL STENOSIS REPAIR N/A 10/8/2021    Procedure: REPAIR, STENOSIS, NOSE, VESTIBULE;  Surgeon: Daniele Castano MD;  Location: [facility];  Service: ENT;  Laterality: N/A;    REVISION OF KNEE ARTHROPLASTY Left 12/3/2019    Procedure: REVISION, ARTHROPLASTY, KNEE;  Surgeon: Ata Paula MD;  Location: [facility] OR;  Service: Orthopedics;  Laterality: Left;  Edith Clifford  (notified 11/27/19 )     SENTINEL LYMPH NODE BIOPSY Left 3/14/2019    Procedure: BIOPSY, LYMPH NODE, SENTINEL;  Surgeon: Didier Garcia MD;  Location: [facility] OR;  Service: General;  Laterality: Left;  left axillary sentinel node biopsy, possible axillary dissection    SURGICAL FRACTURE OF NASAL TURBINATES N/A 10/8/2021    Procedure: SURGICAL FRACTURE, NASAL TURBINATE;  Surgeon: Daniele Castano MD;  Location: [facility];  Service: ENT;  Laterality: N/A;    SURGICAL REMOVAL OF MASS OF LOWER EXTREMITY Left 11/19/2020    Procedure: EXCISION, MASS, LOWER EXTREMITY;  Surgeon: Didier Garcia MD;  Location: [facility] OR;  Service: General;  Laterality: Left;  left hip mass removal ,right anterior thigh mass removal     SURGICAL REMOVAL OF NASAL TURBINATE N/A 10/8/2021    Procedure: EXCISION, NASAL TURBINATE;  Surgeon: Daniele Castano MD;  Location: [facility];  Service: ENT;  Laterality: N/A;    TUMOR REMOVAL Left     thigh     Past Medical History:   Diagnosis Date    Arthritis     Breast cancer     left    Cancer     breast; skin    High cholesterol     History of fracture of nose     PONV (postoperative nausea and vomiting)     Sarcoma of thigh, left     Wears glasses        Current Outpatient Medications:     anastrozole (ARIMIDEX) 1 mg Tab, TAKE 1 TABLET BY MOUTH EVERY DAY, Disp: 90 tablet, Rfl: 4    denosumab (PROLIA) 60 mg/mL Syrg, Inject 60 mg into the skin every 6 (six) months. , Disp: , Rfl:     multivitamin capsule, Take 1 capsule by mouth once daily., Disp: , Rfl:     NON FORMULARY MEDICATION, Take 2 capsules by mouth once daily. Immune Support, Disp: , Rfl:     NON FORMULARY MEDICATION, Take 1 capsule by mouth 2 (two) times daily. Viviscal, Disp: , Rfl:     omeprazole (PRILOSEC) 40 MG capsule, Take 1 capsule (40 mg total) by mouth every morning., Disp: 30 capsule, Rfl: 0  No current facility-administered medications for this visit.    Facility-Administered Medications Ordered in Other Visits:     electrolyte-S  "(ISOLYTE), , Intravenous, Continuous, Damon Romero MD, Stopped at 11/19/20 1405    lactated ringers infusion, , Intravenous, Continuous, Damon Romero MD    lidocaine (PF) 10 mg/ml (1%) injection 10 mg, 1 mL, Intradermal, Once, Damon Romero MD  Review of patient's allergies indicates:   Allergen Reactions    Ciprofloxacin Anaphylaxis     paralysis    Pcn [penicillins] Anaphylaxis     paralysis    Tramadol Anaphylaxis    Eggplant Blisters    Eggs [egg derived]      Inside of mouth peel      Hydrocodone Nausea Only     "causes a migraine"    Morphine Nausea Only     "causes a migraine"    Oxycodone Nausea Only     "causes a migraine"    Tomato (solanum lycopersicum) Blisters     Review of Systems   Constitutional: Positive for weight loss. Negative for chills, diaphoresis, fever and malaise/fatigue.        Had to have reconstructed nasal bone and septum after she accidentally was hit on her face by her dog( head butted her while swimming)   Eyes: Negative for blurred vision and double vision.   Respiratory: Positive for cough. Negative for sputum production.    Cardiovascular: Negative for chest pain, palpitations, orthopnea, claudication and leg swelling.   Gastrointestinal: Negative for diarrhea, heartburn and vomiting.   Genitourinary: Negative for dysuria, frequency and urgency.   Musculoskeletal: Negative for back pain, falls, myalgias and neck pain.   Skin: Positive for itching. Negative for rash.   Neurological: Negative for tremors.   Endo/Heme/Allergies: Negative for polydipsia. Does not bruise/bleed easily.   Psychiatric/Behavioral: Negative for depression.    pt may have had covid in 1/2022 she wasn't tested, in [facility] she had some tingly sensation that went away, she has had sinus bradycardia saw cardiology  PHYSICAL EXAM:     Wt Readings from Last 3 Encounters:   05/10/22 93 kg (205 lb 0.4 oz)   05/06/22 93.4 kg (206 lb)   05/02/22 93.8 kg (206 lb 12.7 oz)     Temp Readings from Last " 3 Encounters:   05/10/22 97.7 °F (36.5 °C) (Temporal)   10/27/21 98 °F (36.7 °C)   10/27/21 98 °F (36.7 °C) (Temporal)     BP Readings from Last 3 Encounters:   05/10/22 128/76   05/02/22 132/71   04/25/22 126/84     Pulse Readings from Last 3 Encounters:   05/10/22 74   05/02/22 (!) 58   04/25/22 68     GENERAL: alert; in no apparent distress, , well-built well-nourished   HEAD: normocephalic, atraumatic.  EYES: pupils are equal, round, reactive to light and accommodation. Sclera anicteric. Conjunctiva not injected.   NOSE/THROAT: no nasal erythema or rhinorrhea. Oropharynx pink, without erythema, ulcerations or thrush.   NECK: no cervical motion rigidity; supple with no masses.  CHEST: clear to auscultation bilaterally; no wheezes, crackles or rubs. Patient is speaking comfortably on room air with normal work of breathing without using accessory muscles of respiration.  CARDIOVASCULAR: regular rate and rhythm; no murmurs, rubs or gallops.  ABDOMEN: soft, nontender, nondistended. Bowel sounds present.   MUSCULOSKELETAL: no tenderness to palpation along the spine or scapulae. Normal range of motion.  NEUROLOGIC: cranial nerves II-XII intact bilaterally. Strength 5/5 in bilateral upper and lower extremities. No sensory deficits appreciated. Reflexes globally intact. No cerebellar signs. Normal gait.  LYMPHATIC: no cervical, supraclavicular or axillary adenopathy appreciated bilaterally.   EXTREMITIES: no clubbing, cyanosis, edema.  SKIN: no erythema, rashes, ulcerations noted    Laboratory:     CBC:  Lab Results   Component Value Date    WBC 3.66 (L) 05/02/2022    RBC 4.33 05/02/2022    HGB 13.1 05/02/2022    HCT 39.7 05/02/2022    MCV 92 05/02/2022    MCH 30.3 05/02/2022    MCHC 33.0 05/02/2022    RDW 14.2 05/02/2022     05/02/2022    MPV 10.6 05/02/2022    GRAN 1.9 05/02/2022    GRAN 51.0 05/02/2022    LYMPH 1.5 05/02/2022    LYMPH 41.3 05/02/2022    MONO 0.2 (L) 05/02/2022    MONO 5.7 05/02/2022    EOS 0.1  05/02/2022    BASO 0.01 05/02/2022    EOSINOPHIL 1.4 05/02/2022    BASOPHIL 0.3 05/02/2022       BMP: BMP  Lab Results   Component Value Date     04/25/2022    K 4.6 04/25/2022     (H) 04/25/2022    CO2 24 04/25/2022    BUN 17 04/25/2022    CREATININE 0.9 04/25/2022    CALCIUM 9.1 04/25/2022    ANIONGAP 8 04/25/2022    ESTGFRAFRICA >60 04/25/2022    EGFRNONAA >60 04/25/2022       LFT:   Lab Results   Component Value Date    ALT 17 04/25/2022    AST 15 04/25/2022    ALKPHOS 45 (L) 04/25/2022    BILITOT 0.4 04/25/2022     Impression:10/2020     The there is a sub 4 mm noncalcified pulmonary nodule seen within the right upper lobe of the lung.  Follow-up CT in 12 months time to ensure stability is recommended.    4/19/21  Impression:     Unchanged right lung nodule and no new findings.    Impression:10/21 CT chest     A 3 mm nodule of the right upper lobe is slightly decreased in size when compared with the prior study.  No enlarging or new nodules are identified.  Small hiatal hernia noted.      FINDINGS: bone d enity 5/22  The L1 to L4 vertebral bone mineral density is equal to 0.844 g/cm squared with a T score of -1.8.  There has been moderate increase relative to the prior study.     The left femoral neck bone mineral density is equal to 0.653 g/cm squared with a T score of -1.8.  There has been  mild increase relative to the prior study.  Assessment/Plan:        invasive breast cancer.1/2019 T1AN0 Patient status post lumpectomy to left breast stage I.  Radiation. Currently on Arimidex.     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   patient has a history of soft tissue pseudo sarcoma follow-up at [facility] as well.went to [facility] 12/2019 had dx testing done. Follows with Dr Sawyer had MRI done as well she no longer needs any further  scans  Osteoporosis: on prolia next 11/2022 next bone density due 4/24   Zoloft ; for depression anxiety has been discontinued by patient due to apathy she sees psychology and feels this helps   Pt doing well with nutrition and feels great       had knee sx 12/3 1019    prolia due today 5/1022    okay to proceed     she will rtc 6 months cbc, cmp, ca 2729 and for prolia    CT chest to follow pulmonary nodules due 10/22 mammo due 10/22      her future f/u will be here she will not go back to [facility] unless she has to   calcium supplements daily   and dental precautions discussed.Advance Care planning/ directives /living will/patient's wishes discussed with patient.  Patient has been given guidelines and instructions on completing these directives   vaccinated  X 2 with lorrie

## 2022-06-09 ENCOUNTER — TELEPHONE (OUTPATIENT)
Dept: GASTROENTEROLOGY | Facility: CLINIC | Age: 62
End: 2022-06-09
Payer: COMMERCIAL

## 2022-06-09 NOTE — TELEPHONE ENCOUNTER
----- Message from Katja Marie MA sent at 6/9/2022  3:53 PM CDT -----  Contact: ALICIA LOPEZ [[id number]]  Type: Needs Medical Advice    Who Called: ALICIA LOPEZ [[id number]]  Best Call Back Number: [phone]  Inquiry/Question: Would you kindly call ALICIA LOPEZ [[id number]] regarding canceling upcoming procedure .     Thank you~

## 2022-06-09 NOTE — TELEPHONE ENCOUNTER
Call placed to Ms. Medina in regards to message received. No answer, left message to return call.

## 2022-06-13 ENCOUNTER — TELEPHONE (OUTPATIENT)
Dept: ORTHOPEDICS | Facility: CLINIC | Age: 62
End: 2022-06-13
Payer: COMMERCIAL

## 2022-06-13 NOTE — TELEPHONE ENCOUNTER
----- Message from Jess Díaz sent at 6/13/2022  8:27 AM CDT -----  .Type:  Patient Call Back    Who Called: PT       Does the patient know what this is regarding?: PT FELL OVER THE WEEKEND WENT TO THE ER HURT RIBS NEEDS TO BE SEEN TODAY    Would the patient rather a call back YES     Best Call Back Number: [phone]    Additional Information: Thank You

## 2022-06-14 ENCOUNTER — OFFICE VISIT (OUTPATIENT)
Dept: SPINE | Facility: CLINIC | Age: 62
End: 2022-06-14
Payer: COMMERCIAL

## 2022-06-14 ENCOUNTER — HOSPITAL ENCOUNTER (OUTPATIENT)
Dept: RADIOLOGY | Facility: HOSPITAL | Age: 62
Discharge: HOME | End: 2022-06-14
Attending: PHYSICAL MEDICINE & REHABILITATION
Payer: COMMERCIAL

## 2022-06-14 ENCOUNTER — TELEPHONE (OUTPATIENT)
Dept: PHYSICAL MEDICINE AND REHAB | Facility: CLINIC | Age: 62
End: 2022-06-14
Payer: COMMERCIAL

## 2022-06-14 ENCOUNTER — PATIENT MESSAGE (OUTPATIENT)
Dept: SPINE | Facility: CLINIC | Age: 62
End: 2022-06-14

## 2022-06-14 VITALS — WEIGHT: 204 LBS | BODY MASS INDEX: 32.02 KG/M2 | HEIGHT: 67 IN

## 2022-06-14 DIAGNOSIS — R07.89 CHEST WALL PAIN: Primary | ICD-10-CM

## 2022-06-14 DIAGNOSIS — R07.89 CHEST WALL PAIN: ICD-10-CM

## 2022-06-14 PROCEDURE — 71250 CT CHEST WITHOUT CONTRAST: ICD-10-PCS | Mod: 26,,, | Performed by: RADIOLOGY

## 2022-06-14 PROCEDURE — 99214 OFFICE O/P EST MOD 30 MIN: CPT | Mod: S$GLB,,, | Performed by: PHYSICAL MEDICINE & REHABILITATION

## 2022-06-14 PROCEDURE — 71250 CT THORAX DX C-: CPT | Mod: 26,,, | Performed by: RADIOLOGY

## 2022-06-14 PROCEDURE — 99214 PR OFFICE/OUTPT VISIT, EST, LEVL IV, 30-39 MIN: ICD-10-PCS | Mod: S$GLB,,, | Performed by: PHYSICAL MEDICINE & REHABILITATION

## 2022-06-14 PROCEDURE — 71250 CT THORAX DX C-: CPT | Mod: TC

## 2022-06-14 RX ORDER — DICLOFENAC SODIUM 75 MG/1
75 TABLET, DELAYED RELEASE ORAL 2 TIMES DAILY PRN
Qty: 40 TABLET | Refills: 0 | Status: SHIPPED | OUTPATIENT
Start: 2022-06-14 | End: 2024-01-29

## 2022-06-14 NOTE — PROGRESS NOTES
SUBJECTIVE:    Patient ID: Caryn Toledo is a 62 y.o. female.    Chief Complaint: Back Pain (after a fall one week ago)    This is a 62-year-old woman who sees Dr. Nolen for primary care.  Has history of osteoporosis and ductal carcinoma in site shoe of the left breast status post partial mastectomy.  She fell on her left side on to concrete around 4 days ago.  She is having pain just left of the spine in the midthoracic region pain in the left lateral chest wall and pain at the xiphisternal junction.  The pain at the xiphisternal junction is familiar and she has had some success with injections in that region in the past.  Since the fall she gets a cracking and popping sensation in the mid thoracic region.  She did go to the emergency room on 610.  X-rays of the left shoulder and left-sided ribs were done with no acute findings.  She was given a shot of Toradol.  Her current pain level is 6/10.  Hurts to breathe or to lie on the left side        Past Medical History:   Diagnosis Date    Arthritis     Breast cancer     left    Cancer     breast; skin    High cholesterol     History of fracture of nose     PONV (postoperative nausea and vomiting)     Sarcoma of thigh, left     Wears glasses      Social History     Socioeconomic History    Marital status: Single   Tobacco Use    Smoking status: Never Smoker    Smokeless tobacco: Never Used   Substance and Sexual Activity    Alcohol use: Yes     Alcohol/week: 2.0 standard drinks     Types: 2 Glasses of wine per week     Comment: occasionally    Drug use: No    Sexual activity: Yes     Partners: Male     Past Surgical History:   Procedure Laterality Date    APPENDECTOMY      AXILLARY NODE DISSECTION Left 3/14/2019    Procedure: LYMPHADENECTOMY, AXILLARY;  Surgeon: Didier Garcia MD;  [facility];  Service: General;  Laterality: Left;    BREAST LUMPECTOMY      BREAST SURGERY Left 02/11/2019    lumpectomy    CLOSED REDUCTION OF  FRACTURE OF NASAL BONE N/A 10/8/2021    Procedure: CLOSED REDUCTION, FRACTURE, NASAL BONE;  Surgeon: Daniele Castano MD;  Location: [facility];  Service: ENT;  Laterality: N/A;    COLONOSCOPY      ENDOSCOPIC NASAL SEPTOPLASTY N/A 10/8/2021    Procedure: SEPTOPLASTY, NOSE, ENDOSCOPIC;  Surgeon: Daniele Castano MD;  Location: [facility];  Service: ENT;  Laterality: N/A;    JOINT REPLACEMENT Left 10/30/2018    knee    KNEE ARTHROPLASTY Left 10/30/2018    Procedure: ARTHROPLASTY, KNEE;  Surgeon: Ata Paula MD;  Location: [facility] OR;  Service: Orthopedics;  Laterality: Left;    KNEE ARTHROSCOPY W/ MENISCECTOMY Right 10/30/2018    Procedure: ARTHROSCOPY, KNEE, WITH MENISCECTOMY;  Surgeon: Ata Paula MD;  Location: [facility] OR;  Service: Orthopedics;  Laterality: Right;    KNEE SURGERY Left 1980s    NASAL STENOSIS REPAIR N/A 10/8/2021    Procedure: REPAIR, STENOSIS, NOSE, VESTIBULE;  Surgeon: Daniele Castano MD;  Location: [facility];  Service: ENT;  Laterality: N/A;    REVISION OF KNEE ARTHROPLASTY Left 12/3/2019    Procedure: REVISION, ARTHROPLASTY, KNEE;  Surgeon: Ata Paula MD;  Location: [facility] OR;  Service: Orthopedics;  Laterality: Left;  LucyKenneth Adams Clifford  (notified 11/27/19 )    SENTINEL LYMPH NODE BIOPSY Left 3/14/2019    Procedure: BIOPSY, LYMPH NODE, SENTINEL;  Surgeon: Didier Garcia MD;  Location: [facility] OR;  Service: General;  Laterality: Left;  left axillary sentinel node biopsy, possible axillary dissection    SURGICAL FRACTURE OF NASAL TURBINATES N/A 10/8/2021    Procedure: SURGICAL FRACTURE, NASAL TURBINATE;  Surgeon: Daniele Castano MD;  Location: [facility];  Service: ENT;  Laterality: N/A;    SURGICAL REMOVAL OF MASS OF LOWER EXTREMITY Left 11/19/2020    Procedure: EXCISION, MASS, LOWER EXTREMITY;  Surgeon: Didier Garcia MD;  Location: [facility] OR;  Service: General;  Laterality: Left;  left hip mass removal ,right anterior thigh mass removal      "SURGICAL REMOVAL OF NASAL TURBINATE N/A 10/8/2021    Procedure: EXCISION, NASAL TURBINATE;  Surgeon: Daniele Castano MD;  Location: [facility];  Service: ENT;  Laterality: N/A;    TUMOR REMOVAL Left     thigh     Family History   Problem Relation Age of Onset    Breast cancer Mother     Cancer Mother         skin    Cancer Father         multiple myeloma    Diverticulitis Father         colon resection    Diverticulitis Brother     Colon cancer Neg Hx      Vitals:    06/14/22 1039   Weight: 92.5 kg (204 lb)   Height: 5' 7" (1.702 m)       Review of Systems   Constitutional: Negative for chills, diaphoresis, fatigue, fever and unexpected weight change.   HENT: Negative for trouble swallowing.    Eyes: Negative for visual disturbance.   Respiratory: Negative for shortness of breath.    Cardiovascular: Negative for chest pain.   Gastrointestinal: Negative for abdominal pain, constipation, nausea and vomiting.   Genitourinary: Negative for difficulty urinating.   Musculoskeletal: Negative for arthralgias, back pain, gait problem, joint swelling, myalgias, neck pain and neck stiffness.   Neurological: Negative for dizziness, speech difficulty, weakness, light-headedness, numbness and headaches.          Objective:      Physical Exam  Neurological:      Mental Status: She is alert and oriented to person, place, and time.      Comments: He is awake and in no acute distress  Mild tenderness to palpation just left of midline in the midthoracic region.  She has tenderness to palpation over the lower sternum and xiphoid.  Mild tenderness to palpation over the left lateral chest wall  Reflexes- +1-+2 reflexes at the following:   C5-Biceps   C6-Brachioradialis   C7-Triceps   L3/4-Patellar   S1-Achilles  Strength testing- 5/5 strength in the following muscle groups:  C5-Elbow flexion  C6-Wrist extension  C7-Elbow extension  C8-Finger flexion  T1-Finger abduction  L2-Hip flexion  L3-Knee extension  L4-Ankle " dorsiflexion  L5-Great toe extension  S1-Ankle plantar flexion                 Assessment:       1. Chest wall pain           Plan:     she has a nonfocal neurological examination and no historical red flags.  I am going to get a CT of her chest looking for occult rib fracture or dislocation.  Start Voltaren 75 mg twice a day as needed for pain.  She is interested in getting a shot in the sternum so I will check with my colleagues to see if we can arrange that.  Otherwise she can follow up here as needed      Chest wall pain  -     CT Chest Without Contrast; Future; Expected date: 06/14/2022    Other orders  -     diclofenac (VOLTAREN) 75 MG EC tablet; Take 1 tablet (75 mg total) by mouth 2 (two) times daily as needed (Pain).  Dispense: 40 tablet; Refill: 0

## 2022-06-14 NOTE — TELEPHONE ENCOUNTER
Attempted to call patient regarding message.  Left message on voicemail.  Nothing available for today.

## 2022-06-14 NOTE — TELEPHONE ENCOUNTER
----- Message from Edwige Sher sent at 6/14/2022 11:14 AM CDT -----  Regarding: same day appointment  Contact: patient  Type:  Sooner Appointment Request    Caller is requesting a sooner appointment.  Caller declined first available appointment listed below.  Caller will not accept being placed on the waitlist and is requesting a message be sent to doctor.    Name of Caller:  patient  When is the first available appointment?  07/29/22  Symptoms:  rib pain from fall  Best Call Back Number:  [phone] (home)   Additional Information:  Patient is right by the office and is having a CT scan on her wrist. She is wanting to be seen today by doctor due to the pain. Patient lives a hour away. Please call patient to advise.Thanks!

## 2022-06-17 ENCOUNTER — PATIENT MESSAGE (OUTPATIENT)
Dept: GASTROENTEROLOGY | Facility: CLINIC | Age: 62
End: 2022-06-17
Payer: COMMERCIAL

## 2022-06-23 NOTE — PLAN OF CARE
12/04/19 0651   PRE-TX-O2   O2 Device (Oxygen Therapy) room air   SpO2 95 %   Pulse Oximetry Type Intermittent   $ Pulse Oximetry - Multiple Charge Pulse Oximetry - Multiple       Goal Outcome Evaluation:  Problem: Oral Intake Inadequate  Goal: Improved Oral Intake  Outcome: Unable to Meet, Plan Revised  Intervention: Promote and Optimize Oral Intake  Flowsheets (Taken 6/23/2022 1425)  Oral Nutrition Promotion: (Extubation and diet vs TFs via OGT) other (see comments)

## 2022-07-25 ENCOUNTER — TREATMENT PLANNING (OUTPATIENT)
Dept: GASTROENTEROLOGY | Facility: CLINIC | Age: 62
End: 2022-07-25
Payer: COMMERCIAL

## 2022-07-25 NOTE — PROGRESS NOTES
Reviewed medical records received from [facility], summarized below and in medical & surgical history (endoscopies, etc), copies made & given to nurse to be scanned into system:     Colonoscopy 12/15/2008 - diverticulosis, large fold in the sigmoid colon which was biopsied, sessile polyp at proximal descending colon which was snared, but unable to get so biopsies were taken; the rest of the colon including the terminal ileum was normal.  Pathology:   A: Left colon biopsy - histologically normal colonic mucosa from L colon w/ cautery artifact  B: sigmoid biopsy - hyperplastic polyp and adjacent normal colonic mucosa    Recommendations:  Continue with previous recommendations.

## 2022-08-02 ENCOUNTER — PATIENT MESSAGE (OUTPATIENT)
Dept: GASTROENTEROLOGY | Facility: CLINIC | Age: 62
End: 2022-08-02
Payer: COMMERCIAL
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
## 2022-08-25 ENCOUNTER — OFFICE VISIT (OUTPATIENT)
Dept: ORTHOPEDICS | Facility: CLINIC | Age: 62
End: 2022-08-25
Payer: COMMERCIAL

## 2022-08-25 ENCOUNTER — HOSPITAL ENCOUNTER (OUTPATIENT)
Dept: RADIOLOGY | Facility: HOSPITAL | Age: 62
Discharge: HOME | End: 2022-08-25
Attending: ORTHOPAEDIC SURGERY
Payer: COMMERCIAL

## 2022-08-25 VITALS — WEIGHT: 204 LBS | RESPIRATION RATE: 18 BRPM | BODY MASS INDEX: 32.02 KG/M2 | HEIGHT: 67 IN

## 2022-08-25 DIAGNOSIS — M75.100 ROTATOR CUFF SYNDROME, UNSPECIFIED LATERALITY: Primary | ICD-10-CM
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
## 2022-08-25 DIAGNOSIS — M25.512 LEFT SHOULDER PAIN, UNSPECIFIED CHRONICITY: ICD-10-CM

## 2022-08-25 PROCEDURE — 99999 PR PBB SHADOW E&M-EST. PATIENT-LVL III: CPT | Mod: PBBFAC,,, | Performed by: ORTHOPAEDIC SURGERY

## 2022-08-25 PROCEDURE — 73030 X-RAY EXAM OF SHOULDER: CPT | Mod: 26,LT,, | Performed by: RADIOLOGY

## 2022-08-25 PROCEDURE — 73030 X-RAY EXAM OF SHOULDER: CPT | Mod: TC,PN,LT

## 2022-08-25 PROCEDURE — 73030 XR SHOULDER TRAUMA 3 VIEW LEFT: ICD-10-PCS | Mod: 26,LT,, | Performed by: RADIOLOGY

## 2022-08-25 PROCEDURE — 99213 PR OFFICE/OUTPT VISIT, EST, LEVL III, 20-29 MIN: ICD-10-PCS | Mod: 25,S$GLB,, | Performed by: ORTHOPAEDIC SURGERY

## 2022-08-25 PROCEDURE — 20610 DRAIN/INJ JOINT/BURSA W/O US: CPT | Mod: LT,S$GLB,, | Performed by: ORTHOPAEDIC SURGERY

## 2022-08-25 PROCEDURE — 99999 PR PBB SHADOW E&M-EST. PATIENT-LVL III: ICD-10-PCS | Mod: PBBFAC,,, | Performed by: ORTHOPAEDIC SURGERY

## 2022-08-25 PROCEDURE — 20610 LARGE JOINT ASPIRATION/INJECTION: L SUBACROMIAL BURSA: ICD-10-PCS | Mod: LT,S$GLB,, | Performed by: ORTHOPAEDIC SURGERY

## 2022-08-25 PROCEDURE — 99213 OFFICE O/P EST LOW 20 MIN: CPT | Mod: 25,S$GLB,, | Performed by: ORTHOPAEDIC SURGERY

## 2022-08-25 RX ORDER — TRIAMCINOLONE ACETONIDE 40 MG/ML
40 INJECTION, SUSPENSION INTRA-ARTICULAR; INTRAMUSCULAR
Status: DISCONTINUED | OUTPATIENT
Start: 2022-08-25 | End: 2022-08-25 | Stop reason: HOSPADM

## 2022-08-25 RX ADMIN — TRIAMCINOLONE ACETONIDE 40 MG: 40 INJECTION, SUSPENSION INTRA-ARTICULAR; INTRAMUSCULAR at 08:08

## 2022-08-25 NOTE — PROGRESS NOTES
Past Medical History:   Diagnosis Date    Arthritis     Breast cancer     left    Cancer     breast; skin    High cholesterol     History of fracture of nose     PONV (postoperative nausea and vomiting)     Sarcoma of thigh, left     Wears glasses        Past Surgical History:   Procedure Laterality Date    APPENDECTOMY      AXILLARY NODE DISSECTION Left 3/14/2019    Procedure: LYMPHADENECTOMY, AXILLARY;  Surgeon: iDdier Garcia MD;  Location: [facility];  Service: General;  Laterality: Left;    BREAST LUMPECTOMY      BREAST SURGERY Left 02/11/2019    lumpectomy    CLOSED REDUCTION OF FRACTURE OF NASAL BONE N/A 10/8/2021    Procedure: CLOSED REDUCTION, FRACTURE, NASAL BONE;  Surgeon: Daniele Castano MD;  Location: [facility];  Service: ENT;  Laterality: N/A;    COLONOSCOPY      ENDOSCOPIC NASAL SEPTOPLASTY N/A 10/8/2021    Procedure: SEPTOPLASTY, NOSE, ENDOSCOPIC;  Surgeon: Daniele Castano MD;  Location: [facility];  Service: ENT;  Laterality: N/A;    JOINT REPLACEMENT Left 10/30/2018    knee    KNEE ARTHROPLASTY Left 10/30/2018    Procedure: ARTHROPLASTY, KNEE;  Surgeon: Ata Paula MD;  Location: [facility];  Service: Orthopedics;  Laterality: Left;    KNEE ARTHROSCOPY W/ MENISCECTOMY Right 10/30/2018    Procedure: ARTHROSCOPY, KNEE, WITH MENISCECTOMY;  Surgeon: Ata Paula MD;  Location: [facility] OR;  Service: Orthopedics;  Laterality: Right;    KNEE SURGERY Left 1980s    NASAL STENOSIS REPAIR N/A 10/8/2021    Procedure: REPAIR, STENOSIS, NOSE, VESTIBULE;  Surgeon: Daniele Castano MD;  Location: [facility];  Service: ENT;  Laterality: N/A;    REVISION OF KNEE ARTHROPLASTY Left 12/3/2019    Procedure: REVISION, ARTHROPLASTY, KNEE;  Surgeon: Ata Paula MD;  Location: [facility];  Service: Orthopedics;  Laterality: Left;  Edith Clifford  (notified 11/27/19 )    SENTINEL LYMPH NODE BIOPSY Left 3/14/2019    Procedure: BIOPSY, LYMPH NODE, SENTINEL;  Surgeon:  Didier Garcia MD;  Location: [facility] OR;  Service: General;  Laterality: Left;  left axillary sentinel node biopsy, possible axillary dissection    SURGICAL FRACTURE OF NASAL TURBINATES N/A 10/8/2021    Procedure: SURGICAL FRACTURE, NASAL TURBINATE;  Surgeon: Daniele Castano MD;  Location: [facility];  Service: ENT;  Laterality: N/A;    SURGICAL REMOVAL OF MASS OF LOWER EXTREMITY Left 11/19/2020    Procedure: EXCISION, MASS, LOWER EXTREMITY;  Surgeon: Didier Garcia MD;  Location: [facility] OR;  Service: General;  Laterality: Left;  left hip mass removal ,right anterior thigh mass removal     SURGICAL REMOVAL OF NASAL TURBINATE N/A 10/8/2021    Procedure: EXCISION, NASAL TURBINATE;  Surgeon: Daniele Castano MD;  Location: [facility];  Service: ENT;  Laterality: N/A;    TUMOR REMOVAL Left     thigh       Current Outpatient Medications   Medication Sig    anastrozole (ARIMIDEX) 1 mg Tab TAKE 1 TABLET BY MOUTH EVERY DAY    denosumab (PROLIA) 60 mg/mL Syrg Inject 60 mg into the skin every 6 (six) months.     diclofenac (VOLTAREN) 75 MG EC tablet Take 1 tablet (75 mg total) by mouth 2 (two) times daily as needed (Pain).    multivitamin capsule Take 1 capsule by mouth once daily.    NON FORMULARY MEDICATION Take 2 capsules by mouth once daily. Immune Support    NON FORMULARY MEDICATION Take 1 capsule by mouth 2 (two) times daily. Viviscal    omeprazole (PRILOSEC) 40 MG capsule Take 1 capsule (40 mg total) by mouth every morning.     No current facility-administered medications for this visit.     Facility-Administered Medications Ordered in Other Visits   Medication    electrolyte-S (ISOLYTE)    lactated ringers infusion    lidocaine (PF) 10 mg/ml (1%) injection 10 mg       Review of patient's allergies indicates:   Allergen Reactions    Ciprofloxacin Anaphylaxis     paralysis    Pcn [penicillins] Anaphylaxis     paralysis    Tramadol Anaphylaxis    Eggplant Blisters    Eggs [egg derived]      Inside  "of mouth peel      Hydrocodone Nausea Only     "causes a migraine"    Morphine Nausea Only     "causes a migraine"    Oxycodone Nausea Only     "causes a migraine"    Tomato (solanum lycopersicum) Blisters       Family History   Problem Relation Age of Onset    Breast cancer Mother     Cancer Mother         skin    Cancer Father         multiple myeloma    Diverticulitis Father         colon resection    Diverticulitis Brother     Colon cancer Neg Hx        Social History     Socioeconomic History    Marital status: Single   Tobacco Use    Smoking status: Never Smoker    Smokeless tobacco: Never Used   Substance and Sexual Activity    Alcohol use: Yes     Alcohol/week: 2.0 standard drinks     Types: 2 Glasses of wine per week     Comment: occasionally    Drug use: No    Sexual activity: Yes     Partners: Male       Chief Complaint:   Chief Complaint   Patient presents with    Left Shoulder - Pain       History of present illness:  62-year-old female patient mine who comes with a new complaint of left shoulder pain.  She is left-hand dominant.  She has had this since her breast surgery and lymph node resection.  She was seen back in 2021.  Was doing well until more recent fall.  Fell on May 5th.  Landed on the left shoulder.  Had some injuries to her rib cartilage.  She has bone doing home exercises for the last 12 weeks and it has not really.  She has constant pain and difficulty raising arm up above her head.  Unable to sleep.  Feels a burning and tingling down her arm.  Even dropping things sometimes.  Rates the pain is a 3/10.      Review of Systems:    Constitution: Negative for chills, fever, and sweats.  Negative for unexplained weight loss.    HENT:  Negative for headaches and blurry vision.    Cardiovascular:Negative for chest pain or irregular heart beat. Negative for hypertension.    Respiratory:  Negative for cough and shortness of breath.    Gastrointestinal: Negative for abdominal pain, " heartburn, melena, nausea, and vomitting.    Genitourinary:  Negative bladder incontinence and dysuria.    Musculoskeletal:  See HPI    Neurological: Negative for numbness.    Psychiatric/Behavioral: Negative for depression.  The patient is not nervous/anxious.      Endocrine: Negative for polyuria    Hematologic/Lymphatic: Negative for bleeding problem.  Does not bruise/bleed easily.    Skin: Negative for poor would healing and rash      Physical Examination:    Vital Signs:    Vitals:    08/25/22 0808   Resp: 18       Body mass index is 31.95 kg/m².    This a well-developed, well nourished patient in no acute distress.  They are alert and oriented and cooperative to examination.  Pt. walks without an antalgic gait.      Examination of the left shoulder shows no rashes or erythema. There are no masses, ecchymosis, or atrophy. The patient has full range of motion in forward flexion, external rotation, and internal rotation to the mid T-spine. The patient has mild impingement signs. - Caldwell's test. - Speeds test. Nontender to palpation over a.c. joint. Normal stability anteriorly, posteriorly, and negative sulcus sign. Passive range of motion: Forward flexion of 180°, external rotation at 90° of 90°, internal rotation of 50°, and external rotation at 0° of 50°. 2+ radial pulse. Intact axillary, radial, median and ulnar sensation. 5 out of 5 resisted forward flexion, external rotation, and negative lift off test.      X-rays:  X-rays left shoulder ordered and reviewed which show no atypical findings.  Some AC arthritic changes and minimal spurring.  Postsurgical changes in the axilla.     Assessment::  Left rotator cuff syndrome versus tear    Plan:  I reviewed the findings with her today.  I agreed to try subacromial injection today.  She has done all the exercises that she needs.  I will see her back in 4-6 weeks.  If the pain is still there, an MRI would be needs to rule out a traumatic tear.    This note was  created using [employer] voice recognition software that occasionally misinterpreted phrases or words.    Consult note is delivered via Epic messaging service.

## 2022-08-25 NOTE — PROCEDURES
Large Joint Aspiration/Injection: L subacromial bursa    Date/Time: 8/25/2022 8:15 AM  Performed by: Ata Paula MD  Authorized by: Ata Paula MD     Consent Done?:  Yes (Verbal)  Indications:  Pain  Site marked: the procedure site was marked    Timeout: prior to procedure the correct patient, procedure, and site was verified    Local anesthetic:  Lidocaine 1% without epinephrine and bupivacaine 0.25% without epinephrine  Anesthetic total (ml):  6      Details:  Needle Size:  20 G  Ultrasonic Guidance for needle placement?: No    Approach:  Posterior  Location:  Shoulder  Site:  L subacromial bursa  Medications:  40 mg triamcinolone acetonide 40 mg/mL  Patient tolerance:  Patient tolerated the procedure well with no immediate complications

## 2022-11-01 ENCOUNTER — TELEPHONE (OUTPATIENT)
Dept: HEMATOLOGY/ONCOLOGY | Facility: CLINIC | Age: 62
End: 2022-11-01
Payer: COMMERCIAL

## 2022-11-01 NOTE — TELEPHONE ENCOUNTER
----- Message from Sosa Skip sent at 11/1/2022 12:14 PM CDT -----  Please review patient's Appointment Desk for an upcoming PROLIA INJECTION appointment.       The following are needed for this appointment.   Reminding to please contact patient, if needed:      ORDER.  Current / active in the Epic Therapy Plan, signed within a year.  LABS. Serum Calcium within 6 weeks of treatment.    DEXA SCAN within the last 2 years   DENTAL PRECAUTION CONFIRMED WITH PATIENT. No recent issues (infections, etc). No invasive procedures recently done or planned (root canal, extraction, implants, etc.)  A patient will need to bring a Dental Clearance signed by patient's dental provider if there are recent dental issues/procedures within the last 3 months.   FOLLOW-UP APPOINTMENT within the last 12 months with the diagnosis mentioned in the visit notes.         Any item unavailable by the day prior to the scheduled appointment will cause the appointment to be CANCELLED.        To reschedule appointments, please contact [facility] INFUSION SCHEDULING POOL or call [phone] or [phone].       Thank you,  [facility], Infusion Department

## 2022-11-02 ENCOUNTER — TELEPHONE (OUTPATIENT)
Dept: HEMATOLOGY/ONCOLOGY | Facility: CLINIC | Age: 62
End: 2022-11-02
Payer: COMMERCIAL

## 2022-11-02 NOTE — TELEPHONE ENCOUNTER
----- Message from Anca Hansen MD sent at 11/2/2022 12:19 PM CDT -----    ----- Message -----  From: Sosa Skip  Sent: 11/1/2022  12:16 PM CDT  To: Anca Hansen MD, #    Please review patient's Appointment Desk for an upcoming PROLIA INJECTION appointment.       The following are needed for this appointment.   Reminding to please contact patient, if needed:      ORDER.  Current / active in the Epic Therapy Plan, signed within a year.  LABS. Serum Calcium within 6 weeks of treatment.    DEXA SCAN within the last 2 years   DENTAL PRECAUTION CONFIRMED WITH PATIENT. No recent issues (infections, etc). No invasive procedures recently done or planned (root canal, extraction, implants, etc.)  A patient will need to bring a Dental Clearance signed by patient's dental provider if there are recent dental issues/procedures within the last 3 months.   FOLLOW-UP APPOINTMENT within the last 12 months with the diagnosis mentioned in the visit notes.         Any item unavailable by the day prior to the scheduled appointment will cause the appointment to be CANCELLED.        To reschedule appointments, please contact [facility] INFUSION SCHEDULING POOL or call [phone] or [phone].       Thank you,  [facility], Infusion Department

## 2022-11-10 ENCOUNTER — HOSPITAL ENCOUNTER (OUTPATIENT)
Dept: RADIOLOGY | Facility: HOSPITAL | Age: 62
Discharge: HOME | End: 2022-11-10
Attending: INTERNAL MEDICINE
Payer: COMMERCIAL

## 2022-11-10 DIAGNOSIS — M81.0 OSTEOPOROSIS, UNSPECIFIED OSTEOPOROSIS TYPE, UNSPECIFIED PATHOLOGICAL FRACTURE PRESENCE: ICD-10-CM

## 2022-11-10 DIAGNOSIS — C50.112 MALIGNANT NEOPLASM OF CENTRAL PORTION OF LEFT BREAST IN FEMALE, ESTROGEN RECEPTOR POSITIVE: ICD-10-CM

## 2022-11-10 DIAGNOSIS — Z17.0 MALIGNANT NEOPLASM OF CENTRAL PORTION OF LEFT BREAST IN FEMALE, ESTROGEN RECEPTOR POSITIVE: ICD-10-CM

## 2022-11-10 DIAGNOSIS — R91.8 LUNG NODULES: ICD-10-CM

## 2022-11-10 LAB
CREAT SERPL-MCNC: 1.1 MG/DL (ref 0.5–1.4)
SAMPLE: NORMAL

## 2022-11-10 PROCEDURE — 77066 DX MAMMO INCL CAD BI: CPT | Mod: 26,,, | Performed by: RADIOLOGY

## 2022-11-10 PROCEDURE — 71260 CT THORAX DX C+: CPT | Mod: TC

## 2022-11-10 PROCEDURE — 71260 CT CHEST WITH CONTRAST: ICD-10-PCS | Mod: 26,,, | Performed by: RADIOLOGY

## 2022-11-10 PROCEDURE — 25500020 PHARM REV CODE 255

## 2022-11-10 PROCEDURE — 77066 MAMMO DIGITAL DIAGNOSTIC BILAT WITH TOMO: ICD-10-PCS | Mod: 26,,, | Performed by: RADIOLOGY

## 2022-11-10 PROCEDURE — 71260 CT THORAX DX C+: CPT | Mod: 26,,, | Performed by: RADIOLOGY

## 2022-11-10 PROCEDURE — 77062 MAMMO DIGITAL DIAGNOSTIC BILAT WITH TOMO: ICD-10-PCS | Mod: 26,,, | Performed by: RADIOLOGY

## 2022-11-10 PROCEDURE — 77062 BREAST TOMOSYNTHESIS BI: CPT | Mod: 26,,, | Performed by: RADIOLOGY

## 2022-11-10 PROCEDURE — 77066 DX MAMMO INCL CAD BI: CPT | Mod: TC

## 2022-11-10 RX ADMIN — IOHEXOL 75 ML: 350 INJECTION, SOLUTION INTRAVENOUS at 10:11

## 2022-11-15 ENCOUNTER — OFFICE VISIT (OUTPATIENT)
Dept: HEMATOLOGY/ONCOLOGY | Facility: CLINIC | Age: 62
End: 2022-11-15
Payer: COMMERCIAL

## 2022-11-15 ENCOUNTER — INFUSION (OUTPATIENT)
Dept: INFUSION THERAPY | Facility: HOSPITAL | Age: 62
End: 2022-11-15
Attending: INTERNAL MEDICINE
Payer: COMMERCIAL

## 2022-11-15 VITALS
SYSTOLIC BLOOD PRESSURE: 164 MMHG | HEART RATE: 63 BPM | WEIGHT: 213.88 LBS | HEIGHT: 67 IN | OXYGEN SATURATION: 97 % | BODY MASS INDEX: 33.57 KG/M2 | RESPIRATION RATE: 12 BRPM | TEMPERATURE: 96 F | DIASTOLIC BLOOD PRESSURE: 76 MMHG

## 2022-11-15 VITALS
BODY MASS INDEX: 33.57 KG/M2 | WEIGHT: 213.88 LBS | OXYGEN SATURATION: 97 % | TEMPERATURE: 98 F | SYSTOLIC BLOOD PRESSURE: 164 MMHG | HEIGHT: 67 IN | HEART RATE: 63 BPM | DIASTOLIC BLOOD PRESSURE: 76 MMHG | RESPIRATION RATE: 15 BRPM

## 2022-11-15 DIAGNOSIS — Z79.811 PROPHYLACTIC USE OF ANASTROZOLE (ARIMIDEX): Primary | ICD-10-CM

## 2022-11-15 DIAGNOSIS — C50.112 MALIGNANT NEOPLASM OF CENTRAL PORTION OF LEFT BREAST IN FEMALE, ESTROGEN RECEPTOR POSITIVE: ICD-10-CM

## 2022-11-15 DIAGNOSIS — Z17.0 MALIGNANT NEOPLASM OF CENTRAL PORTION OF LEFT BREAST IN FEMALE, ESTROGEN RECEPTOR POSITIVE: ICD-10-CM

## 2022-11-15 DIAGNOSIS — D05.12 DUCTAL CARCINOMA IN SITU (DCIS) OF LEFT BREAST: Primary | ICD-10-CM

## 2022-11-15 PROCEDURE — 99214 OFFICE O/P EST MOD 30 MIN: CPT | Mod: S$GLB,,, | Performed by: INTERNAL MEDICINE

## 2022-11-15 PROCEDURE — 99214 PR OFFICE/OUTPT VISIT, EST, LEVL IV, 30-39 MIN: ICD-10-PCS | Mod: S$GLB,,, | Performed by: INTERNAL MEDICINE

## 2022-11-15 PROCEDURE — 63600175 PHARM REV CODE 636 W HCPCS: Mod: JG | Performed by: INTERNAL MEDICINE

## 2022-11-15 PROCEDURE — 99999 PR PBB SHADOW E&M-EST. PATIENT-LVL III: CPT | Mod: PBBFAC,,, | Performed by: INTERNAL MEDICINE

## 2022-11-15 PROCEDURE — 99999 PR PBB SHADOW E&M-EST. PATIENT-LVL III: ICD-10-PCS | Mod: PBBFAC,,, | Performed by: INTERNAL MEDICINE

## 2022-11-15 PROCEDURE — 96372 THER/PROPH/DIAG INJ SC/IM: CPT

## 2022-11-15 RX ADMIN — DENOSUMAB 60 MG: 60 INJECTION SUBCUTANEOUS at 12:11

## 2022-11-15 NOTE — PROGRESS NOTES
Subjective:       Patient ID: Caryn Toledo is a 62 y.o. female.    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  She does have significant family history however.  Her mother developed breast cancer in her 30s and she has 2 maternal aunts who developed breast cancer at young ages.  Father had multiple myeloma.  Age of menarche was 13.  She had menopause at age 35.  She has never taken hormone replacement therapy or oral contraceptives.  She has had 3 pregnancies the 1st child born when she was 21 years old.  This was found on a screening test. She underwent partial mastectomy and biopsy revealed invasive carcinoma and DCIS sentinel lymph node was negative for malignancy with clear margins.  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  She completed radiation to the breast started  Arimidex.  Had resection and radiation to the soft tissue pseudo sarcoma had follow-up in December 2019 and will no longer go back to [place] for [facility] to follow she will continue her care here Patient is from [place] has had issues with lymphedema ever since and has a lymphedema clinic appointment.  But felt that she was doing better and no longer needs this referral had knee surgery  here to continue Prolia.  And follow-up of breast cancer with labs and chest x-ray       Oncologic History     Oncologic Treatment     Pathology1. Left breast, partial mastectomy:  - Invasive mucinous carcinoma, Cecilia grade 1, (T=2, N=1, M=1), 3mm in longest linear dimension  - Ductal carcinoma in-situ (DCIS), solid and mucinous types, low grade, 9mm in longest  linear dimension  - Margins negative for carcinoma and negative for DCIS  - No lymphovascular invasion identified  - Fibrocystic changes present  2. Left breast additional superior margin, excision:  - Benign breast tissue with fibrocystic changes  - Negative for atypia or malignancy  3. Left breast additional anterior margin, excision:  - Benign breast tissue with fibrocystic changes  - Negative for atypia or malignancy  4. Left breast additional deep margin, excision:  - BenignTumor Size: Greatest dimension of largest invasive focus: 3mm  Additional dimensions: 2 x 2 mm  Histologic Type: Mucinous carcinoma  Histologic Grade: (Star histologic score):  Glandular (Acinar)/Tubular differentiation: Score 2 (10% to 75%) of tumor area forming glandular/tubular  structures)  Nuclear Pleomorphism: Score 1 (nuclei small with little increase in size in comparison with normal breast  epithelial cells, regular outlines, uniform nuclear chromatin, little variation in size  Mitotic Rate: Score 1 (< or = 3 mitoses per mm^2)  Overall Grade: Grade 1 (scores of 3, 4, or 5)  Tumor Focality: Unifocal  Ductal carcinoma in-situ (DCIS): Present    Pathologic Stage Classification (pTNM, AJCC 8th Edition):  Primary Tumor (pT): pT1a: Tumor <1mm but < or =5 mm in greatest dimension  Regional Lymph Nodes (pN): pNX: Regional lymph nodes cannot be assessed  Additional pathologic findings:  - Fibrocystic changes  Ancillary Studies:  Prognostic/Predictive markers were performed on the previous core biopsy specimen ([id number]) and were  reported as follows:  ER: Positive (>90%, strong)  NH: Positive (>90%, strong)  Her-2 IHC: Negative (score 0)  Ki-67: Approximately 7%   on arimidex  HPI:     Social History     Socioeconomic History    Marital status: Single   Tobacco Use    Smoking status: Never    Smokeless tobacco: Never   Substance and Sexual Activity    Alcohol use: Yes     Alcohol/week: 2.0 standard drinks     Types: 2 Glasses of wine  per week     Comment: occasionally    Drug use: No    Sexual activity: Yes     Partners: Male     Family History   Problem Relation Age of Onset    Breast cancer Mother     Cancer Mother         skin    Cancer Father         multiple myeloma    Diverticulitis Father         colon resection    Diverticulitis Brother     Colon cancer Neg Hx      Past Surgical History:   Procedure Laterality Date    APPENDECTOMY      AXILLARY NODE DISSECTION Left 3/14/2019    Procedure: LYMPHADENECTOMY, AXILLARY;  Surgeon: Didier Garcia MD;  Location: [facility];  Service: General;  Laterality: Left;    BREAST LUMPECTOMY      BREAST SURGERY Left 02/11/2019    lumpectomy    CLOSED REDUCTION OF FRACTURE OF NASAL BONE N/A 10/8/2021    Procedure: CLOSED REDUCTION, FRACTURE, NASAL BONE;  Surgeon: Daniele Castano MD;  Location: [facility];  Service: ENT;  Laterality: N/A;    COLONOSCOPY      ENDOSCOPIC NASAL SEPTOPLASTY N/A 10/8/2021    Procedure: SEPTOPLASTY, NOSE, ENDOSCOPIC;  Surgeon: Daniele Castano MD;  Location: [facility];  Service: ENT;  Laterality: N/A;    JOINT REPLACEMENT Left 10/30/2018    knee    KNEE ARTHROPLASTY Left 10/30/2018    Procedure: ARTHROPLASTY, KNEE;  Surgeon: Ata Paula MD;  Location: [facility] OR;  Service: Orthopedics;  Laterality: Left;    KNEE ARTHROSCOPY W/ MENISCECTOMY Right 10/30/2018    Procedure: ARTHROSCOPY, KNEE, WITH MENISCECTOMY;  Surgeon: Ata Paula MD;  Location: [facility] OR;  Service: Orthopedics;  Laterality: Right;    KNEE SURGERY Left 1980s    NASAL STENOSIS REPAIR N/A 10/8/2021    Procedure: REPAIR, STENOSIS, NOSE, VESTIBULE;  Surgeon: Daniele Castano MD;  Location: [facility];  Service: ENT;  Laterality: N/A;    REVISION OF KNEE ARTHROPLASTY Left 12/3/2019    Procedure: REVISION, ARTHROPLASTY, KNEE;  Surgeon: Ata Paula MD;  Location: [facility];  Service: Orthopedics;  Laterality: Left;  Edith Clifford  (notified 11/27/19 )    SENTINEL LYMPH NODE BIOPSY Left  3/14/2019    Procedure: BIOPSY, LYMPH NODE, SENTINEL;  Surgeon: Didier Garcia MD;  Location: [facility] OR;  Service: General;  Laterality: Left;  left axillary sentinel node biopsy, possible axillary dissection    SURGICAL FRACTURE OF NASAL TURBINATES N/A 10/8/2021    Procedure: SURGICAL FRACTURE, NASAL TURBINATE;  Surgeon: Daniele Castano MD;  Location[facility];  Service: ENT;  Laterality: N/A;    SURGICAL REMOVAL OF MASS OF LOWER EXTREMITY Left 11/19/2020    Procedure: EXCISION, MASS, LOWER EXTREMITY;  Surgeon: Didier Garcia MD;  Location: [facility] OR;  Service: General;  Laterality: Left;  left hip mass removal ,right anterior thigh mass removal     SURGICAL REMOVAL OF NASAL TURBINATE N/A 10/8/2021    Procedure: EXCISION, NASAL TURBINATE;  Surgeon: Daniele Castano MD;  Location: [facility];  Service: ENT;  Laterality: N/A;    TUMOR REMOVAL Left     thigh     Past Medical History:   Diagnosis Date    Arthritis     Breast cancer     left    Cancer     breast; skin    High cholesterol     History of fracture of nose     PONV (postoperative nausea and vomiting)     Sarcoma of thigh, left     Wears glasses        Current Outpatient Medications:     anastrozole (ARIMIDEX) 1 mg Tab, TAKE 1 TABLET BY MOUTH EVERY DAY, Disp: 90 tablet, Rfl: 4    denosumab (PROLIA) 60 mg/mL Syrg, Inject 60 mg into the skin every 6 (six) months. , Disp: , Rfl:     diclofenac (VOLTAREN) 75 MG EC tablet, Take 1 tablet (75 mg total) by mouth 2 (two) times daily as needed (Pain)., Disp: 40 tablet, Rfl: 0    multivitamin capsule, Take 1 capsule by mouth once daily., Disp: , Rfl:     NON FORMULARY MEDICATION, Take 2 capsules by mouth once daily. Immune Support, Disp: , Rfl:     NON FORMULARY MEDICATION, Take 1 capsule by mouth 2 (two) times daily. Viviscal, Disp: , Rfl:   No current facility-administered medications for this visit.    Facility-Administered Medications Ordered in Other Visits:     electrolyte-S (ISOLYTE), , Intravenous,  "Continuous, Damon Romero MD, Stopped at 11/19/20 1405    lactated ringers infusion, , Intravenous, Continuous, Damon Romero MD    lidocaine (PF) 10 mg/ml (1%) injection 10 mg, 1 mL, Intradermal, Once, Damon Romero MD  Review of patient's allergies indicates:   Allergen Reactions    Ciprofloxacin Anaphylaxis     paralysis    Pcn [penicillins] Anaphylaxis     paralysis    Tramadol Anaphylaxis    Eggplant Blisters    Eggs [egg derived]      Inside of mouth peel      Hydrocodone Nausea Only     "causes a migraine"    Morphine Nausea Only     "causes a migraine"    Oxycodone Nausea Only     "causes a migraine"    Tomato (solanum lycopersicum) Blisters     Review of Systems   Constitutional:  Positive for weight loss. Negative for chills, diaphoresis, fever and malaise/fatigue.        Had to have reconstructed nasal bone and septum after she accidentally was hit on her face by her dog( head butted her while swimming)   Eyes:  Negative for blurred vision and double vision.   Respiratory:  Positive for cough. Negative for sputum production.    Cardiovascular:  Negative for chest pain, palpitations, orthopnea, claudication and leg swelling.   Gastrointestinal:  Negative for diarrhea, heartburn and vomiting.   Genitourinary:  Negative for dysuria, frequency and urgency.   Musculoskeletal:  Negative for back pain, falls, myalgias and neck pain.   Skin:  Positive for itching. Negative for rash.   Neurological:  Negative for tremors.   Endo/Heme/Allergies:  Negative for polydipsia. Does not bruise/bleed easily.   Psychiatric/Behavioral:  Negative for depression.   pt may have had covid in 1/2022 she wasn't tested, [facility] had some tingly sensation that went away, she has had sinus   6/22 fell in [employer] parking lot, had scans xray, nothing broken , lots of soft tissue damage, better now. Got steroid shot from ortho  bradycardia saw cardiology  PHYSICAL EXAM:     Wt Readings from Last 3 Encounters: "   11/15/22 97 kg (213 lb 13.5 oz)   08/25/22 92.5 kg (204 lb)   06/14/22 92.5 kg (204 lb)     Temp Readings from Last 3 Encounters:   11/15/22 (!) 95.5 °F (35.3 °C) (Temporal)   05/10/22 97.6 °F (36.4 °C)   05/10/22 97.7 °F (36.5 °C) (Temporal)     BP Readings from Last 3 Encounters:   11/15/22 (!) 164/76   05/10/22 124/72   05/10/22 128/76     Pulse Readings from Last 3 Encounters:   11/15/22 63   05/10/22 75   05/10/22 74     GENERAL: alert; in no apparent distress, , well-built well-nourished   HEAD: normocephalic, atraumatic.  EYES: pupils are equal, round, reactive to light and accommodation. Sclera anicteric. Conjunctiva not injected.   NOSE/THROAT: no nasal erythema or rhinorrhea. Oropharynx pink, without erythema, ulcerations or thrush.   NECK: no cervical motion rigidity; supple with no masses.  CHEST: clear to auscultation bilaterally; no wheezes, crackles or rubs. Patient is speaking comfortably on room air with normal work of breathing without using accessory muscles of respiration.  CARDIOVASCULAR: regular rate and rhythm; no murmurs, rubs or gallops.  ABDOMEN: soft, nontender, nondistended. Bowel sounds present.   MUSCULOSKELETAL: no tenderness to palpation along the spine or scapulae. Normal range of motion.  NEUROLOGIC: cranial nerves II-XII intact bilaterally. Strength 5/5 in bilateral upper and lower extremities. No sensory deficits appreciated. Reflexes globally intact. No cerebellar signs. Normal gait.  LYMPHATIC: no cervical, supraclavicular or axillary adenopathy appreciated bilaterally.   EXTREMITIES: no clubbing, cyanosis, edema.  SKIN: no erythema, rashes, ulcerations noted    Laboratory:     CBC:  Lab Results   Component Value Date    WBC 3.52 (L) 11/10/2022    RBC 4.60 11/10/2022    HGB 13.9 11/10/2022    HCT 43.2 11/10/2022    MCV 94 11/10/2022    MCH 30.2 11/10/2022    MCHC 32.2 11/10/2022    RDW 13.3 11/10/2022     11/10/2022    MPV 10.2 11/10/2022    GRAN 2.1 11/10/2022    GRAN  58.1 11/10/2022    LYMPH 1.2 11/10/2022    LYMPH 32.7 11/10/2022    MONO 0.2 (L) 11/10/2022    MONO 6.3 11/10/2022    EOS 0.1 11/10/2022    BASO 0.02 11/10/2022    EOSINOPHIL 1.7 11/10/2022    BASOPHIL 0.6 11/10/2022       BMP: BMP  Lab Results   Component Value Date     11/10/2022    K 4.1 11/10/2022     11/10/2022    CO2 27 11/10/2022    BUN 15 11/10/2022    CREATININE 0.9 11/10/2022    CALCIUM 9.5 11/10/2022    ANIONGAP 9 11/10/2022    ESTGFRAFRICA >60 04/25/2022    EGFRNONAA >60 04/25/2022       LFT:   Lab Results   Component Value Date    ALT 25 11/10/2022    AST 16 11/10/2022    ALKPHOS 51 (L) 11/10/2022    BILITOT 0.5 11/10/2022     Impression:10/2020     The there is a sub 4 mm noncalcified pulmonary nodule seen within the right upper lobe of the lung.  Follow-up CT in 12 months time to ensure stability is recommended.    4/19/21  Impression:     Unchanged right lung nodule and no new findings.    Impression:10/21 CT chest     A 3 mm nodule of the right upper lobe is slightly decreased in size when compared with the prior study.  No enlarging or new nodules are identified.  Small hiatal hernia noted.      FINDINGS: bone d enity 5/22  The L1 to L4 vertebral bone mineral density is equal to 0.844 g/cm squared with a T score of -1.8.  There has been moderate increase relative to the prior study.     The left femoral neck bone mineral density is equal to 0.653 g/cm squared with a T score of -1.8.  There has been  mild increase relative to the prior study.    Impression:11/22     There is bilateral dependent atelectasis.  No pulmonary lesions are identified.  Mammo 11/22 neg  Assessment/Plan:        invasive breast cancer.1/2019 T1AN0 Patient status post lumpectomy to left breast stage I.  Radiation. Currently on Arimidex.     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   patient has a history of soft tissue pseudo sarcoma follow-up at [facility] as well.went to [facility] 12/2019 had dx testing done. Follows with Dr Sawyer had MRI done as well she no longer needs any further scans  Osteoporosis: on prolia next 11/2022 next bone density due 4/24   Zoloft ; for depression anxiety has been discontinued by patient due to apathy she sees psychology and feels this helps   Pt doing well with nutrition and feels great       had knee sx 12/3 1019    prolia due today 11/1022    okay to proceed     she will rtc 6 months cbc, cmp, ca 2729 and for prolia    CT chest to follow pulmonary nodules due 5/23mammo due 10/23     her future f/u will be here she will not go back to [place] unless she has to   calcium supplements daily   and dental precautions discussed.Advance Care planning/ directives /living will/patient's wishes discussed with patient.  Patient has been given guidelines and instructions on completing these directives   vaccinated  X 2 with lorrie

## 2022-11-15 NOTE — PLAN OF CARE
Problem: Fatigue  Goal: Improved Activity Tolerance  Outcome: Ongoing, Progressing  Intervention: Promote Improved Energy  Flowsheets (Taken 11/15/2022 1235)  Fatigue Management:   fatigue-related activity identified   frequent rest breaks encouraged   paced activity encouraged  Sleep/Rest Enhancement: relaxation techniques promoted

## 2022-12-06 ENCOUNTER — PATIENT MESSAGE (OUTPATIENT)
Dept: ADMINISTRATIVE | Facility: HOSPITAL | Age: 62
End: 2022-12-06
Payer: COMMERCIAL

## 2023-04-11 ENCOUNTER — PATIENT MESSAGE (OUTPATIENT)
Dept: ADMINISTRATIVE | Facility: HOSPITAL | Age: 63
End: 2023-04-11
Payer: COMMERCIAL

## 2023-05-10 ENCOUNTER — PATIENT MESSAGE (OUTPATIENT)
Dept: HEMATOLOGY/ONCOLOGY | Facility: CLINIC | Age: 63
End: 2023-05-10
Payer: COMMERCIAL

## 2023-05-10 ENCOUNTER — HOSPITAL ENCOUNTER (OUTPATIENT)
Dept: RADIOLOGY | Facility: HOSPITAL | Age: 63
Discharge: HOME | End: 2023-05-10
Attending: INTERNAL MEDICINE
Payer: COMMERCIAL

## 2023-05-10 DIAGNOSIS — D05.12 DUCTAL CARCINOMA IN SITU (DCIS) OF LEFT BREAST: ICD-10-CM

## 2023-05-10 LAB
CREAT SERPL-MCNC: 0.8 MG/DL (ref 0.5–1.4)
SAMPLE: NORMAL

## 2023-05-10 PROCEDURE — 71260 CT THORAX DX C+: CPT | Mod: TC

## 2023-05-10 PROCEDURE — 71260 CT THORAX DX C+: CPT | Mod: 26,,, | Performed by: RADIOLOGY

## 2023-05-10 PROCEDURE — 25500020 PHARM REV CODE 255: Performed by: INTERNAL MEDICINE

## 2023-05-10 PROCEDURE — 71260 CT CHEST WITH CONTRAST: ICD-10-PCS | Mod: 26,,, | Performed by: RADIOLOGY

## 2023-05-10 RX ADMIN — IOHEXOL 75 ML: 350 INJECTION, SOLUTION INTRAVENOUS at 09:05

## 2023-05-16 ENCOUNTER — OFFICE VISIT (OUTPATIENT)
Dept: HEMATOLOGY/ONCOLOGY | Facility: CLINIC | Age: 63
End: 2023-05-16
Payer: COMMERCIAL

## 2023-05-16 ENCOUNTER — INFUSION (OUTPATIENT)
Dept: INFUSION THERAPY | Facility: HOSPITAL | Age: 63
End: 2023-05-16
Attending: INTERNAL MEDICINE
Payer: COMMERCIAL

## 2023-05-16 VITALS
HEART RATE: 64 BPM | HEIGHT: 67 IN | DIASTOLIC BLOOD PRESSURE: 79 MMHG | HEART RATE: 64 BPM | RESPIRATION RATE: 12 BRPM | WEIGHT: 200.19 LBS | OXYGEN SATURATION: 99 % | OXYGEN SATURATION: 99 % | BODY MASS INDEX: 31.42 KG/M2 | RESPIRATION RATE: 17 BRPM | SYSTOLIC BLOOD PRESSURE: 135 MMHG | BODY MASS INDEX: 31.42 KG/M2 | DIASTOLIC BLOOD PRESSURE: 79 MMHG | TEMPERATURE: 97 F | WEIGHT: 200.19 LBS | SYSTOLIC BLOOD PRESSURE: 135 MMHG | TEMPERATURE: 97 F | HEIGHT: 67 IN

## 2023-05-16 DIAGNOSIS — Z17.0 MALIGNANT NEOPLASM OF CENTRAL PORTION OF LEFT BREAST IN FEMALE, ESTROGEN RECEPTOR POSITIVE: ICD-10-CM

## 2023-05-16 DIAGNOSIS — C50.112 MALIGNANT NEOPLASM OF CENTRAL PORTION OF LEFT BREAST IN FEMALE, ESTROGEN RECEPTOR POSITIVE: ICD-10-CM

## 2023-05-16 DIAGNOSIS — D05.12 DUCTAL CARCINOMA IN SITU (DCIS) OF LEFT BREAST: Primary | ICD-10-CM

## 2023-05-16 DIAGNOSIS — Z79.811 PROPHYLACTIC USE OF ANASTROZOLE (ARIMIDEX): ICD-10-CM

## 2023-05-16 DIAGNOSIS — Z79.811 PROPHYLACTIC USE OF ANASTROZOLE (ARIMIDEX): Primary | ICD-10-CM

## 2023-05-16 PROCEDURE — 63600175 PHARM REV CODE 636 W HCPCS: Mod: JZ,JG | Performed by: INTERNAL MEDICINE

## 2023-05-16 PROCEDURE — 99214 PR OFFICE/OUTPT VISIT, EST, LEVL IV, 30-39 MIN: ICD-10-PCS | Mod: S$GLB,,, | Performed by: INTERNAL MEDICINE

## 2023-05-16 PROCEDURE — 99999 PR PBB SHADOW E&M-EST. PATIENT-LVL IV: ICD-10-PCS | Mod: PBBFAC,,, | Performed by: INTERNAL MEDICINE

## 2023-05-16 PROCEDURE — 96372 THER/PROPH/DIAG INJ SC/IM: CPT

## 2023-05-16 PROCEDURE — 99999 PR PBB SHADOW E&M-EST. PATIENT-LVL IV: CPT | Mod: PBBFAC,,, | Performed by: INTERNAL MEDICINE

## 2023-05-16 PROCEDURE — 99214 OFFICE O/P EST MOD 30 MIN: CPT | Mod: S$GLB,,, | Performed by: INTERNAL MEDICINE

## 2023-05-16 RX ADMIN — DENOSUMAB 60 MG: 60 INJECTION SUBCUTANEOUS at 12:05

## 2023-05-16 NOTE — PROGRESS NOTES
Subjective:       Patient ID: Caryn Toledo is a 63 y.o. female.    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  She does have significant family history however.  Her mother developed breast cancer in her 30s and she has 2 maternal aunts who developed breast cancer at young ages.  Father had multiple myeloma.  Age of menarche was 13.  She had menopause at age 35.  She has never taken hormone replacement therapy or oral contraceptives.  She has had 3 pregnancies the 1st child born when she was 21 years old.  This was found on a screening test. She underwent partial mastectomy and biopsy revealed invasive carcinoma and DCIS sentinel lymph node was negative for malignancy with clear margins.  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  She completed radiation to the breast started  Arimidex.  Had resection and radiation to the soft tissue pseudo sarcoma had follow-up in December 2019 and will no longer go back to [place] for [facility] to follow she will continue her care here Patient is from [place] has had issues with lymphedema ever since and has a lymphedema clinic appointment.  But felt that she was doing better and no longer needs this referral had knee surgery  here to continue Prolia.  And follow-up of breast cancer with labs and chest x-ray       Oncologic History     Oncologic Treatment     Pathology1. Left breast, partial mastectomy:  - Invasive mucinous carcinoma, Cecilia grade 1, (T=2, N=1, M=1), 3mm in longest linear dimension  - Ductal carcinoma in-situ (DCIS), solid and mucinous types, low grade, 9mm in longest  linear dimension  - Margins negative for carcinoma and negative for DCIS  - No lymphovascular invasion identified  - Fibrocystic changes present  2. Left breast additional superior margin, excision:  - Benign breast tissue with fibrocystic changes  - Negative for atypia or malignancy  3. Left breast additional anterior margin, excision:  - Benign breast tissue with fibrocystic changes  - Negative for atypia or malignancy  4. Left breast additional deep margin, excision:  - BenignTumor Size: Greatest dimension of largest invasive focus: 3mm  Additional dimensions: 2 x 2 mm  Histologic Type: Mucinous carcinoma  Histologic Grade: ([place] histologic score):  Glandular (Acinar)/Tubular differentiation: Score 2 (10% to 75%) of tumor area forming glandular/tubular  structures)  Nuclear Pleomorphism: Score 1 (nuclei small with little increase in size in comparison with normal breast  epithelial cells, regular outlines, uniform nuclear chromatin, little variation in size  Mitotic Rate: Score 1 (< or = 3 mitoses per mm^2)  Overall Grade: Grade 1 (scores of 3, 4, or 5)  Tumor Focality: Unifocal  Ductal carcinoma in-situ (DCIS): Present    Pathologic Stage Classification (pTNM, AJCC 8th Edition):  Primary Tumor (pT): pT1a: Tumor <1mm but < or =5 mm in greatest dimension  Regional Lymph Nodes (pN): pNX: Regional lymph nodes cannot be assessed  Additional pathologic findings:  - Fibrocystic changes  Ancillary Studies:  Prognostic/Predictive markers were performed on the previous core biopsy specimen ([id number]) and were  reported as follows:  ER: Positive (>90%, strong)  TX: Positive (>90%, strong)  Her-2 IHC: Negative (score 0)  Ki-67: Approximately 7%   on arimidex  HPI:     Social History     Socioeconomic History    Marital status: Single   Tobacco Use    Smoking status: Never    Smokeless tobacco: Never   Substance and Sexual Activity    Alcohol use: Yes     Alcohol/week: 2.0 standard drinks     Types: 2 Glasses of wine  per week     Comment: occasionally    Drug use: No    Sexual activity: Yes     Partners: Male     Family History   Problem Relation Age of Onset    Breast cancer Mother     Cancer Mother         skin    Cancer Father         multiple myeloma    Diverticulitis Father         colon resection    Diverticulitis Brother     Colon cancer Neg Hx      Past Surgical History:   Procedure Laterality Date    APPENDECTOMY      AXILLARY NODE DISSECTION Left 3/14/2019    Procedure: LYMPHADENECTOMY, AXILLARY;  Surgeon: Didier Garcia MD;  Location: [facility];  Service: General;  Laterality: Left;    BREAST LUMPECTOMY      BREAST SURGERY Left 02/11/2019    lumpectomy    CLOSED REDUCTION OF FRACTURE OF NASAL BONE N/A 10/8/2021    Procedure: CLOSED REDUCTION, FRACTURE, NASAL BONE;  Surgeon: Daniele Castano MD;  Location: [facility];  Service: ENT;  Laterality: N/A;    COLONOSCOPY      ENDOSCOPIC NASAL SEPTOPLASTY N/A 10/8/2021    Procedure: SEPTOPLASTY, NOSE, ENDOSCOPIC;  Surgeon: Daniele Castano MD;  Location: [facility];  Service: ENT;  Laterality: N/A;    JOINT REPLACEMENT Left 10/30/2018    knee    KNEE ARTHROPLASTY Left 10/30/2018    Procedure: ARTHROPLASTY, KNEE;  Surgeon: Ata Paula MD;  Location: [facility] OR;  Service: Orthopedics;  Laterality: Left;    KNEE ARTHROSCOPY W/ MENISCECTOMY Right 10/30/2018    Procedure: ARTHROSCOPY, KNEE, WITH MENISCECTOMY;  Surgeon: Ata Paula MD;  Location: [facility] OR;  Service: Orthopedics;  Laterality: Right;    KNEE SURGERY Left 1980s    NASAL STENOSIS REPAIR N/A 10/8/2021    Procedure: REPAIR, STENOSIS, NOSE, VESTIBULE;  Surgeon: Daniele Castano MD;  Location: [facility];  Service: ENT;  Laterality: N/A;    REVISION OF KNEE ARTHROPLASTY Left 12/3/2019    Procedure: REVISION, ARTHROPLASTY, KNEE;  Surgeon: Ata Paula MD;  Location: [facility];  Service: Orthopedics;  Laterality: Left;  Edith Clifford  (notified 11/27/19 )    SENTINEL LYMPH NODE BIOPSY Left  3/14/2019    Procedure: BIOPSY, LYMPH NODE, SENTINEL;  Surgeon: Didier Garcia MD;  Location: [facility] OR;  Service: General;  Laterality: Left;  left axillary sentinel node biopsy, possible axillary dissection    SURGICAL FRACTURE OF NASAL TURBINATES N/A 10/8/2021    Procedure: SURGICAL FRACTURE, NASAL TURBINATE;  Surgeon: Daneile Castano MD;  Location: [facility];  Service: ENT;  Laterality: N/A;    SURGICAL REMOVAL OF MASS OF LOWER EXTREMITY Left 11/19/2020    Procedure: EXCISION, MASS, LOWER EXTREMITY;  Surgeon: Didier Garcia MD;  Location: [facility] OR;  Service: General;  Laterality: Left;  left hip mass removal ,right anterior thigh mass removal     SURGICAL REMOVAL OF NASAL TURBINATE N/A 10/8/2021    Procedure: EXCISION, NASAL TURBINATE;  Surgeon: Daniele Castano MD;  Location: [facility];  Service: ENT;  Laterality: N/A;    TUMOR REMOVAL Left     thigh     Past Medical History:   Diagnosis Date    Arthritis     Breast cancer     left    Cancer     breast; skin    High cholesterol     History of fracture of nose     PONV (postoperative nausea and vomiting)     Sarcoma of thigh, left     Wears glasses        Current Outpatient Medications:     anastrozole (ARIMIDEX) 1 mg Tab, TAKE 1 TABLET BY MOUTH EVERY DAY, Disp: 90 tablet, Rfl: 4    denosumab (PROLIA) 60 mg/mL Syrg, Inject 60 mg into the skin every 6 (six) months. , Disp: , Rfl:     diclofenac (VOLTAREN) 75 MG EC tablet, Take 1 tablet (75 mg total) by mouth 2 (two) times daily as needed (Pain)., Disp: 40 tablet, Rfl: 0    multivitamin capsule, Take 1 capsule by mouth once daily., Disp: , Rfl:     NON FORMULARY MEDICATION, Take 2 capsules by mouth once daily. Immune Support, Disp: , Rfl:     NON FORMULARY MEDICATION, Take 1 capsule by mouth 2 (two) times daily. Viviscal, Disp: , Rfl:   No current facility-administered medications for this visit.    Facility-Administered Medications Ordered in Other Visits:     electrolyte-S (ISOLYTE), , Intravenous,  "Continuous, Damon Romero MD, Stopped at 11/19/20 1405    lactated ringers infusion, , Intravenous, Continuous, Damon Romero MD    lidocaine (PF) 10 mg/ml (1%) injection 10 mg, 1 mL, Intradermal, Once, Damon Romero MD  Review of patient's allergies indicates:   Allergen Reactions    Ciprofloxacin Anaphylaxis     paralysis    Pcn [penicillins] Anaphylaxis     paralysis    Tramadol Anaphylaxis    Eggplant Blisters    Eggs [egg derived]      Inside of mouth peel      Hydrocodone Nausea Only     "causes a migraine"    Morphine Nausea Only     "causes a migraine"    Oxycodone Nausea Only     "causes a migraine"    Tomato (solanum lycopersicum) Blisters     Review of Systems   Constitutional:  Positive for weight loss. Negative for chills, diaphoresis, fever and malaise/fatigue.        Had to have reconstructed nasal bone and septum after she accidentally was hit on her face by her dog( head butted her while swimming)   Eyes:  Negative for blurred vision and double vision.   Respiratory:  Positive for cough. Negative for sputum production.    Cardiovascular:  Negative for chest pain, palpitations, orthopnea, claudication and leg swelling.   Gastrointestinal:  Negative for diarrhea, heartburn and vomiting.   Genitourinary:  Negative for dysuria, frequency and urgency.   Musculoskeletal:  Negative for back pain, falls, myalgias and neck pain.   Skin:  Positive for itching. Negative for rash.   Neurological:  Negative for tremors.   Endo/Heme/Allergies:  Negative for polydipsia. Does not bruise/bleed easily.   Psychiatric/Behavioral:  Negative for depression.   pt may have had covid in 1/2022 she wasn't tested, [facility] [place] tingly sensation that went away, she has had sinus   6/22 fell in [employer] parking lot, had scans xray, nothing broken , lots of soft tissue damage, better now. Got steroid shot from ortho  bradycardia saw cardiology  PHYSICAL EXAM:     Wt Readings from Last 3 Encounters: "   05/16/23 90.8 kg (200 lb 2.8 oz)   11/15/22 97 kg (213 lb 13.5 oz)   11/15/22 97 kg (213 lb 13.5 oz)     Temp Readings from Last 3 Encounters:   05/16/23 97.3 °F (36.3 °C) (Temporal)   11/15/22 97.6 °F (36.4 °C)   11/15/22 (!) 95.5 °F (35.3 °C) (Temporal)     BP Readings from Last 3 Encounters:   05/16/23 135/79   11/15/22 (!) 164/76   11/15/22 (!) 164/76     Pulse Readings from Last 3 Encounters:   05/16/23 64   11/15/22 63   11/15/22 63     GENERAL: alert; in no apparent distress, , well-built well-nourished   HEAD: normocephalic, atraumatic.  EYES: pupils are equal, round, reactive to light and accommodation. Sclera anicteric. Conjunctiva not injected.   NOSE/THROAT: no nasal erythema or rhinorrhea. Oropharynx pink, without erythema, ulcerations or thrush.   NECK: no cervical motion rigidity; supple with no masses.  CHEST: clear to auscultation bilaterally; no wheezes, crackles or rubs. Patient is speaking comfortably on room air with normal work of breathing without using accessory muscles of respiration.  CARDIOVASCULAR: regular rate and rhythm; no murmurs, rubs or gallops.  ABDOMEN: soft, nontender, nondistended. Bowel sounds present.   MUSCULOSKELETAL: no tenderness to palpation along the spine or scapulae. Normal range of motion.  NEUROLOGIC: cranial nerves II-XII intact bilaterally. Strength 5/5 in bilateral upper and lower extremities. No sensory deficits appreciated. Reflexes globally intact. No cerebellar signs. Normal gait.  LYMPHATIC: no cervical, supraclavicular or axillary adenopathy appreciated bilaterally.   EXTREMITIES: no clubbing, cyanosis, edema.  SKIN: no erythema, rashes, ulcerations noted    Laboratory:     CBC:  Lab Results   Component Value Date    WBC 3.49 (L) 05/10/2023    RBC 4.62 05/10/2023    HGB 13.9 05/10/2023    HCT 42.5 05/10/2023    MCV 92 05/10/2023    MCH 30.1 05/10/2023    MCHC 32.7 05/10/2023    RDW 13.8 05/10/2023     05/10/2023    MPV 10.7 05/10/2023    GRAN 1.8  05/10/2023    GRAN 52.3 05/10/2023    LYMPH 1.4 05/10/2023    LYMPH 39.3 05/10/2023    MONO 0.2 (L) 05/10/2023    MONO 6.9 05/10/2023    EOS 0.0 05/10/2023    BASO 0.02 05/10/2023    EOSINOPHIL 0.6 05/10/2023    BASOPHIL 0.6 05/10/2023       BMP: BMP  Lab Results   Component Value Date     05/10/2023    K 4.7 05/10/2023     (H) 05/10/2023    CO2 24 05/10/2023    BUN 13 05/10/2023    CREATININE 0.8 05/10/2023    CALCIUM 9.6 05/10/2023    ANIONGAP 9 05/10/2023    ESTGFRAFRICA >60 04/25/2022    EGFRNONAA >60 04/25/2022       LFT:   Lab Results   Component Value Date    ALT 19 05/10/2023    AST 14 05/10/2023    ALKPHOS 41 (L) 05/10/2023    BILITOT 0.4 05/10/2023     Impression:10/2020     The there is a sub 4 mm noncalcified pulmonary nodule seen within the right upper lobe of the lung.  Follow-up CT in 12 months time to ensure stability is recommended.    4/19/21  Impression:     Unchanged right lung nodule and no new findings.    Impression:10/21 CT chest     A 3 mm nodule of the right upper lobe is slightly decreased in size when compared with the prior study.  No enlarging or new nodules are identified.  Small hiatal hernia noted.      FINDINGS: bone d enity 5/22  The L1 to L4 vertebral bone mineral density is equal to 0.844 g/cm squared with a T score of -1.8.  There has been moderate increase relative to the prior study.     The left femoral neck bone mineral density is equal to 0.653 g/cm squared with a T score of -1.8.  There has been  mild increase relative to the prior study.    Impression:11/22     There is bilateral dependent atelectasis.  No pulmonary lesions are identified.  Mammo 11/22 neg  Assessment/Plan:        invasive breast cancer.1/2019 T1AN0 Patient status post lumpectomy to left breast stage I.  Radiation. Currently on Arimidex.     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   patient has a history of soft tissue pseudo sarcoma follow-up at [facility] as well.went to [facility] 12/2019 had dx testing done. Follows with Dr Sawyer had MRI done as well she no longer needs any further scans  Osteoporosis: on prolia  proceed  today next 11/2023 next bone density due 4/24   Zoloft ; for depression anxiety has been discontinued by patient due to apathy she sees psychology and feels this helps   Pt doing well with nutrition and feels great       had knee sx 12/3 1019    prolia due today  5/23  okay to proceed     she will rtc 6 months cbc, cmp, ca 2729 and for prolia    Cxr 5/24 next mammo due 10/23     her future f/u will be here she will not go back to [place] unless she has to   calcium supplements daily   and dental precautions discussed.Advance Care planning/ directives /living will/patient's wishes discussed with patient.  Patient has been given guidelines and instructions on completing these directives   vaccinated  X 2 with lorrie    Advance Care Planning     Date: 05/16/2023    Power of   I initiated the process of advance care planning today and explained the importance of this process to the patient.  I introduced the concept of advance directives to the patient, as well. Then the patient received detailed information about the importance of designating a Health Care Power of  (HCPOA). She was also instructed to communicate with this person about their wishes for future healthcare, should she become sick and lose decision-making capacity.pt has completed her forms today and completed and filed them

## 2023-05-16 NOTE — PLAN OF CARE
Problem: Fatigue  Goal: Improved Activity Tolerance  Intervention: Promote Improved Energy  Flowsheets (Taken 5/16/2023 1201)  Fatigue Management: fatigue-related activity identified  Activity Management: Ambulated -L4

## 2023-11-03 ENCOUNTER — PATIENT MESSAGE (OUTPATIENT)
Dept: HEMATOLOGY/ONCOLOGY | Facility: CLINIC | Age: 63
End: 2023-11-03
Payer: COMMERCIAL

## 2023-11-03 ENCOUNTER — TELEPHONE (OUTPATIENT)
Dept: HEMATOLOGY/ONCOLOGY | Facility: CLINIC | Age: 63
End: 2023-11-03
Payer: COMMERCIAL

## 2023-11-03 NOTE — TELEPHONE ENCOUNTER
----- Message from Sosa Skip sent at 11/3/2023  8:39 AM CDT -----  Please review patient's Appointment Desk for an upcoming PROLIA INJECTION appointment.       The following are needed for this appointment.   Reminding to please contact patient, if needed:      ORDER.  Current / active in the Epic Therapy Plan, signed within a year.  LABS. Serum Calcium within 6 weeks of treatment.    DEXA SCAN within the last 2 years   DENTAL PRECAUTION CONFIRMED WITH PATIENT. No recent invasive dental procedures within the last month (tooth extraction, etc). A patient will need to bring a Dental Clearance signed by a dental provider if there was any recent dental procedure within the last month.   FOLLOW-UP APPOINTMENT within the last 12 months with the diagnosis mentioned in the visit notes.         Any item unavailable by the day prior to the scheduled appointment will cause the appointment to be CANCELLED.        To reschedule appointments, please contact [facility] INFUSION SCHEDULING POOL or call [phone].       Thank you,  [facility], Infusion Department

## 2023-11-15 ENCOUNTER — LAB VISIT (OUTPATIENT)
Dept: LAB | Facility: HOSPITAL | Age: 63
End: 2023-11-15
Attending: INTERNAL MEDICINE
Payer: COMMERCIAL

## 2023-11-15 DIAGNOSIS — D05.12 DUCTAL CARCINOMA IN SITU (DCIS) OF LEFT BREAST: ICD-10-CM

## 2023-11-15 DIAGNOSIS — C50.112 MALIGNANT NEOPLASM OF CENTRAL PORTION OF LEFT BREAST IN FEMALE, ESTROGEN RECEPTOR POSITIVE: ICD-10-CM

## 2023-11-15 DIAGNOSIS — Z17.0 MALIGNANT NEOPLASM OF CENTRAL PORTION OF LEFT BREAST IN FEMALE, ESTROGEN RECEPTOR POSITIVE: ICD-10-CM

## 2023-11-15 LAB
ALBUMIN SERPL BCP-MCNC: 4.2 G/DL (ref 3.5–5.2)
ALP SERPL-CCNC: 46 U/L (ref 55–135)
ALT SERPL W/O P-5'-P-CCNC: 17 U/L (ref 10–44)
ANION GAP SERPL CALC-SCNC: 9 MMOL/L (ref 8–16)
AST SERPL-CCNC: 12 U/L (ref 10–40)
BASOPHILS # BLD AUTO: 0.02 K/UL (ref 0–0.2)
BASOPHILS NFR BLD: 0.5 % (ref 0–1.9)
BILIRUB SERPL-MCNC: 0.4 MG/DL (ref 0.1–1)
BUN SERPL-MCNC: 16 MG/DL (ref 8–23)
CALCIUM SERPL-MCNC: 9.9 MG/DL (ref 8.7–10.5)
CHLORIDE SERPL-SCNC: 108 MMOL/L (ref 95–110)
CO2 SERPL-SCNC: 27 MMOL/L (ref 23–29)
CREAT SERPL-MCNC: 0.9 MG/DL (ref 0.5–1.4)
DIFFERENTIAL METHOD: ABNORMAL
EOSINOPHIL # BLD AUTO: 0 K/UL (ref 0–0.5)
EOSINOPHIL NFR BLD: 0.5 % (ref 0–8)
ERYTHROCYTE [DISTWIDTH] IN BLOOD BY AUTOMATED COUNT: 13.5 % (ref 11.5–14.5)
EST. GFR  (NO RACE VARIABLE): >60 ML/MIN/1.73 M^2
GLUCOSE SERPL-MCNC: 97 MG/DL (ref 70–110)
HCT VFR BLD AUTO: 41.6 % (ref 37–48.5)
HGB BLD-MCNC: 13.5 G/DL (ref 12–16)
IMM GRANULOCYTES # BLD AUTO: 0.01 K/UL (ref 0–0.04)
IMM GRANULOCYTES NFR BLD AUTO: 0.3 % (ref 0–0.5)
LYMPHOCYTES # BLD AUTO: 1.5 K/UL (ref 1–4.8)
LYMPHOCYTES NFR BLD: 38.2 % (ref 18–48)
MCH RBC QN AUTO: 30.2 PG (ref 27–31)
MCHC RBC AUTO-ENTMCNC: 32.5 G/DL (ref 32–36)
MCV RBC AUTO: 93 FL (ref 82–98)
MONOCYTES # BLD AUTO: 0.2 K/UL (ref 0.3–1)
MONOCYTES NFR BLD: 5.8 % (ref 4–15)
NEUTROPHILS # BLD AUTO: 2.1 K/UL (ref 1.8–7.7)
NEUTROPHILS NFR BLD: 54.7 % (ref 38–73)
NRBC BLD-RTO: 0 /100 WBC
PLATELET # BLD AUTO: 184 K/UL (ref 150–450)
PMV BLD AUTO: 10.2 FL (ref 9.2–12.9)
POTASSIUM SERPL-SCNC: 4.6 MMOL/L (ref 3.5–5.1)
PROT SERPL-MCNC: 7.3 G/DL (ref 6–8.4)
RBC # BLD AUTO: 4.47 M/UL (ref 4–5.4)
SODIUM SERPL-SCNC: 144 MMOL/L (ref 136–145)
WBC # BLD AUTO: 3.8 K/UL (ref 3.9–12.7)

## 2023-11-15 PROCEDURE — 36415 COLL VENOUS BLD VENIPUNCTURE: CPT | Performed by: INTERNAL MEDICINE

## 2023-11-15 PROCEDURE — 85025 COMPLETE CBC W/AUTO DIFF WBC: CPT | Performed by: INTERNAL MEDICINE

## 2023-11-15 PROCEDURE — 80053 COMPREHEN METABOLIC PANEL: CPT | Performed by: INTERNAL MEDICINE

## 2023-11-15 PROCEDURE — 86300 IMMUNOASSAY TUMOR CA 15-3: CPT | Performed by: INTERNAL MEDICINE

## 2023-11-16 ENCOUNTER — HOSPITAL ENCOUNTER (OUTPATIENT)
Dept: RADIOLOGY | Facility: HOSPITAL | Age: 63
Discharge: HOME | End: 2023-11-16
Attending: INTERNAL MEDICINE
Payer: COMMERCIAL

## 2023-11-16 DIAGNOSIS — D05.12 DUCTAL CARCINOMA IN SITU (DCIS) OF LEFT BREAST: ICD-10-CM

## 2023-11-16 PROCEDURE — 77066 MAMMO DIGITAL DIAGNOSTIC BILAT WITH TOMO: ICD-10-PCS | Mod: 26,,, | Performed by: RADIOLOGY

## 2023-11-16 PROCEDURE — 77062 BREAST TOMOSYNTHESIS BI: CPT | Mod: 26,,, | Performed by: RADIOLOGY

## 2023-11-16 PROCEDURE — 77062 MAMMO DIGITAL DIAGNOSTIC BILAT WITH TOMO: ICD-10-PCS | Mod: 26,,, | Performed by: RADIOLOGY

## 2023-11-16 PROCEDURE — 77066 DX MAMMO INCL CAD BI: CPT | Mod: 26,,, | Performed by: RADIOLOGY

## 2023-11-16 PROCEDURE — 77066 DX MAMMO INCL CAD BI: CPT | Mod: TC

## 2023-11-17 ENCOUNTER — OFFICE VISIT (OUTPATIENT)
Dept: HEMATOLOGY/ONCOLOGY | Facility: CLINIC | Age: 63
End: 2023-11-17
Payer: COMMERCIAL

## 2023-11-17 ENCOUNTER — INFUSION (OUTPATIENT)
Dept: INFUSION THERAPY | Facility: HOSPITAL | Age: 63
End: 2023-11-17
Attending: INTERNAL MEDICINE
Payer: COMMERCIAL

## 2023-11-17 VITALS
HEART RATE: 60 BPM | WEIGHT: 194 LBS | HEIGHT: 67 IN | BODY MASS INDEX: 30.45 KG/M2 | TEMPERATURE: 98 F | SYSTOLIC BLOOD PRESSURE: 120 MMHG | OXYGEN SATURATION: 97 % | RESPIRATION RATE: 12 BRPM | DIASTOLIC BLOOD PRESSURE: 76 MMHG

## 2023-11-17 VITALS
SYSTOLIC BLOOD PRESSURE: 131 MMHG | WEIGHT: 194 LBS | BODY MASS INDEX: 30.45 KG/M2 | HEIGHT: 67 IN | DIASTOLIC BLOOD PRESSURE: 73 MMHG | HEART RATE: 58 BPM | TEMPERATURE: 97 F | RESPIRATION RATE: 18 BRPM

## 2023-11-17 DIAGNOSIS — D05.12 DUCTAL CARCINOMA IN SITU (DCIS) OF LEFT BREAST: ICD-10-CM

## 2023-11-17 DIAGNOSIS — Z17.0 MALIGNANT NEOPLASM OF CENTRAL PORTION OF LEFT BREAST IN FEMALE, ESTROGEN RECEPTOR POSITIVE: ICD-10-CM

## 2023-11-17 DIAGNOSIS — C50.112 MALIGNANT NEOPLASM OF CENTRAL PORTION OF LEFT BREAST IN FEMALE, ESTROGEN RECEPTOR POSITIVE: Primary | ICD-10-CM

## 2023-11-17 DIAGNOSIS — Z79.811 PROPHYLACTIC USE OF ANASTROZOLE (ARIMIDEX): Primary | ICD-10-CM

## 2023-11-17 DIAGNOSIS — C50.112 MALIGNANT NEOPLASM OF CENTRAL PORTION OF LEFT BREAST IN FEMALE, ESTROGEN RECEPTOR POSITIVE: ICD-10-CM

## 2023-11-17 DIAGNOSIS — Z17.0 MALIGNANT NEOPLASM OF CENTRAL PORTION OF LEFT BREAST IN FEMALE, ESTROGEN RECEPTOR POSITIVE: Primary | ICD-10-CM

## 2023-11-17 LAB — CANCER AG27-29 SERPL-ACNC: <12 U/ML

## 2023-11-17 PROCEDURE — 99214 PR OFFICE/OUTPT VISIT, EST, LEVL IV, 30-39 MIN: ICD-10-PCS | Mod: S$GLB,,, | Performed by: INTERNAL MEDICINE

## 2023-11-17 PROCEDURE — 96372 THER/PROPH/DIAG INJ SC/IM: CPT

## 2023-11-17 PROCEDURE — 99999 PR PBB SHADOW E&M-EST. PATIENT-LVL IV: CPT | Mod: PBBFAC,,, | Performed by: INTERNAL MEDICINE

## 2023-11-17 PROCEDURE — 63600175 PHARM REV CODE 636 W HCPCS: Mod: JZ,JG | Performed by: INTERNAL MEDICINE

## 2023-11-17 PROCEDURE — 99999 PR PBB SHADOW E&M-EST. PATIENT-LVL IV: ICD-10-PCS | Mod: PBBFAC,,, | Performed by: INTERNAL MEDICINE

## 2023-11-17 PROCEDURE — 99214 OFFICE O/P EST MOD 30 MIN: CPT | Mod: S$GLB,,, | Performed by: INTERNAL MEDICINE

## 2023-11-17 RX ADMIN — DENOSUMAB 60 MG: 60 INJECTION SUBCUTANEOUS at 11:11

## 2023-11-17 NOTE — PLAN OF CARE
Problem: Fatigue  Goal: Improved Activity Tolerance  11/17/2023 1155 by Marge Hewitt, RN  Outcome: Met  11/17/2023 1155 by Marge Hewitt, RN  Outcome: Ongoing, Progressing

## 2023-11-17 NOTE — PROGRESS NOTES
Subjective:       Patient ID: Caryn Toledo is a 63 y.o. female.    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  She does have significant family history however.  Her mother developed breast cancer in her 30s and she has 2 maternal aunts who developed breast cancer at young ages.  Father had multiple myeloma.  Age of menarche was 13.  She had menopause at age 35.  She has never taken hormone replacement therapy or oral contraceptives.  She has had 3 pregnancies the 1st child born when she was 21 years old.  This was found on a screening test. She underwent partial mastectomy and biopsy revealed invasive carcinoma and DCIS sentinel lymph node was negative for malignancy with clear margins.  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  She completed radiation to the breast started  Arimidex.  Had resection and radiation to the soft tissue pseudo sarcoma had follow-up in December 2019 and will no longer go back to [place] for [facility] to follow she will continue her care here Patient is from [place] has had issues with lymphedema ever since and has a lymphedema clinic appointment.  But felt that she was doing better and no longer needs this referral had knee surgery  here to continue Prolia.  And follow-up of breast cancer with labs and chest x-ray       Oncologic History     Oncologic Treatment     Pathology1. Left breast, partial mastectomy:  - Invasive mucinous carcinoma, Cecilia grade 1, (T=2, N=1, M=1), 3mm in longest linear dimension  - Ductal carcinoma in-situ (DCIS), solid and mucinous types, low grade, 9mm in longest  linear dimension  - Margins negative for carcinoma and negative for DCIS  - No lymphovascular invasion identified  - Fibrocystic changes present  2. Left breast additional superior margin, excision:  - Benign breast tissue with fibrocystic changes  - Negative for atypia or malignancy  3. Left breast additional anterior margin, excision:  - Benign breast tissue with fibrocystic changes  - Negative for atypia or malignancy  4. Left breast additional deep margin, excision:  - BenignTumor Size: Greatest dimension of largest invasive focus: 3mm  Additional dimensions: 2 x 2 mm  Histologic Type: Mucinous carcinoma  Histologic Grade: ([place] histologic score):  Glandular (Acinar)/Tubular differentiation: Score 2 (10% to 75%) of tumor area forming glandular/tubular  structures)  Nuclear Pleomorphism: Score 1 (nuclei small with little increase in size in comparison with normal breast  epithelial cells, regular outlines, uniform nuclear chromatin, little variation in size  Mitotic Rate: Score 1 (< or = 3 mitoses per mm^2)  Overall Grade: Grade 1 (scores of 3, 4, or 5)  Tumor Focality: Unifocal  Ductal carcinoma in-situ (DCIS): Present    Pathologic Stage Classification (pTNM, AJCC 8th Edition):  Primary Tumor (pT): pT1a: Tumor <1mm but < or =5 mm in greatest dimension  Regional Lymph Nodes (pN): pNX: Regional lymph nodes cannot be assessed  Additional pathologic findings:  - Fibrocystic changes  Ancillary Studies:  Prognostic/Predictive markers were performed on the previous core biopsy specimen ([id number]) and were  reported as follows:  ER: Positive (>90%, strong)  CA: Positive (>90%, strong)  Her-2 IHC: Negative (score 0)  Ki-67: Approximately 7%   on arimidex  HPI:     Social History     Socioeconomic History    Marital status: Single   Tobacco Use    Smoking status: Never    Smokeless tobacco: Never   Substance and Sexual Activity    Alcohol use: Yes     Alcohol/week: 2.0 standard drinks of alcohol     Types: 2  Glasses of wine per week     Comment: occasionally    Drug use: No    Sexual activity: Yes     Partners: Male     Family History   Problem Relation Age of Onset    Breast cancer Mother     Cancer Mother         skin    Cancer Father         multiple myeloma    Diverticulitis Father         colon resection    Diverticulitis Brother     Colon cancer Neg Hx      Past Surgical History:   Procedure Laterality Date    APPENDECTOMY      AXILLARY NODE DISSECTION Left 3/14/2019    Procedure: LYMPHADENECTOMY, AXILLARY;  Surgeon: Didier Garcia MD;  Location: [facility];  Service: General;  Laterality: Left;    BREAST LUMPECTOMY      BREAST SURGERY Left 02/11/2019    lumpectomy    CLOSED REDUCTION OF FRACTURE OF NASAL BONE N/A 10/8/2021    Procedure: CLOSED REDUCTION, FRACTURE, NASAL BONE;  Surgeon: Daniele Castano MD;  Location: [facility];  Service: ENT;  Laterality: N/A;    COLONOSCOPY      ENDOSCOPIC NASAL SEPTOPLASTY N/A 10/8/2021    Procedure: SEPTOPLASTY, NOSE, ENDOSCOPIC;  Surgeon: Daniele Castano MD;  Location: [facility];  Service: ENT;  Laterality: N/A;    JOINT REPLACEMENT Left 10/30/2018    knee    KNEE ARTHROPLASTY Left 10/30/2018    Procedure: ARTHROPLASTY, KNEE;  Surgeon: Ata Paula MD;  Location: [facility] OR;  Service: Orthopedics;  Laterality: Left;    KNEE ARTHROSCOPY W/ MENISCECTOMY Right 10/30/2018    Procedure: ARTHROSCOPY, KNEE, WITH MENISCECTOMY;  Surgeon: Ata Paula MD;  Location: [facility] OR;  Service: Orthopedics;  Laterality: Right;    KNEE SURGERY Left 1980s    NASAL STENOSIS REPAIR N/A 10/8/2021    Procedure: REPAIR, STENOSIS, NOSE, VESTIBULE;  Surgeon: Daniele Castano MD;  Location: [facility];  Service: ENT;  Laterality: N/A;    REVISION OF KNEE ARTHROPLASTY Left 12/3/2019    Procedure: REVISION, ARTHROPLASTY, KNEE;  Surgeon: Ata Paula MD;  Location: [facility] OR;  Service: Orthopedics;  Laterality: Left;  Edith Clifford  (notified 11/27/19 )    SENTINEL LYMPH  NODE BIOPSY Left 3/14/2019    Procedure: BIOPSY, LYMPH NODE, SENTINEL;  Surgeon: Didier Garcia MD;  Location: [facility] OR;  Service: General;  Laterality: Left;  left axillary sentinel node biopsy, possible axillary dissection    SURGICAL FRACTURE OF NASAL TURBINATES N/A 10/8/2021    Procedure: SURGICAL FRACTURE, NASAL TURBINATE;  Surgeon: Daniele Castano MD;  Location: [facility];  Service: ENT;  Laterality: N/A;    SURGICAL REMOVAL OF MASS OF LOWER EXTREMITY Left 11/19/2020    Procedure: EXCISION, MASS, LOWER EXTREMITY;  Surgeon: Didier Garcia MD;  Location: [facility] OR;  Service: General;  Laterality: Left;  left hip mass removal ,right anterior thigh mass removal     SURGICAL REMOVAL OF NASAL TURBINATE N/A 10/8/2021    Procedure: EXCISION, NASAL TURBINATE;  Surgeon: Dainele Castano MD;  Location: [facility];  Service: ENT;  Laterality: N/A;    TUMOR REMOVAL Left     thigh     Past Medical History:   Diagnosis Date    Arthritis     Breast cancer     left    Cancer     breast; skin    High cholesterol     History of fracture of nose     PONV (postoperative nausea and vomiting)     Sarcoma of thigh, left     Wears glasses        Current Outpatient Medications:     anastrozole (ARIMIDEX) 1 mg Tab, TAKE 1 TABLET BY MOUTH EVERY DAY, Disp: 90 tablet, Rfl: 4    denosumab (PROLIA) 60 mg/mL Syrg, Inject 60 mg into the skin every 6 (six) months. , Disp: , Rfl:     diclofenac (VOLTAREN) 75 MG EC tablet, Take 1 tablet (75 mg total) by mouth 2 (two) times daily as needed (Pain)., Disp: 40 tablet, Rfl: 0    multivitamin capsule, Take 1 capsule by mouth once daily., Disp: , Rfl:     NON FORMULARY MEDICATION, Take 2 capsules by mouth once daily. Immune Support, Disp: , Rfl:     NON FORMULARY MEDICATION, Take 1 capsule by mouth 2 (two) times daily. Viviscal, Disp: , Rfl:   No current facility-administered medications for this visit.    Facility-Administered Medications Ordered in Other Visits:     electrolyte-S (ISOLYTE), ,  "Intravenous, Continuous, Damon Romero MD, Stopped at 11/19/20 1405    lactated ringers infusion, , Intravenous, Continuous, Damon Romero MD    lidocaine (PF) 10 mg/ml (1%) injection 10 mg, 1 mL, Intradermal, Once, Damon Romero MD  Review of patient's allergies indicates:   Allergen Reactions    Ciprofloxacin Anaphylaxis     paralysis    Pcn [penicillins] Anaphylaxis     paralysis    Tramadol Anaphylaxis    Eggplant Blisters    Eggs [egg derived]      Inside of mouth peel      Hydrocodone Nausea Only     "causes a migraine"    Morphine Nausea Only     "causes a migraine"    Oxycodone Nausea Only     "causes a migraine"    Tomato (solanum lycopersicum) Blisters     Review of Systems   Constitutional:  Positive for weight loss. Negative for chills, diaphoresis, fever and malaise/fatigue.        Had to have reconstructed nasal bone and septum after she accidentally was hit on her face by her dog( head butted her while swimming)   Eyes:  Negative for blurred vision and double vision.   Respiratory:  Positive for cough. Negative for sputum production.    Cardiovascular:  Negative for chest pain, palpitations, orthopnea, claudication and leg swelling.   Gastrointestinal:  Negative for diarrhea, heartburn and vomiting.   Genitourinary:  Negative for dysuria, frequency and urgency.   Musculoskeletal:  Negative for back pain, falls, myalgias and neck pain.   Skin:  Positive for itching. Negative for rash.   Neurological:  Negative for tremors.   Endo/Heme/Allergies:  Negative for polydipsia. Does not bruise/bleed easily.   Psychiatric/Behavioral:  Negative for depression.     pt may have had covid in 1/2022 she wasn't tested, in [place] she had some tingly sensation that went away, she has had sinus   6/22 fell in [place] parking lot, had scans xray, nothing broken , lots of soft tissue damage, better now. Got steroid shot from ortho  bradycardia saw cardiology  PHYSICAL EXAM:     Wt Readings from Last 3 " Encounters:   05/16/23 90.8 kg (200 lb 2.8 oz)   05/16/23 90.8 kg (200 lb 2.8 oz)   11/15/22 97 kg (213 lb 13.5 oz)     Temp Readings from Last 3 Encounters:   05/16/23 97.3 °F (36.3 °C)   05/16/23 97.3 °F (36.3 °C) (Temporal)   11/15/22 97.6 °F (36.4 °C)     BP Readings from Last 3 Encounters:   05/16/23 135/79   05/16/23 135/79   11/15/22 (!) 164/76     Pulse Readings from Last 3 Encounters:   05/16/23 64   05/16/23 64   11/15/22 63     GENERAL: alert; in no apparent distress, , well-built well-nourished   HEAD: normocephalic, atraumatic.  EYES: pupils are equal, round, reactive to light and accommodation. Sclera anicteric. Conjunctiva not injected.   NOSE/THROAT: no nasal erythema or rhinorrhea. Oropharynx pink, without erythema, ulcerations or thrush.   NECK: no cervical motion rigidity; supple with no masses.  CHEST: clear to auscultation bilaterally; no wheezes, crackles or rubs. Patient is speaking comfortably on room air with normal work of breathing without using accessory muscles of respiration.  CARDIOVASCULAR: regular rate and rhythm; no murmurs, rubs or gallops.  ABDOMEN: soft, nontender, nondistended. Bowel sounds present.   MUSCULOSKELETAL: no tenderness to palpation along the spine or scapulae. Normal range of motion.  NEUROLOGIC: cranial nerves II-XII intact bilaterally. Strength 5/5 in bilateral upper and lower extremities. No sensory deficits appreciated. Reflexes globally intact. No cerebellar signs. Normal gait.  LYMPHATIC: no cervical, supraclavicular or axillary adenopathy appreciated bilaterally.   EXTREMITIES: no clubbing, cyanosis, edema.  SKIN: no erythema, rashes, ulcerations noted    Laboratory:     CBC:  Lab Results   Component Value Date    WBC 3.80 (L) 11/15/2023    RBC 4.47 11/15/2023    HGB 13.5 11/15/2023    HCT 41.6 11/15/2023    MCV 93 11/15/2023    MCH 30.2 11/15/2023    MCHC 32.5 11/15/2023    RDW 13.5 11/15/2023     11/15/2023    MPV 10.2 11/15/2023    GRAN 2.1  11/15/2023    GRAN 54.7 11/15/2023    LYMPH 1.5 11/15/2023    LYMPH 38.2 11/15/2023    MONO 0.2 (L) 11/15/2023    MONO 5.8 11/15/2023    EOS 0.0 11/15/2023    BASO 0.02 11/15/2023    EOSINOPHIL 0.5 11/15/2023    BASOPHIL 0.5 11/15/2023       BMP: BMP  Lab Results   Component Value Date     11/15/2023    K 4.6 11/15/2023     11/15/2023    CO2 27 11/15/2023    BUN 16 11/15/2023    CREATININE 0.9 11/15/2023    CALCIUM 9.9 11/15/2023    ANIONGAP 9 11/15/2023    ESTGFRAFRICA >60 04/25/2022    EGFRNONAA >60 04/25/2022       LFT:   Lab Results   Component Value Date    ALT 17 11/15/2023    AST 12 11/15/2023    ALKPHOS 46 (L) 11/15/2023    BILITOT 0.4 11/15/2023     Impression:10/2020     The there is a sub 4 mm noncalcified pulmonary nodule seen within the right upper lobe of the lung.  Follow-up CT in 12 months time to ensure stability is recommended.    4/19/21  Impression:     Unchanged right lung nodule and no new findings.    Impression:10/21 CT chest     A 3 mm nodule of the right upper lobe is slightly decreased in size when compared with the prior study.  No enlarging or new nodules are identified.  Small hiatal hernia noted.      FINDINGS: bone d enity 5/22  The L1 to L4 vertebral bone mineral density is equal to 0.844 g/cm squared with a T score of -1.8.  There has been moderate increase relative to the prior study.     The left femoral neck bone mineral density is equal to 0.653 g/cm squared with a T score of -1.8.  There has been  mild increase relative to the prior study.    Impression:11/22     There is bilateral dependent atelectasis.  No pulmonary lesions are identified.  Mammo 11/22 neg  Assessment/Plan:        invasive breast cancer.1/2019 T1AN0 Patient status post lumpectomy to left breast stage I.  Radiation. Currently on Arimidex.     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   patient has a history of soft tissue pseudo sarcoma follow-up at [facility] as well.went to [facility] 12/2019 had dx testing done. Follows with Dr Sawyer had MRI done as well she no longer needs any further scans    Osteoporosis: on prolia  proceed  today  next bone density due 4/24     Zoloft ; for depression anxiety has been discontinued by patient due to apathy she sees psychology and feels this helps      she will rtc 6 months cbc, cmp, ca 2729 and for prolia    Cxr 5/24 next mammo due 11/24  Next bmd 4/24    Pt doing well with nutrition and feels great       had knee sx 12/3 1019         her future f/u will be here she will not go back to [place] unless she has to   calcium supplements daily   and dental precautions discussed.Advance Care planning/ directives /living will/patient's wishes discussed with patient.  Patient has been given guidelines and instructions on completing these directives   vaccinated  X 2 with lorrie    Advance Care Planning     Date: 05/16/2023    Power of   I initiated the process of advance care planning today and explained the importance of this process to the patient.  I introduced the concept of advance directives to the patient, as well. Then the patient received detailed information about the importance of designating a Health Care Power of  (HCPOA). She was also instructed to communicate with this person about their wishes for future healthcare, should she become sick and lose decision-making capacity.pt has completed her forms today and completed and filed them

## 2023-11-20 ENCOUNTER — PATIENT MESSAGE (OUTPATIENT)
Dept: HEMATOLOGY/ONCOLOGY | Facility: CLINIC | Age: 63
End: 2023-11-20
Payer: COMMERCIAL

## 2024-01-25 ENCOUNTER — TELEPHONE (OUTPATIENT)
Dept: ORTHOPEDICS | Facility: CLINIC | Age: 64
End: 2024-01-25
Payer: COMMERCIAL

## 2024-01-25 NOTE — TELEPHONE ENCOUNTER
LVM for patient informing her Dr. Paula does not treat the hand and that I can get her rescheduled with a different physician.

## 2024-01-29 ENCOUNTER — OFFICE VISIT (OUTPATIENT)
Dept: ORTHOPEDICS | Facility: CLINIC | Age: 64
End: 2024-01-29
Payer: COMMERCIAL

## 2024-01-29 ENCOUNTER — HOSPITAL ENCOUNTER (OUTPATIENT)
Dept: RADIOLOGY | Facility: HOSPITAL | Age: 64
Discharge: HOME | End: 2024-01-29
Attending: FAMILY MEDICINE
Payer: COMMERCIAL

## 2024-01-29 VITALS — HEIGHT: 67 IN | BODY MASS INDEX: 30.45 KG/M2 | WEIGHT: 194 LBS

## 2024-01-29 DIAGNOSIS — M25.551 PAIN OF RIGHT HIP: ICD-10-CM

## 2024-01-29 DIAGNOSIS — M79.641 PAIN OF RIGHT HAND: ICD-10-CM

## 2024-01-29 DIAGNOSIS — M79.641 PAIN OF RIGHT HAND: Primary | ICD-10-CM

## 2024-01-29 DIAGNOSIS — M18.11 PRIMARY OSTEOARTHRITIS OF FIRST CARPOMETACARPAL JOINT OF RIGHT HAND: Primary | ICD-10-CM

## 2024-01-29 DIAGNOSIS — M25.551 RIGHT HIP PAIN: ICD-10-CM

## 2024-01-29 DIAGNOSIS — M53.3 DISORDER OF SI (SACROILIAC) JOINT: ICD-10-CM

## 2024-01-29 PROCEDURE — 73130 X-RAY EXAM OF HAND: CPT | Mod: 26,RT,, | Performed by: RADIOLOGY

## 2024-01-29 PROCEDURE — 99214 OFFICE O/P EST MOD 30 MIN: CPT | Mod: 25,S$GLB,, | Performed by: FAMILY MEDICINE

## 2024-01-29 PROCEDURE — 20600 DRAIN/INJ JOINT/BURSA W/O US: CPT | Mod: RT,S$GLB,, | Performed by: FAMILY MEDICINE

## 2024-01-29 PROCEDURE — 99999 PR PBB SHADOW E&M-EST. PATIENT-LVL II: CPT | Mod: PBBFAC,,, | Performed by: FAMILY MEDICINE

## 2024-01-29 PROCEDURE — 73502 X-RAY EXAM HIP UNI 2-3 VIEWS: CPT | Mod: 26,RT,, | Performed by: RADIOLOGY

## 2024-01-29 PROCEDURE — 73502 X-RAY EXAM HIP UNI 2-3 VIEWS: CPT | Mod: TC,PO,RT

## 2024-01-29 PROCEDURE — 73130 X-RAY EXAM OF HAND: CPT | Mod: TC,PO,RT

## 2024-01-29 RX ORDER — MELOXICAM 15 MG/1
15 TABLET ORAL DAILY PRN
Qty: 30 TABLET | Refills: 2 | Status: SHIPPED | OUTPATIENT
Start: 2024-01-29

## 2024-01-29 RX ORDER — TRIAMCINOLONE ACETONIDE 40 MG/ML
40 INJECTION, SUSPENSION INTRA-ARTICULAR; INTRAMUSCULAR
Status: DISCONTINUED | OUTPATIENT
Start: 2024-01-29 | End: 2024-01-29 | Stop reason: HOSPADM

## 2024-01-29 RX ADMIN — TRIAMCINOLONE ACETONIDE 40 MG: 40 INJECTION, SUSPENSION INTRA-ARTICULAR; INTRAMUSCULAR at 10:01

## 2024-01-29 NOTE — PROGRESS NOTES
Subjective:     Patient ID: Caryn Toledo is a 64 y.o. female.    Chief Complaint: Pain of the Right Hand (Right hand pain for about a year. States that the thumb is swollen. Would like injection in the thumb. ) and Pain of the Right Hip (Right hip pain for about a year. States hurts when rolls over. )    64-year-old female here today with complaints of right hand pain and right hip pain.  Both have been going on for over a year.      Hurt him pain is localized in the area of the thumb.  Can not give any specific injury.  Notes that it began hurting shortly after had a skin biopsy done.  Notes that the joint in the area will swell and she is difficulty using her thumb particularly she points out has difficulty opening jars.  She is tried wearing a brace and taking over-the-counter medications that has not helped.    Also complains of right-sided hip region pain.  She localizes it to the area of the posterior hip in the region of the SI joint.  Seems to only noticed when rolling over on that side in bed or trying to move offer side.  Is a brief pain but she describes it as sharp and significant.  Only seems to happen at night.  Does not feel any sciatica like symptoms with it.    Pain  Pertinent negatives include no chest pain, chills, congestion, coughing, headaches, rash or sore throat.       Review of Systems   Constitutional: Negative for chills and decreased appetite.   HENT:  Negative for congestion and sore throat.    Eyes:  Negative for blurred vision.   Cardiovascular:  Negative for chest pain, dyspnea on exertion and palpitations.   Respiratory:  Negative for cough and shortness of breath.    Skin:  Negative for rash.   Neurological:  Negative for difficulty with concentration, disturbances in coordination and headaches.   Psychiatric/Behavioral:  Negative for altered mental status, depression, hallucinations, memory loss and suicidal ideas.        Objective:       General    Nursing note and vitals  reviewed.  Constitutional: She is oriented to person, place, and time. She appears well-developed and well-nourished.   HENT:   Nose: Nose normal.   Eyes: EOM are normal. Pupils are equal, round, and reactive to light.   Neck: Neck supple.   Cardiovascular:  Normal rate.            Pulmonary/Chest: Effort normal.   Abdominal: Soft.   Neurological: She is alert and oriented to person, place, and time. She has normal reflexes.   Psychiatric: She has a normal mood and affect. Her behavior is normal. Judgment and thought content normal.             Right Hip Exam     Inspection   Scars: absent  Swelling: absent  No deformity of hip.    Tenderness   The patient tender to palpation of the SI joint.    Range of Motion   Extension:  20   Flexion:  140   External rotation:  80   Internal rotation:  30     Muscle Strength   The patient has normal right hip strength.    Tests   Pain w/ forced internal rotation (NICO): absent  Pain w/ forced external rotation (FADIR): present  Antonio: negative  Circumduction test: negative  Stinchfield test: negative  Log Roll: negative  Snapping Hip (internal): negative    Other   Sensation: normal  Left Hip Exam   Left hip exam is normal.    Range of Motion   The patient has normal left hip ROM.    Muscle Strength   The patient has normal left hip strength.           Right Hand/Wrist Exam     Pain   Wrist - The patient exhibits pain of the CMC.    Swelling   Wrist - The patient is swollen on the CMC.    Tenderness   The patient is tender to palpation of the dorsal area.    Range of Motion     Wrist   Extension:  normal   Flexion:  normal   Pronation:  normal   Supination:  normal     Tests   Tinel's sign (median nerve): negative    Atrophy   Thenar:  negative  Hypothenar:  negative    Other     Neuorologic Exam    Median Distribution: normal  Ulnar Distribution: normal  Radial Distribution: normal      Right Elbow Exam     Tests   Tinel's sign (cubital tunnel): negative          Muscle  Strength   Right Upper Extremity   Wrist extension: 5/5   Wrist flexion: 5/5   : 5/5   Thumb - APB: 5/5  Thumb - FPL: 5/5    Vascular Exam     Right Pulses  Dorsalis Pedis:      2+          Capillary Refill  Right Hand: normal capillary refill      Physical Exam  Vitals and nursing note reviewed.   Constitutional:       Appearance: She is well-developed and well-nourished.   HENT:      Nose: Nose normal.   Eyes:      Extraocular Movements: EOM normal.      Pupils: Pupils are equal, round, and reactive to light.   Cardiovascular:      Rate and Rhythm: Normal rate.      Pulses:           Dorsalis pedis pulses are 2+ on the right side.   Pulmonary:      Effort: Pulmonary effort is normal.   Abdominal:      Palpations: Abdomen is soft.   Musculoskeletal:      Right hand: Normal sensation of the ulnar distribution, median distribution and radial distribution. Normal capillary refill.      Cervical back: Normal range of motion and neck supple.      Right hip: No swelling or deformity.   Neurological:      Mental Status: She is alert and oriented to person, place, and time.      Deep Tendon Reflexes: Reflexes are normal and symmetric.   Psychiatric:         Mood and Affect: Mood and affect normal.         Behavior: Behavior normal.         Thought Content: Thought content normal.         Judgment: Judgment normal.     X-ray images ordered obtained interpreted by me.  They show no obvious degenerative changes of the hip or SI joint on x-ray of the right hip with no acute fractures present.  Unremarkable x-ray.    X-ray of the wrist do section mild degenerative changes of the CMC joint.    Assessment:     Encounter Diagnoses   Name Primary?    Primary osteoarthritis of first carpometacarpal joint of right hand Yes    Pain of right hand     Right hip pain     Disorder of SI (sacroiliac) joint        Plan:      Caryn was seen today for pain and pain.    Diagnoses and all orders for this visit:    Primary  osteoarthritis of first carpometacarpal joint of right hand    Pain of right hand    Right hip pain    Disorder of SI (sacroiliac) joint    Other orders  -     meloxicam (MOBIC) 15 MG tablet; Take 1 tablet (15 mg total) by mouth daily as needed for Pain.        Will give her an injection into the CMC joint on her right hand today.  As for her hip pain I am not sure it is not referred pain from the lumbar spine.  Will give her meloxicam to take as needed and give her a series of home exercises.  If his pain continues I would consider an ultrasound-guided injection into the SI joint as a diagnostic and therapeutic procedure.    Small Joint Aspiration/Injection: R thumb CMC    Date/Time: 1/29/2024 10:40 AM    Performed by: Mynor Perrin MD  Authorized by: Mynor Perrin MD    Consent Done?:  Yes (Verbal)  Indications:  Arthritis and pain  Timeout: prior to procedure the correct patient, procedure, and site was verified    Prep: patient was prepped and draped in usual sterile fashion      Local anesthesia used?: Yes    Local anesthetic:  Topical anesthetic  Location:  Thumb  Site:  R thumb CMC  Ultrasonic guidance for needle placement?: No    Needle size:  22 G  Approach:  Dorsal  Medications:  40 mg triamcinolone acetonide 40 mg/mL  Patient tolerance:  Patient tolerated the procedure well with no immediate complications

## 2024-02-19 DIAGNOSIS — Z79.811 USE OF AROMATASE INHIBITORS: ICD-10-CM

## 2024-02-19 DIAGNOSIS — M81.0 OSTEOPOROSIS, UNSPECIFIED OSTEOPOROSIS TYPE, UNSPECIFIED PATHOLOGICAL FRACTURE PRESENCE: Primary | ICD-10-CM

## 2024-02-20 DIAGNOSIS — M81.0 AGE-RELATED OSTEOPOROSIS WITHOUT CURRENT PATHOLOGICAL FRACTURE: Primary | ICD-10-CM

## 2024-03-01 NOTE — ADDENDUM NOTE
Addended by: GILDA JIMENEZ on: 5/2/2022 11:45 AM     Modules accepted: Orders     [

## 2024-04-05 ENCOUNTER — LAB VISIT (OUTPATIENT)
Dept: LAB | Facility: HOSPITAL | Age: 64
End: 2024-04-05
Payer: COMMERCIAL

## 2024-04-05 ENCOUNTER — TELEPHONE (OUTPATIENT)
Dept: FAMILY MEDICINE | Facility: CLINIC | Age: 64
End: 2024-04-05

## 2024-04-05 ENCOUNTER — OFFICE VISIT (OUTPATIENT)
Dept: FAMILY MEDICINE | Facility: CLINIC | Age: 64
End: 2024-04-05
Payer: COMMERCIAL

## 2024-04-05 ENCOUNTER — HOSPITAL ENCOUNTER (OUTPATIENT)
Dept: RADIOLOGY | Facility: CLINIC | Age: 64
Discharge: HOME | End: 2024-04-05
Payer: COMMERCIAL

## 2024-04-05 VITALS
HEIGHT: 67 IN | WEIGHT: 192.88 LBS | TEMPERATURE: 98 F | BODY MASS INDEX: 30.27 KG/M2 | DIASTOLIC BLOOD PRESSURE: 84 MMHG | RESPIRATION RATE: 16 BRPM | HEART RATE: 76 BPM | OXYGEN SATURATION: 95 % | SYSTOLIC BLOOD PRESSURE: 130 MMHG

## 2024-04-05 DIAGNOSIS — R94.31 ELECTROCARDIOGRAM SHOWING T WAVE ABNORMALITIES: ICD-10-CM

## 2024-04-05 DIAGNOSIS — R07.89 OTHER CHEST PAIN: Primary | ICD-10-CM

## 2024-04-05 DIAGNOSIS — R07.89 CHEST DISCOMFORT: ICD-10-CM

## 2024-04-05 DIAGNOSIS — R05.9 COUGH, UNSPECIFIED TYPE: ICD-10-CM

## 2024-04-05 DIAGNOSIS — R05.9 COUGH, UNSPECIFIED TYPE: Primary | ICD-10-CM

## 2024-04-05 LAB
OHS QRS DURATION: 76 MS
OHS QTC CALCULATION: 431 MS
TROPONIN I SERPL DL<=0.01 NG/ML-MCNC: <0.006 NG/ML (ref 0–0.03)

## 2024-04-05 PROCEDURE — 36415 COLL VENOUS BLD VENIPUNCTURE: CPT

## 2024-04-05 PROCEDURE — 93005 ELECTROCARDIOGRAM TRACING: CPT | Mod: S$GLB,,,

## 2024-04-05 PROCEDURE — 99999 PR PBB SHADOW E&M-EST. PATIENT-LVL V: CPT | Mod: PBBFAC,,,

## 2024-04-05 PROCEDURE — 99214 OFFICE O/P EST MOD 30 MIN: CPT | Mod: S$GLB,,,

## 2024-04-05 PROCEDURE — 71046 X-RAY EXAM CHEST 2 VIEWS: CPT | Mod: TC,FY,PO

## 2024-04-05 PROCEDURE — 71046 X-RAY EXAM CHEST 2 VIEWS: CPT | Mod: 26,,, | Performed by: RADIOLOGY

## 2024-04-05 PROCEDURE — 93010 ELECTROCARDIOGRAM REPORT: CPT | Mod: S$GLB,,, | Performed by: INTERNAL MEDICINE

## 2024-04-05 PROCEDURE — 84484 ASSAY OF TROPONIN QUANT: CPT

## 2024-04-05 RX ORDER — PROMETHAZINE HYDROCHLORIDE AND DEXTROMETHORPHAN HYDROBROMIDE 6.25; 15 MG/5ML; MG/5ML
5 SYRUP ORAL EVERY 8 HOURS PRN
Qty: 118 ML | Refills: 0 | Status: SHIPPED | OUTPATIENT
Start: 2024-04-05 | End: 2024-04-15

## 2024-04-05 RX ORDER — PANTOPRAZOLE SODIUM 40 MG/1
40 TABLET, DELAYED RELEASE ORAL DAILY
Qty: 14 TABLET | Refills: 0 | Status: SHIPPED | OUTPATIENT
Start: 2024-04-05 | End: 2024-04-19

## 2024-04-05 NOTE — PATIENT INSTRUCTIONS
Jesus Rubin,     If you are due for any health screening(s) below please notify me so we can arrange them to be ordered and scheduled to maintain your health. Most healthy patients complete it. Don't lose out on improving your health.     Tests to Keep You Healthy    Mammogram: Met on 11/16/2023  Colon Cancer Screening: DUE         Colon Cancer Screening    Of cancers affecting both men and women, colorectal cancer is the third leading cancer killer in the [place]. But it doesnt have to be. Screening can prevent colorectal cancer or find it at an early stage when treatment often leads to a cure.    A colonoscopy is the preferred test for detecting colon cancer. It is needed only once every 10 years if results are negative. While sedated, a flexible, lighted tube with a tiny camera is inserted into the rectum and advanced through the colon to look for cancers. An alternative screening test that is used at home and returned to the lab may also be used. It detects hidden blood in bowel movements which could indicate cancer in the colon. If results are positive, you will need a colonoscopy to determine if the blood is a sign of cancer. This type of follow up (diagnostic) colonoscopy usually requires additional copays as required by your insurance provider. Please contact your PCP if you have any questions.

## 2024-04-05 NOTE — PROGRESS NOTES
"Subjective:       Patient ID: Caryn Toledo is a 64 y.o. female.    Chief Complaint: Chest Pain    Urgent care visit.     Chest Pain   This is a new problem. The current episode started in the past 7 days. The onset quality is sudden. The problem occurs intermittently. The problem has been unchanged. The pain is present in the lateral region and epigastric region. The pain is moderate. Quality: Spasm, heavyness. The pain radiates to the epigastrium (Left ribs). Associated symptoms include abdominal pain, a cough, a fever (Now resolved) and malaise/fatigue. Pertinent negatives include no irregular heartbeat, palpitations or shortness of breath. Associated symptoms comments: Belching  . Cough associated with: Recent viral infection. The cough is Productive. Color change with cough: Light yellow. Nothing relieves the cough. The pain is aggravated by movement (Lying down). She has tried NSAIDs and rest for the symptoms. Risk factors include obesity and post-menopausal.   Her past medical history is significant for cancer.   Pertinent negatives for past medical history include no DVT and no PE.   Her family medical history is significant for heart disease. Prior diagnostic workup includes chest x-ray (EKG).       Past Medical History:   Diagnosis Date    Arthritis     Breast cancer     left    Cancer     breast; skin    High cholesterol     History of fracture of nose     PONV (postoperative nausea and vomiting)     Sarcoma of thigh, left     Wears glasses        Review of patient's allergies indicates:   Allergen Reactions    Ciprofloxacin Anaphylaxis     paralysis    Pcn [penicillins] Anaphylaxis     paralysis    Tramadol Anaphylaxis    Eggplant Blisters    Eggs [egg derived]      Inside of mouth peel      Hydrocodone Nausea Only     "causes a migraine"    Morphine Nausea Only     "causes a migraine"    Oxycodone Nausea Only     "causes a migraine"    Tomato (solanum lycopersicum) Blisters         Current " "Outpatient Medications:     anastrozole (ARIMIDEX) 1 mg Tab, TAKE 1 TABLET BY MOUTH EVERY DAY, Disp: 90 tablet, Rfl: 4    denosumab (PROLIA) 60 mg/mL Syrg, Inject 60 mg into the skin every 6 (six) months. , Disp: , Rfl:     multivitamin capsule, Take 1 capsule by mouth once daily., Disp: , Rfl:     meloxicam (MOBIC) 15 MG tablet, Take 1 tablet (15 mg total) by mouth daily as needed for Pain. (Patient not taking: Reported on 4/5/2024), Disp: 30 tablet, Rfl: 2    NON FORMULARY MEDICATION, Take 2 capsules by mouth once daily. Immune Support (Patient not taking: Reported on 4/5/2024), Disp: , Rfl:     pantoprazole (PROTONIX) 40 MG tablet, Take 1 tablet (40 mg total) by mouth once daily. for 14 days, Disp: 14 tablet, Rfl: 0    promethazine-dextromethorphan (PROMETHAZINE-DM) 6.25-15 mg/5 mL Syrp, Take 5 mLs by mouth every 8 (eight) hours as needed (cough)., Disp: 118 mL, Rfl: 0  No current facility-administered medications for this visit.    Facility-Administered Medications Ordered in Other Visits:     electrolyte-S (ISOLYTE), , Intravenous, Continuous, Damon Romero MD, Stopped at 11/19/20 1405    lactated ringers infusion, , Intravenous, Continuous, Damon Romero MD    lidocaine (PF) 10 mg/ml (1%) injection 10 mg, 1 mL, Intradermal, Once, Damon Romero MD    Review of Systems   Constitutional:  Positive for fever (Now resolved) and malaise/fatigue.   HENT:  Positive for postnasal drip and rhinorrhea.    Respiratory:  Positive for cough. Negative for shortness of breath.    Cardiovascular:  Positive for chest pain. Negative for palpitations.   Gastrointestinal:  Positive for abdominal pain.       Objective:      /84 (BP Location: Right arm, Patient Position: Sitting, BP Method: Large (Manual))   Pulse 76   Temp 98.2 °F (36.8 °C) (Oral)   Resp 16   Ht 5' 7" (1.702 m)   Wt 87.5 kg (192 lb 14.4 oz)   SpO2 95%   BMI 30.21 kg/m²   Physical Exam  Vitals reviewed.   Constitutional:       General: She is " not in acute distress.     Appearance: Normal appearance. She is obese. She is not ill-appearing, toxic-appearing or diaphoretic.   HENT:      Head: Normocephalic.      Right Ear: External ear normal.      Left Ear: External ear normal.      Nose: Nose normal. No congestion or rhinorrhea.      Mouth/Throat:      Mouth: Mucous membranes are moist.      Pharynx: Oropharynx is clear.   Eyes:      General: No scleral icterus.        Right eye: No discharge.         Left eye: No discharge.      Extraocular Movements: Extraocular movements intact.      Conjunctiva/sclera: Conjunctivae normal.   Cardiovascular:      Rate and Rhythm: Normal rate and regular rhythm.      Pulses: Normal pulses.      Heart sounds: Normal heart sounds. No murmur heard.     No friction rub. No gallop.   Pulmonary:      Effort: Pulmonary effort is normal. No respiratory distress.      Breath sounds: Normal breath sounds. No wheezing, rhonchi or rales.   Chest:      Chest wall: No tenderness.   Musculoskeletal:         General: No swelling, tenderness or deformity. Normal range of motion.      Cervical back: Normal range of motion.      Right lower leg: No edema.      Left lower leg: No edema.   Skin:     General: Skin is warm and dry.      Capillary Refill: Capillary refill takes less than 2 seconds.      Coloration: Skin is not jaundiced.      Findings: No bruising, erythema, lesion or rash.   Neurological:      Mental Status: She is alert and oriented to person, place, and time.      Gait: Gait normal.   Psychiatric:         Mood and Affect: Mood normal.         Behavior: Behavior normal.         Thought Content: Thought content normal.         Judgment: Judgment normal.         Assessment:       1. Cough, unspecified type    2. Chest discomfort        Plan:       Cough, unspecified type  -     X-Ray Chest PA And Lateral; Future; Expected date: 04/05/2024  -     promethazine-dextromethorphan (PROMETHAZINE-DM) 6.25-15 mg/5 mL Syrp; Take 5 mLs by  mouth every 8 (eight) hours as needed (cough).  Dispense: 118 mL; Refill: 0        -     EKG normal. 0 points Wells Criteria.         -     Likely related to recent naproxen use causing stomach/esophagus irritation/GERD symptoms     Chest discomfort  -     X-Ray Chest PA And Lateral; Future; Expected date: 04/05/2024  -     pantoprazole (PROTONIX) 40 MG tablet; Take 1 tablet (40 mg total) by mouth once daily. for 14 days  Dispense: 14 tablet; Refill: 0                 Eze Gutierrez PA-C  Family Medicine Physician Assistant       Future Appointments       Date Provider Specialty Appt Notes    4/5/2024  Radiology cough, unspecified type  chest discomfort    5/14/2024  Radiology hemonc    5/17/2024 Anca Hansen MD Hematology and Oncology Breast Ca/dexa/CXR/Labs/Prolia/6mfu    5/17/2024 Terrance Nolen MD Family Medicine Cholesterol, inflammation, weight               I spent a total of 30 minutes on the day of the visit.This includes face to face time and non-face to face time preparing to see the patient (eg, review of tests), obtaining and/or reviewing separately obtained history, documenting clinical information in the electronic or other health record, independently interpreting results and communicating results to the patient/family/caregiver, or care coordinator.      We have addressed [3] Low: 2 or more self-limited or minor problems / 1 stable chronic illness / 1 acute, uncomplicated illness or injury  The complexity of the data reviewed and analyzed for this visit was [3] Limited (Reviewed prior external note, ordered unique testing or reviewed the results of each unique test)   The risk of complications and/or morbidity or mortality are [4] Moderate risk (I.e. prescription drug management / decision regarding minor surgery with identified pt or procedure risk factors / decision regarding elective major surgery without identified pt or procedure risk factors / diagnosis or treatment significantly  limited by social determinants of health)   The level of Medical Decision Making for this visit is [3] Low

## 2024-05-03 ENCOUNTER — PATIENT MESSAGE (OUTPATIENT)
Dept: HEMATOLOGY/ONCOLOGY | Facility: CLINIC | Age: 64
End: 2024-05-03
Payer: COMMERCIAL

## 2024-05-03 ENCOUNTER — PATIENT OUTREACH (OUTPATIENT)
Dept: ADMINISTRATIVE | Facility: HOSPITAL | Age: 64
End: 2024-05-03
Payer: COMMERCIAL

## 2024-05-03 ENCOUNTER — PATIENT MESSAGE (OUTPATIENT)
Dept: ADMINISTRATIVE | Facility: HOSPITAL | Age: 64
End: 2024-05-03
Payer: COMMERCIAL

## 2024-05-03 NOTE — PROGRESS NOTES
Population Health Chart Review & Patient Outreach Details      Additional [facility] Health Notes:               Updates Requested / Reviewed:      Updated Care Coordination Note, Care Everywhere, and          Health Maintenance Topics Overdue:      [facility] Score: 1     Colon Cancer Screening    Pneumonia Vaccine  Tetanus Vaccine  Shingles/Zoster Vaccine  RSV Vaccine                  Health Maintenance Topic(s) Outreach Outcomes & Actions Taken:    Colorectal Cancer Screening - Outreach Outcomes & Actions Taken  : OFFER FI TKIT

## 2024-05-16 ENCOUNTER — TELEPHONE (OUTPATIENT)
Dept: HEMATOLOGY/ONCOLOGY | Facility: CLINIC | Age: 64
End: 2024-05-16
Payer: COMMERCIAL

## 2024-05-16 NOTE — TELEPHONE ENCOUNTER
Spoke to pt and notified testing will need to be rescheduled, pt states she just got back in town from [facility] and will call tomorrow to reschedule dexa,xray, lab, appt w/Dr Hansen, and prolia injection, notified appt w/Dr Hansen 05/17/24 will be cancelled pt agrees.

## 2024-05-16 NOTE — TELEPHONE ENCOUNTER
Lft msg to notified lab, dexa, and xray missing for appt 05/17/24, please reschedule lab, xray, and dexa before appointment with Dr Hansen.

## 2024-06-11 ENCOUNTER — TELEPHONE (OUTPATIENT)
Dept: HEMATOLOGY/ONCOLOGY | Facility: CLINIC | Age: 64
End: 2024-06-11
Payer: COMMERCIAL

## 2024-06-25 ENCOUNTER — HOSPITAL ENCOUNTER (OUTPATIENT)
Dept: RADIOLOGY | Facility: HOSPITAL | Age: 64
Discharge: HOME | End: 2024-06-25
Attending: INTERNAL MEDICINE
Payer: COMMERCIAL

## 2024-06-25 ENCOUNTER — HOSPITAL ENCOUNTER (OUTPATIENT)
Dept: RADIOLOGY | Facility: HOSPITAL | Age: 64
Discharge: HOME | End: 2024-06-25
Attending: NURSE PRACTITIONER
Payer: COMMERCIAL

## 2024-06-25 DIAGNOSIS — Z79.811 USE OF AROMATASE INHIBITORS: ICD-10-CM

## 2024-06-25 DIAGNOSIS — Z17.0 MALIGNANT NEOPLASM OF CENTRAL PORTION OF LEFT BREAST IN FEMALE, ESTROGEN RECEPTOR POSITIVE: ICD-10-CM

## 2024-06-25 DIAGNOSIS — C50.112 MALIGNANT NEOPLASM OF CENTRAL PORTION OF LEFT BREAST IN FEMALE, ESTROGEN RECEPTOR POSITIVE: ICD-10-CM

## 2024-06-25 DIAGNOSIS — M81.0 OSTEOPOROSIS, UNSPECIFIED OSTEOPOROSIS TYPE, UNSPECIFIED PATHOLOGICAL FRACTURE PRESENCE: ICD-10-CM

## 2024-06-25 PROCEDURE — 71046 X-RAY EXAM CHEST 2 VIEWS: CPT | Mod: TC

## 2024-06-25 PROCEDURE — 77080 DXA BONE DENSITY AXIAL: CPT | Mod: TC

## 2024-06-25 PROCEDURE — 77080 DXA BONE DENSITY AXIAL: CPT | Mod: 26,,, | Performed by: RADIOLOGY

## 2024-06-25 PROCEDURE — 71046 X-RAY EXAM CHEST 2 VIEWS: CPT | Mod: 26,,, | Performed by: RADIOLOGY

## 2024-07-01 ENCOUNTER — TELEPHONE (OUTPATIENT)
Dept: HEMATOLOGY/ONCOLOGY | Facility: CLINIC | Age: 64
End: 2024-07-01
Payer: COMMERCIAL

## 2024-07-03 ENCOUNTER — TELEPHONE (OUTPATIENT)
Dept: HEMATOLOGY/ONCOLOGY | Facility: CLINIC | Age: 64
End: 2024-07-03
Payer: COMMERCIAL

## 2024-08-23 ENCOUNTER — HOSPITAL ENCOUNTER (OUTPATIENT)
Dept: RADIOLOGY | Facility: HOSPITAL | Age: 64
Discharge: HOME | End: 2024-08-23
Attending: OBSTETRICS & GYNECOLOGY
Payer: COMMERCIAL

## 2024-08-23 ENCOUNTER — OFFICE VISIT (OUTPATIENT)
Dept: OBSTETRICS AND GYNECOLOGY | Facility: CLINIC | Age: 64
End: 2024-08-23
Payer: COMMERCIAL

## 2024-08-23 VITALS
WEIGHT: 200.63 LBS | BODY MASS INDEX: 31.42 KG/M2 | SYSTOLIC BLOOD PRESSURE: 110 MMHG | DIASTOLIC BLOOD PRESSURE: 62 MMHG

## 2024-08-23 DIAGNOSIS — N95.0 POST-MENOPAUSE BLEEDING: Primary | ICD-10-CM

## 2024-08-23 DIAGNOSIS — D05.12 DUCTAL CARCINOMA IN SITU (DCIS) OF LEFT BREAST: ICD-10-CM

## 2024-08-23 DIAGNOSIS — N95.0 POST-MENOPAUSE BLEEDING: ICD-10-CM

## 2024-08-23 DIAGNOSIS — Z12.4 ENCOUNTER FOR SCREENING FOR MALIGNANT NEOPLASM OF CERVIX: ICD-10-CM

## 2024-08-23 PROCEDURE — 76830 TRANSVAGINAL US NON-OB: CPT | Mod: 26,,, | Performed by: RADIOLOGY

## 2024-08-23 PROCEDURE — 99204 OFFICE O/P NEW MOD 45 MIN: CPT | Mod: S$GLB,,, | Performed by: OBSTETRICS & GYNECOLOGY

## 2024-08-23 PROCEDURE — 76856 US EXAM PELVIC COMPLETE: CPT | Mod: 26,,, | Performed by: RADIOLOGY

## 2024-08-23 PROCEDURE — 76830 TRANSVAGINAL US NON-OB: CPT | Mod: TC,PN

## 2024-08-23 PROCEDURE — 99999 PR PBB SHADOW E&M-EST. PATIENT-LVL III: CPT | Mod: PBBFAC,,, | Performed by: OBSTETRICS & GYNECOLOGY

## 2024-08-23 NOTE — PROGRESS NOTES
"Chief Complaint   Patient presents with    Establish Care    Vaginal Bleeding     Started on Monday as small amount of red blood and today its clear.       History of Present Illness   64 y.o. WF   patient presents today for  bleeding.  Last exam 2019 post dx with breast cancer.  On Arimidex for 5 yrs and stopped 3 mos ago.  No pain    Past medical and surgical history reviewed.   I have reviewed the patient's medical history in detail and updated the computerized patient record.    Review of patient's allergies indicates:   Allergen Reactions    Ciprofloxacin Anaphylaxis     paralysis    Pcn [penicillins] Anaphylaxis     paralysis    Tramadol Anaphylaxis    Eggplant Blisters    Eggs [egg derived]      Inside of mouth peel      Hydrocodone Nausea Only     "causes a migraine"    Morphine Nausea Only     "causes a migraine"    Oxycodone Nausea Only     "causes a migraine"    Tomato (solanum lycopersicum) Blisters         Review of Systems -   GEN ROS: negative  Breast ROS: negative for breast lumps  Genito-Urinary ROS:  bleed      Physical Examination:  /62   Wt 91 kg (200 lb 9.9 oz)   BMI 31.42 kg/m²      OBGyn Exam   Abd: non tender, no rebound, no guarding, no organomegaly  V,v: no lesions  Cervix: no lesions, pap, atrophic  Uterus: nssp  Adnexa: no masses, non tender  Assessment:    1. Post-menopause bleeding  US Pelvis Comp with Transvag NON-OB (xpd      2. Ductal carcinoma in situ (DCIS) of left breast        Stopped Arimidex 3 mos ago    Plan:  Pap  Sono and ov 2 weeks  Patient informed will be contacted with results within 2 weeks. Encouraged to please call back or email if she has not heard from us by then.          "

## 2024-08-26 ENCOUNTER — OFFICE VISIT (OUTPATIENT)
Dept: FAMILY MEDICINE | Facility: CLINIC | Age: 64
End: 2024-08-26
Payer: COMMERCIAL

## 2024-08-26 VITALS
SYSTOLIC BLOOD PRESSURE: 118 MMHG | DIASTOLIC BLOOD PRESSURE: 78 MMHG | RESPIRATION RATE: 16 BRPM | TEMPERATURE: 98 F | HEART RATE: 64 BPM | BODY MASS INDEX: 31.11 KG/M2 | OXYGEN SATURATION: 97 % | WEIGHT: 198.19 LBS | HEIGHT: 67 IN

## 2024-08-26 DIAGNOSIS — R53.83 FATIGUE, UNSPECIFIED TYPE: ICD-10-CM

## 2024-08-26 PROCEDURE — 99999 PR PBB SHADOW E&M-EST. PATIENT-LVL III: CPT | Mod: PBBFAC,,, | Performed by: FAMILY MEDICINE

## 2024-08-26 PROCEDURE — 99397 PER PM REEVAL EST PAT 65+ YR: CPT | Mod: S$GLB,,, | Performed by: FAMILY MEDICINE

## 2024-08-26 RX ORDER — SEMAGLUTIDE 0.5 MG/.5ML
0.5 INJECTION, SOLUTION SUBCUTANEOUS WEEKLY
Qty: 2 ML | Refills: 1 | Status: SHIPPED | OUTPATIENT
Start: 2024-08-26 | End: 2024-10-25

## 2024-08-26 NOTE — PROGRESS NOTES
Subjective:   Patient ID: Caryn Toledo is a 64 y.o. female     Chief Complaint:Annual Exam      Here for checkup      Review of Systems   Constitutional:  Negative for chills and fever.   HENT:  Negative for sore throat and trouble swallowing.    Respiratory:  Negative for cough and shortness of breath.    Cardiovascular:  Negative for chest pain and leg swelling.   Gastrointestinal:  Negative for abdominal distention and abdominal pain.   Genitourinary:  Negative for dysuria and flank pain.   Musculoskeletal:  Negative for arthralgias and back pain.   Skin:  Negative for color change and pallor.   Neurological:  Negative for weakness and headaches.   Psychiatric/Behavioral:  Negative for agitation and confusion.      Past Medical History:   Diagnosis Date    Arthritis     Breast cancer     left    Cancer     breast; skin    High cholesterol     History of fracture of nose     PONV (postoperative nausea and vomiting)     Sarcoma of thigh, left     Wears glasses      Past Surgical History:   Procedure Laterality Date    APPENDECTOMY      AXILLARY NODE DISSECTION Left 3/14/2019    Procedure: LYMPHADENECTOMY, AXILLARY;  Surgeon: Didier Garcia MD;  Location: [facility];  Service: General;  Laterality: Left;    BREAST LUMPECTOMY      BREAST SURGERY Left 02/11/2019    lumpectomy    CLOSED REDUCTION OF FRACTURE OF NASAL BONE N/A 10/8/2021    Procedure: CLOSED REDUCTION, FRACTURE, NASAL BONE;  Surgeon: Daniele Castano MD;  Location: [facility];  Service: ENT;  Laterality: N/A;    COLONOSCOPY      ENDOSCOPIC NASAL SEPTOPLASTY N/A 10/8/2021    Procedure: SEPTOPLASTY, NOSE, ENDOSCOPIC;  Surgeon: Daniele Castano MD;  Location: [facility];  Service: ENT;  Laterality: N/A;    JOINT REPLACEMENT Left 10/30/2018    knee    KNEE ARTHROPLASTY Left 10/30/2018    Procedure: ARTHROPLASTY, KNEE;  Surgeon: Ata Paula MD;  Location: [facility];  Service: Orthopedics;  Laterality: Left;    KNEE ARTHROSCOPY W/  MENISCECTOMY Right 10/30/2018    Procedure: ARTHROSCOPY, KNEE, WITH MENISCECTOMY;  Surgeon: Ata Paula MD;  Location: [facility] OR;  Service: Orthopedics;  Laterality: Right;    KNEE SURGERY Left 1980s    NASAL STENOSIS REPAIR N/A 10/8/2021    Procedure: REPAIR, STENOSIS, NOSE, VESTIBULE;  Surgeon: Daniele Castano MD;  Location: [facility];  Service: ENT;  Laterality: N/A;    REVISION OF KNEE ARTHROPLASTY Left 12/3/2019    Procedure: REVISION, ARTHROPLASTY, KNEE;  Surgeon: Ata Paula MD;  Location: [facility] OR;  Service: Orthopedics;  Laterality: Left;  [facility]- [facility] Clifford  (notified 11/27/19 )    SENTINEL LYMPH NODE BIOPSY Left 3/14/2019    Procedure: BIOPSY, LYMPH NODE, SENTINEL;  Surgeon: Didier Garcia MD;  Location: [facility];  Service: General;  Laterality: Left;  left axillary sentinel node biopsy, possible axillary dissection    SURGICAL FRACTURE OF NASAL TURBINATES N/A 10/8/2021    Procedure: SURGICAL FRACTURE, NASAL TURBINATE;  Surgeon: Daniele Castano MD;  Location: [facility];  Service: ENT;  Laterality: N/A;    SURGICAL REMOVAL OF MASS OF LOWER EXTREMITY Left 11/19/2020    Procedure: EXCISION, MASS, LOWER EXTREMITY;  Surgeon: Didier Garcia MD;  Location: [facility];  Service: General;  Laterality: Left;  left hip mass removal ,right anterior thigh mass removal     SURGICAL REMOVAL OF NASAL TURBINATE N/A 10/8/2021    Procedure: EXCISION, NASAL TURBINATE;  Surgeon: Daniele Castano MD;  Location: [facility];  Service: ENT;  Laterality: N/A;    TUMOR REMOVAL Left     thigh     Objective:     Vitals:    08/26/24 0842   BP: 118/78   Pulse: 64   Resp: 16   Temp: 97.8 °F (36.6 °C)     Body mass index is 31.04 kg/m².  Physical Exam  Vitals and nursing note reviewed.   Constitutional:       Appearance: She is well-developed.   HENT:      Head: Normocephalic and atraumatic.   Eyes:      General: No scleral icterus.     Conjunctiva/sclera: Conjunctivae normal.   Cardiovascular:      Heart  sounds: No murmur heard.  Pulmonary:      Effort: Pulmonary effort is normal. No respiratory distress.   Musculoskeletal:         General: No deformity. Normal range of motion.      Cervical back: Normal range of motion and neck supple.   Skin:     Coloration: Skin is not pale.      Findings: No rash.   Neurological:      Mental Status: She is alert and oriented to person, place, and time.   Psychiatric:         Behavior: Behavior normal.         Thought Content: Thought content normal.         Judgment: Judgment normal.       Assessment:     1. BMI 30.0-30.9,adult    2. Fatigue, unspecified type      Plan:   BMI 30.0-30.9,adult  -     Comprehensive Metabolic Panel; Future; Expected date: 08/26/2024  -     Hemoglobin A1C; Future; Expected date: 08/26/2024  -     Lipid Panel; Future; Expected date: 08/26/2024  -     CBC Auto Differential; Future; Expected date: 08/26/2024  -     semaglutide, weight loss, (WEGOVY) 0.5 mg/0.5 mL PnIj; Inject 0.5 mg into the skin once a week.  Dispense: 2 mL; Refill: 1    Fatigue, unspecified type  -     TSH; Future; Expected date: 08/26/2024  -     FERRITIN; Future; Expected date: 08/26/2024        Terrance Nolen MD  08/26/2024    Portions of this note have been dictated with KIRK Bonner.

## 2024-09-04 ENCOUNTER — OFFICE VISIT (OUTPATIENT)
Dept: OBSTETRICS AND GYNECOLOGY | Facility: CLINIC | Age: 64
End: 2024-09-04
Payer: COMMERCIAL

## 2024-09-04 VITALS — SYSTOLIC BLOOD PRESSURE: 116 MMHG | WEIGHT: 196 LBS | DIASTOLIC BLOOD PRESSURE: 70 MMHG | BODY MASS INDEX: 30.7 KG/M2

## 2024-09-04 DIAGNOSIS — N95.0 POST-MENOPAUSE BLEEDING: Primary | ICD-10-CM

## 2024-09-04 DIAGNOSIS — N89.8 VAGINAL DISCHARGE: ICD-10-CM

## 2024-09-04 DIAGNOSIS — D05.12 DUCTAL CARCINOMA IN SITU (DCIS) OF LEFT BREAST: ICD-10-CM

## 2024-09-04 DIAGNOSIS — R39.9 URINARY SYMPTOM OR SIGN: ICD-10-CM

## 2024-09-04 PROCEDURE — 99999 PR PBB SHADOW E&M-EST. PATIENT-LVL III: CPT | Mod: PBBFAC,,, | Performed by: OBSTETRICS & GYNECOLOGY

## 2024-09-04 PROCEDURE — 87086 URINE CULTURE/COLONY COUNT: CPT | Performed by: OBSTETRICS & GYNECOLOGY

## 2024-09-04 PROCEDURE — 58100 BIOPSY OF UTERUS LINING: CPT | Mod: S$GLB,,, | Performed by: OBSTETRICS & GYNECOLOGY

## 2024-09-04 PROCEDURE — 99499 UNLISTED E&M SERVICE: CPT | Mod: S$GLB,,, | Performed by: OBSTETRICS & GYNECOLOGY

## 2024-09-04 PROCEDURE — 81514 NFCT DS BV&VAGINITIS DNA ALG: CPT | Performed by: OBSTETRICS & GYNECOLOGY

## 2024-09-04 NOTE — PROGRESS NOTES
Endometrial biopsy:  9/4/2024   Patient was prepped and draped in the usual fashion after verbal consent was obtained. Cervix was cleaned with betadine and endometrial pipelle was passed to a depth of 4 cm without difficulty with mucous return of tissue.  Additional instrumentation needed: tenaculum   Tolerated well, specimen sent to pathology.    Patient informed will be contacted with results within 2 weeks. Encouraged to please call back or email if she has not heard from us by the     c/o vaginal disch and incont and left hip pain  Vaginosis test  Ua and culture  If endometritis tx with abx, but feel low risk for that

## 2024-09-05 ENCOUNTER — TELEPHONE (OUTPATIENT)
Dept: FAMILY MEDICINE | Facility: CLINIC | Age: 64
End: 2024-09-05
Payer: COMMERCIAL

## 2024-09-06 LAB
BACTERIAL VAGINOSIS DNA: NEGATIVE
CANDIDA GLABRATA DNA: NEGATIVE
CANDIDA KRUSEI DNA: NEGATIVE
CANDIDA RRNA VAG QL PROBE: NEGATIVE
T VAGINALIS RRNA GENITAL QL PROBE: NEGATIVE

## 2024-09-07 LAB — BACTERIA UR CULT: NO GROWTH

## 2024-11-19 ENCOUNTER — OFFICE VISIT (OUTPATIENT)
Dept: ORTHOPEDICS | Facility: CLINIC | Age: 64
End: 2024-11-19
Payer: COMMERCIAL

## 2024-11-19 ENCOUNTER — HOSPITAL ENCOUNTER (OUTPATIENT)
Dept: RADIOLOGY | Facility: HOSPITAL | Age: 64
Discharge: HOME | End: 2024-11-19
Attending: FAMILY MEDICINE
Payer: COMMERCIAL

## 2024-11-19 VITALS — WEIGHT: 196 LBS | HEIGHT: 67 IN | BODY MASS INDEX: 30.76 KG/M2

## 2024-11-19 DIAGNOSIS — M18.11 PRIMARY OSTEOARTHRITIS OF FIRST CARPOMETACARPAL JOINT OF RIGHT HAND: ICD-10-CM

## 2024-11-19 DIAGNOSIS — M25.512 LEFT SHOULDER PAIN, UNSPECIFIED CHRONICITY: Primary | ICD-10-CM

## 2024-11-19 DIAGNOSIS — M25.512 LEFT SHOULDER PAIN, UNSPECIFIED CHRONICITY: ICD-10-CM

## 2024-11-19 DIAGNOSIS — G89.29 CHRONIC LEFT SHOULDER PAIN: ICD-10-CM

## 2024-11-19 DIAGNOSIS — M12.812 ROTATOR CUFF ARTHROPATHY OF LEFT SHOULDER: ICD-10-CM

## 2024-11-19 DIAGNOSIS — M79.641 PAIN OF RIGHT HAND: ICD-10-CM

## 2024-11-19 DIAGNOSIS — M25.512 CHRONIC LEFT SHOULDER PAIN: ICD-10-CM

## 2024-11-19 DIAGNOSIS — M79.18 RHOMBOID MYALGIA: Primary | ICD-10-CM

## 2024-11-19 PROCEDURE — 99999 PR PBB SHADOW E&M-EST. PATIENT-LVL III: CPT | Mod: PBBFAC,,, | Performed by: FAMILY MEDICINE

## 2024-11-19 PROCEDURE — 73030 X-RAY EXAM OF SHOULDER: CPT | Mod: 26,LT,, | Performed by: RADIOLOGY

## 2024-11-19 PROCEDURE — 73030 X-RAY EXAM OF SHOULDER: CPT | Mod: TC,PO,LT

## 2024-11-19 RX ORDER — METHYLPREDNISOLONE ACETATE 80 MG/ML
80 INJECTION, SUSPENSION INTRA-ARTICULAR; INTRALESIONAL; INTRAMUSCULAR; SOFT TISSUE
Status: DISCONTINUED | OUTPATIENT
Start: 2024-11-19 | End: 2024-11-19 | Stop reason: HOSPADM

## 2024-11-19 RX ORDER — TRIAMCINOLONE ACETONIDE 40 MG/ML
40 INJECTION, SUSPENSION INTRA-ARTICULAR; INTRAMUSCULAR
Status: DISCONTINUED | OUTPATIENT
Start: 2024-11-19 | End: 2024-11-19 | Stop reason: HOSPADM

## 2024-11-19 RX ADMIN — METHYLPREDNISOLONE ACETATE 80 MG: 80 INJECTION, SUSPENSION INTRA-ARTICULAR; INTRALESIONAL; INTRAMUSCULAR; SOFT TISSUE at 04:11

## 2024-11-19 RX ADMIN — TRIAMCINOLONE ACETONIDE 40 MG: 40 INJECTION, SUSPENSION INTRA-ARTICULAR; INTRAMUSCULAR at 04:11

## 2024-11-19 NOTE — PROGRESS NOTES
Subjective     Patient ID: Caryn Toledo is a 64 y.o. female.    Chief Complaint: Pain of the Left Shoulder (Pt here w/ c/o L) shoulder pain. Pt denies any recent fall/injury. Pt states her last injection lasted for approx 2 yrs. ) and Pain of the Right Hand (Pt here for R) thumb pain. Pt is requesting a shot in her thumb. Last injection R) thumb CMC 1/29/24; Kenalog. 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  We last saw her for this issue January 29, 2024.  Gave her an injection into her CMC.  Responded very well.  Had almost complete pain relief for six months.  She ignored the gradual pain returned but has now gotten to the point where she is unable to open jars.  With Thanksgiving in the holidays coming up she is worried about being able to prepare food for her family.  Requesting another injection in her thumb today.      She brings up a new issue of a left-sided shoulder pain.  She had an issue with a left shoulder about two years ago.  Received a subacromial injection which did relieve her pain.  Reports pain in his shoulder mainly in the area of the medial shoulder blade near the area of the rhomboid muscle.  She states that it feels like a tight spasm like pain.  It is made worse with moving her arm overhead but not off to the side.  Feels like she has a stabbing not sensation in the back of her shoulder blade.  She has been using THC gummies for pain which does seem to help.  She is wondering if an injection will help that today as well.    Pain  Pertinent negatives include no chest pain, chills, congestion, coughing, headaches, rash or sore throat.       Review of Systems   Constitutional: Negative for chills and decreased appetite.   HENT:  Negative for congestion and sore throat.    Eyes:  Negative for blurred vision.   Cardiovascular:  Negative for chest pain, dyspnea on exertion and palpitations.   Respiratory:  Negative for cough and shortness of  breath.    Skin:  Negative for rash.   Neurological:  Negative for difficulty with concentration, disturbances in coordination and headaches.   Psychiatric/Behavioral:  Negative for altered mental status, depression, hallucinations, memory loss and suicidal ideas.           Objective     General    Nursing note and vitals reviewed.  Constitutional: She is oriented to person, place, and time. She appears well-developed and well-nourished.   HENT:   Nose: Nose normal.   Eyes: EOM are normal. Pupils are equal, round, and reactive to light.   Neck: Neck supple.   Cardiovascular:  Normal rate.            Pulmonary/Chest: Effort normal.   Abdominal: Soft.   Neurological: She is alert and oriented to person, place, and time. She has normal reflexes.   Psychiatric: She has a normal mood and affect. Her behavior is normal. Judgment and thought content normal.             Right Hand/Wrist Exam     Pain   Wrist - The patient exhibits pain of the CMC.    Swelling   Wrist - The patient is swollen on the CMC.    Tenderness   The patient is tender to palpation of the dorsal area.    Range of Motion     Wrist   Extension:  normal   Flexion:  normal   Pronation:  normal   Supination:  normal     Tests   Tinel's sign (median nerve): negative    Atrophy   Thenar:  negative  Hypothenar:  negative    Other     Neuorologic Exam    Median Distribution: normal  Ulnar Distribution: normal  Radial Distribution: normal      Right Elbow Exam     Tests   Tinel's sign (cubital tunnel): negative      Right Shoulder Exam   Right shoulder exam is normal.    Inspection/Observation   Swelling: absent  Bruising: absent  Scars: absent  Deformity: absent  Scapular Winging: absent  Scapular Dyskinesia: negative    Range of Motion   Active abduction:  normal   Passive abduction:  normal   Extension:  normal   Forward Flexion:  normal   Forward Elevation: normal  Adduction: normal    Tests & Signs   Coy test: negative  Impingement: negative  Active  Compression Test (Lorain's Sign): negative  Speed's Test: negative  Anterior Drawer Test: 0   Posterior Drawer Test: 0    Other   Sensation: normal    Left Shoulder Exam     Inspection/Observation   Swelling: absent  Bruising: absent  Scars: absent  Deformity: absent  Scapular Winging: absent  Scapular Dyskinesia: negative    Tenderness   The patient is tender to palpation of the medial scapula (Rhomboid).    Range of Motion   Active abduction:  150   Forward Flexion:  150     Tests & Signs   Coy test: negative  Impingement: positive  Active Compression test (Lorain's Sign): negative  Speed's Test: negative  Anterior Drawer Test: 0  Posterior Drawer Test: 0    Other   Sensation: normal       Muscle Strength   Right Upper Extremity   Shoulder Abduction: 5/5   Shoulder Internal Rotation: 5/5   Shoulder External Rotation: 5/5   Supraspinatus: 5/5   Subscapularis: 5/5   Biceps: 5/5   Wrist extension: 5/5   Wrist flexion: 5/5   : 5/5   Thumb - APB: 5/5  Thumb - FPL: 5/5  Left Upper Extremity  Shoulder Abduction: 5/5   Shoulder Internal Rotation: 5/5   Shoulder External Rotation: 5/5   Supraspinatus: 5/5   Subscapularis: 5/5   Biceps: 5/5     Vascular Exam     Right Pulses      Radial:                    2+      Left Pulses      Radial:                    2+      Capillary Refill  Right Hand: normal capillary refill        Physical Exam  Vitals and nursing note reviewed.   Constitutional:       Appearance: She is well-developed and well-nourished.   HENT:      Nose: Nose normal.   Eyes:      Extraocular Movements: EOM normal.      Pupils: Pupils are equal, round, and reactive to light.   Cardiovascular:      Rate and Rhythm: Normal rate.      Pulses:           Radial pulses are 2+ on the right side and 2+ on the left side.   Pulmonary:      Effort: Pulmonary effort is normal.   Abdominal:      Palpations: Abdomen is soft.   Musculoskeletal:      Right shoulder: No swelling or deformity.      Left shoulder: No  "swelling or deformity.      Right hand: Normal sensation of the ulnar distribution, median distribution and radial distribution. Normal capillary refill.      Cervical back: Normal range of motion and neck supple.   Neurological:      Mental Status: She is alert and oriented to person, place, and time.      Deep Tendon Reflexes: Reflexes are normal and symmetric.   Psychiatric:         Mood and Affect: Mood and affect normal.         Behavior: Behavior normal.         Thought Content: Thought content normal.         Judgment: Judgment normal.   X-ray images ordered obtained interpreted by me.  They show some early degenerative changes of the glenohumeral joint and AC joint similar in appearance to x-ray done two years ago.  No evidence of soft tissue injury.  No acute fractures present.          Assessment and Plan     Encounter Diagnoses   Name Primary?    Pain of right hand     Primary osteoarthritis of first carpometacarpal joint of right hand     Chronic left shoulder pain     Rotator cuff arthropathy of left shoulder     Rhomboid myalgia Yes         Caryn Tapia" was seen today for pain and pain.    Diagnoses and all orders for this visit:    Rhomboid myalgia    Pain of right hand    Primary osteoarthritis of first carpometacarpal joint of right hand    Chronic left shoulder pain    Rotator cuff arthropathy of left shoulder    Gave her another injection into her right CMC joint today.  I offered her an injection into the left-sided rhomboid for trigger point injection and she agreed with this.  Recommend she follow up here as needed should pain fail to improve or returned.    Small Joint Aspiration/Injection: R thumb CMC    Date/Time: 11/19/2024 4:20 PM    Performed by: Mynor Perrin MD  Authorized by: Mynor Perrin MD    Consent Done?:  Yes (Verbal)  Indications:  Arthritis and pain  Site marked: the procedure site was marked    Timeout: prior to procedure the correct patient, procedure, and site was " verified    Prep: patient was prepped and draped in usual sterile fashion      Local anesthesia used?: Yes    Local anesthetic:  Topical anesthetic  Location:  Thumb  Site:  R thumb CMC  Ultrasonic guidance for needle placement?: No    Needle size:  25 G  Approach:  Dorsal  Medications:  40 mg triamcinolone acetonide 40 mg/mL  Patient tolerance:  Patient tolerated the procedure well with no immediate complications  Trigger Point Injection    Performed by: Mynor Perrin MD  Authorized by: Mynor Perrin MD    Rhomboid:  Left    Consent Done?:  Yes (Verbal)    Pre-Procedure:   Indications:  Pain and pain relief  Site marked: the procedure site was marked     Timeout: prior to procedure the correct patient, procedure, and site was verified    Prep: patient was prepped and draped in usual sterile fashion     Local anesthesia used?: Yes    Local anesthetic:  Lidocaine 1% without epinephrine and topical anesthetic  Anesthetic total (ml):  2    Medications: 80 mg methylPREDNISolone acetate 80 mg/mL

## 2025-05-06 DIAGNOSIS — M79.641 PAIN OF RIGHT HAND: Primary | ICD-10-CM

## 2025-05-07 ENCOUNTER — HOSPITAL ENCOUNTER (OUTPATIENT)
Dept: RADIOLOGY | Facility: HOSPITAL | Age: 65
Discharge: HOME | End: 2025-05-07
Attending: FAMILY MEDICINE
Payer: COMMERCIAL

## 2025-05-07 ENCOUNTER — OFFICE VISIT (OUTPATIENT)
Dept: ORTHOPEDICS | Facility: CLINIC | Age: 65
End: 2025-05-07
Payer: COMMERCIAL

## 2025-05-07 VITALS — BODY MASS INDEX: 30.76 KG/M2 | HEIGHT: 67 IN | WEIGHT: 196 LBS

## 2025-05-07 DIAGNOSIS — M25.512 LEFT SHOULDER PAIN, UNSPECIFIED CHRONICITY: ICD-10-CM

## 2025-05-07 DIAGNOSIS — M79.641 PAIN OF RIGHT HAND: Primary | ICD-10-CM

## 2025-05-07 DIAGNOSIS — M79.18 RHOMBOID MYALGIA: ICD-10-CM

## 2025-05-07 DIAGNOSIS — M18.11 PRIMARY OSTEOARTHRITIS OF FIRST CARPOMETACARPAL JOINT OF RIGHT HAND: ICD-10-CM

## 2025-05-07 DIAGNOSIS — M79.641 PAIN OF RIGHT HAND: ICD-10-CM

## 2025-05-07 PROCEDURE — 73130 X-RAY EXAM OF HAND: CPT | Mod: TC,PO,RT

## 2025-05-07 PROCEDURE — 99999 PR PBB SHADOW E&M-EST. PATIENT-LVL III: CPT | Mod: PBBFAC,,, | Performed by: FAMILY MEDICINE

## 2025-05-07 PROCEDURE — 73130 X-RAY EXAM OF HAND: CPT | Mod: 26,RT,, | Performed by: RADIOLOGY

## 2025-05-07 RX ADMIN — TRIAMCINOLONE ACETONIDE 40 MG: 40 INJECTION, SUSPENSION INTRA-ARTICULAR; INTRAMUSCULAR at 09:05

## 2025-05-08 RX ORDER — TRIAMCINOLONE ACETONIDE 40 MG/ML
40 INJECTION, SUSPENSION INTRA-ARTICULAR; INTRAMUSCULAR
Status: DISCONTINUED | OUTPATIENT
Start: 2025-05-07 | End: 2025-05-08 | Stop reason: HOSPADM

## 2025-07-14 ENCOUNTER — OFFICE VISIT (OUTPATIENT)
Dept: FAMILY MEDICINE | Facility: CLINIC | Age: 65
End: 2025-07-14
Payer: COMMERCIAL

## 2025-07-14 VITALS
WEIGHT: 164.88 LBS | HEIGHT: 67 IN | DIASTOLIC BLOOD PRESSURE: 70 MMHG | HEART RATE: 62 BPM | BODY MASS INDEX: 25.88 KG/M2 | OXYGEN SATURATION: 98 % | RESPIRATION RATE: 18 BRPM | SYSTOLIC BLOOD PRESSURE: 110 MMHG | TEMPERATURE: 98 F

## 2025-07-14 DIAGNOSIS — R07.81 RIB PAIN ON LEFT SIDE: ICD-10-CM

## 2025-07-14 DIAGNOSIS — Z12.31 BREAST CANCER SCREENING BY MAMMOGRAM: ICD-10-CM

## 2025-07-14 DIAGNOSIS — M81.0 AGE-RELATED OSTEOPOROSIS WITHOUT CURRENT PATHOLOGICAL FRACTURE: ICD-10-CM

## 2025-07-14 DIAGNOSIS — M54.6 CHRONIC LEFT-SIDED THORACIC BACK PAIN: ICD-10-CM

## 2025-07-14 DIAGNOSIS — G89.29 CHRONIC LEFT-SIDED THORACIC BACK PAIN: ICD-10-CM

## 2025-07-14 DIAGNOSIS — K21.9 GASTROESOPHAGEAL REFLUX DISEASE, UNSPECIFIED WHETHER ESOPHAGITIS PRESENT: ICD-10-CM

## 2025-07-14 DIAGNOSIS — Z12.11 COLON CANCER SCREENING: ICD-10-CM

## 2025-07-14 DIAGNOSIS — Z85.831 HISTORY OF SARCOMA OF SOFT TISSUE: ICD-10-CM

## 2025-07-14 DIAGNOSIS — Z00.00 ANNUAL PHYSICAL EXAM: Primary | ICD-10-CM

## 2025-07-14 PROCEDURE — 99999 PR PBB SHADOW E&M-EST. PATIENT-LVL IV: CPT | Mod: PBBFAC,,,

## 2025-07-14 PROCEDURE — 99397 PER PM REEVAL EST PAT 65+ YR: CPT | Mod: 25,S$GLB,,

## 2025-07-14 RX ORDER — TIRZEPATIDE 2.5 MG/.5ML
2.5 INJECTION, SOLUTION SUBCUTANEOUS
Qty: 4 ML | Refills: 2 | Status: SHIPPED | OUTPATIENT
Start: 2025-07-14 | End: 2025-10-12

## 2025-07-14 NOTE — PROGRESS NOTES
Subjective:       Patient ID: Caryn Toledo is a 65 y.o. female.    Chief Complaint: Annual Exam      History of Present Illness    CHIEF COMPLAINT:  Ms. Toledo presents today for annual visit with concerns about medication prescription and rib pain.    CHRONIC LEFT RIB PAIN:  She reports constant left-sided posterior rib pain that originated from a fall two years ago. Pain is located below the shoulder blade and bra strap area, radiating outward as the day progresses. Pain becomes excruciating by end of day and is exacerbated by pressure (wearing bra/swimsuit), sitting on hard surfaces, and lying on left side. Movement does not worsen the pain. Associated symptoms include occasional nausea. She denies chest pain, fever, chills, sweats, shortness of breath, and cough. Initial x-ray after fall showed no fractures. She received two steroid injections into the rhomboid trigger point from orthopedist - first provided relief, second was ineffective. Currently manages pain with ibuprofen 200-400mg daily.    GERD:  She reports reflux symptoms that began after [place] last year. Symptoms occur frequently but not daily, managed with Tums. Reflux triggers are not consistently identified, though she avoids acidic, spicy foods, and caffeine. She suspects back pain may be positionally related to reflux occurrence. Symptoms previously worsened with frequent international travel but have improved since snf in July.    WEIGHT MANAGEMENT:  She reports significant weight loss of approximately 55-60 lbs over the past year with GLP-1 medication (currently at 2.5mg dose). She denies any medication side effects and is motivated to maintain her current progress.    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 Requests refill of Prolia. Previously managed by her  "oncologist.             Past Medical History:   Diagnosis Date    Arthritis     Breast cancer     left    Cancer     breast; skin    High cholesterol     History of fracture of nose     PONV (postoperative nausea and vomiting)     Sarcoma of thigh, left     Wears glasses        Review of patient's allergies indicates:   Allergen Reactions    Ciprofloxacin Anaphylaxis     paralysis    Pcn [penicillins] Anaphylaxis     paralysis    Tramadol Anaphylaxis    Eggplant Blisters    Eggs [egg derived]      Inside of mouth peel      Hydrocodone Nausea Only     "causes a migraine"    Morphine Nausea Only     "causes a migraine"    Oxycodone Nausea Only     "causes a migraine"    Tomato (solanum lycopersicum) Blisters       Current Medications[1]    Review of Systems   Constitutional:  Negative for appetite change, chills, diaphoresis, fatigue and fever.   Respiratory:  Negative for cough and shortness of breath.    Cardiovascular:  Negative for chest pain and palpitations.   Gastrointestinal:  Positive for nausea. Negative for abdominal pain, blood in stool, constipation, diarrhea and vomiting.   Musculoskeletal:  Positive for back pain.        +rib pain        Objective:        Physical Exam  Vitals reviewed.   Constitutional:       Appearance: Normal appearance.   HENT:      Head: Normocephalic and atraumatic.      Right Ear: Tympanic membrane and ear canal normal.      Left Ear: Tympanic membrane and ear canal normal.      Mouth/Throat:      Pharynx: No posterior oropharyngeal erythema.   Eyes:      Conjunctiva/sclera: Conjunctivae normal.   Cardiovascular:      Rate and Rhythm: Normal rate and regular rhythm.      Heart sounds: Normal heart sounds.   Pulmonary:      Effort: Pulmonary effort is normal.      Breath sounds: Normal breath sounds.   Abdominal:      General: Bowel sounds are normal.      Palpations: Abdomen is soft.      Tenderness: There is no abdominal tenderness. Negative signs include Vargas's sign. " "  Musculoskeletal:         General: Normal range of motion.        Arms:       Cervical back: Normal range of motion.   Lymphadenopathy:      Cervical: No cervical adenopathy.      Upper Body:      Right upper body: No supraclavicular adenopathy.      Left upper body: No supraclavicular adenopathy.   Skin:     General: Skin is warm and dry.   Neurological:      Mental Status: She is alert and oriented to person, place, and time.   Psychiatric:         Behavior: Behavior normal.           Visit Vitals  /70 (BP Location: Right arm, Patient Position: Sitting)   Pulse 62   Temp 97.5 °F (36.4 °C) (Oral)   Resp 18   Ht 5' 7" (1.702 m)   Wt 74.8 kg (164 lb 14.5 oz)   SpO2 98%   BMI 25.83 kg/m²      Assessment:         1. Annual physical exam    2. BMI 25.0-25.9,adult    3. Rib pain on left side    4. Chronic left-sided thoracic back pain    5. History of sarcoma of soft tissue    6. Breast cancer screening by mammogram    7. Colon cancer screening    8. Gastroesophageal reflux disease, unspecified whether esophagitis present    9. Age-related osteoporosis without current pathological fracture        Plan:         Assessment & Plan    Evaluated persistent left-sided rib pain, considering differential diagnoses including musculoskeletal issues, referred pain, and potential malignancy given history of sarcoma.  Ordered CT chest to thoroughly evaluate ribs, lungs, and surrounding structures, ruling out concerning pathology.  Considered gallbladder as potential source of referred pain between shoulder blades, though presentation is atypical.  Assessed reflux symptoms, potentially related to GLP-1 agonist use or positional changes due to pain.  Initiated tirzepatide (Mounjaro) for weight management to replace compounded GLP-1 agonist, as patient has been using it successfully         Caryn "Kate" was seen today for annual exam.    Diagnoses and all orders for this visit:    Annual physical exam       -     Annual labs " as ordered by PCP.    BMI 25.0-25.9,adult  -     tirzepatide (MOUNJARO) 2.5 mg/0.5 mL PnIj; Inject 2.5 mg into the skin every 7 days.    Rib pain on left side  -     CT Chest Without Contrast; Future    Chronic left-sided thoracic back pain  -     CT Chest Without Contrast; Future    History of sarcoma of soft tissue  -     CT Chest Without Contrast; Future    Breast cancer screening by mammogram  -     Mammo Digital Screening Bilat w/ Kannan (XPD); Future    Colon cancer screening  -     Cologuard Screening (Multitarget Stool DNA); Future  -     Cologuard Screening (Multitarget Stool DNA)    Gastroesophageal reflux disease, unspecified whether esophagitis present        -     Discussed lifestyle modifications to help reduce acid reflux, such as avoiding large meals, avoid eating within 2-3 hours of bedtime/ lying down after eating, elevate head of bed if nocturnal symptoms are present.         -     Try to avoid known foods which trigger reflux symptoms, such as chocolate, caffeine, citrus, alcohol, & tomato products    Age-related osteoporosis without current pathological fracture         -    Will send message to Dr. Nolen for further direction on resuming Prolia.          Family Medicine Physician Assistant     Future Appointments       Date Provider Specialty Appt Notes    7/21/2025  Radiology Breast cancer screening by mammogram    8/27/2025 Terrance Nolen MD Family Medicine Annual             Tests to Keep You Healthy    Mammogram: SCHEDULED  Colon Cancer Screening: ORDERED       I spent a total of 40 minutes on the day of the visit.This includes face to face time and non-face to face time preparing to see the patient (eg, review of tests), obtaining and/or reviewing separately obtained history, documenting clinical information in the electronic or other health record, independently interpreting results and communicating results to the patient/family/caregiver, or care coordinator.    This note was  generated with the assistance of ambient listening technology. Verbal consent was obtained by the patient and accompanying visitor(s) for the recording of patient appointment to facilitate this note. I attest to having reviewed and edited the generated note for accuracy, though some syntax or spelling errors may persist. Please contact the author of this note for any clarification.         [1]   Current Outpatient Medications:     multivitamin capsule, Take 1 capsule by mouth once daily., Disp: , Rfl:     denosumab (PROLIA) 60 mg/mL Syrg, Inject 60 mg into the skin every 6 (six) months.  (Patient not taking: Reported on 7/14/2025), Disp: , Rfl:     tirzepatide (MOUNJARO) 2.5 mg/0.5 mL PnIj, Inject 2.5 mg into the skin every 7 days., Disp: 4 mL, Rfl: 2  No current facility-administered medications for this visit.    Facility-Administered Medications Ordered in Other Visits:     electrolyte-S (ISOLYTE), , Intravenous, Continuous, Damon Romero MD, Stopped at 11/19/20 1405    lactated ringers infusion, , Intravenous, Continuous, Damon Romero MD    lidocaine (PF) 10 mg/ml (1%) injection 10 mg, 1 mL, Intradermal, Once, Damon Romeor MD

## 2025-07-16 ENCOUNTER — TELEPHONE (OUTPATIENT)
Dept: FAMILY MEDICINE | Facility: CLINIC | Age: 65
End: 2025-07-16
Payer: COMMERCIAL

## 2025-07-16 NOTE — TELEPHONE ENCOUNTER
Electronic prior authorization for Mounjaro 2.5 mg attempted, received the following message from insurance:    Closed   7/15/2025  5:48 PM  Close reason: Other   Note from payer: Could not find matching patient.   Payer: [employer] Patient's Payer Case ID: [id number]    [phone]  Waiting for Payer Response   7/15/2025  5:48 PM  Deadline to reply: July 14, 2026  3:12 PM User: Connie Alvarez LPN   Payer: [employer] Patient's Payer Case ID: [id number]    [phone]  Eligibility Check Patient Demographic Failure Retry   We were unable to find a patient eligible for coverage with the previously submitted demographic information. Please re-enter the patient's information.  Waiting for Payer Response   7/14/2025  3:12 PM  User: Katarzyna Shelley PA-C   Payer: [employer] Patient's Payer    [phone]      Call placed to insurance.   advised unable to locate patient in their system.  Call placed to patient to confirm pharmacy insurance information. No answer received. Left voicemail requesting return call to office. Voicemail indicated date & time message left for patient.  Also sent [facility] portal message to patient at this time.

## 2025-07-21 ENCOUNTER — HOSPITAL ENCOUNTER (OUTPATIENT)
Dept: RADIOLOGY | Facility: CLINIC | Age: 65
Discharge: HOME | End: 2025-07-21
Payer: COMMERCIAL

## 2025-07-21 ENCOUNTER — HOSPITAL ENCOUNTER (OUTPATIENT)
Dept: RADIOLOGY | Facility: HOSPITAL | Age: 65
Discharge: HOME | End: 2025-07-21
Payer: COMMERCIAL

## 2025-07-21 DIAGNOSIS — R07.81 RIB PAIN ON LEFT SIDE: ICD-10-CM

## 2025-07-21 DIAGNOSIS — M54.6 CHRONIC LEFT-SIDED THORACIC BACK PAIN: ICD-10-CM

## 2025-07-21 DIAGNOSIS — Z12.31 BREAST CANCER SCREENING BY MAMMOGRAM: ICD-10-CM

## 2025-07-21 DIAGNOSIS — Z85.831 HISTORY OF SARCOMA OF SOFT TISSUE: ICD-10-CM

## 2025-07-21 DIAGNOSIS — G89.29 CHRONIC LEFT-SIDED THORACIC BACK PAIN: ICD-10-CM

## 2025-07-21 PROCEDURE — 71250 CT THORAX DX C-: CPT | Mod: TC

## 2025-07-21 PROCEDURE — 77063 BREAST TOMOSYNTHESIS BI: CPT | Mod: TC,PO

## 2025-07-21 PROCEDURE — 77067 SCR MAMMO BI INCL CAD: CPT | Mod: 26,,, | Performed by: RADIOLOGY

## 2025-07-21 PROCEDURE — 77063 BREAST TOMOSYNTHESIS BI: CPT | Mod: 26,,, | Performed by: RADIOLOGY

## 2025-07-21 PROCEDURE — 71250 CT THORAX DX C-: CPT | Mod: 26,,, | Performed by: RADIOLOGY

## 2025-08-11 ENCOUNTER — OFFICE VISIT (OUTPATIENT)
Dept: ORTHOPEDICS | Facility: CLINIC | Age: 65
End: 2025-08-11
Payer: COMMERCIAL

## 2025-08-11 ENCOUNTER — HOSPITAL ENCOUNTER (OUTPATIENT)
Dept: RADIOLOGY | Facility: HOSPITAL | Age: 65
Discharge: HOME | End: 2025-08-11
Attending: ORTHOPAEDIC SURGERY
Payer: COMMERCIAL

## 2025-08-11 VITALS — RESPIRATION RATE: 16 BRPM

## 2025-08-11 DIAGNOSIS — M25.561 PAIN IN BOTH KNEES, UNSPECIFIED CHRONICITY: ICD-10-CM

## 2025-08-11 DIAGNOSIS — Z96.652 STATUS POST TOTAL LEFT KNEE REPLACEMENT: Primary | ICD-10-CM

## 2025-08-11 DIAGNOSIS — M25.361 INSTABILITY OF BOTH KNEE JOINTS: ICD-10-CM

## 2025-08-11 DIAGNOSIS — M25.562 PAIN IN BOTH KNEES, UNSPECIFIED CHRONICITY: Primary | ICD-10-CM

## 2025-08-11 DIAGNOSIS — M25.562 PAIN IN BOTH KNEES, UNSPECIFIED CHRONICITY: ICD-10-CM

## 2025-08-11 DIAGNOSIS — M25.362 INSTABILITY OF BOTH KNEE JOINTS: ICD-10-CM

## 2025-08-11 DIAGNOSIS — M25.561 PAIN IN BOTH KNEES, UNSPECIFIED CHRONICITY: Primary | ICD-10-CM

## 2025-08-11 PROCEDURE — 73564 X-RAY EXAM KNEE 4 OR MORE: CPT | Mod: 26,50,, | Performed by: RADIOLOGY

## 2025-08-11 PROCEDURE — 73564 X-RAY EXAM KNEE 4 OR MORE: CPT | Mod: TC,50,PO

## 2025-08-11 PROCEDURE — 99999 PR PBB SHADOW E&M-EST. PATIENT-LVL II: CPT | Mod: PBBFAC,,, | Performed by: ORTHOPAEDIC SURGERY

## 2025-08-11 PROCEDURE — 99213 OFFICE O/P EST LOW 20 MIN: CPT | Mod: S$GLB,,, | Performed by: ORTHOPAEDIC SURGERY

## 2025-08-18 ENCOUNTER — INFUSION (OUTPATIENT)
Dept: INFUSION THERAPY | Facility: HOSPITAL | Age: 65
End: 2025-08-18
Attending: FAMILY MEDICINE
Payer: COMMERCIAL

## 2025-08-18 VITALS
DIASTOLIC BLOOD PRESSURE: 64 MMHG | HEART RATE: 64 BPM | TEMPERATURE: 98 F | SYSTOLIC BLOOD PRESSURE: 100 MMHG | OXYGEN SATURATION: 96 % | RESPIRATION RATE: 18 BRPM | HEIGHT: 67 IN | WEIGHT: 158.69 LBS | BODY MASS INDEX: 24.91 KG/M2

## 2025-08-18 DIAGNOSIS — M81.0 AGE-RELATED OSTEOPOROSIS WITHOUT CURRENT PATHOLOGICAL FRACTURE: Primary | ICD-10-CM

## 2025-08-18 PROCEDURE — 63600175 PHARM REV CODE 636 W HCPCS: Mod: JZ,TB | Performed by: FAMILY MEDICINE

## 2025-08-18 PROCEDURE — 96372 THER/PROPH/DIAG INJ SC/IM: CPT

## 2025-08-18 RX ADMIN — DENOSUMAB 60 MG: 60 INJECTION SUBCUTANEOUS at 10:08

## 2025-09-03 ENCOUNTER — TELEPHONE (OUTPATIENT)
Dept: REHABILITATION | Facility: HOSPITAL | Age: 65
End: 2025-09-03
Payer: COMMERCIAL

## (undated) DEVICE — PACK BASIC

## (undated) DEVICE — SUT MONOCRYL 4-0 PS-2

## (undated) DEVICE — SOL 9P NACL IRR PIC IL

## (undated) DEVICE — SEE MEDLINE ITEM 157131

## (undated) DEVICE — GLOVE PROTEXIS PI SYN SURG 7.5

## (undated) DEVICE — PAD ABD 8X10 STERILE

## (undated) DEVICE — TOGA FLYTE PEEL AWAY XLARGE

## (undated) DEVICE — BANDAGE ESMARK 6X12

## (undated) DEVICE — UNDERGLOVES BIOGEL PI SIZE 7.5

## (undated) DEVICE — PACK ARTHROSCOPY W/ISO BAC

## (undated) DEVICE — SPONGE GAUZE 16PLY 4X4

## (undated) DEVICE — SPONGE SUPER KERLIX 6X6.75IN

## (undated) DEVICE — SEE MEDLINE ITEM 152622

## (undated) DEVICE — BANDAGE ACE ELASTIC 6"

## (undated) DEVICE — STRAP OR TABLE 5IN X 72IN

## (undated) DEVICE — APPLICATOR CHLORAPREP ORN 26ML

## (undated) DEVICE — GLOVE SURG ULTRA TOUCH 7.5

## (undated) DEVICE — PAD CAST SPECIALIST STRL 6

## (undated) DEVICE — DRESSING TRANS 4X4 TEGADERM

## (undated) DEVICE — GLOVE SURG BIOGEL LATEX SZ 7.5

## (undated) DEVICE — SEE MEDLINE ITEM 107746

## (undated) DEVICE — GUIDE TOTAL KNEE ALIGN CUSTOM

## (undated) DEVICE — NDL SAFETY 21G X 1 1/2 ECLPSE

## (undated) DEVICE — SYR MEDALLION WHITE 1ML

## (undated) DEVICE — SEE MEDLINE ITEM 146313

## (undated) DEVICE — SLEEVE SCD EXPRESS CALF MEDIUM

## (undated) DEVICE — DRESSING N ADH OIL EMUL 3X8 3S

## (undated) DEVICE — DRAPE INCISE IOBAN 2 23X17IN

## (undated) DEVICE — ELECTRODE REM PLYHSV RETURN 9

## (undated) DEVICE — ELECTRODE BLADE W/SLEEVE 2.75

## (undated) DEVICE — CHLORAPREP W TINT 26ML APPL

## (undated) DEVICE — COOLER ICEMAN COLD THERAPY

## (undated) DEVICE — DRESSING AQUACEL AG 3.5X10IN

## (undated) DEVICE — GLOVE SURG PLYSPHRN ORTH SZ7.5

## (undated) DEVICE — STAPLER SKIN ROTATING HEAD

## (undated) DEVICE — SUT STRATAFIX PDS 2 CT-1 14IN

## (undated) DEVICE — DRAPE STERI U-SHAPED 47X51IN

## (undated) DEVICE — DRAPE LAP TIBURON 77X122IN

## (undated) DEVICE — GLOVE SURG PLYSPHRN ORTH SZ 8

## (undated) DEVICE — SEE MEDLINE ITEM 152487

## (undated) DEVICE — PACK LOWER EXTREMITY

## (undated) DEVICE — GLOVE SURG ULTRA TOUCH 8

## (undated) DEVICE — TOURNIQUET SB QC DP 34X4IN

## (undated) DEVICE — SPACESUIT TOGA T5 ZIPPER PEEL

## (undated) DEVICE — SEE MEDLINE ITEM 146231

## (undated) DEVICE — SEE MEDLINE ITEM 146271

## (undated) DEVICE — SEE MEDLINE ITEM 153151

## (undated) DEVICE — WRAP PROTECTIVE LEG POS STRL

## (undated) DEVICE — TUBE SUCTION YANKAUER HI CAP

## (undated) DEVICE — SUT 2/0 30IN SILK BLK BRAI

## (undated) DEVICE — CLOSURE SKIN STERI STRIP 1/2X4

## (undated) DEVICE — BLADE SURG CARBON STEEL SZ11

## (undated) DEVICE — CONTAINER SPECIMEN STRL 4OZ

## (undated) DEVICE — DRAPE STERI INSTRUMENT 1018

## (undated) DEVICE — SEE MEDLINE ITEM 152530

## (undated) DEVICE — BLADE SAG DUAL 18MMX1.27MMX90M

## (undated) DEVICE — SEE MEDLINE ITEM 157216

## (undated) DEVICE — SOL IRR NACL .9% 3000ML

## (undated) DEVICE — LINER SUCTION 3000CC

## (undated) DEVICE — SEE MEDLINE ITEM 157117

## (undated) DEVICE — CANISTER SUCTION MEDI-VAC 12L

## (undated) DEVICE — SUT STRATAFIX PDS 1 CTX 18IN

## (undated) DEVICE — DRAIN SINGLE ROUND 3/16 15F

## (undated) DEVICE — NDL BOX COUNTER

## (undated) DEVICE — GLOVE PROTEXIS PI CLASSIC 7.5

## (undated) DEVICE — BOWL CEMENT MIXING HIVAC 7

## (undated) DEVICE — GAUZE SPONGE BULKEE 6X6.75IN

## (undated) DEVICE — ELECTRODE BLADE INSULATED 1 IN

## (undated) DEVICE — CAUTERY BOVIE PENCIL

## (undated) DEVICE — KNEE IMMB UNV FOAM/MESH 20IN

## (undated) DEVICE — SUT 3-0 VICRYL / LIGAPACK

## (undated) DEVICE — INTERPULSE SET

## (undated) DEVICE — DRESSING N ADH OIL EMUL 3X3

## (undated) DEVICE — PADDING CAST SPECIALIST 6X4YD

## (undated) DEVICE — NEOGUARD COVER 4X30CM STERILE

## (undated) DEVICE — DRESSING GAUZE 6PLY 4X4

## (undated) DEVICE — SUT 3-0 VICRYL / SH (J416)

## (undated) DEVICE — PACK CUSTOM UNIV BASIN SLI

## (undated) DEVICE — DRESSING MEPILEX BORDR AG 4X10

## (undated) DEVICE — SCRUB 10% POVIDONE IODINE 4OZ

## (undated) DEVICE — SEE MEDLINE ITEM 157128

## (undated) DEVICE — SUT MONOCRYL 3-0 SH U/D

## (undated) DEVICE — SEE MEDLINE ITEM 146417

## (undated) DEVICE — SYR 50CC LL

## (undated) DEVICE — OSTEOTOME HARRIS FLEX 12X93

## (undated) DEVICE — GOWN TOGA SYS PEELWY ZIP 2 XL

## (undated) DEVICE — TUBING FLOSTEADY ARTHROSCOPY

## (undated) DEVICE — SYS LABEL CORRECT MED

## (undated) DEVICE — SYS CLSR DERMABOND PRINEO 22CM

## (undated) DEVICE — ALCOHOL 70% ISOP RUBBING 4OZ

## (undated) DEVICE — SYR 30CC LUER LOCK

## (undated) DEVICE — Device

## (undated) DEVICE — SUT 2/0 18IN COATED VICRYL

## (undated) DEVICE — GAUZE SPONGE 4X4 12PLY

## (undated) DEVICE — SEE MEDLINE ITEM 157166

## (undated) DEVICE — NDL SPINAL 18GX3.5 SPINOCAN

## (undated) DEVICE — SUT ETHILON 3-0 PS2 18 BLK

## (undated) DEVICE — SEE MEDLINE ITEM 146292

## (undated) DEVICE — OSTEOTOME HARRIS FLEX 8X80

## (undated) DEVICE — NDL 22GA X1 1/2 REG BEVEL

## (undated) DEVICE — DRESSING AQUACEL AG ADV 3.5X12

## (undated) DEVICE — DRAPE MINOR PROCEDURE

## (undated) DEVICE — GLOVE PROTEXIS PI SYN SURG 8.0

## (undated) DEVICE — GOWN B1 X-LG X-LONG

## (undated) DEVICE — NDL HYPODERMIC BLUNT 18G 1.5IN

## (undated) DEVICE — BLADE SURG CARBON STEEL #10

## (undated) DEVICE — SYR 10CC LUER LOCK

## (undated) DEVICE — ADHESIVE DERMABOND ADVANCED

## (undated) DEVICE — SUT VICRYL 2-0 CT-1 18 CR

## (undated) DEVICE — BLADE SAG DUAL 25MM

## (undated) DEVICE — DRAIN EVACUATOR 400CC 1/4IN

## (undated) DEVICE — BLADE RECIP SINGLE SIDED

## (undated) DEVICE — PIN PINABALL HEADLESS
Type: IMPLANTABLE DEVICE | Site: KNEE | Status: NON-FUNCTIONAL
Removed: 2019-12-03

## (undated) DEVICE — COVER SURG LIGHT HANDLE

## (undated) DEVICE — SET DECANTER MEDICHOICE

## (undated) DEVICE — CUBE COLD THERAPY POLAR CARE

## (undated) DEVICE — SUT 2-0 12-18IN SILK

## (undated) DEVICE — CUTTER AGGRESSIVE PLUS 3.5MM

## (undated) DEVICE — MANIFOLD 4 PORT

## (undated) DEVICE — DRESSING N ADH OIL EMUL 3X8

## (undated) DEVICE — PRESSURIZER HI VAC HIP KIT